# Patient Record
Sex: MALE | Race: WHITE | NOT HISPANIC OR LATINO | Employment: FULL TIME | URBAN - METROPOLITAN AREA
[De-identification: names, ages, dates, MRNs, and addresses within clinical notes are randomized per-mention and may not be internally consistent; named-entity substitution may affect disease eponyms.]

---

## 2017-05-03 NOTE — PROGRESS NOTES
SUBJECTIVE:                                                    Dong Joseph is a 62 year old male who presents to clinic today for the following health issues: on Niacin, fish oil daniel once in a while IBS, headache on and off      Hyperlipidemia Follow-Up      Rate your low fat/cholesterol diet?: good    Taking statin?  Yes, no muscle aches from statin    Other lipid medications/supplements?:  none       Amount of exercise or physical activity: 6-7 days/week for an average of 45-60 minutes    Problems taking medications regularly: No    Medication side effects: none    Diet: low salt and low fat/cholesterol      PROBLEMS TO ADD ON...    Problem list and histories reviewed & adjusted, as indicated.  Additional history: as documented    Patient Active Problem List   Diagnosis     Pure hypercholesterolemia     Impotence of organic origin     HYPERLIPIDEMIA LDL GOAL <130     Past Surgical History:   Procedure Laterality Date     COLONOSCOPY  12/22/2010    COLONOSCOPY performed by DONNA WHITEHEAD at  GI     HERNIA REPAIR, INGUINAL RT/LT  1979-80     HERNIA REPAIR, INGUINAL RT/LT  03/30/2006    Recurrent left inguinal hernia.       Social History   Substance Use Topics     Smoking status: Former Smoker     Packs/day: 0.50     Types: Cigarettes     Smokeless tobacco: Never Used      Comment: Quit in 1978     Alcohol use 1.0 oz/week     Family History   Problem Relation Age of Onset     C.A.D. Mother      age 70s     CEREBROVASCULAR DISEASE Father      at 68 yo     Lipids Brother      Lipids Brother          Current Outpatient Prescriptions   Medication Sig Dispense Refill     atorvastatin (LIPITOR) 20 MG tablet Take 1 tablet (20 mg) by mouth daily 90 tablet 3     aspirin 81 MG tablet Take 1 tablet by mouth daily. 90 tablet 3     [DISCONTINUED] atorvastatin (LIPITOR) 20 MG tablet Take 1 tablet (20 mg) by mouth daily 90 tablet 3     VIAGRA 100 MG OR TABS 1/2 to 1 TABLET AS NEEDED (Patient not taking: No sig reported)  "12 4     No Known Allergies    Reviewed and updated as needed this visit by clinical staff       Reviewed and updated as needed this visit by Provider         ROS:  Constitutional, HEENT, cardiovascular, pulmonary, gi and gu systems are negative, except as otherwise noted.    OBJECTIVE:                                                    /70  Pulse 72  Temp 97.4  F (36.3  C) (Oral)  Resp 12  Ht 5' 8.5\" (1.74 m)  Wt 157 lb (71.2 kg)  BMI 23.52 kg/m2  Body mass index is 23.52 kg/(m^2).  GENERAL: healthy, alert and no distress  EYES: Eyes grossly normal to inspection, PERRL and conjunctivae and sclerae normal  NECK: no adenopathy, no asymmetry, masses, or scars and thyroid normal to palpation  RESP: lungs clear to auscultation - no rales, rhonchi or wheezes  CV: regular rate and rhythm, normal S1 S2, no S3 or S4, no murmur, click or rub, no peripheral edema and peripheral pulses strong  ABDOMEN: soft, nontender, no hepatosplenomegaly, no masses and bowel sounds normal  MS: no gross musculoskeletal defects noted, no edema  NEURO: Normal strength and tone, mentation intact and speech normal  BACK: no CVA tenderness, no paralumbar tenderness  PSYCH: mentation appears normal, affect normal/bright    Diagnostic Test Results:  Results for orders placed or performed in visit on 05/08/17   Lipid panel reflex to direct LDL   Result Value Ref Range    Cholesterol 149 <200 mg/dL    Triglycerides 105 <150 mg/dL    HDL Cholesterol 44 >39 mg/dL    LDL Cholesterol Calculated 84 <100 mg/dL    Non HDL Cholesterol 105 <130 mg/dL   Glucose   Result Value Ref Range    Glucose 105 (H) 70 - 99 mg/dL        ASSESSMENT/PLAN:                                                    Hyperlipidemia; controlled   Plan:  No changes in the patient's current treatment plan            ICD-10-CM    1. History of elevated glucose Z86.39 Glucose   2. Pure hypercholesterolemia E78.00 Lipid panel reflex to direct LDL     atorvastatin (LIPITOR) 20 MG " "tablet   3. Hyperlipidemia LDL goal <130 E78.5 Lipid panel reflex to direct LDL     atorvastatin (LIPITOR) 20 MG tablet     Glucose       MEDICATIONS:  Continue current medications without change  FUTURE APPOINTMENTS:       - Follow-up visit in   SELF MONITORING:  Work on weight loss  Regular exercise  See Patient Instructions    The patient understood the rational for the diagnosis and treatment plan. All questions were answered to best of my ability and the patient's satisfaction.  I have reviewed all pertinent investigations including labs and imaging including outside records if relevant. See patient instructions.     Patient Instructions     Controlling Your Cholesterol  Cholesterol is a waxy substance. It travels in your blood through the blood vessels. When you have high cholesterol, it builds up in the walls of the blood vessels. This makes the vessels narrower. Blood flow decreases. You are then at greater risk for having a heart attack or a stroke.  Good and bad cholesterol  Lipids are fats. Blood is mostly water. Fat and water don't mix. So our bodies need lipoproteins (lipids inside a protein shell) to carry the lipids. The protein shell carries its lipids through the bloodstream. There are two main kinds of lipoproteins:    LDL (low-density lipoprotein) is known as \"bad cholesterol.\" It mainly carries cholesterol. It delivers this cholesterol to body cells. Excess LDL cholesterol will build up in artery walls. This increases your risk for heart disease and stroke.    HDL (high-density lipoprotein) is known as \"good cholesterol.\" This protein shell collects excess cholesterol that LDLs have left behind on blood vessel walls. That's why high levels of HDL cholesterol can decrease your risk of heart disease and stroke.  Controlling cholesterol levels  Total cholesterol includes LDL and HDL cholesterol, as well as other fats in the bloodstream. If your total cholesterol is high, follow the steps below to " help lower your total cholesterol level:    Eat less unhealthy fat:    Cut back on saturated fats and trans (also called hydrogenated) fats by selecting lean cuts of meat, low-fat dairy, and using oils instead of solid fats. Limit baked goods, processed meats, and fried foods. A diet that s high in these fats increases your bad cholesterol. It's not enough to just cut back on foods containing cholesterol.    Eat about 2 servings of fish per week. Most fish contain omega-3 fatty acids. These help lower blood cholesterol.    Eat more whole grains and soluble fiber (such as oat bran). These lower overall cholesterol.    Be active:    Choose an activity you enjoy. Walking, swimming, and riding a bike are some good ways to be active.    Start at a level where you feel comfortable. Increase your time and pace a little each week.    Work up to 40 minutes of moderate to high intensity physical activity at least 3 to 4 days per week.    Remember, some activity is better than none.    If you haven't been exercising regularly, start slowly. Check with your doctor to make sure the exercise plan is right for you.    Quit smoking. Quitting smoking can improve your lipid levels. It also lowers your risk for heart disease and stroke.    Weight management. If you are overweight or obese, your health care provider will work with you to lose weight and lower your BMI (body mass index) to a normal or near-normal level. Making diet changes and increasing physical activity can help.    Take medication as directed. Many people need medication to get their LDL levels to a safe level. Medication to lower cholesterol levels is effective and safe. (But taking medication is not a substitute for exercise or watching your diet!) Your doctor can tell you whether you might benefit from a cholesterol-lowering medication.    8270-6843 [employer]. [address] [place] [address], [place] [address]. All rights reserved. This information is not  intended as a substitute for professional medical care. Always follow your healthcare professional's instructions.        Understanding Food and Cholesterol  Having a high cholesterol level puts you at risk for heart disease and other health problems. What you eat has a big effect on your body s cholesterol level. Eating certain foods can raise your cholesterol. Other foods can help you lower it. Watching what you eat can help you get your cholesterol level under control.    Know high cholesterol foods  Foods high in fat, cholesterol, or both:    Fatty beef, cold cuts, ortega, sausage    Creamy sauces and fatty gravies    Cookies, donuts, muffins, and pastries    Fried foods    Egg yolks    Shortening, butter, coconut oil, palm oil, hydrogenated oils (read labels)    High-fat dairy products, such as whole milk, cheese, and ice cream  Better choices:    Lean beef, skinless white-meat poultry, fish    Tomato sauce, vegetable puree    Dried fruit, bagels, bread with jam    Baked, broiled, steamed, or roasted foods    Egg whites or egg substitute    Tub margarine, canola oil, and olive oil in moderation    Low-fat or nonfat dairy products, such as 1% or fat-free milk, reduced-fat cheese, and nonfat frozen yogurt  Use fiber to help control cholesterol  Foods high in fiber can help you keep your cholesterol down. Good sources of fiber are:    Oats, barley    Whole grains    Beans    Vegetables    Cornmeal, popcorn    Berries, apples, other fruits    2015-2681 [employer]. [address] [place] [address], [place] [address]. All rights reserved. This information is not intended as a substitute for professional medical care. Always follow your healthcare professional's instructions.        Lifestyle Changes to Control Cholesterol  Diet, exercise, weight management, quitting smoking, stress management, and taking your medications right can help you control your cholesterol.    Diet  Your health care provider will give you  information on changes to your diet you may need to make, based on your situation. Your provider may recommend that you see a registered dietitian for help with diet changes. Changes may include:    Reducing the amount of fat and cholesterol in your meals    Reducing the amount of sodium (salt) in your food, especially if you have high blood pressure    Eating more fresh vegetables and fruits    Eating lean proteins, such as fish, poultry, and legumes (beans and peas), and eating less red meat and processed meats    Using low-fat dairy products    Using vegetable and nut oils in limited amounts    Limiting how many sweets and processed foods like chips, cookies, and baked goods that you eat   Exercise  Regular exercise is a good way to help your body control cholesterol. Regular exercise has many benefits. It can:    Raise your good cholesterol.    Help lower your bad cholesterol.    Let blood flow better through your body.    Give more oxygen to your muscles and tissues.    Help you manage your weight.  Your health care provider may recommend that you get more physical activity if you haven't been active. Depending on your situation, your provider may recommend that you get moderate to vigorous physical activity for at least 40 minutes each day and for at least 3 to 4 days each week. A few examples of moderate to vigorous activity include:    Walking at a brisk pace, about 3 to 4 miles per hour    Jogging or running    Swimming or water aerobics    Hiking    Dancing    Martial arts    Tennis    Riding a bicycle or stationary bike    Dancing  Weight management  If you are overweight or obese, your health care provider will work with you to help you lose weight and lower your BMI (body mass index) to a normal or near-normal level. Making diet changes and getting more physical activity can help.    Quitting smoking  Smoking and other tobacco use can raise cholesterol and make it harder to control. Quitting is tough.  But millions of people have given up tobacco for good. You can quit, too! Think about some of the reasons below to quit smoking. Do any of them make you think twice about your smoking habit?  Stop smoking because it:    Keeps your cholesterol high, even if you make all the other changes you re supposed to.    Damages your body, especially your heart, lungs, and blood vessels.    Makes you more likely to have a heart attack (also known as acute myocardial infarction, or AMI), stroke, or cancer.    Stains your teeth and makes your skin, clothes, and breath smell bad.    Costs a lot of money.  Stress   Learn stress-management techniques to help you deal with stress in your home and work life.   Making the most of medications  Healthy eating and exercise are a good start to keeping your cholesterol down. But you may need some extra help from medication. If your doctor prescribes medication, be sure to take it exactly as directed. Remember:    Tell your doctor about all other medications you take, including vitamins and herbs.    Tell your doctor if you have any side effects after starting to take a medication. Examples of side effects to watch for include: muscle aches, weakness, blurred vision, rust-colored urine, yellowing of eyes or skin (jaundice), or headache.    Don t skip a dose or stop taking your medication because you feel better or because your cholesterol numbers go down. Never stop taking your medication unless your doctor has told you it s OK.    7784-4956 [employer]. [address] [place] [address]. All rights reserved. This information is not intended as a substitute for professional medical care. Always follow your healthcare professional's instructions.            Tanner Khan MD  [facility]

## 2017-05-08 ENCOUNTER — OFFICE VISIT (OUTPATIENT)
Dept: FAMILY MEDICINE | Facility: OTHER | Age: 62
End: 2017-05-08
Payer: COMMERCIAL

## 2017-05-08 VITALS
RESPIRATION RATE: 12 BRPM | SYSTOLIC BLOOD PRESSURE: 126 MMHG | WEIGHT: 157 LBS | HEIGHT: 69 IN | HEART RATE: 72 BPM | TEMPERATURE: 97.4 F | DIASTOLIC BLOOD PRESSURE: 70 MMHG | BODY MASS INDEX: 23.25 KG/M2

## 2017-05-08 DIAGNOSIS — E78.00 PURE HYPERCHOLESTEROLEMIA: Primary | ICD-10-CM

## 2017-05-08 DIAGNOSIS — Z86.39 HISTORY OF ELEVATED GLUCOSE: ICD-10-CM

## 2017-05-08 DIAGNOSIS — E78.5 HYPERLIPIDEMIA LDL GOAL <130: ICD-10-CM

## 2017-05-08 LAB
CHOLEST SERPL-MCNC: 149 MG/DL
GLUCOSE SERPL-MCNC: 105 MG/DL (ref 70–99)
HDLC SERPL-MCNC: 44 MG/DL
LDLC SERPL CALC-MCNC: 84 MG/DL
NONHDLC SERPL-MCNC: 105 MG/DL
TRIGL SERPL-MCNC: 105 MG/DL

## 2017-05-08 PROCEDURE — 99213 OFFICE O/P EST LOW 20 MIN: CPT | Performed by: FAMILY MEDICINE

## 2017-05-08 PROCEDURE — 80061 LIPID PANEL: CPT | Performed by: FAMILY MEDICINE

## 2017-05-08 PROCEDURE — 36415 COLL VENOUS BLD VENIPUNCTURE: CPT | Performed by: FAMILY MEDICINE

## 2017-05-08 PROCEDURE — 82947 ASSAY GLUCOSE BLOOD QUANT: CPT | Performed by: FAMILY MEDICINE

## 2017-05-08 RX ORDER — ATORVASTATIN CALCIUM 20 MG/1
20 TABLET, FILM COATED ORAL DAILY
Qty: 90 TABLET | Refills: 3 | Status: SHIPPED | OUTPATIENT
Start: 2017-05-08 | End: 2018-05-14

## 2017-05-08 ASSESSMENT — PAIN SCALES - GENERAL: PAINLEVEL: NO PAIN (0)

## 2017-05-08 NOTE — PATIENT INSTRUCTIONS
"  Controlling Your Cholesterol  Cholesterol is a waxy substance. It travels in your blood through the blood vessels. When you have high cholesterol, it builds up in the walls of the blood vessels. This makes the vessels narrower. Blood flow decreases. You are then at greater risk for having a heart attack or a stroke.  Good and bad cholesterol  Lipids are fats. Blood is mostly water. Fat and water don't mix. So our bodies need lipoproteins (lipids inside a protein shell) to carry the lipids. The protein shell carries its lipids through the bloodstream. There are two main kinds of lipoproteins:    LDL (low-density lipoprotein) is known as \"bad cholesterol.\" It mainly carries cholesterol. It delivers this cholesterol to body cells. Excess LDL cholesterol will build up in artery walls. This increases your risk for heart disease and stroke.    HDL (high-density lipoprotein) is known as \"good cholesterol.\" This protein shell collects excess cholesterol that LDLs have left behind on blood vessel walls. That's why high levels of HDL cholesterol can decrease your risk of heart disease and stroke.  Controlling cholesterol levels  Total cholesterol includes LDL and HDL cholesterol, as well as other fats in the bloodstream. If your total cholesterol is high, follow the steps below to help lower your total cholesterol level:    Eat less unhealthy fat:    Cut back on saturated fats and trans (also called hydrogenated) fats by selecting lean cuts of meat, low-fat dairy, and using oils instead of solid fats. Limit baked goods, processed meats, and fried foods. A diet that s high in these fats increases your bad cholesterol. It's not enough to just cut back on foods containing cholesterol.    Eat about 2 servings of fish per week. Most fish contain omega-3 fatty acids. These help lower blood cholesterol.    Eat more whole grains and soluble fiber (such as oat bran). These lower overall cholesterol.    Be active:    Choose an " activity you enjoy. Walking, swimming, and riding a bike are some good ways to be active.    Start at a level where you feel comfortable. Increase your time and pace a little each week.    Work up to 40 minutes of moderate to high intensity physical activity at least 3 to 4 days per week.    Remember, some activity is better than none.    If you haven't been exercising regularly, start slowly. Check with your doctor to make sure the exercise plan is right for you.    Quit smoking. Quitting smoking can improve your lipid levels. It also lowers your risk for heart disease and stroke.    Weight management. If you are overweight or obese, your health care provider will work with you to lose weight and lower your BMI (body mass index) to a normal or near-normal level. Making diet changes and increasing physical activity can help.    Take medication as directed. Many people need medication to get their LDL levels to a safe level. Medication to lower cholesterol levels is effective and safe. (But taking medication is not a substitute for exercise or watching your diet!) Your doctor can tell you whether you might benefit from a cholesterol-lowering medication.    9844-0164 [employer]. [address] [place] [id number]. All rights reserved. This information is not intended as a substitute for professional medical care. Always follow your healthcare professional's instructions.        Understanding Food and Cholesterol  Having a high cholesterol level puts you at risk for heart disease and other health problems. What you eat has a big effect on your body s cholesterol level. Eating certain foods can raise your cholesterol. Other foods can help you lower it. Watching what you eat can help you get your cholesterol level under control.    Know high cholesterol foods  Foods high in fat, cholesterol, or both:    Fatty beef, cold cuts, ortega, sausage    Creamy sauces and fatty gravies    Cookies, donuts,  muffins, and pastries    Fried foods    Egg yolks    Shortening, butter, coconut oil, palm oil, hydrogenated oils (read labels)    High-fat dairy products, such as whole milk, cheese, and ice cream  Better choices:    Lean beef, skinless white-meat poultry, fish    Tomato sauce, vegetable puree    Dried fruit, bagels, bread with jam    Baked, broiled, steamed, or roasted foods    Egg whites or egg substitute    Tub margarine, canola oil, and olive oil in moderation    Low-fat or nonfat dairy products, such as 1% or fat-free milk, reduced-fat cheese, and nonfat frozen yogurt  Use fiber to help control cholesterol  Foods high in fiber can help you keep your cholesterol down. Good sources of fiber are:    Oats, barley    Whole grains    Beans    Vegetables    Cornmeal, popcorn    Berries, apples, other fruits    [id number] [employer]. [address] [place] [address]. All rights reserved. This information is not intended as a substitute for professional medical care. Always follow your healthcare professional's instructions.        Lifestyle Changes to Control Cholesterol  Diet, exercise, weight management, quitting smoking, stress management, and taking your medications right can help you control your cholesterol.    Diet  Your health care provider will give you information on changes to your diet you may need to make, based on your situation. Your provider may recommend that you see a registered dietitian for help with diet changes. Changes may include:    Reducing the amount of fat and cholesterol in your meals    Reducing the amount of sodium (salt) in your food, especially if you have high blood pressure    Eating more fresh vegetables and fruits    Eating lean proteins, such as fish, poultry, and legumes (beans and peas), and eating less red meat and processed meats    Using low-fat dairy products    Using vegetable and nut oils in limited amounts    Limiting how many sweets and processed  foods like chips, cookies, and baked goods that you eat   Exercise  Regular exercise is a good way to help your body control cholesterol. Regular exercise has many benefits. It can:    Raise your good cholesterol.    Help lower your bad cholesterol.    Let blood flow better through your body.    Give more oxygen to your muscles and tissues.    Help you manage your weight.  Your health care provider may recommend that you get more physical activity if you haven't been active. Depending on your situation, your provider may recommend that you get moderate to vigorous physical activity for at least 40 minutes each day and for at least 3 to 4 days each week. A few examples of moderate to vigorous activity include:    Walking at a brisk pace, about 3 to 4 miles per hour    Jogging or running    Swimming or water aerobics    Hiking    Dancing    Martial arts    Tennis    Riding a bicycle or stationary bike    Dancing  Weight management  If you are overweight or obese, your health care provider will work with you to help you lose weight and lower your BMI (body mass index) to a normal or near-normal level. Making diet changes and getting more physical activity can help.    Quitting smoking  Smoking and other tobacco use can raise cholesterol and make it harder to control. Quitting is tough. But millions of people have given up tobacco for good. You can quit, too! Think about some of the reasons below to quit smoking. Do any of them make you think twice about your smoking habit?  Stop smoking because it:    Keeps your cholesterol high, even if you make all the other changes you re supposed to.    Damages your body, especially your heart, lungs, and blood vessels.    Makes you more likely to have a heart attack (also known as acute myocardial infarction, or AMI), stroke, or cancer.    Stains your teeth and makes your skin, clothes, and breath smell bad.    Costs a lot of money.  Stress   Learn stress-management techniques to  "help you deal with stress in your home and work life.   Making the most of medications  Healthy eating and exercise are a good start to keeping your cholesterol down. But you may need some extra help from medication. If your doctor prescribes medication, be sure to take it exactly as directed. Remember:    Tell your doctor about all other medications you take, including vitamins and herbs.    Tell your doctor if you have any side effects after starting to take a medication. Examples of side effects to watch for include: muscle aches, weakness, blurred vision, rust-colored urine, yellowing of eyes or skin (jaundice), or headache.    Don t skip a dose or stop taking your medication because you feel better or because your cholesterol numbers go down. Never stop taking your medication unless your doctor has told you it s OK.    [id number] [employer]. [address] [place], [place] [id number]. All rights reserved. This information is not intended as a substitute for professional medical care. Always follow your healthcare professional's instructions.        Risk Factors for Heart Disease  A risk factor is something that increases your chance of having heart disease. Heart disease (also called coronary artery disease) involves damage to arteries, blood vessels that carry oxygen-rich blood through your body. Things like smoking or unhealthy cholesterol levels can damage arteries. You can t control some risk factors, such as age and a family history of heart disease. But most, including those listed below, are things you can control.    Unhealthy cholesterol levels  Cholesterol is a fatty substance in your blood. It can build up inside your arteries and block the blood flow to your heart or brain. Your risk of heart disease goes up if you have high levels of LDL (\"bad\") cholesterol or triglycerides (another substance that can build up) You re also at risk if you don't have enough HDL cholesterol (\"good\") " cholesterol that clears the bad cholesterol away.  Smoking  This is the most important risk factor you can change. Smoking damages your arteries. It reduces blood flow to your heart and brain. It greatly increases your risk of heart disease, stroke, lung disease, and cancer. If you smoke, you are two to four times more likely to develop coronary artery disease.  High blood pressure  High blood pressure occurs when blood pushes too hard against artery walls as it passes through the arteries. This damages the artery lining. High blood pressure raises your risk of heart attack, also known as acute myocardial infarction, or AMI, and especially stroke.  Negative emotions  Stress, pent-up anger, and other negative emotions have been linked to heart disease. Over time, these emotions could raise your heart disease risk.  Metabolic syndrome  This is caused by a combination of certain risk factors. It puts you at extra high risk of heart disease, stroke, and diabetes. You have metabolic syndrome if you have three or more of the following: low HDL cholesterol; high triglycerides; high blood pressure; high blood sugar; extra weight around the waist.  Diabetes  Diabetes occurs when you have high levels of sugar (glucose) in your blood. This can damage arteries if not kept under control. Having diabetes also makes you more likely to have a silent heart attack--one without any symptoms.  Excess weight  Excess weight makes other risk factors, such as diabetes, more likely. Excess weight around the waist or stomach increases your heart disease risk the most.  Lack of physical activity  When you re not active, you re more likely to develop diabetes, high blood pressure, abnormal cholesterol levels, and excess weight.     Most people with heart disease have more than one risk factor.     [id number] [employer]. [address] [place] [address], [place] [address]. All rights reserved. This information is not intended as a  substitute for professional medical care. Always follow your healthcare professional's instructions.        Heart Disease Education    The heart beats 60 to 100 times per minute, 24 hours a day. This equals almost 1000,000 times a day. It pumps blood with oxygen and nutrients to the tissues and organs of the body. But the heart is a muscle and needs its own supply of blood. Blood flow to the heart is supplied by the coronary arteries. Coronary artery disease (atherosclerosis) is a result of cholesterol, saturated fat, and calcium deposits (plaques) that build up inside the walls. This causes inflammation within the coronary arteries. These plaques narrow the artery and reduce blood flow to the heart muscle. The reduction in blood flow to the heart muscle decreases oxygen supply to the heart. If the narrowing is significant enough, the oxygen supply to one or more regions of the heart can be temporarily or permanently shut down. This can cause chest pain, and possibly death of heart tissue (heart attack).  Types of chest pain  Angina is the name for pain in the heart muscle. Angina is a warning sign of serious heart disease. When untreated it can lead to a heart attack, also known as acute myocardial infarction, or AMI. Angina occurs when there is not enough blood and oxygen flowing to the heart for the amount of work it is doing. This most often happens during physical exertion, when the heart is working hardest. It is usually relieved by rest or nitroglycerin. Angina may also occur after a large meal when extra blood is sent to the digestive organs and less goes to the heart. In the case of advanced or unstable heart disease, angina can occur at rest or awaken you from sleep. Angina usually lasts from a few minutes up to 20 minutes or more. When treated early, the effects of angina can be reversed without permanent damage to the heart. Angina is a serious condition and needs to be evaluated by a medical  professional immediately.  There are two types of angina -- stable and unstable:    Stable angina usually occurs with a predictable level of activity. Being stable, its character, severity, and occurrence do not change much over time. It usually starts with activity, and resolves with rest or taking your medicine as instructed by your doctor. The symptoms usually do not last long.    Unstable angina changes or gets worse over time. It is different from whatever you are used to. It may feel different or worse, begin without cause, occur with exercise or exertion, wake you up from sleep, and last longer. It may not respond in the same way as it does when you take your usual medicines for an attack. This type of angina can be a warning sign of an impending heart attack.     A heart attack is usually the result of a blood clot that suddenly forms in a coronary artery that has been narrowed with plaque. When this occurs, blood flow may be cut off to a part of the heart muscle, causing the cells to die. This weakens the pumping action of the heart, which affects the delivery of blood to all the other organs in the body including the brain. This damage is not reversible. However, early treatment can limit the amount of damage.  The pain you feel with angina and a heart attack may have a similar quality. However, it is usually different in intensity and duration. Here are some typical descriptions of a heart attack:    It is most often experienced as a squeezing, crushing, pressure-like sensation in the center of the chest.    It is sometimes described as  something heavy sitting on my chest.     It may feel more like a bad case of indigestion.    The pain may spread from the chest to the arm, shoulder, throat or jaw.    Sometimes the pain is not felt in the chest at all, but only in the arm, shoulder, throat or jaw.    There may also be nausea, vomiting, dizziness or light-headedness, sweating and trouble  "breathing.    Palpitations, or your heart beating rapidly    A new, irregular heart beat    Unexplained weakness  You may not be able to tell the difference between \"bad\" angina and a heart attack at home. Seek help if your symptoms are different than usual. Do not be in denial or just try to \"tough it out.\"  Call 911  This is the fastest and safest way to get to the emergency department. The paramedics can also start treatment on the way to the hospital, saving valuable time for your heart.    If the angina gets worse, if it continues, or if it stops and returns, call 911 immediately. Do not delay. You may be having a heart attack.    After you call 911, take a second tablet or spray unless instructed otherwise. When repeating doses, sit down if possible, because it can make you feel lightheaded or dizzy. Wait another 5 minutes. If the angina still does not go away, take a third tablet or spray. Do not take more than 3 tablets or sprays within 15 minutes. Stay on the phone with 911 for further instruction.    Your healthcare provider may give you slightly different instructions than those above. If so, follow them carefully.  Do not wait until symptoms become severe to call 911.  Other reasons to call 911 include:    Trouble breathing    Feeling lightheaded, faint, or dizzy    Rapid heart beat    Slower than usual heart rate compared to your normal    Angina with weakness, dizziness, fainting, heavy sweating, nausea, or vomiting    Extreme drowsiness, confusion    Weakness of an arm or leg or one side of the face    Difficulty with speech or vision  When to seek medical care  Remember, the signs and symptoms of a heart attack are not always like they are on TV. Sometimes they are not so obvious. You may only feel weak, or just not right. If it is not clear or if you have any doubt, call for advice.    Seek help if there is a change in the type of pain, if it feels different, or if your symptoms are mild.    Do not " "drive yourself. Have someone else drive you. If no one can drive, call 911.    Do not delay. Fast diagnosis and treatment can prevent or limit the amount of heart damage during a heart attack.    Do not go to your doctor's office or a clinic as they may not be able to provide all the testing and treatment required for this condition.    If your doctor has given you medicine to take when symptoms occur, take them but don't delay getting help trying to locate medicines.  What happens in the emergency department  The emergency department is connected to your local emergency medical system (EMS) through 911. That's why during a cardiac emergency, calling 911 is the fastest way to get help. The goal of the emergency department is to rapidly screen, evaluate, and treat people.  Once you are there, an electrocardiogram (ECG or heart tracing) will be done. Blood samples may be taken to look for the presence of heart enzymes that leak from damaged heart cells and show if a heart attack is occurring. You will often be evaluated by a heart specialist (cardiologist) who decides the best course of action. In the case of severe angina or early heart attack, and depending on the circumstances, powerful \"clot busting\" medicines can be used to dissolve blood clots in the coronary artery. In other cases, you may be taken to a cardiac catheterization lab. Here, a tiny balloon-tipped catheter is advanced through blood vessels to the heart. There the balloon is inflated pushing open the blood vessel restoring blood flow.  Risk factors for heart disease  Risk factors for heart disease are a combination of genetic and lifestyle. Many risk factors work by either directly or indirectly damaging the blood vessels of the heart, or by increasing the risk of forming blood or cholesterol clots, which then clog up and block the arteries.     Examples of physical lifestyle risk factors:    Cigarette smoking    High blood pressure    High blood " cholesterol    Use of stimulant drugs such as cocaine,  crack,  and amphetamines    Eating a high-fat, high-cholesterol meal    Diabetes     Obesity which increases risk for diabetes and high blood pressure    Lack of regular physical activity     Examples of emotional lifestyle factors:    Chronic high stress levels release stress hormones. These raise blood pressure and cholesterol level and makes blood clot more easily.    Held-in anger, hostile or cynical attitude    Social and emotional isolation, lack of intimacy    Loss of relationship    Depression  Other factors that increase the risk of heart attack that you cannot control :    Age. The older you get beyond 40, the greater is your risk of significant coronary artery disease.    Gender. More men than women get heart disease; but once past menopause, women who are not taking estrogen replacement have the same risk as men for a heart attack.    Family history. If your mother, father, brother or sister has coronary artery disease, your risk of having it is higher than a person your age without this family history.  What can you do to decrease your risk  To reduce your risk of heart disease:    Get regular checkups with your doctor.    Take your medicines for blood pressure, cholesterol or diabetes as directed.    Watch your diet. Eat a heart healthy diet choosing fresh foods, less salt, cholesterol, and fat    Stop smoking. Get help if needed.    Get regular exercise.    Manage stress.    Carry a list of medicines and doses in your wallet.    [id number] [employer]. [address] [place] [address], [place] [address]. All rights reserved. This information is not intended as a substitute for professional medical care. Always follow your healthcare professional's instructions.

## 2017-05-08 NOTE — NURSING NOTE
"Chief Complaint   Patient presents with     Recheck Medication     Panel Management     Lipids       Initial /70  Pulse 72  Temp 97.4  F (36.3  C) (Oral)  Resp 12  Ht 5' 8.5\" (1.74 m)  Wt 157 lb (71.2 kg)  BMI 23.52 kg/m2 Estimated body mass index is 23.52 kg/(m^2) as calculated from the following:    Height as of this encounter: 5' 8.5\" (1.74 m).    Weight as of this encounter: 157 lb (71.2 kg).  Medication Reconciliation: complete     Rashmi Chang CMA (AAMA)      "

## 2017-05-08 NOTE — MR AVS SNAPSHOT
"              After Visit Summary   5/8/2017    Dong Joseph    MRN: [id number]           Patient Information     Date Of Birth          1955        Visit Information        Provider Department      5/8/2017 11:10 AM Tanner Khan MD [facility]        Today's Diagnoses     History of elevated glucose    -  1    Pure hypercholesterolemia        Hyperlipidemia LDL goal <130          Care Instructions      Controlling Your Cholesterol  Cholesterol is a waxy substance. It travels in your blood through the blood vessels. When you have high cholesterol, it builds up in the walls of the blood vessels. This makes the vessels narrower. Blood flow decreases. You are then at greater risk for having a heart attack or a stroke.  Good and bad cholesterol  Lipids are fats. Blood is mostly water. Fat and water don't mix. So our bodies need lipoproteins (lipids inside a protein shell) to carry the lipids. The protein shell carries its lipids through the bloodstream. There are two main kinds of lipoproteins:    LDL (low-density lipoprotein) is known as \"bad cholesterol.\" It mainly carries cholesterol. It delivers this cholesterol to body cells. Excess LDL cholesterol will build up in artery walls. This increases your risk for heart disease and stroke.    HDL (high-density lipoprotein) is known as \"good cholesterol.\" This protein shell collects excess cholesterol that LDLs have left behind on blood vessel walls. That's why high levels of HDL cholesterol can decrease your risk of heart disease and stroke.  Controlling cholesterol levels  Total cholesterol includes LDL and HDL cholesterol, as well as other fats in the bloodstream. If your total cholesterol is high, follow the steps below to help lower your total cholesterol level:    Eat less unhealthy fat:    Cut back on saturated fats and trans (also called hydrogenated) fats by selecting lean cuts of meat, low-fat dairy, and using oils instead of solid " fats. Limit baked goods, processed meats, and fried foods. A diet that s high in these fats increases your bad cholesterol. It's not enough to just cut back on foods containing cholesterol.    Eat about 2 servings of fish per week. Most fish contain omega-3 fatty acids. These help lower blood cholesterol.    Eat more whole grains and soluble fiber (such as oat bran). These lower overall cholesterol.    Be active:    Choose an activity you enjoy. Walking, swimming, and riding a bike are some good ways to be active.    Start at a level where you feel comfortable. Increase your time and pace a little each week.    Work up to 40 minutes of moderate to high intensity physical activity at least 3 to 4 days per week.    Remember, some activity is better than none.    If you haven't been exercising regularly, start slowly. Check with your doctor to make sure the exercise plan is right for you.    Quit smoking. Quitting smoking can improve your lipid levels. It also lowers your risk for heart disease and stroke.    Weight management. If you are overweight or obese, your health care provider will work with you to lose weight and lower your BMI (body mass index) to a normal or near-normal level. Making diet changes and increasing physical activity can help.    Take medication as directed. Many people need medication to get their LDL levels to a safe level. Medication to lower cholesterol levels is effective and safe. (But taking medication is not a substitute for exercise or watching your diet!) Your doctor can tell you whether you might benefit from a cholesterol-lowering medication.    4564-5272 [employer]. [address] [place] [address], [place] [address]. All rights reserved. This information is not intended as a substitute for professional medical care. Always follow your healthcare professional's instructions.        Understanding Food and Cholesterol  Having a high cholesterol level puts you at risk for heart  disease and other health problems. What you eat has a big effect on your body s cholesterol level. Eating certain foods can raise your cholesterol. Other foods can help you lower it. Watching what you eat can help you get your cholesterol level under control.    Know high cholesterol foods  Foods high in fat, cholesterol, or both:    Fatty beef, cold cuts, ortega, sausage    Creamy sauces and fatty gravies    Cookies, donuts, muffins, and pastries    Fried foods    Egg yolks    Shortening, butter, coconut oil, palm oil, hydrogenated oils (read labels)    High-fat dairy products, such as whole milk, cheese, and ice cream  Better choices:    Lean beef, skinless white-meat poultry, fish    Tomato sauce, vegetable puree    Dried fruit, bagels, bread with jam    Baked, broiled, steamed, or roasted foods    Egg whites or egg substitute    Tub margarine, canola oil, and olive oil in moderation    Low-fat or nonfat dairy products, such as 1% or fat-free milk, reduced-fat cheese, and nonfat frozen yogurt  Use fiber to help control cholesterol  Foods high in fiber can help you keep your cholesterol down. Good sources of fiber are:    Oats, barley    Whole grains    Beans    Vegetables    Cornmeal, popcorn    Berries, apples, other fruits    5493-9199 [employer]. [address] [place] [address]. All rights reserved. This information is not intended as a substitute for professional medical care. Always follow your healthcare professional's instructions.        Lifestyle Changes to Control Cholesterol  Diet, exercise, weight management, quitting smoking, stress management, and taking your medications right can help you control your cholesterol.    Diet  Your health care provider will give you information on changes to your diet you may need to make, based on your situation. Your provider may recommend that you see a registered dietitian for help with diet changes. Changes may include:    Reducing the  amount of fat and cholesterol in your meals    Reducing the amount of sodium (salt) in your food, especially if you have high blood pressure    Eating more fresh vegetables and fruits    Eating lean proteins, such as fish, poultry, and legumes (beans and peas), and eating less red meat and processed meats    Using low-fat dairy products    Using vegetable and nut oils in limited amounts    Limiting how many sweets and processed foods like chips, cookies, and baked goods that you eat   Exercise  Regular exercise is a good way to help your body control cholesterol. Regular exercise has many benefits. It can:    Raise your good cholesterol.    Help lower your bad cholesterol.    Let blood flow better through your body.    Give more oxygen to your muscles and tissues.    Help you manage your weight.  Your health care provider may recommend that you get more physical activity if you haven't been active. Depending on your situation, your provider may recommend that you get moderate to vigorous physical activity for at least 40 minutes each day and for at least 3 to 4 days each week. A few examples of moderate to vigorous activity include:    Walking at a brisk pace, about 3 to 4 miles per hour    Jogging or running    Swimming or water aerobics    Hiking    Dancing    Martial arts    Tennis    Riding a bicycle or stationary bike    Dancing  Weight management  If you are overweight or obese, your health care provider will work with you to help you lose weight and lower your BMI (body mass index) to a normal or near-normal level. Making diet changes and getting more physical activity can help.    Quitting smoking  Smoking and other tobacco use can raise cholesterol and make it harder to control. Quitting is tough. But millions of people have given up tobacco for good. You can quit, too! Think about some of the reasons below to quit smoking. Do any of them make you think twice about your smoking habit?  Stop smoking because  it:    Keeps your cholesterol high, even if you make all the other changes you re supposed to.    Damages your body, especially your heart, lungs, and blood vessels.    Makes you more likely to have a heart attack (also known as acute myocardial infarction, or AMI), stroke, or cancer.    Stains your teeth and makes your skin, clothes, and breath smell bad.    Costs a lot of money.  Stress   Learn stress-management techniques to help you deal with stress in your home and work life.   Making the most of medications  Healthy eating and exercise are a good start to keeping your cholesterol down. But you may need some extra help from medication. If your doctor prescribes medication, be sure to take it exactly as directed. Remember:    Tell your doctor about all other medications you take, including vitamins and herbs.    Tell your doctor if you have any side effects after starting to take a medication. Examples of side effects to watch for include: muscle aches, weakness, blurred vision, rust-colored urine, yellowing of eyes or skin (jaundice), or headache.    Don t skip a dose or stop taking your medication because you feel better or because your cholesterol numbers go down. Never stop taking your medication unless your doctor has told you it s OK.    4231-6978 [employer]. [address] [place] [address], [place] [address]. All rights reserved. This information is not intended as a substitute for professional medical care. Always follow your healthcare professional's instructions.              Follow-ups after your visit        Who to contact     If you have questions or need follow up information about today's clinic visit or your schedule please contact [facility] directly at [phone].  Normal or non-critical lab and imaging results will be communicated to you by MyChart, letter or phone within 4 business days after the clinic has received the results. If you do not hear from us within 7  "days, please contact the clinic through [employer] or phone. If you have a critical or abnormal lab result, we will notify you by phone as soon as possible.  Submit refill requests through Mobile[employer] or call your pharmacy and they will forward the refill request to us. Please allow 3 business days for your refill to be completed.          Additional Information About Your Visit        [facility] Information     [employer] gives you secure access to your electronic health record. If you see a primary care provider, you can also send messages to your care team and make appointments. If you have questions, please call your primary care clinic.  If you do not have a primary care provider, please call [phone] and they will assist you.        Care EveryWhere ID     This is your Care EveryWhere ID. This could be used by other organizations to access your [place] medical records  [id number]        Your Vitals Were     Pulse Temperature Respirations Height BMI (Body Mass Index)       72 97.4  F (36.3  C) (Oral) 12 5' 8.5\" (1.74 m) 23.52 kg/m2        Blood Pressure from Last 3 Encounters:   05/08/17 126/70   05/23/16 120/66   05/22/15 104/70    Weight from Last 3 Encounters:   05/08/17 157 lb (71.2 kg)   05/23/16 157 lb (71.2 kg)   05/22/15 164 lb 8 oz (74.6 kg)              We Performed the Following     Glucose     Lipid panel reflex to direct LDL          Where to get your medicines      These medications were sent to [facility] - [place], [place] - [address] [facility] [address]  [address] [facility] [address], [place] [address]     Phone:  [phone]     atorvastatin 20 MG tablet          Primary Care Provider Office Phone # Fax #    Naomi Kuo PA-C [phone]       [facility] [address] [place] [address]        Thank you!     Thank you for choosing [facility]  for your care. Our goal is always to provide you with excellent care. Hearing back from our " patients is one way we can continue to improve our services. Please take a few minutes to complete the written survey that you may receive in the mail after your visit with us. Thank you!             Your Updated Medication List - Protect others around you: Learn how to safely use, store and throw away your medicines at www.disposemymeds.org.          This list is accurate as of: 5/8/17 11:44 AM.  Always use your most recent med list.                   Brand Name Dispense Instructions for use    aspirin 81 MG tablet     90 tablet    Take 1 tablet by mouth daily.       atorvastatin 20 MG tablet    LIPITOR    90 tablet    Take 1 tablet (20 mg) by mouth daily       VIAGRA 100 MG cap/tab   Generic drug:  sildenafil     12    1/2 to 1 TABLET AS NEEDED

## 2017-05-09 ENCOUNTER — TELEPHONE (OUTPATIENT)
Dept: FAMILY MEDICINE | Facility: OTHER | Age: 62
End: 2017-05-09

## 2017-05-09 NOTE — TELEPHONE ENCOUNTER
Notes Recorded by Tanner Khan MD on 5/9/2017 at 10:20 AM  Cholesterol well controlled, glucose mild elevated.  continue healthy life style, balance diet and regular exercise.  If any questions, I can call.    Electronically signed:  Tanner Khan M.D

## 2018-01-10 ENCOUNTER — TELEPHONE (OUTPATIENT)
Dept: FAMILY MEDICINE | Facility: OTHER | Age: 63
End: 2018-01-10

## 2018-01-10 NOTE — TELEPHONE ENCOUNTER
Next 5 appointments (look out 90 days)     Jan 11, 2018  1:00 PM CST   Office Visit with Neel Vizcarra PA-C   [facility]    [address] [place] [id number]   [phone]                Rachel Ervin, RN, BSN

## 2018-01-10 NOTE — TELEPHONE ENCOUNTER
Reason for call:  Patient reporting a symptom    Symptom or request: pain from butt and down leg    Duration (how long have symptoms been present): worse the last couple of weeks    Have you been treated for this before? No    Additional comments: Patient would like to talk to RN to see if he should be seen and who he should see.  Please call    Phone Number patient can be reached at:  Home number on file [phone] (home)    Best Time:  any    Can we leave a detailed message on this number:  YES    Call taken on 1/10/2018 at 1:12 PM by Beba Pickett

## 2018-01-10 NOTE — TELEPHONE ENCOUNTER
"Work in request for Thursday afternoon--routing to [facility] and ER providers per patient request.    RN triage phone assessment note: 1/10/2018  Dong Joseph is a 62 year old male with a history of right hip pain. I spoke with him today.    NURSING ASSESSMENT:  Description:  Right hip pain for about one year. He believes it's sciatic pain, has been seeing a chiropractor, and that seemed to help at first, but the past couple of weeks his pain has become worse, \"it's feeling like it's pinched, a sharp pain when I try to get up from sitting, and like there's no stretch when I try to walk up stairs, my leg feels like a log.\" Pain can reach up to 8/10 at times. For the past month or so he's felt a fairly constant numbness/tingling sensation in his toes on the right foot, can feel it now while driving. He denies any noticeable deformity in the hip/leg/foot. Denies pale/blue skin in the foot.  Pain scale: \"it gets bad, like 8/10 at times.\"  Associated sx: numbness/tingling in toes  Onset/duration:  Pain for about a year, worsening and with new numbness/tingling in toes for about 1 month  Precip. factors:  No known injury, gradual onset  Allergies: No Known Allergies    NURSING PLAN: Nursing advice to patient seek care immediately per triage protocol.    RECOMMENDED DISPOSITION:  advised visit today, though PCP and [facility] providers are unable to work in any more patients this afternoon. Offered to look at surrounding clinics for open spots, as well as the option for UC/ER discussed with patient. Patient then declines a visit today, and requests a work in for tomorrow, as late in the afternoon as possible, at either [place] or [place]. At this time, only \"Dr. Only\" and \"same day\" spots are available. Will route as work in request to tomorrow's providers at both clinics. Patient will await a call back.    If further questions/concerns or if symptoms do not improve, worsen or new symptoms develop, call your PCP or [place] [facility] as soon as possible.      Guideline used:  Telephone Triage Protocols for Nurses, Fifth Edition, Esmer Hi  Hip Pain, p. 332  NOTE:  Disposition was determined by the first positive assessment question in the listed triage protocol, therefore all previous assessment questions were negative.       Stan Em, RN, BSN

## 2018-01-11 ENCOUNTER — OFFICE VISIT (OUTPATIENT)
Dept: FAMILY MEDICINE | Facility: OTHER | Age: 63
End: 2018-01-11
Payer: COMMERCIAL

## 2018-01-11 VITALS
RESPIRATION RATE: 20 BRPM | SYSTOLIC BLOOD PRESSURE: 136 MMHG | DIASTOLIC BLOOD PRESSURE: 80 MMHG | BODY MASS INDEX: 23.82 KG/M2 | HEART RATE: 103 BPM | OXYGEN SATURATION: 99 % | WEIGHT: 159 LBS | TEMPERATURE: 97.5 F

## 2018-01-11 DIAGNOSIS — M54.31 SCIATICA, RIGHT SIDE: Primary | ICD-10-CM

## 2018-01-11 DIAGNOSIS — M54.16 LUMBAR RADICULOPATHY: ICD-10-CM

## 2018-01-11 PROCEDURE — 99214 OFFICE O/P EST MOD 30 MIN: CPT | Performed by: PHYSICIAN ASSISTANT

## 2018-01-11 RX ORDER — METHYLPREDNISOLONE 4 MG
TABLET, DOSE PACK ORAL
Qty: 21 TABLET | Refills: 0 | Status: SHIPPED | OUTPATIENT
Start: 2018-01-11 | End: 2018-01-24

## 2018-01-11 ASSESSMENT — PAIN SCALES - GENERAL: PAINLEVEL: WORST PAIN (10)

## 2018-01-11 NOTE — PROGRESS NOTES
"  SUBJECTIVE:                                                    Dong Joseph is a 62 year old male who presents to clinic today for the following health issues:      HPI    Joint Pain    Onset: 1 year, but severe pain within the last few weeks    Description:   Location: right hip  Character: Sharp and Stabbing in \"buttock and hip area. It's a shooting pain.\"    Intensity: 10/10    Progression of Symptoms: worse    Accompanying Signs & Symptoms:  Other symptoms: numbness, tingling and \"leg kind of feels like a log\"   \"hard to bend it\"  \"hard to walk on it\"  \"sitting is okay but when I stand it's like shards of glass going through leg.\"  \"getting up from sitting is bad, getting up from sleeping is very bad.\"  Has to \"physically lift leg up to get into vehicle\"    History:   Previous similar pain: no       Precipitating factors:   Trauma or overuse: no     Alleviating factors:  Improved by: \"sitting is okay\"    Therapies Tried and outcome: heat, ice, stretches, Advil/Aleve with no relief.    Patient states he has coworkers who told him it could be his sciatic nerve. He has been seeing a chiropractor which was initially helpful but recently it has not helped. Over the past few weeks, the pain has been a lot worse in the right buttock and hip area with numbness over the anterior leg down to the top of the toes. He denies any low back pain. When he bends over and tries to stand up, the pain is excruciating and take his breath away. He states he can barely lift his right leg when sitting down. He has been taking Advil and Aleve with minimal benefit. He denies any saddle anesthesia or loss of bowel/bladder control. He denies any recent fall. He has not taken any time off of work.     Problem list and histories reviewed & adjusted, as indicated.  Additional history: none    ROS:  GENERAL: Denies fever, fatigue, weakness, weight gain, or weight loss.  MUSCULOSKELETAL: +Right buttock/hip pain.   NEUROLOGIC:  +Right leg " numbness/tinging. Denies headache, fainting, dizziness, memory loss, or seizures.    OBJECTIVE:     /80 (BP Location: Right arm, Patient Position: Chair, Cuff Size: Adult Regular)  Pulse 103  Temp 97.5  F (36.4  C) (Temporal)  Resp 20  Wt 159 lb (72.1 kg)  SpO2 99%  BMI 23.82 kg/m2  Body mass index is 23.82 kg/(m^2).  GENERAL: healthy, alert and no distress  RESP: lungs clear to auscultation - no rales, rhonchi or wheezes  CV: regular rate and rhythm, normal S1 S2, no S3 or S4, no murmur, click or rub  MS: no gross musculoskeletal defects noted. No spinal or paraspinal tenderness. No hip tenderness. Straight leg raise positive on the right.   NEURO: Normal strength and tone with the exception of 4-/5 right hip flexion, mentation intact and speech normal. Cranial nerves II-XII are grossly intact. DTRs are 2+/4 throughout and symmetric. Gait is antalgic and appears painful.      ASSESSMENT/PLAN:       ICD-10-CM    1. Sciatica, right side M54.31 MR Lumbar Spine w/o Contrast     methylPREDNISolone (MEDROL DOSEPAK) 4 MG tablet     NEUROSURGERY REFERRAL   2. Lumbar radiculopathy M54.16 MR Lumbar Spine w/o Contrast     methylPREDNISolone (MEDROL DOSEPAK) 4 MG tablet     NEUROSURGERY REFERRAL         Symptoms are consistent with lumbar radiculopathy/sciatica so will order an MRI for further evaluation.   Will also set him up with neurosurgery to discuss treatment options as he very likely has a disc herniation with new right leg weakness and parasthesias.   I will prescribe a medrol dose pack to take as directed with food to help with pain and inflammation. Instructed to take with food. He would like to avoid any narcotic medications.   I recommend he with ibuprofen/Aleve and Tylenol as needed for pain along with ice and heat.  Continue with stretching as well.  I offered to write him a work note but he declines at this time stating his job is not very physical but he will let me know if this changes.        Neel Vizcarra PA-C  [facility]

## 2018-01-11 NOTE — PATIENT INSTRUCTIONS
"Will order an MRI for further evaluation of your continued pain as you very likely have a pinched nerve in your back.  Will get you set up with a spine surgeon for further evaluation.  I will prescribe a steroid pack to take as directed with food to help with pain and inflammation.  Continue with ibuprofen/Aleve and Tylenol as needed for pain along with ice and heat.  Continue with stretching as well.  If you need a work note, let me know.              * Sciatica    Sciatica (\"Lumbar Radiculopathy\") causes a pain that spreads from the lower back down into the buttock, hip and leg. Sometimes leg pain can occur without any back pain. Sciatica is due to irritation or pressure on a spinal nerve as it comes out of the spinal canal. This is most often due to a bulge or rupture of a nearby spinal disk (the cartilage cushion between each spinal bone), which presses on a nearby nerve. Other causes include spinal stenosis (narrowing of the spinal canal) and spasm of the piriformis muscle (a muscle in the buttocks that the sciatic nerve passes through).  Sciatica may begin after a sudden twisting/bending force (such as in a car accident), or sometimes after a simple awkward movement. In either case, muscle spasm is commonly present and contributes to the pain.  The diagnosis of sciatica is made from the symptoms and physical exam. Unless you had a physical injury (such as a car accident or fall), X-rays are usually not ordered for the initial evaluation of sciatica because the nerves and disks cannot be seen on an X-ray. Most sciatica (80-90%) gets better with time.  What can I do about my low back pain?  There are three main things you can do to ease low back pain and help it go away.    Use heat or cold packs.    Take medicine as directed.    Use positions, movements and exercises. Stay active! Too much rest can make your symptoms worse.  Using heat or cold packs  Try cold packs or gentle heat to ease your pain. Use " whichever gives the most relief. Apply the cold pack or heat for 15 minutes at a time, as often as needed.  Taking medicine  If taking over-the-counter medicine:    Take ibuprofen (Advil, Motrin) 600 mg. three times a day as needed for pain.  OR    Take Aleve (naproxen sodium) 220 to 440 mg. two times a day as needed for pain  If your doctor prescribed a muscle relaxant (cyclobenzapine 10 mg.):    Take one half ( ) to 1 tablet at bedtime    Do not drive when taking this medicine. This drug may make you sleepy.  Using positions, movements and exercises  Research tells us that moving your joints and muscles can help you recover from back pain. Such activity should be simple and gentle.  Use the positions below as well as walking to help relieve your discomfort. Try taking a short walk every 3 to 4 hours during the day. Walk for a few minutes inside your home or take longer walks outside, on a treadmill or at a mall. Slowly increase the amount of time you walk. Expect discomfort when you begin, but it should lessen as your back starts to recover.  Finding a position that is comfortable  When your back pain is new, you may find that certain positions will ease your pain. Gently try each of the following positions until you find one that eases your pain. Once you find a position of comfort, use it as often as you like while you recover. Return to your daily routine as soon as possible.     Lie on your back with your legs bent. You can do this by placing a pillow under your knees or lie on the floor and rest your lower legs on the seat of a chair.    Lie on your side with your knees bent and place a pillow between your knees.    Lie on your stomach over pillows.  When should I call my doctor?  Your back pain should improve over the first couple of weeks. As it improves, you should be able to return to your normal activities. But call your doctor if:    You have a sudden change in your ability to control? your bladder or  bowels.    You begin to feel tingling in your groin or legs.    The pain spreads down your leg and into your foot.    Your toes, feet or leg muscles begin to feel weak.    You feel generally unwell or sick.    Your pain gets worse.    4417-7535 [employer]. [address] [place] [address]. All rights reserved. This information is not intended as a substitute for professional medical care. Always follow your healthcare professional's instructions.  This information has been modified by your health care provider with permission from the publisher.

## 2018-01-11 NOTE — NURSING NOTE
"Chief Complaint   Patient presents with     Musculoskeletal Problem       Initial /80 (BP Location: Right arm, Patient Position: Chair, Cuff Size: Adult Regular)  Pulse 103  Temp 97.5  F (36.4  C) (Temporal)  Resp 20  Wt 159 lb (72.1 kg)  SpO2 99%  BMI 23.82 kg/m2 Estimated body mass index is 23.82 kg/(m^2) as calculated from the following:    Height as of 5/8/17: 5' 8.5\" (1.74 m).    Weight as of this encounter: 159 lb (72.1 kg).  Medication Reconciliation: complete     Magdalena Solis CMA      "

## 2018-01-11 NOTE — MR AVS SNAPSHOT
"              After Visit Summary   1/11/2018    Dong Joseph    MRN: [id number]           Patient Information     Date Of Birth          1955        Visit Information        Provider Department      1/11/2018 1:00 PM Neel Vizcarra PA-C [facility]        Today's Diagnoses     Sciatica, right side    -  1    Lumbar radiculopathy          Care Instructions    Will order an MRI for further evaluation of your continued pain as you very likely have a pinched nerve in your back.  Will get you set up with a spine surgeon for further evaluation.  I will prescribe a steroid pack to take as directed with food to help with pain and inflammation.  Continue with ibuprofen/Aleve and Tylenol as needed for pain along with ice and heat.  Continue with stretching as well.  If you need a work note, let me know.              * Sciatica    Sciatica (\"Lumbar Radiculopathy\") causes a pain that spreads from the lower back down into the buttock, hip and leg. Sometimes leg pain can occur without any back pain. Sciatica is due to irritation or pressure on a spinal nerve as it comes out of the spinal canal. This is most often due to a bulge or rupture of a nearby spinal disk (the cartilage cushion between each spinal bone), which presses on a nearby nerve. Other causes include spinal stenosis (narrowing of the spinal canal) and spasm of the piriformis muscle (a muscle in the buttocks that the sciatic nerve passes through).  Sciatica may begin after a sudden twisting/bending force (such as in a car accident), or sometimes after a simple awkward movement. In either case, muscle spasm is commonly present and contributes to the pain.  The diagnosis of sciatica is made from the symptoms and physical exam. Unless you had a physical injury (such as a car accident or fall), X-rays are usually not ordered for the initial evaluation of sciatica because the nerves and disks cannot be seen on an X-ray. Most sciatica (80-90%) " gets better with time.  What can I do about my low back pain?  There are three main things you can do to ease low back pain and help it go away.    Use heat or cold packs.    Take medicine as directed.    Use positions, movements and exercises. Stay active! Too much rest can make your symptoms worse.  Using heat or cold packs  Try cold packs or gentle heat to ease your pain. Use whichever gives the most relief. Apply the cold pack or heat for 15 minutes at a time, as often as needed.  Taking medicine  If taking over-the-counter medicine:    Take ibuprofen (Advil, Motrin) 600 mg. three times a day as needed for pain.  OR    Take Aleve (naproxen sodium) 220 to 440 mg. two times a day as needed for pain  If your doctor prescribed a muscle relaxant (cyclobenzapine 10 mg.):    Take one half ( ) to 1 tablet at bedtime    Do not drive when taking this medicine. This drug may make you sleepy.  Using positions, movements and exercises  Research tells us that moving your joints and muscles can help you recover from back pain. Such activity should be simple and gentle.  Use the positions below as well as walking to help relieve your discomfort. Try taking a short walk every 3 to 4 hours during the day. Walk for a few minutes inside your home or take longer walks outside, on a treadmill or at a mall. Slowly increase the amount of time you walk. Expect discomfort when you begin, but it should lessen as your back starts to recover.  Finding a position that is comfortable  When your back pain is new, you may find that certain positions will ease your pain. Gently try each of the following positions until you find one that eases your pain. Once you find a position of comfort, use it as often as you like while you recover. Return to your daily routine as soon as possible.     Lie on your back with your legs bent. You can do this by placing a pillow under your knees or lie on the floor and rest your lower legs on the seat of a  chair.    Lie on your side with your knees bent and place a pillow between your knees.    Lie on your stomach over pillows.  When should I call my doctor?  Your back pain should improve over the first couple of weeks. As it improves, you should be able to return to your normal activities. But call your doctor if:    You have a sudden change in your ability to control? your bladder or bowels.    You begin to feel tingling in your groin or legs.    The pain spreads down your leg and into your foot.    Your toes, feet or leg muscles begin to feel weak.    You feel generally unwell or sick.    Your pain gets worse.    3619-4430 [employer]. [address] [place] [address], [place] [address]. All rights reserved. This information is not intended as a substitute for professional medical care. Always follow your healthcare professional's instructions.  This information has been modified by your health care provider with permission from the publisher.                Follow-ups after your visit        Additional Services     NEUROSURGERY REFERRAL       Your provider has referred you to: [facility]: [facility] [phone]   [email or web address]    Worsening right sided sciatic with leg weakness, MRI ordered    Please be aware that coverage of these services is subject to the terms and limitations of your health insurance plan.  Call member services at your health plan with any benefit or coverage questions.      Please bring the following with you to your appointment:    (1) Any X-Rays, CTs or MRIs which have been performed.  Contact the facility where they were done to arrange for  prior to your scheduled appointment.   (2) List of current medications  (3) This referral request   (4) Any documents/labs given to you for this referral                  Follow-up notes from your care team     Return if symptoms worsen or fail to improve.      Your next 10  appointments already scheduled     Jan 12, 2018 12:30 PM CST   (Arrive by 12:15 PM)   MR LUMBAR SPINE W/O CONTRAST with PHMR1   [facility] ([facility] [place])    [address] [address] [phone]           Take your medicines as usual, unless your doctor tells you not to. Bring a list of your current medicines to your exam (including vitamins, minerals and over-the-counter drugs). Also bring the results of similar scans you may have had.  Please remove any body piercings and hair extensions before you arrive.  Follow your doctor s orders. If you do not, we may have to postpone your exam.  You will not have contrast for this exam. You do not need to do anything special to prepare.  The MRI machine uses a strong magnet. Please wear clothes without metal (snaps, zippers). A sweatsuit works well, or we may give you a hospital gown.   **IMPORTANT** THE INSTRUCTIONS BELOW ARE ONLY FOR THOSE PATIENTS WHO HAVE BEEN TOLD THEY WILL RECEIVE SEDATION OR GENERAL ANESTHESIA DURING THEIR MRI PROCEDURE:  IF YOU WILL RECEIVE SEDATION (take medicine to help you relax during your exam):   You must get the medicine from your doctor before you arrive. Bring the medicine to the exam. Do not take it at home.   Arrive one hour early. Bring someone who can take you home after the test. Your medicine will make you sleepy. After the exam, you may not drive, take a bus or take a taxi by yourself.   No eating 8 hours before your exam. You may have clear liquids up until 4 hours before your exam. (Clear liquids include water, clear tea, black coffee and fruit juice without pulp.)  IF YOU WILL RECEIVE ANESTHESIA (be asleep for your exam):   Arrive 1 1/2 hours early. Bring someone who can take you home after the test. You may not drive, take a bus or take a taxi by yourself.   No eating 8 hours before your exam. You may have clear liquids up until 4 hours before your exam. (Clear liquids include  water, clear tea, black coffee and fruit juice without pulp.)   You will spend four to five hours in the recovery room.  Please call the Imaging Department at your exam site with any questions.            Jan 18, 2018  9:20 AM CST   [facility] Visit [facility]asquez [facility]   [facility]    [address], [address]  [facility] [address]   [phone]              Future tests that were ordered for you today     Open Future Orders        Priority Expected Expires Ordered    MR Lumbar Spine w/o Contrast Routine  1/11/2019 1/11/2018            Who to contact     If you have questions or need follow up information about today's clinic visit or your schedule please contact [facility] directly at [phone].  Normal or non-critical lab and imaging results will be communicated to you by MyChart, letter or phone within 4 business days after the clinic has received the results. If you do not hear from us within 7 days, please contact the clinic through [employer] or phone. If you have a critical or abnormal lab result, we will notify you by phone as soon as possible.  Submit refill requests through [employer] or call your pharmacy and they will forward the refill request to us. Please allow 3 business days for your refill to be completed.          Additional Information About Your Visit        [employer] Information     [employer] gives you secure access to your electronic health record. If you see a primary care provider, you can also send messages to your care team and make appointments. If you have questions, please call your primary care clinic.  If you do not have a primary care provider, please call [phone] and they will assist you.        Care EveryWhere ID     This is your Care EveryWhere ID. This could be used by other organizations to access your [facility] medical records  [id number]        Your Vitals Were     Pulse Temperature Respirations Pulse Oximetry  BMI (Body Mass Index)       103 97.5  F (36.4  C) (Temporal) 20 99% 23.82 kg/m2        Blood Pressure from Last 3 Encounters:   01/11/18 136/80   05/08/17 126/70   05/23/16 120/66    Weight from Last 3 Encounters:   01/11/18 159 lb (72.1 kg)   05/08/17 157 lb (71.2 kg)   05/23/16 157 lb (71.2 kg)              We Performed the Following     NEUROSURGERY REFERRAL          Today's Medication Changes          These changes are accurate as of: 1/11/18  1:23 PM.  If you have any questions, ask your nurse or doctor.               Start taking these medicines.        Dose/Directions    methylPREDNISolone 4 MG tablet   Commonly known as:  MEDROL DOSEPAK   Used for:  Sciatica, right side, Lumbar radiculopathy   Started by:  Neel Vizcarra PA-C        Follow package instructions   Quantity:  21 tablet   Refills:  0            Where to get your medicines      These medications were sent to [facility] [address] [place] [address] [address] [facility] [address]  [address], [facility] [address]     Phone:  [phone]     methylPREDNISolone 4 MG tablet                Primary Care Provider Office Phone # Fax #    Naomi Kuo PA-C [phone] [address] [place] [address]        Equal Access to Services     STEPHAN ASHFORD AH: Hadii emily ku hadasho Soomaali, waaxda luqadaha, qaybta kaalmada adedovyada, tejinder marr. So [facility] [phone].    ATENCIÓN: Si habla español, tiene a gonzalez disposición servicios gratuitos de asistencia lingüística. Naima al [phone].    We comply with applicable federal civil rights laws and [place] laws. We do not discriminate on the basis of race, color, national origin, age, disability, sex, sexual orientation, or gender identity.            Thank you!     Thank you for choosing [facility]  for your care. Our goal is always to provide you with excellent care. Hearing back from our patients is one  way we can continue to improve our services. Please take a few minutes to complete the written survey that you may receive in the mail after your visit with us. Thank you!             Your Updated Medication List - Protect others around you: Learn how to safely use, store and throw away your medicines at www.disposemymeds.org.          This list is accurate as of: 1/11/18  1:23 PM.  Always use your most recent med list.                   Brand Name Dispense Instructions for use Diagnosis    aspirin 81 MG tablet     90 tablet    Take 1 tablet by mouth daily.    Pure hypercholesterolemia       atorvastatin 20 MG tablet    LIPITOR    90 tablet    Take 1 tablet (20 mg) by mouth daily    Pure hypercholesterolemia, Hyperlipidemia LDL goal <130       methylPREDNISolone 4 MG tablet    MEDROL DOSEPAK    21 tablet    Follow package instructions    Sciatica, right side, Lumbar radiculopathy       VIAGRA 100 MG tablet   Generic drug:  sildenafil     12    1/2 to 1 TABLET AS NEEDED    Impotence of organic origin

## 2018-01-12 ENCOUNTER — HOSPITAL ENCOUNTER (OUTPATIENT)
Dept: MRI IMAGING | Facility: CLINIC | Age: 63
Discharge: HOME | End: 2018-01-12
Attending: PHYSICIAN ASSISTANT | Admitting: PHYSICIAN ASSISTANT
Payer: COMMERCIAL

## 2018-01-12 DIAGNOSIS — M54.16 LUMBAR RADICULOPATHY: ICD-10-CM

## 2018-01-12 DIAGNOSIS — M54.31 SCIATICA, RIGHT SIDE: ICD-10-CM

## 2018-01-12 PROCEDURE — 72148 MRI LUMBAR SPINE W/O DYE: CPT

## 2018-01-16 ENCOUNTER — RADIANT APPOINTMENT (OUTPATIENT)
Dept: GENERAL RADIOLOGY | Facility: OTHER | Age: 63
End: 2018-01-16
Attending: PHYSICIAN ASSISTANT
Payer: COMMERCIAL

## 2018-01-16 ENCOUNTER — OFFICE VISIT (OUTPATIENT)
Dept: NEUROSURGERY | Facility: CLINIC | Age: 63
End: 2018-01-16
Attending: PHYSICIAN ASSISTANT
Payer: COMMERCIAL

## 2018-01-16 VITALS
WEIGHT: 160 LBS | HEIGHT: 70 IN | OXYGEN SATURATION: 99 % | HEART RATE: 65 BPM | DIASTOLIC BLOOD PRESSURE: 85 MMHG | SYSTOLIC BLOOD PRESSURE: 133 MMHG | BODY MASS INDEX: 22.9 KG/M2

## 2018-01-16 DIAGNOSIS — M54.10 RADICULAR PAIN OF RIGHT LOWER EXTREMITY: ICD-10-CM

## 2018-01-16 DIAGNOSIS — M25.551 RIGHT HIP PAIN: ICD-10-CM

## 2018-01-16 DIAGNOSIS — M25.551 RIGHT HIP PAIN: Primary | ICD-10-CM

## 2018-01-16 PROCEDURE — 99204 OFFICE O/P NEW MOD 45 MIN: CPT | Performed by: PHYSICIAN ASSISTANT

## 2018-01-16 PROCEDURE — 73502 X-RAY EXAM HIP UNI 2-3 VIEWS: CPT

## 2018-01-16 PROCEDURE — G0463 HOSPITAL OUTPT CLINIC VISIT: HCPCS | Performed by: PHYSICIAN ASSISTANT

## 2018-01-16 RX ORDER — TRAMADOL HYDROCHLORIDE 50 MG/1
50-100 TABLET ORAL EVERY 6 HOURS PRN
Qty: 20 TABLET | Refills: 0 | Status: SHIPPED | OUTPATIENT
Start: 2018-01-16 | End: 2018-02-07

## 2018-01-16 ASSESSMENT — PAIN SCALES - GENERAL: PAINLEVEL: WORST PAIN (10)

## 2018-01-16 NOTE — MR AVS SNAPSHOT
After Visit Summary   1/16/2018    Dong Joseph    MRN: [id number]           Patient Information     Date Of Birth          1955        Visit Information        Provider Department      1/16/2018 11:00 AM Aurelio Lam PA-C [facility]        Today's Diagnoses     Right hip pain    -  1    Radicular pain of right lower extremity          Care Instructions    >          Follow-ups after your visit        Additional Services     PAIN MANAGEMENT REFERRAL       ISpine, right L4-5 and L5-S1 transforaminal epidural injections please.                  Your next 10 appointments already scheduled     Apr 16, 2018   Procedure with Johnnie Ellsworth MD   [facility], [place], Same Day Surgery (--)    [address] [place] [id number] [phone]            Apr 24, 2018 12:00 PM CDT   Return Visit with Garrett Dejesus MD   [facility]    [address] [place] [id number] [phone]            May 02, 2018 12:00 PM CDT   (Arrive by 11:45 AM)   Return Visit with Johnnie Ellsworth MD   [facility] ([facility])    [address] [id number] [phone]            Jun 07, 2018 11:00 AM CDT   LAB with NL LAB [facility] ([facility])    [id number] [place] [id number] [phone]           Please do not eat 10-12 hours before your appointment if you are coming in fasting for labs on lipids, cholesterol, or glucose (sugar). This does not apply to pregnant women. Water, hot tea and black coffee (with nothing added) are okay. Do not drink other fluids, diet soda or chew gum.            Jun 14, 2018 11:00 AM CDT   Return Visit with Froilan Peres MD   [facility]    [address] [facility] [id number] [phone]             "  Who to contact     If you have questions or need follow up information about today's clinic visit or your schedule please contact [facility] directly at [phone].  Normal or non-critical lab and imaging results will be communicated to you by [employer], letter or phone within 4 business days after the clinic has received the results. If you do not hear from us within 7 days, please contact the clinic through InvisibleCRMhart or phone. If you have a critical or abnormal lab result, we will notify you by phone as soon as possible.  Submit refill requests through [employer] or call your pharmacy and they will forward the refill request to us. Please allow 3 business days for your refill to be completed.          Additional Information About Your Visit        [employer] Information     [employer] gives you secure access to your electronic health record. If you see a primary care provider, you can also send messages to your care team and make appointments. If you have questions, please call your primary care clinic.  If you do not have a primary care provider, please call [phone] and they will assist you.        Care EveryWhere ID     This is your Care EveryWhere ID. This could be used by other organizations to access your [place] medical records  [id number]        Your Vitals Were     Pulse Height Pulse Oximetry BMI (Body Mass Index)          65 5' 10\" (1.778 m) 99% 22.96 kg/m2         Blood Pressure from Last 3 Encounters:   04/11/18 134/70   02/14/18 136/79   02/07/18 132/75    Weight from Last 3 Encounters:   04/11/18 159 lb (72.1 kg)   03/30/18 154 lb (69.9 kg)   03/19/18 154 lb (69.9 kg)              We Performed the Following     PAIN MANAGEMENT REFERRAL          Today's Medication Changes          These changes are accurate as of 1/16/18 11:59 PM.  If you have any questions, ask your nurse or doctor.               Start taking these medicines.        Dose/Directions    traMADol 50 MG tablet "   Commonly known as:  ULTRAM   Used for:  Right hip pain   Started by:  Aurelio Lam PA-C        Dose:   mg   Take 1-2 tablets ( mg) by mouth every 6 hours as needed for pain   Quantity:  20 tablet   Refills:  0            Where to get your medicines      Some of these will need a paper prescription and others can be bought over the counter.  Ask your nurse if you have questions.     Bring a paper prescription for each of these medications     traMADol 50 MG tablet                Primary Care Provider Office Phone # Fax #    Naomi Kuo PA-C [phone] [address] [place] [address]        Equal Access to Services     [facility]: Hadii emily payne hadsonyo Somurray, waaxda luqadaha, qaybta kaalmada koffi, tejinder marr. [facility] [phone].    ATENCIÓN: Si habla español, tiene a gonzalez disposición servicios gratuitos de asistencia lingüística. [facility] [phone].    We comply with applicable federal civil rights laws and [place] laws. We do not discriminate on the basis of race, color, national origin, age, disability, sex, sexual orientation, or gender identity.            Thank you!     Thank you for choosing [facility]  for your care. Our goal is always to provide you with excellent care. Hearing back from our patients is one way we can continue to improve our services. Please take a few minutes to complete the written survey that you may receive in the mail after your visit with us. Thank you!             Your Updated Medication List - Protect others around you: Learn how to safely use, store and throw away your medicines at www.disposemymeds.org.          This list is accurate as of 1/16/18 11:59 PM.  Always use your most recent med list.                   Brand Name Dispense Instructions for use Diagnosis    aspirin 81 MG tablet     90 tablet    Take 1 tablet by mouth daily.    Pure hypercholesterolemia        atorvastatin 20 MG tablet    LIPITOR    90 tablet    Take 1 tablet (20 mg) by mouth daily    Pure hypercholesterolemia, Hyperlipidemia LDL goal <130       traMADol 50 MG tablet    ULTRAM    20 tablet    Take 1-2 tablets ( mg) by mouth every 6 hours as needed for pain    Right hip pain

## 2018-01-16 NOTE — LETTER
1/16/2018         RE: Dong Joseph  [id number] [place] [address]        Dear Colleague,    Thank you for referring your patient, Dong Joseph, to the [facility]. Please see a copy of my visit note below.    Dr. Jairon Velasquez  [facility]  Neurosurgery Clinic Visit      CC: Right hip pain    Primary care Provider: Naomi Kuo    Referring Provider: Neel Vizcarra PA-C      Reason For Visit:   I was asked to consult on the patient for right hip pain.          HPI: Dong Joseph is a 62 year old male who presents for evaluation of his chief complaint of right hip pain.  He has had severe pain through the right buttock and hip over the last couple of weeks.  He has been developing worsening symptoms over the last couple of months, but states that these past 2 weeks have been quite severe.  He has difficulty bearing weight on the right lower extremity.  He notes that he did have similar symptoms about 1 year ago, which resolved spontaneously.  He also describes some pain in the right anterior thigh.  He also has some numbness and tingling that affects his toes of the right foot.  He has been working with a chiropractor, with minimal symptomatic improvement.  He states that he does feel some weakness in the right lower extremity, but is unsure whether this is related to his pain level or not.  He denies bowel or bladder changes, or any problems with balance or coordination.  He has been using a cane lately.    Past Medical History:   Diagnosis Date     Impotence of organic origin 2003     Pure hypercholesterolemia 2002    statin started circa 2002       Past Medical History reviewed with patient during visit.    Past Surgical History:   Procedure Laterality Date     COLONOSCOPY  12/22/2010    COLONOSCOPY performed by DONNA WHITEHEAD at  GI     HERNIA REPAIR, INGUINAL RT/LT  1979-80     HERNIA REPAIR, INGUINAL RT/LT  03/30/2006    Recurrent left  "inguinal hernia.     Past Surgical History reviewed with patient during visit.    Current Outpatient Prescriptions   Medication     traMADol (ULTRAM) 50 MG tablet     methylPREDNISolone (MEDROL DOSEPAK) 4 MG tablet     atorvastatin (LIPITOR) 20 MG tablet     aspirin 81 MG tablet     VIAGRA 100 MG OR TABS     No current facility-administered medications for this visit.        No Known Allergies    Social History     Social History     Marital status:      Spouse name: N/A     Number of children: N/A     Years of education: N/A     Social History Main Topics     Smoking status: Former Smoker     Packs/day: 0.50     Types: Cigarettes     Smokeless tobacco: Never Used      Comment: Quit in 1978     Alcohol use 1.0 oz/week     Drug use: No     Sexual activity: Yes     Partners: Female     Other Topics Concern     Parent/Sibling W/ Cabg, Mi Or Angioplasty Before 65f 55m? No     Social History Narrative       Family History   Problem Relation Age of Onset     C.A.D. Mother      age 70s     CEREBROVASCULAR DISEASE Father      at 68 yo     Lipids Brother      Lipids Brother           ROS: 10 point ROS neg other than the symptoms noted above in the HPI.    Vital Signs: /85 (BP Location: Right arm, Patient Position: Sitting, Cuff Size: Adult Regular)  Pulse 65  Ht 5' 10\" (1.778 m)  Wt 160 lb (72.6 kg)  SpO2 99%  BMI 22.96 kg/m2    Examination:  Constitutional:  Alert, well nourished, NAD.  HEENT: Normocephalic, atraumatic.   Pulmonary:  Without shortness of breath, normal effort.   Lymph: no lymphadenopathy to low back or LE.   Integumentary: Skin is free of rashes or lesions.   Cardiovascular:  No pitting edema of BLE.    Psych: Normal affect, no apparent distress    Neurological:  Awake  Alert  Oriented x 3  Speech clear  Cranial nerves II - XII grossly intact  Motor exam   Hip Flexor:                Right: 5/5  Left:  5/5  Hip Adductor:             Right:  5/5  Left:  5/5  Hip Abductor:             Right:  " 5/5  Left:  5/5  Gastroc Soleus:        Right:  5/5  Left:  5/5  Tib/Ant:                      Right:  5/5  Left:  5/5  EHL:                          Right:  5/5  Left:  5/5       Right quads are difficult to assess given his pain level.  Sensation normal to bilateral upper and lower extremities.    Reflexes are 2+ in the patellar and Achilles. There is no clonus. Downgoing Babinski.    Musculoskeletal:  Gait: Able to stand from a seated position, using a cane and has an antalgic gait.  However, his pain is reproduced with internal and external rotation of the right hip.  He is mildly tender to palpation over the greater trochanter on the right.    Imaging:   MRI of the lumbar spine was reviewed in the office today.  It does show multiple levels of lumbar disc degeneration, with mild to moderate foraminal narrowing on the right at L4-5 and L5-S1.  There are no apparent disc herniations or clear nerve impingement.    Assessment/Plan:     Lumbar disc degeneration  Right hip pain    Dong Joseph is a 62 year old male.  I did have a discussed with the patient regarding his symptoms.  He understands that his lumbar spine MRI does show multiple levels of disc degeneration, but there is no clear disc herniation or nerve impingement which correlates directly with his symptoms.  He does have some foraminal narrowing on the right at L4-5 and L5-S1, so his spinal pathology certainly could be contributory.  However, I do feel that he has probably some underlying hip pathology as well, as he does have his pain re-created with internal and external rotation of the right hip, and with his inability to bear weight on that side.  I will still get him in for an epidural injection, and I also ordered some x-rays on the right hip for further evaluation.  He was eager to proceed with these options.  I gave him a one time, small quantity of tramadol as well.          Aurelio Lam PA-C  [facility]  [facility]  [address] [address]    Tel [phone]  Pager [phone]      Again, thank you for allowing me to participate in the care of your patient.        Sincerely,        Aurelio Lam PA-C

## 2018-01-16 NOTE — PROGRESS NOTES
Dr. Jairon Velasquez  [facility]  Neurosurgery Clinic Visit      CC: Right hip pain    Primary care Provider: Naomi Kuo    Referring Provider: Neel Vizcarra PA-C      Reason For Visit:   I was asked to consult on the patient for right hip pain.          HPI: Dong Joseph is a 62 year old male who presents for evaluation of his chief complaint of right hip pain.  He has had severe pain through the right buttock and hip over the last couple of weeks.  He has been developing worsening symptoms over the last couple of months, but states that these past 2 weeks have been quite severe.  He has difficulty bearing weight on the right lower extremity.  He notes that he did have similar symptoms about 1 year ago, which resolved spontaneously.  He also describes some pain in the right anterior thigh.  He also has some numbness and tingling that affects his toes of the right foot.  He has been working with a chiropractor, with minimal symptomatic improvement.  He states that he does feel some weakness in the right lower extremity, but is unsure whether this is related to his pain level or not.  He denies bowel or bladder changes, or any problems with balance or coordination.  He has been using a cane lately.    Past Medical History:   Diagnosis Date     Impotence of organic origin 2003     Pure hypercholesterolemia 2002    statin started circa 2002       Past Medical History reviewed with patient during visit.    Past Surgical History:   Procedure Laterality Date     COLONOSCOPY  12/22/2010    COLONOSCOPY performed by DONNA WHITEHEAD at  GI     HERNIA REPAIR, INGUINAL RT/LT  1979-80     HERNIA REPAIR, INGUINAL RT/LT  03/30/2006    Recurrent left inguinal hernia.     Past Surgical History reviewed with patient during visit.    Current Outpatient Prescriptions   Medication     traMADol (ULTRAM) 50 MG tablet     methylPREDNISolone (MEDROL DOSEPAK) 4 MG tablet     atorvastatin (LIPITOR) 20 MG tablet      "aspirin 81 MG tablet     VIAGRA 100 MG OR TABS     No current facility-administered medications for this visit.        No Known Allergies    Social History     Social History     Marital status:      Spouse name: N/A     Number of children: N/A     Years of education: N/A     Social History Main Topics     Smoking status: Former Smoker     Packs/day: 0.50     Types: Cigarettes     Smokeless tobacco: Never Used      Comment: Quit in 1978     Alcohol use 1.0 oz/week     Drug use: No     Sexual activity: Yes     Partners: Female     Other Topics Concern     Parent/Sibling W/ Cabg, Mi Or Angioplasty Before 65f 55m? No     Social History Narrative       Family History   Problem Relation Age of Onset     C.A.D. Mother      age 70s     CEREBROVASCULAR DISEASE Father      at 68 yo     Lipids Brother      Lipids Brother           ROS: 10 point ROS neg other than the symptoms noted above in the HPI.    Vital Signs: /85 (BP Location: Right arm, Patient Position: Sitting, Cuff Size: Adult Regular)  Pulse 65  Ht 5' 10\" (1.778 m)  Wt 160 lb (72.6 kg)  SpO2 99%  BMI 22.96 kg/m2    Examination:  Constitutional:  Alert, well nourished, NAD.  HEENT: Normocephalic, atraumatic.   Pulmonary:  Without shortness of breath, normal effort.   Lymph: no lymphadenopathy to low back or LE.   Integumentary: Skin is free of rashes or lesions.   Cardiovascular:  No pitting edema of BLE.    Psych: Normal affect, no apparent distress    Neurological:  Awake  Alert  Oriented x 3  Speech clear  Cranial nerves II - XII grossly intact  Motor exam   Hip Flexor:                Right: 5/5  Left:  5/5  Hip Adductor:             Right:  5/5  Left:  5/5  Hip Abductor:             Right:  5/5  Left:  5/5  Gastroc Soleus:        Right:  5/5  Left:  5/5  Tib/Ant:                      Right:  5/5  Left:  5/5  EHL:                          Right:  5/5  Left:  5/5       Right quads are difficult to assess given his pain level.  Sensation normal to " bilateral upper and lower extremities.    Reflexes are 2+ in the patellar and Achilles. There is no clonus. Downgoing Babinski.    Musculoskeletal:  Gait: Able to stand from a seated position, using a cane and has an antalgic gait.  However, his pain is reproduced with internal and external rotation of the right hip.  He is mildly tender to palpation over the greater trochanter on the right.    Imaging:   MRI of the lumbar spine was reviewed in the office today.  It does show multiple levels of lumbar disc degeneration, with mild to moderate foraminal narrowing on the right at L4-5 and L5-S1.  There are no apparent disc herniations or clear nerve impingement.    Assessment/Plan:     Lumbar disc degeneration  Right hip pain    Dong Joseph is a 62 year old male.  I did have a discussed with the patient regarding his symptoms.  He understands that his lumbar spine MRI does show multiple levels of disc degeneration, but there is no clear disc herniation or nerve impingement which correlates directly with his symptoms.  He does have some foraminal narrowing on the right at L4-5 and L5-S1, so his spinal pathology certainly could be contributory.  However, I do feel that he has probably some underlying hip pathology as well, as he does have his pain re-created with internal and external rotation of the right hip, and with his inability to bear weight on that side.  I will still get him in for an epidural injection, and I also ordered some x-rays on the right hip for further evaluation.  He was eager to proceed with these options.  I gave him a one time, small quantity of tramadol as well.          Aurelio Lam PA-C  [facility]  [address] [address]    Tel [phone]  Pager [phone]

## 2018-01-16 NOTE — NURSING NOTE
"Dong Joseph is a 62 year old male who presents for:  Chief Complaint   Patient presents with     Neurologic Problem     Right sided low back pain, stabbing pain down the right buttocks down the legs with N/W/T for a couple weeks and getting worse         Initial Vitals:  /85 (BP Location: Right arm, Patient Position: Sitting, Cuff Size: Adult Regular)  Pulse 65  Ht 5' 10\" (1.778 m)  Wt 160 lb (72.6 kg)  SpO2 99%  BMI 22.96 kg/m2 Estimated body mass index is 22.96 kg/(m^2) as calculated from the following:    Height as of this encounter: 5' 10\" (1.778 m).    Weight as of this encounter: 160 lb (72.6 kg).. Body surface area is 1.89 meters squared. BP completed using cuff size: regular  Worst Pain (10)    Do you feel safe in your environment?  Yes  Do you need any refills today? No    Nursing Comments: Right sided low back pain, stabbing pain down the right buttocks down the legs with N/W/T for a couple weeks and getting worse.        5 min. nursing intake time  Aisha Freitas MA       Discharge plan: see providers dictation  2 min. nursing discharge time  Aisha Freitas MA        "

## 2018-01-17 ENCOUNTER — TELEPHONE (OUTPATIENT)
Dept: FAMILY MEDICINE | Facility: OTHER | Age: 63
End: 2018-01-17

## 2018-01-17 DIAGNOSIS — M54.10 RADICULAR PAIN OF RIGHT LOWER EXTREMITY: ICD-10-CM

## 2018-01-17 DIAGNOSIS — M25.551 HIP PAIN, RIGHT: Primary | ICD-10-CM

## 2018-01-17 NOTE — TELEPHONE ENCOUNTER
His hip x-ray revealed a bony cyst that is likely benign based on the character of the cyst but we could order an MRI for further evaluation to be sure. I doubt this would be contributing to his pain and there were no other abnormal hip findgs. I read his office note from neurosurgery and it sounds like they were going to set up an epidural steroid injection. Has he set this up yet because I think this is the next step to treat his pain.    Neel Vizcarra PA-C

## 2018-01-17 NOTE — TELEPHONE ENCOUNTER
Spoke with patient to coordinate next steps in plan of care. Right hip xray shows bony cyst. Per Dr. Velasquez, ordered right hip MRI to further evaluate, as well as right LESI.     Hip MRI scheduled for tomorrow at [place]. 7:45am check in.    Patient states he wants soonest available for LESI, and is open to any [place] location. [facility] has soonest opening. Scheduled for Friday at 8am. Informed patient to be NPO 3 hours prior, check in at 6:30am, and to have a  with him. Advised patient to call me back with any further questions. We will contact him with MRI results. Patient voiced understanding.

## 2018-01-17 NOTE — TELEPHONE ENCOUNTER
"Patient is wondering if an MRI JM would order would be different, because \"he just had one on 01/12.\"     Also - he has not set up an appointment for the steroid injection. He states \"Dr. Velasquez said he would call me but I haven't heard from him about any of it.\"    Will route message to JM to review. Also to Dr. Velasquez/team to address. Patient states \"he can hardly walk.\"  Magdalena Solis, CMA    "

## 2018-01-17 NOTE — TELEPHONE ENCOUNTER
Reason for call:  Patient would like to know what is going on with him.  He states that he has had test taken and does not know what is causing his pain on his right side and right leg.  Ok to leave a detailed message on phone.

## 2018-01-17 NOTE — TELEPHONE ENCOUNTER
This would need to be a separate MRI as the lumbar MRI did not look at the area with the cyst.    Neel Vizcarra PA-C

## 2018-01-18 ENCOUNTER — TELEPHONE (OUTPATIENT)
Dept: FAMILY MEDICINE | Facility: OTHER | Age: 63
End: 2018-01-18

## 2018-01-18 ENCOUNTER — HOSPITAL ENCOUNTER (OUTPATIENT)
Dept: MRI IMAGING | Facility: CLINIC | Age: 63
Discharge: HOME | End: 2018-01-18
Attending: NEUROLOGICAL SURGERY | Admitting: NEUROLOGICAL SURGERY
Payer: COMMERCIAL

## 2018-01-18 DIAGNOSIS — M54.10 RADICULAR PAIN OF RIGHT LOWER EXTREMITY: ICD-10-CM

## 2018-01-18 DIAGNOSIS — C79.51 BONY METASTASIS: Primary | ICD-10-CM

## 2018-01-18 DIAGNOSIS — M25.551 HIP PAIN, RIGHT: ICD-10-CM

## 2018-01-18 PROCEDURE — 73721 MRI JNT OF LWR EXTRE W/O DYE: CPT | Mod: RT

## 2018-01-18 NOTE — PROGRESS NOTES
Dr. Velasquez received hip MRI results and communicated with patient. It shows a probable large osseous metastasis, primary unknown. Cancelled LESI.     Coordinated Ortho Oncology referral at [facility].   Appt scheduled for Wed 1/24/18 at 12:45 with Dr. Ellsworth.   LVM with patient to inform him of their address and phone: [address] [facility], 4th [address]. #[phone]

## 2018-01-18 NOTE — TELEPHONE ENCOUNTER
Reason for Call:  Form, our goal is to have forms completed with 72 hours, however, some forms may require a visit or additional information.    Type of letter, form or note:  medical    Who is the form from?: [employer]     Where did the form come from: Patient or family brought in       What clinic location was the form placed at?: [facility] - [phone]    Where the form was placed: Dr's Box    What number is listed as a contact on the form?: [phone]       Additional comments: pt dropped this off for Vizcarra. Please complete form,sign,date and fax back to [phone]    Call taken on 1/18/2018 at 2:45 PM by Leila Barr

## 2018-01-18 NOTE — TELEPHONE ENCOUNTER
Form placed in JM's box for completion and signature.  Mandi Carmona CMA ([facility])

## 2018-01-18 NOTE — TELEPHONE ENCOUNTER
APPT INFO    Date /Time: 1/24/18 12:45PM   Reason for Appt: R Hip Bony Metastasis    Ref Provider/Clinic: Dr. Jairon Velasquez   Are there internal records? Yes/No?  IF YES, list clinic names: Yes -     [facility]  [facility] -- Neel Vizcarra PA-C    Imaging in Pacs:  XR R Hip 1/16/18  MRI R Hip 1/18/18   Are there outside records? Yes/No? no   Patient Contact (Y/N) & Call Details: No - Patient is referred. All records are in Epic/PACS.      Action: Chart reviewed

## 2018-01-22 ASSESSMENT — ENCOUNTER SYMPTOMS
DIZZINESS: 0
SEIZURES: 0
WEAKNESS: 1
MUSCLE WEAKNESS: 1
STIFFNESS: 1
MEMORY LOSS: 0
TINGLING: 0
DISTURBANCES IN COORDINATION: 1
ORTHOPNEA: 0
EXERCISE INTOLERANCE: 1
SYNCOPE: 0
ARTHRALGIAS: 1
NUMBNESS: 1
LEG PAIN: 1
LOSS OF CONSCIOUSNESS: 0
HYPOTENSION: 0
SPEECH CHANGE: 0
PARALYSIS: 0
MYALGIAS: 1
LIGHT-HEADEDNESS: 0
SLEEP DISTURBANCES DUE TO BREATHING: 0
HEADACHES: 0
HYPERTENSION: 1
TREMORS: 0
BACK PAIN: 1
PALPITATIONS: 0
MUSCLE CRAMPS: 0

## 2018-01-24 ENCOUNTER — TELEPHONE (OUTPATIENT)
Dept: FAMILY MEDICINE | Facility: OTHER | Age: 63
End: 2018-01-24

## 2018-01-24 ENCOUNTER — APPOINTMENT (OUTPATIENT)
Dept: SURGERY | Facility: CLINIC | Age: 63
End: 2018-01-24
Payer: COMMERCIAL

## 2018-01-24 ENCOUNTER — ANESTHESIA EVENT (OUTPATIENT)
Dept: SURGERY | Facility: AMBULATORY SURGERY CENTER | Age: 63
End: 2018-01-24

## 2018-01-24 ENCOUNTER — OFFICE VISIT (OUTPATIENT)
Dept: ORTHOPEDICS | Facility: CLINIC | Age: 63
End: 2018-01-24
Payer: COMMERCIAL

## 2018-01-24 ENCOUNTER — ALLIED HEALTH/NURSE VISIT (OUTPATIENT)
Dept: SURGERY | Facility: CLINIC | Age: 63
End: 2018-01-24
Payer: COMMERCIAL

## 2018-01-24 ENCOUNTER — OFFICE VISIT (OUTPATIENT)
Dept: SURGERY | Facility: CLINIC | Age: 63
End: 2018-01-24
Payer: COMMERCIAL

## 2018-01-24 ENCOUNTER — PRE VISIT (OUTPATIENT)
Dept: ORTHOPEDICS | Facility: CLINIC | Age: 63
End: 2018-01-24

## 2018-01-24 VITALS
BODY MASS INDEX: 21.95 KG/M2 | TEMPERATURE: 98 F | WEIGHT: 153.3 LBS | SYSTOLIC BLOOD PRESSURE: 132 MMHG | HEIGHT: 70 IN | DIASTOLIC BLOOD PRESSURE: 85 MMHG | HEART RATE: 72 BPM | OXYGEN SATURATION: 98 % | RESPIRATION RATE: 18 BRPM

## 2018-01-24 VITALS — WEIGHT: 160 LBS | HEIGHT: 70 IN | BODY MASS INDEX: 22.9 KG/M2

## 2018-01-24 DIAGNOSIS — M89.9 BONE LESION: Primary | ICD-10-CM

## 2018-01-24 DIAGNOSIS — Z01.818 PRE-OP EVALUATION: ICD-10-CM

## 2018-01-24 DIAGNOSIS — M89.8X9 BONE MASS: Primary | ICD-10-CM

## 2018-01-24 DIAGNOSIS — M89.8X9 BONE MASS: ICD-10-CM

## 2018-01-24 LAB
ANION GAP SERPL CALCULATED.3IONS-SCNC: 6 MMOL/L (ref 3–14)
BUN SERPL-MCNC: 16 MG/DL (ref 7–30)
CALCIUM SERPL-MCNC: 9.6 MG/DL (ref 8.5–10.1)
CHLORIDE SERPL-SCNC: 106 MMOL/L (ref 94–109)
CO2 SERPL-SCNC: 26 MMOL/L (ref 20–32)
CREAT SERPL-MCNC: 0.86 MG/DL (ref 0.66–1.25)
ERYTHROCYTE [DISTWIDTH] IN BLOOD BY AUTOMATED COUNT: 12.2 % (ref 10–15)
GFR SERPL CREATININE-BSD FRML MDRD: >90 ML/MIN/1.7M2
GLUCOSE SERPL-MCNC: 92 MG/DL (ref 70–99)
HCT VFR BLD AUTO: 46.1 % (ref 40–53)
HGB BLD-MCNC: 15.4 G/DL (ref 13.3–17.7)
MCH RBC QN AUTO: 30.7 PG (ref 26.5–33)
MCHC RBC AUTO-ENTMCNC: 33.4 G/DL (ref 31.5–36.5)
MCV RBC AUTO: 92 FL (ref 78–100)
PLATELET # BLD AUTO: 202 10E9/L (ref 150–450)
POTASSIUM SERPL-SCNC: 3.7 MMOL/L (ref 3.4–5.3)
RBC # BLD AUTO: 5.01 10E12/L (ref 4.4–5.9)
SODIUM SERPL-SCNC: 138 MMOL/L (ref 133–144)
WBC # BLD AUTO: 6.6 10E9/L (ref 4–11)

## 2018-01-24 ASSESSMENT — LIFESTYLE VARIABLES: TOBACCO_USE: 1

## 2018-01-24 ASSESSMENT — PAIN SCALES - GENERAL: PAINLEVEL: MODERATE PAIN (5)

## 2018-01-24 NOTE — NURSING NOTE
"Reason For Visit:   Chief Complaint   Patient presents with     Consult     Rt hip bony metastais appt per [facility] Ref by Dr. De La O        Pain Assessment  Patient Currently in Pain: Yes  0-10 Pain Scale: 9  Primary Pain Location: Hip  Pain Orientation: Right  Pain Descriptors: Sharp, Stabbing (numbness and tingling in toes)  Alleviating Factors: Pain medication, Rest, Ice, Heat  Aggravating Factors: Movement, Walking, Standing, Stairs, Bending, Stretching (getting up and down)               HEIGHT: 5' 10\", WEIGHT: 160 lbs 0 oz, BMI: Body mass index is 22.96 kg/(m^2).      Current Outpatient Prescriptions   Medication Sig Dispense Refill     traMADol (ULTRAM) 50 MG tablet Take 1-2 tablets ( mg) by mouth every 6 hours as needed for pain 20 tablet 0     methylPREDNISolone (MEDROL DOSEPAK) 4 MG tablet Follow package instructions 21 tablet 0     atorvastatin (LIPITOR) 20 MG tablet Take 1 tablet (20 mg) by mouth daily 90 tablet 3     aspirin 81 MG tablet Take 1 tablet by mouth daily. 90 tablet 3     VIAGRA 100 MG OR TABS 1/2 to 1 TABLET AS NEEDED (Patient not taking: No sig reported) 12 4        No Known Allergies          VIVIANE, ESTUS, CMA    "

## 2018-01-24 NOTE — PATIENT INSTRUCTIONS
Preparing for Your Surgery      Name:  Dong Joseph   MRN:  [id number]   :  1955   Today's Date:  2018     Arriving for surgery:  Surgery date:  18  Arrival time:  2:00 p.m.  Please come to:     [facility]  [address] [place] [id number]     Parking is available in front of the [facility] building from 5:30AM to 8:00PM.  -  Proceed to the 5th floor to check into the Ambulatory Surgery Center.              >> There will be patient concierges on the 1st and 5th floor, for assistance or an escort, if you would like.              >> Please call [phone] with any questions.    What can I eat or drink?  -  You may have solid food or milk products until 8 hours prior to your surgery 06:00 a.m.  -  You may have water, apple juice or 7up/Sprite, Gatorade or Powerade, Clear broth, or clear jello until 2 hours prior to your surgery  1:30 p.m.    Which medicines can I take?  -  Do NOT take these medications in the morning, the day of surgery:     Aspirin    -  Please take these medications the day of surgery:     Tramadol as needed    How do I prepare myself?  -  Take two showers: one the night before surgery; and one the morning of surgery.         Use Scrubcare or Hibiclens to wash from neck down.  You may use your own shampoo and conditioner. No other hair products.   -  Do NOT use lotion, powder, deodorant, or antiperspirant the day of your surgery.  -  Do NOT wear any makeup, fingernail polish or jewelry.  -Do not bring your own medications to the hospital, except for inhalers and eye drops.  -  Bring your ID and insurance card.    Questions or Concerns:  If you have questions or concerns, please call the  Preoperative Assessment Center, Monday-Friday 7AM-7PM:  [phone]          AFTER YOUR SURGERY  Breathing exercises   Breathing exercises help you recover faster. Take deep breaths and let the air out slowly. This will:     Help you wake  up after surgery.    Help prevent complications like pneumonia.  Preventing complications will help you go home sooner.   We may give you a breathing device (incentive spirometer) to encourage you to breathe deeply.   Nausea and vomiting   You may feel sick to your stomach after surgery; if so, let your nurse know.    Pain control:  After surgery, you may have pain. Our goal is to help you manage your pain. Pain medicine will help you feel comfortable enough to do activities that will help you heal.  These activities may include breathing exercises, walking and physical therapy.   To help your health care team treat your pain we will ask: 1) If you have pain  2) where it is located 3) describe your pain in your words  Methods of pain control include medications given by mouth, vein or by nerve block for some surgeries.  We may give you a pain control pump that will:  1) Deliver the medicine through a tube placed in your vein  2) Control the amount of medicine you receive  3) Allow you to push a button to deliver a dose of pain medicine  Sequential Compression Device (SCD) or Pneumo Boots:  You may need to wear SCD S on your legs or feet. These are wraps connected to a machine that pumps in air and releases it. The repeated pumping helps prevent blood clots from forming.

## 2018-01-24 NOTE — ANESTHESIA PREPROCEDURE EVALUATION
Anesthesia Evaluation     . Pt has had prior anesthetic. Type: General    No history of anesthetic complications          ROS/MED HX    ENT/Pulmonary:  - neg pulmonary ROS   (+)tobacco use, Past use light remote smoker packs/day  , . .    Neurologic:  - neg neurologic ROS     Cardiovascular:     (+) Dyslipidemia, ----. Taking blood thinners Pt has received instructions: Instructions Given to patient: Surgery scheduled 1 day in advance so still on ASA. . . :. . Previous cardiac testing date:results:date: results:ECG reviewed date:1/24/18 results:SB at 55. QTc 382. date: results:         (-) CAD   METS/Exercise Tolerance: Comment: Can walk miles, bike until right hip pain.  >4 METS   Hematologic:  - neg hematologic  ROS       Musculoskeletal:   (+) , , other musculoskeletal- right hip pain      GI/Hepatic: Comment: Seldom GERD    (+) GERD Symptomatic,       Renal/Genitourinary:     (+) Other Renal/ Genitourinary, ED      Endo:  - neg endo ROS       Psychiatric:  - neg psychiatric ROS       Infectious Disease:  - neg infectious disease ROS       Malignancy:   (+) Malignancy History of Other  Other CA Active status post 1. Probable large osseous metastasis in the right supra-acetabular  ilium extending into the adjacent soft tissues corresponding with the  lucent lesion seen on the prior CT. Plasmacytoma is also in the differential.         Other:    (+) H/O Chronic Pain,                   Physical Exam      Airway   Mallampati: II  TM distance: >3 FB  Neck ROM: full    Dental   (+) caps    Cardiovascular   Rhythm and rate: regular and normal      Pulmonary    breath sounds clear to auscultation    Other findings:   1/24/2018 15:48  Sodium: 138  Potassium: 3.7  Chloride: 106  Carbon Dioxide: 26  Urea Nitrogen: 16  Creatinine: 0.86  GFR Estimate: >90  GFR Estimate If Black: >90  Calcium: 9.6  Anion Gap: 6  Glucose: 92    WBC: 6.6  Hemoglobin: 15.4  Hematocrit: 46.1  Platelet Count: 202  RBC Count: 5.01  MCV: 92  MCH:  30.7  MCHC: 33.4  RDW: 12.2           PAC Discussion and Assessment    ASA Classification: 2  Case is suitable for: [place]  Anesthetic techniques and relevant risks discussed: GA  Invasive monitoring and risk discussed: No  Types:   Possibility and Risk of blood transfusion discussed: No  NPO instructions given:   Additional anesthetic preparation and risks discussed:   Needs early admission to pre-op area:   Other:     PAC Resident/NP Anesthesia Assessment:  Scheduled for Right Needle Versus Open Biopsy Right Pelvis on 1/25/18 by Dr. Sanchez in treatment of bone tumor.  PAC referral for risk assessment and optimization for anesthesia with comorbid conditions of:    1. Probable large osseous metastasis in the right supra-acetabular  ilium extending into the adjacent soft tissues corresponding with the  lucent lesion seen on the prior CT. Plasmacytoma is also in the  differential.     Pre-operative considerations:  1.  Cardiac:  Functional status very good.  Could walk miles prior to right hip pain that started few months ago.  Low risk surgery.  0.4%  risk of major adverse cardiac event.  Risk of MACE < 1%. METS>4.  No further cardiac evaluation needed per 2014 ACC/AHA guidelines for non-cardiac surgery.  EKG **  2.  Pulm:  Airway feasible.  SUZETTE risk: low.  Prior light smoker.   3.  GI:  Risk of PONV score = 2.  If 3 or > anti-emetic intervention recommended (with 2 or more meds).   4.  Right hip pain due to large osseous mass      Patient is optimized and is acceptable candidate for the proposed procedure.  No further diagnostic evaluation is needed.     Patient also evaluated by Dr. Ramirez. See recommendations below.           Reviewed and Signed by PAC Mid-Level Provider/Resident  Mid-Level Provider/Resident: Nat Barreto PA-C  Date: 1/24/18  Time: 1440    Attending Anesthesiologist Anesthesia Assessment:        Anesthesiologist:   Date:   Time:   Pass/Fail:   Disposition:     PAC Pharmacist  Assessment:        Pharmacist:   Date:   Time:      Anesthesia Plan      History & Physical Review  History and physical reviewed and following examination; no interval change.    ASA Status:  2 .    NPO Status:  > 8 hours (NPO clear liquids >2 hours)    Plan for LMA and General with Intravenous induction. Maintenance will be Balanced.    PONV prophylaxis:  Ondansetron (or other 5HT-3) and Dexamethasone or Solumedrol  - ASA 2  - GA with LMA, standard ASA monitors, IV induction, balanced anesthetic  - PIV  - Antibiotics per surgery  - PONV prophylaxis  - Pain management with multimodal analgesia, IV fentanyl PRN  - Relevant risks, benefits, alternatives and the anesthetic plan were discussed with patient/family or family representative. All questions were answered and there was agreement to proceed.    Lis Osorio MD PGY4        Postoperative Care  Postoperative pain management:  Multi-modal analgesia.      Consents  Anesthetic plan, risks, benefits and alternatives discussed with:  Patient.  Use of blood products discussed: No .   .                          .

## 2018-01-24 NOTE — H&P
Pre-Operative H & P     CC:  Preoperative exam to assess for increased cardiopulmonary risk while undergoing surgery and anesthesia.    Date of Encounter: 1/24/2018  Primary Care Physician:  Naomi Kuo  Dong Joseph is a 62 year old male who presents for pre-operative H & P in preparation for Right Needle Versus Open Biopsy Right Pelvis on 1/25/18 by Dr. Sanchez in treatment of bone tumor.  Surgery at [facility].  He developed right hip pain 6 months ago.  He tried going to chiropractor but no relief.  Initially had MRI lumbar spine that was unrevealing. He then underwent right hip xray that was abnormal and proceeded to MRI right hip and found to have large osseous mass.   He saw Dr. Sanchez today and surgery scheduled for tomorrow.  He tried steroid pack but seemed to make it worse.  He is currently just taking Ultram prn.  Pain level 10/10.     History is obtained from the patient and electronic health record.     Past Medical History  Past Medical History:   Diagnosis Date     Bone mass      Impotence of organic origin 2003     Pure hypercholesterolemia 2002    statin started circa 2002       Past Surgical History  Past Surgical History:   Procedure Laterality Date     C STOMACH SURGERY PROCEDURE UNLISTED  2006    Hernia     COLONOSCOPY  12/22/2010    COLONOSCOPY performed by DONNA WHITEHEAD at  GI     HERNIA REPAIR, INGUINAL RT/LT  1979-80     HERNIA REPAIR, INGUINAL RT/LT  03/30/2006    Recurrent left inguinal hernia.       Hx of Blood transfusions/reactions: no     Hx of abnormal bleeding or anti-platelet use: on ASA    Steroid use in the last year: steroid dose pack    Personal or FH with difficulty with Anesthesia:  no    Prior to Admission Medications  Current Outpatient Prescriptions   Medication Sig Dispense Refill     traMADol (ULTRAM) 50 MG tablet Take 1-2 tablets ( mg) by mouth every 6 hours as needed for pain 20 tablet 0     atorvastatin (LIPITOR) 20  MG tablet Take 1 tablet (20 mg) by mouth daily (Patient taking differently: Take 20 mg by mouth At Bedtime ) 90 tablet 3     aspirin 81 MG tablet Take 1 tablet by mouth daily. 90 tablet 3       Allergies  No Known Allergies    Social History  Social History     Social History     Marital status:      Spouse name: N/A     Number of children: N/A     Years of education: N/A     Occupational History     Not on file.     Social History Main Topics     Smoking status: Former Smoker     Packs/day: 0.50     Types: Cigarettes     Start date: 6/1/1973     Quit date: 6/1/1978     Smokeless tobacco: Never Used      Comment: Quit in 1978     Alcohol use 1.0 oz/week      Comment: Very minimal.     Drug use: No     Sexual activity: Not Currently     Partners: Female     Other Topics Concern     Parent/Sibling W/ Cabg, Mi Or Angioplasty Before 65f 55m? No     Social History Narrative    Single.  Son lives with him.  .            Family History  Family History   Problem Relation Age of Onset     C.A.D. Mother      age 70s     Coronary Artery Disease Mother      CEREBROVASCULAR DISEASE Father      at 66 yo     Hypertension Father      CANCER Brother      Coronary Artery Disease Brother      Cardiac Sudden Death Brother      Hyperlipidemia Brother      MENTAL ILLNESS Son      Asthma Son      Depression Son        ROS/MED HX  The complete review of systems is negative other than noted in the HPI or here.     ENT/Pulmonary:  - neg pulmonary ROS   (+)tobacco use, Past use light remote smoker packs/day  , . .    Neurologic:  - neg neurologic ROS     Cardiovascular:     (+) Dyslipidemia,    (-) CAD, chest pain or shortness of breath.   METS/Exercise Tolerance: Comment: Can walk miles, bike until right hip pain.  >4 METS   Hematologic:  - neg hematologic  ROS       Musculoskeletal:   (+) , , other musculoskeletal- right hip pain      GI/Hepatic: Comment: Seldom GERD    (+) GERD Symptomatic,       Renal/Genitourinary:    "  (+) Other Renal/ Genitourinary, ED      Endo:  - neg endo ROS       Psychiatric:  - neg psychiatric ROS       Infectious Disease:  - neg infectious disease ROS       Malignancy:   See HPI                   Temp: 98  F (36.7  C) Temp src: Oral BP: 132/85 Pulse: 72   Resp: 18 SpO2: 98 %         153 lbs 4.8 oz  5' 10\"   Body mass index is 22 kg/(m^2).       Physical Exam  Constitutional: Awake, alert, cooperative, no apparent distress, and appears stated age.  Eyes: Pupils equal, round and reactive to light, , sclera clear, conjunctiva normal.  HENT: Normocephalic, oral pharynx with moist mucus membranes, good dentition. No goiter appreciated.   Respiratory: Clear to auscultation bilaterally, no crackles or wheezing.  Cardiovascular: Regular rate and rhythm, normal S1 and S2, and no murmur noted.  Carotids +2, no bruits. No edema. Palpable pulses to  DP and PT arteries.   GI: Normal bowel sounds, soft, non-distended, non-tender, no masses palpated, no hepatosplenomegaly.   Lymph/Hematologic: No cervical lymphadenopathy and no supraclavicular lymphadenopathy.  Skin: Warm and dry.  No rashes at anticipated surgical site.   Musculoskeletal: Full ROM of neck. There is no redness, warmth, or swelling of the joints. Gross motor strength is normal.    Neurologic: Awake, alert, oriented to name, place and time. Cranial nerves II-XII are grossly intact. Antalgic gait.  Uses cane.   Neuropsychiatric: Calm, cooperative. Normal affect.     Labs: (personally reviewed)    1/24/2018 15:48  Sodium: 138  Potassium: 3.7  Chloride: 106  Carbon Dioxide: 26  Urea Nitrogen: 16  Creatinine: 0.86  GFR Estimate: >90  GFR Estimate If Black: >90  Calcium: 9.6  Anion Gap: 6  Glucose: 92    WBC: 6.6  Hemoglobin: 15.4  Hematocrit: 46.1  Platelet Count: 202  RBC Count: 5.01  MCV: 92  MCH: 30.7  MCHC: 33.4  RDW: 12.2  MRI right hip  Probable large osseous metastasis in the right supra-acetabular  ilium extending into the adjacent soft tissues " corresponding with the  lucent lesion seen on the prior CT. Plasmacytoma is also in the  differential.     EKG: Personally reviewed but formal cardiology read pending: SB at 55.  QTc 382.       Outside records reviewed from: NA    ASSESSMENT and PLAN  Dong Joseph is a 62 year old male scheduled to undergo Right Needle Versus Open Biopsy Right Pelvis on 1/25/18 by Dr. Sanchez in treatment of bone tumor.  PAC referral for risk assessment and optimization for anesthesia with comorbid conditions of:    Pre-operative considerations:  1.  Cardiac:  Functional status very good.  Could walk miles prior to right hip pain that started few months ago.  Low risk surgery.  0.4%  risk of major adverse cardiac event.  Risk of MACE < 1%. METS>4.  No further cardiac evaluation needed per 2014 ACC/AHA guidelines for non-cardiac surgery.  EKG SB at 55 bpm.    2.  Pulm:  Airway feasible.  SUZETTE risk: low.  Prior light smoker.   3.  GI:  Risk of PONV score = 2.  If 3 or > anti-emetic intervention recommended (with 2 or more meds).   4.  Right hip pain due to large osseous mass    For complete medication and diet instructions for surgery, please refer to the AVS completed by nursing.   Patient is optimized and is acceptable candidate for the proposed procedure.  No further diagnostic evaluation is needed.  Patient was discussed with Dr Ramirez.    Nat Barreto PA-C  Preoperative Assessment Center  [facility]  Phone: [phone]  Fax: [phone]

## 2018-01-24 NOTE — TELEPHONE ENCOUNTER
Reason for Call:  Same Day Appointment, Requested Provider:  STU Salazar    PCP: Naomi Kuo    Reason for visit: preop yet today for surgery tomorrow for biopsy to be done at the [facility]     Duration of symptoms:NA    Have you been treated for this in the past? No    Additional comments: none    Can we leave a detailed message on this number? YES    Phone number patient can be reached at: Home number on file [phone] (home)    Best Time: any    Call taken on 1/24/2018 at 1:31 PM by Cielo Lakhani

## 2018-01-24 NOTE — TELEPHONE ENCOUNTER
Pt called back and he is being worked in else where but he was thankful for the help.  Sheela Lea, CMA

## 2018-01-24 NOTE — MR AVS SNAPSHOT
After Visit Summary   1/24/2018    Dong Joseph    MRN: [id number]           Patient Information     Date Of Birth          1955        Visit Information        Provider Department      1/24/2018 12:45 PM Johnnie Ellsworth MD [facility]        Today's Diagnoses     Bone lesion    -  1       Follow-ups after your visit        Your next 10 appointments already scheduled     Jan 24, 2018  3:30 PM CST   (Arrive by 3:15 PM)   PAC RN ASSESSMENT with  Pac Rn   [facility] ([facility])    [address] [place] [address]  4th [facility] [address]   [phone]            Jan 24, 2018  3:50 PM CST   (Arrive by 3:35 PM)   PAC Anesthesia Consult with  Pac Anesthesiologist   [facility] [place] ([facility])    [address] [place] [address]  4th [facility] [address]   [phone]            Jan 24, 2018  4:00 PM CST   LAB with  LAB   [facility] Lab ([facility])    [address] [place] [id number] [phone]           Please do not eat 10-12 hours before your appointment if you are coming in fasting for labs on lipids, cholesterol, or glucose (sugar). This does not apply to pregnant women. Water, hot tea and black coffee (with nothing added) are okay. Do not drink other fluids, diet soda or chew gum.            Jan 25, 2018   Procedure with Johnnie Ellsworth MD   [facility] ([facility])    [address] [place] [address]  5th [facility] [address] [phone]           Located in the Clinics and Surgery Center at [address] [place] [address].   parking is very convenient and highly recommended.  is a $6 flat rate fee.  Both  and self parkers should enter the main arrival plaza from [facility]; parking attendants will direct you based on  "your parking preference.              Future tests that were ordered for you today     Open Future Orders        Priority Expected Expires Ordered    Basic metabolic panel Routine 1/24/2018 2/23/2018 1/24/2018    CBC with platelets Routine 1/24/2018 2/23/2018 1/24/2018            Who to contact     Please call your clinic at [phone] to:    Ask questions about your health    Make or cancel appointments    Discuss your medicines    Learn about your test results    Speak to your doctor   If you have compliments or concerns about an experience at your clinic, or if you wish to file a complaint, please contact [facility] Patient Relations at [phone] or email us at [email or web address]         Additional Information About Your Visit        [employer] Information     [employer] gives you secure access to your electronic health record. If you see a primary care provider, you can also send messages to your care team and make appointments. If you have questions, please call your primary care clinic.  If you do not have a primary care provider, please call [phone] and they will assist you.      [employer] is an electronic gateway that provides easy, online access to your medical records. With [employer], you can request a clinic appointment, read your test results, renew a prescription or communicate with your care team.     To access your existing account, please contact your [facility] or call [phone] for assistance.        Care EveryWhere ID     This is your Care EveryWhere ID. This could be used by other organizations to access your [place] medical records  [id number]        Your Vitals Were     Height BMI (Body Mass Index)                1.778 m (5' 10\") 22.96 kg/m2           Blood Pressure from Last 3 Encounters:   01/24/18 132/85   01/16/18 133/85   01/11/18 136/80    Weight from Last 3 Encounters:   01/24/18 69.5 kg (153 lb 4.8 oz) "   01/24/18 72.6 kg (160 lb)   01/16/18 72.6 kg (160 lb)              We Performed the Following     Rosie-Operative Worksheet          Today's Medication Changes          These changes are accurate as of 1/24/18  3:04 PM.  If you have any questions, ask your nurse or doctor.               These medicines have changed or have updated prescriptions.        Dose/Directions    atorvastatin 20 MG tablet   Commonly known as:  LIPITOR   This may have changed:  when to take this   Used for:  Pure hypercholesterolemia, Hyperlipidemia LDL goal <130        Dose:  20 mg   Take 1 tablet (20 mg) by mouth daily   Quantity:  90 tablet   Refills:  3                Primary Care Provider Office Phone # Fax #    Naomi Kuo PA-C [phone] [address] [place] [address]        Equal Access to Services     ROSA ASHFORD : Latanya Cedeno, waaminata acunaqkashmir, qaybta kaalmaarnel rain, tejinder camacho . So [facility] [phone].    ATENCIÓN: Si habla español, tiene a gonzalez disposición servicios gratuitos de asistencia lingüística. Llame al [phone].    We comply with applicable federal civil rights laws and [place] laws. We do not discriminate on the basis of race, color, national origin, age, disability, sex, sexual orientation, or gender identity.            Thank you!     Thank you for choosing [facility]  for your care. Our goal is always to provide you with excellent care. Hearing back from our patients is one way we can continue to improve our services. Please take a few minutes to complete the written survey that you may receive in the mail after your visit with us. Thank you!             Your Updated Medication List - Protect others around you: Learn how to safely use, store and throw away your medicines at www.disposemymeds.org.          This list is accurate as of 1/24/18  3:04 PM.  Always use your most recent med list.                   Brand Name  Dispense Instructions for use Diagnosis    aspirin 81 MG tablet     90 tablet    Take 1 tablet by mouth daily.    Pure hypercholesterolemia       atorvastatin 20 MG tablet    LIPITOR    90 tablet    Take 1 tablet (20 mg) by mouth daily    Pure hypercholesterolemia, Hyperlipidemia LDL goal <130       traMADol 50 MG tablet    ULTRAM    20 tablet    Take 1-2 tablets ( mg) by mouth every 6 hours as needed for pain    Right hip pain

## 2018-01-24 NOTE — TELEPHONE ENCOUNTER
Left message for pt to return call, when call is returned let pt know Naomi is unable to work pt in today but Lis Kellogg stated she could fit him in in any of her chart review spots. Please see if pt is willing to do see her and schedule.    Rashmi Chang CMA ([facility])

## 2018-01-24 NOTE — PROGRESS NOTES
Orthopedic Surgery Consultation    REFERRING PHYSICIAN: Jairon Velasquez   PRIMARY CARE PHYSICIAN: Naomi Kuo           Chief Complaint:   Consult (Rt hip bony metastais appt per Rashmi from  [facility] Ref by Dr. De La O )      History of Present Illness:  Symptom Profile Including: location of symptoms, onset, severity, exacerbating/alleviating factors, previous treatments:        Dong Joseph is a 62 year old male who resents for evaluation of a several month history of right hip pain.  The patient says that he initially thought it was his sciatic nerve and was seeing a chiropractor chiropractor who eventually referred him to a neurosurgeon.  X-rays and MRI were done which demonstrated a lesion in his hip.  He was subsequently referred to us for evaluation.  He is otherwise been of his normal health.  He denies numbness tingling or weakness.  He denies nausea vomiting chest pain shortness of breath.         Past Medical History:     Past Medical History:   Diagnosis Date     Hyperlipidemia 2002     Impotence of organic origin 2003     Pure hypercholesterolemia 2002    statin started circa 2002            Past Surgical History:     Past Surgical History:   Procedure Laterality Date     C STOMACH SURGERY PROCEDURE UNLISTED  2006    Hernia     COLONOSCOPY  12/22/2010    COLONOSCOPY performed by DONNA WHITEHEAD at  GI     HERNIA REPAIR, INGUINAL RT/LT  1979-80     HERNIA REPAIR, INGUINAL RT/LT  03/30/2006    Recurrent left inguinal hernia.            Social History:     Social History   Substance Use Topics     Smoking status: Former Smoker     Packs/day: 0.50     Types: Cigarettes     Start date: 6/1/1973     Quit date: 6/1/1978     Smokeless tobacco: Never Used      Comment: Quit in 1978     Alcohol use 1.0 oz/week      Comment: Very minimal.            Family History:     Family History   Problem Relation Age of Onset     C.A.D. Mother      age 70s     Coronary Artery Disease Mother       "CEREBROVASCULAR DISEASE Father      at 66 yo     Hypertension Father      Lipids Brother      Lipids Brother      Coronary Artery Disease Brother      Cardiac Sudden Death Brother      Hyperlipidemia Brother      CANCER Brother      MENTAL ILLNESS Son      Asthma Son      Depression Son             Allergies:   No Known Allergies         Medications:     Current Outpatient Prescriptions   Medication     traMADol (ULTRAM) 50 MG tablet     methylPREDNISolone (MEDROL DOSEPAK) 4 MG tablet     atorvastatin (LIPITOR) 20 MG tablet     aspirin 81 MG tablet     VIAGRA 100 MG OR TABS     No current facility-administered medications for this visit.              Review of Systems:     A 10 point ROS was performed and reviewed. Specific responses to these questions are noted at the end of the document.         Physical Exam:   Vitals: Ht 1.778 m (5' 10\")  Wt 72.6 kg (160 lb)  BMI 22.96 kg/m2  Constitutional: awake, alert, cooperative, no apparent distress, appears stated age.    Eyes: The sclera are white.  Ears, Nose, Throat: The trachea is midline.  Psychiatric: The patient has a normal affect.  Respiratory: breathing non-labored  Cardiovascular: The extremities are warm and perfused.  Skin: no obvious rashes or lesions.  Musculoskeletal: Evaluation of the right hip demonstrates no palpable masses or skin changes.  He has pain with any motion of the right hip.  He has 5 out of 5 strength in ankle plantar and dorsiflexion.  He is 5 out of 5 strength with great toe plantar dorsiflexion.  He does endorse some tingling sensation on the dorsum of his foot but is otherwise sensory intact throughout the entire foot and calf.  He has palpable DP and PT pulses.         Imaging:   We ordered and independently reviewed new radiographs at this clinic visit. The results were discussed with the patient.  Findings include:    MRI of the right hip demonstrates a destructive lesion associated with the bone of the right acetabulum and ilium " with his significant soft tissue mass associated.             Assessment and Plan:   Assessment:  62 year old male with destructive mass of the right acetabulum with soft tissue component.  Differential includes malignancy or metastatic disease lymphoma chondrosarcoma etc.     Plan:  1. Plan would be to do a biopsy of this mass.  We will plan to do a guided biopsy via x-ray in the OR with the potential of doing an open biopsy if frozen came back nonspecific.  Risks and benefits of this are discussed with the patient.  He would like to proceed as soon as possible.      Kalen Cabello MD  PGY-4, Orthopaedic Surgery  [phone]            Answers for HPI/ROS submitted by the patient on 1/22/2018   General Symptoms: No  Skin Symptoms: No  HENT Symptoms: No  EYE SYMPTOMS: No  HEART SYMPTOMS: Yes  LUNG SYMPTOMS: No  INTESTINAL SYMPTOMS: No  URINARY SYMPTOMS: No  REPRODUCTIVE SYMPTOMS: No  SKELETAL SYMPTOMS: Yes  BLOOD SYMPTOMS: No  NERVOUS SYSTEM SYMPTOMS: Yes  MENTAL HEALTH SYMPTOMS: No  Chest pain or pressure: No  Fast or irregular heartbeat: No  Pain in legs with walking: Yes  Trouble breathing while lying down: No  Fingers or toes appear blue: No  High blood pressure: Yes  Low blood pressure: No  Fainting: No  Murmurs: No  Pacemaker: No  Varicose veins: No  Edema or swelling: No  Wake up at night with shortness of breath: No  Light-headedness: No  Exercise intolerance: Yes  Back pain: Yes  Muscle aches: Yes  Joint pain: Yes  Bone pain: Yes  Muscle cramps: No  Muscle weakness: Yes  Joint stiffness: Yes  Bone fracture: No  Trouble with coordination: Yes  Dizziness or trouble with balance: No  Fainting or black-out spells: No  Memory loss: No  Headache: No  Seizures: No  Speech problems: No  Tingling: No  Tremor: No  Weakness: Yes  Difficulty walking: Yes  Paralysis: No  Numbness: Yes

## 2018-01-24 NOTE — MR AVS SNAPSHOT
After Visit Summary   2018    Dong Joseph    MRN: [id number]           Patient Information     Date Of Birth          1955        Visit Information        Provider Department      2018 3:30 PM Rn, [facility]        Care Instructions    Preparing for Your Surgery      Name:  Dong Joseph   MRN:  [id number]   :  1955   Today's Date:  2018     Arriving for surgery:  Surgery date:  18  Arrival time:  2:00 p.m.  Please come to:     [facility]  [address] [place] [id number]     Parking is available in front of the [facility] building from 5:30AM to 8:00PM.  -  Proceed to the 5th floor to check into the Ambulatory Surgery Center.              >> There will be patient concierges on the 1st and 5th floor, for assistance or an escort, if you would like.              >> Please call [phone] with any questions.    What can I eat or drink?  -  You may have solid food or milk products until 8 hours prior to your surgery 06:00 a.m.  -  You may have water, apple juice or 7up/Sprite, Gatorade or Powerade, Clear broth, or clear jello until 2 hours prior to your surgery  1:30 p.m.    Which medicines can I take?  -  Do NOT take these medications in the morning, the day of surgery:     Aspirin    -  Please take these medications the day of surgery:     Tramadol as needed    How do I prepare myself?  -  Take two showers: one the night before surgery; and one the morning of surgery.         Use Scrubcare or Hibiclens to wash from neck down.  You may use your own shampoo and conditioner. No other hair products.   -  Do NOT use lotion, powder, deodorant, or antiperspirant the day of your surgery.  -  Do NOT wear any makeup, fingernail polish or jewelry.  -Do not bring your own medications to the hospital, except for inhalers and eye drops.  -  Bring your ID and insurance card.    Questions or  Concerns:  If you have questions or concerns, please call the  Preoperative Assessment Center, Monday-Friday 7AM-7PM:  [phone]          AFTER YOUR SURGERY  Breathing exercises   Breathing exercises help you recover faster. Take deep breaths and let the air out slowly. This will:     Help you wake up after surgery.    Help prevent complications like pneumonia.  Preventing complications will help you go home sooner.   We may give you a breathing device (incentive spirometer) to encourage you to breathe deeply.   Nausea and vomiting   You may feel sick to your stomach after surgery; if so, let your nurse know.    Pain control:  After surgery, you may have pain. Our goal is to help you manage your pain. Pain medicine will help you feel comfortable enough to do activities that will help you heal.  These activities may include breathing exercises, walking and physical therapy.   To help your health care team treat your pain we will ask: 1) If you have pain  2) where it is located 3) describe your pain in your words  Methods of pain control include medications given by mouth, vein or by nerve block for some surgeries.  We may give you a pain control pump that will:  1) Deliver the medicine through a tube placed in your vein  2) Control the amount of medicine you receive  3) Allow you to push a button to deliver a dose of pain medicine  Sequential Compression Device (SCD) or Pneumo Boots:  You may need to wear SCD S on your legs or feet. These are wraps connected to a machine that pumps in air and releases it. The repeated pumping helps prevent blood clots from forming.           Follow-ups after your visit        Your next 10 appointments already scheduled     Jan 24, 2018  3:30 PM CST   (Arrive by 3:15 PM)   PAC RN ASSESSMENT with Davidson Pac Rn   [facility] ([facility])    [address] [place] [id number] [phone]            Jan 24,  2018  3:50 PM CST   (Arrive by 3:35 PM)   PAC Anesthesia Consult with  Pac Anesthesiologist   [facility] Preoperative [facility] ([facility])    [address] [place] [address]  4th [facility] [address] [phone]            Jan 24, 2018  4:00 PM CST   LAB with  LAB   [facility] Lab ([facility])    [address] [place] [address]  1st [facility] [id number] [phone]           Please do not eat 10-12 hours before your appointment if you are coming in fasting for labs on lipids, cholesterol, or glucose (sugar). This does not apply to pregnant women. Water, hot tea and black coffee (with nothing added) are okay. Do not drink other fluids, diet soda or chew gum.            Jan 25, 2018   Procedure with Johnnie Ellsworth MD   [facility] ([facility])    [address] [place] [address]  5th [facility] [id number] [phone]           Located in the Clinics and Surgery Center at [address] [place] [address].   parking is very convenient and highly recommended.  is a $6 flat rate fee.  Both  and self parkers should enter the main arrival plaza from [facility]; parking attendants will direct you based on your parking preference.              Future tests that were ordered for you today     Open Future Orders        Priority Expected Expires Ordered    Basic metabolic panel Routine 1/24/2018 2/23/2018 1/24/2018    CBC with platelets Routine 1/24/2018 2/23/2018 1/24/2018            Who to contact     Please call your clinic at [phone] to:    Ask questions about your health    Make or cancel appointments    Discuss your medicines    Learn about your test results    Speak to your doctor   If you have compliments or concerns about an experience at your clinic, or if you wish to file a complaint, please contact [facility] Patient Relations at  [phone] or email us at [email or web address]         Additional Information About Your Visit        [employer] Information     [employer] gives you secure access to your electronic health record. If you see a primary care provider, you can also send messages to your care team and make appointments. If you have questions, please call your primary care clinic.  If you do not have a primary care provider, please call [phone] and they will assist you.      [employer] is an electronic gateway that provides easy, online access to your medical records. With [employer], you can request a clinic appointment, read your test results, renew a prescription or communicate with your care team.     To access your existing account, please contact your [facility] or call [phone] for assistance.        Care EveryWhere ID     This is your Care EveryWhere ID. This could be used by other organizations to access your [place] medical records  [id number]         Blood Pressure from Last 3 Encounters:   01/24/18 132/85   01/16/18 133/85   01/11/18 136/80    Weight from Last 3 Encounters:   01/24/18 69.5 kg (153 lb 4.8 oz)   01/24/18 72.6 kg (160 lb)   01/16/18 72.6 kg (160 lb)              Today, you had the following     No orders found for display         Today's Medication Changes          These changes are accurate as of 1/24/18  3:06 PM.  If you have any questions, ask your nurse or doctor.               These medicines have changed or have updated prescriptions.        Dose/Directions    atorvastatin 20 MG tablet   Commonly known as:  LIPITOR   This may have changed:  when to take this   Used for:  Pure hypercholesterolemia, Hyperlipidemia LDL goal <130        Dose:  20 mg   Take 1 tablet (20 mg) by mouth daily   Quantity:  90 tablet   Refills:  3                Primary Care Provider Office Phone # Fax #    Naomi Kuo PA-C [phone] [address] [place]  [place] [address]        Equal Access to Services     ROSA ASHFORD : Hadii emily payne stephaniekevin Wilian, wajarvisda luqadaha, qaybta kabhavaniarnel rain, tejinder marr. So [facility] [phone].    ATENCIÓN: Si habla español, tiene a gonzalez disposición servicios gratuitos de asistencia lingüística. Llame al [phone].    We comply with applicable federal civil rights laws and [place] laws. We do not discriminate on the basis of race, color, national origin, age, disability, sex, sexual orientation, or gender identity.            Thank you!     Thank you for choosing [facility]OPER[facility]  for your care. Our goal is always to provide you with excellent care. Hearing back from our patients is one way we can continue to improve our services. Please take a few minutes to complete the written survey that you may receive in the mail after your visit with us. Thank you!             Your Updated Medication List - Protect others around you: Learn how to safely use, store and throw away your medicines at www.disposemymeds.org.          This list is accurate as of 1/24/18  3:06 PM.  Always use your most recent med list.                   Brand Name Dispense Instructions for use Diagnosis    aspirin 81 MG tablet     90 tablet    Take 1 tablet by mouth daily.    Pure hypercholesterolemia       atorvastatin 20 MG tablet    LIPITOR    90 tablet    Take 1 tablet (20 mg) by mouth daily    Pure hypercholesterolemia, Hyperlipidemia LDL goal <130       traMADol 50 MG tablet    ULTRAM    20 tablet    Take 1-2 tablets ( mg) by mouth every 6 hours as needed for pain    Right hip pain

## 2018-01-24 NOTE — NURSING NOTE
Teaching Flowsheet   Relevant Diagnosis: Needle versus open biopsy right pelvis  Teaching Topic: preop     Person(s) involved in teaching:   Patient     Motivation Level:  Asks Questions: Yes  Eager to Learn: Yes  Cooperative: Yes  Receptive (willing/able to accept information): Yes  Any cultural factors/Holiness beliefs that may influence understanding or compliance? No       Patient demonstrates understanding of the following:  Reason for the appointment, diagnosis and treatment plan: Yes  Knowledge of proper use of medications and conditions for which they are ordered (with special attention to potential side effects or drug interactions): Yes  Which situations necessitate calling provider and whom to contact: Yes     Teaching Concerns Addressed:        Proper use and care of soap (medical equip, care aids, etc.): Yes  Nutritional needs and diet plan: Yes  Pain management techniques: Yes  Wound Care: Yes  How and/when to access community resources: NA     Instructional Materials Used/Given: surgery packet reviewed with patient.  He will obtain H&P today here today.  He has no further questions today.

## 2018-01-24 NOTE — LETTER
1/24/2018       RE: Dong Joseph  [id number]  [place] [address]     Dear Colleague,    Thank you for referring your patient, Dong Joseph, to the [facility] at [facility]. Please see a copy of my visit note below.    I was present with the resident during the history and exam.  I discussed the case with the resident and agree with the findings as documented in the assessment and plan.    Orthopedic Surgery Consultation    REFERRING PHYSICIAN: Jairon Velasquez   PRIMARY CARE PHYSICIAN: Naomi Kuo           Chief Complaint:   Consult (Rt hip bony metastais appt per Rashmi from  [facility]. Ref by Dr. De La O )      History of Present Illness:  Symptom Profile Including: location of symptoms, onset, severity, exacerbating/alleviating factors, previous treatments:        Dong Joseph is a 62 year old male who resents for evaluation of a several month history of right hip pain.  The patient says that he initially thought it was his sciatic nerve and was seeing a chiropractor chiropractor who eventually referred him to a neurosurgeon.  X-rays and MRI were done which demonstrated a lesion in his hip.  He was subsequently referred to us for evaluation.  He is otherwise been of his normal health.  He denies numbness tingling or weakness.  He denies nausea vomiting chest pain shortness of breath.         Past Medical History:     Past Medical History:   Diagnosis Date     Hyperlipidemia 2002     Impotence of organic origin 2003     Pure hypercholesterolemia 2002    statin started circa 2002            Past Surgical History:     Past Surgical History:   Procedure Laterality Date     C STOMACH SURGERY PROCEDURE UNLISTED  2006    Hernia     COLONOSCOPY  12/22/2010    COLONOSCOPY performed by DONNA WHITEHEAD at  GI     HERNIA REPAIR, INGUINAL RT/LT  1979-80     HERNIA REPAIR, INGUINAL RT/LT  03/30/2006    Recurrent left inguinal hernia.             "Social History:     Social History   Substance Use Topics     Smoking status: Former Smoker     Packs/day: 0.50     Types: Cigarettes     Start date: 6/1/1973     Quit date: 6/1/1978     Smokeless tobacco: Never Used      Comment: Quit in 1978     Alcohol use 1.0 oz/week      Comment: Very minimal.            Family History:     Family History   Problem Relation Age of Onset     C.A.D. Mother      age 70s     Coronary Artery Disease Mother      CEREBROVASCULAR DISEASE Father      at 66 yo     Hypertension Father      Lipids Brother      Lipids Brother      Coronary Artery Disease Brother      Cardiac Sudden Death Brother      Hyperlipidemia Brother      CANCER Brother      MENTAL ILLNESS Son      Asthma Son      Depression Son             Allergies:   No Known Allergies         Medications:     Current Outpatient Prescriptions   Medication     traMADol (ULTRAM) 50 MG tablet     methylPREDNISolone (MEDROL DOSEPAK) 4 MG tablet     atorvastatin (LIPITOR) 20 MG tablet     aspirin 81 MG tablet     VIAGRA 100 MG OR TABS     No current facility-administered medications for this visit.              Review of Systems:     A 10 point ROS was performed and reviewed. Specific responses to these questions are noted at the end of the document.         Physical Exam:   Vitals: Ht 1.778 m (5' 10\")  Wt 72.6 kg (160 lb)  BMI 22.96 kg/m2  Constitutional: awake, alert, cooperative, no apparent distress, appears stated age.    Eyes: The sclera are white.  Ears, Nose, Throat: The trachea is midline.  Psychiatric: The patient has a normal affect.  Respiratory: breathing non-labored  Cardiovascular: The extremities are warm and perfused.  Skin: no obvious rashes or lesions.  Musculoskeletal: Evaluation of the right hip demonstrates no palpable masses or skin changes.  He has pain with any motion of the right hip.  He has 5 out of 5 strength in ankle plantar and dorsiflexion.  He is 5 out of 5 strength with great toe plantar dorsiflexion. "  He does endorse some tingling sensation on the dorsum of his foot but is otherwise sensory intact throughout the entire foot and calf.  He has palpable DP and PT pulses.         Imaging:   We ordered and independently reviewed new radiographs at this clinic visit. The results were discussed with the patient.  Findings include:    MRI of the right hip demonstrates a destructive lesion associated with the bone of the right acetabulum and ilium with his significant soft tissue mass associated.             Assessment and Plan:   Assessment:  62 year old male with destructive mass of the right acetabulum with soft tissue component.  Differential includes malignancy or metastatic disease lymphoma chondrosarcoma etc.     Plan:  1. Plan would be to do a biopsy of this mass.  We will plan to do a guided biopsy via x-ray in the OR with the potential of doing an open biopsy if frozen came back nonspecific.  Risks and benefits of this are discussed with the patient.  He would like to proceed as soon as possible.      Kalen Cabello MD  PGY-4, Orthopaedic Surgery  [phone]    Again, thank you for allowing me to participate in the care of your patient.      Sincerely,    Johnnie Ellsworth MD

## 2018-01-25 ENCOUNTER — SURGERY (OUTPATIENT)
Age: 63
End: 2018-01-25

## 2018-01-25 ENCOUNTER — ANESTHESIA (OUTPATIENT)
Dept: SURGERY | Facility: AMBULATORY SURGERY CENTER | Age: 63
End: 2018-01-25

## 2018-01-25 ENCOUNTER — TELEPHONE (OUTPATIENT)
Dept: ORTHOPEDICS | Facility: CLINIC | Age: 63
End: 2018-01-25

## 2018-01-25 ENCOUNTER — RADIANT APPOINTMENT (OUTPATIENT)
Dept: RADIOLOGY | Facility: AMBULATORY SURGERY CENTER | Age: 63
End: 2018-01-25
Attending: ORTHOPAEDIC SURGERY
Payer: COMMERCIAL

## 2018-01-25 ENCOUNTER — HOSPITAL ENCOUNTER (OUTPATIENT)
Facility: AMBULATORY SURGERY CENTER | Age: 63
End: 2018-01-25
Attending: ORTHOPAEDIC SURGERY
Payer: COMMERCIAL

## 2018-01-25 VITALS
RESPIRATION RATE: 16 BRPM | DIASTOLIC BLOOD PRESSURE: 71 MMHG | OXYGEN SATURATION: 98 % | SYSTOLIC BLOOD PRESSURE: 136 MMHG | TEMPERATURE: 97.6 F

## 2018-01-25 DIAGNOSIS — M89.8X9 BONE MASS: ICD-10-CM

## 2018-01-25 DIAGNOSIS — M89.9 BONE LESION: Primary | ICD-10-CM

## 2018-01-25 LAB — INTERPRETATION ECG - MUSE: NORMAL

## 2018-01-25 RX ORDER — SODIUM CHLORIDE, SODIUM LACTATE, POTASSIUM CHLORIDE, CALCIUM CHLORIDE 600; 310; 30; 20 MG/100ML; MG/100ML; MG/100ML; MG/100ML
INJECTION, SOLUTION INTRAVENOUS CONTINUOUS
Status: DISCONTINUED | OUTPATIENT
Start: 2018-01-25 | End: 2018-01-26 | Stop reason: HOSPADM

## 2018-01-25 RX ORDER — LIDOCAINE HYDROCHLORIDE 20 MG/ML
INJECTION, SOLUTION INFILTRATION; PERINEURAL PRN
Status: DISCONTINUED | OUTPATIENT
Start: 2018-01-25 | End: 2018-01-25

## 2018-01-25 RX ORDER — ONDANSETRON 4 MG/1
4 TABLET, ORALLY DISINTEGRATING ORAL EVERY 30 MIN PRN
Status: DISCONTINUED | OUTPATIENT
Start: 2018-01-25 | End: 2018-01-26 | Stop reason: HOSPADM

## 2018-01-25 RX ORDER — NALOXONE HYDROCHLORIDE 0.4 MG/ML
.1-.4 INJECTION, SOLUTION INTRAMUSCULAR; INTRAVENOUS; SUBCUTANEOUS
Status: DISCONTINUED | OUTPATIENT
Start: 2018-01-25 | End: 2018-01-26 | Stop reason: HOSPADM

## 2018-01-25 RX ORDER — ACETAMINOPHEN 325 MG/1
975 TABLET ORAL ONCE
Status: COMPLETED | OUTPATIENT
Start: 2018-01-25 | End: 2018-01-25

## 2018-01-25 RX ORDER — DEXAMETHASONE SODIUM PHOSPHATE 4 MG/ML
INJECTION, SOLUTION INTRA-ARTICULAR; INTRALESIONAL; INTRAMUSCULAR; INTRAVENOUS; SOFT TISSUE PRN
Status: DISCONTINUED | OUTPATIENT
Start: 2018-01-25 | End: 2018-01-25

## 2018-01-25 RX ORDER — SODIUM CHLORIDE, SODIUM LACTATE, POTASSIUM CHLORIDE, CALCIUM CHLORIDE 600; 310; 30; 20 MG/100ML; MG/100ML; MG/100ML; MG/100ML
INJECTION, SOLUTION INTRAVENOUS CONTINUOUS
Status: DISCONTINUED | OUTPATIENT
Start: 2018-01-25 | End: 2018-01-25 | Stop reason: HOSPADM

## 2018-01-25 RX ORDER — MEPERIDINE HYDROCHLORIDE 25 MG/ML
12.5 INJECTION INTRAMUSCULAR; INTRAVENOUS; SUBCUTANEOUS
Status: DISCONTINUED | OUTPATIENT
Start: 2018-01-25 | End: 2018-01-26 | Stop reason: HOSPADM

## 2018-01-25 RX ORDER — PROPOFOL 10 MG/ML
INJECTION, EMULSION INTRAVENOUS PRN
Status: DISCONTINUED | OUTPATIENT
Start: 2018-01-25 | End: 2018-01-25

## 2018-01-25 RX ORDER — FENTANYL CITRATE 50 UG/ML
25-50 INJECTION, SOLUTION INTRAMUSCULAR; INTRAVENOUS
Status: DISCONTINUED | OUTPATIENT
Start: 2018-01-25 | End: 2018-01-25 | Stop reason: HOSPADM

## 2018-01-25 RX ORDER — ONDANSETRON 2 MG/ML
INJECTION INTRAMUSCULAR; INTRAVENOUS PRN
Status: DISCONTINUED | OUTPATIENT
Start: 2018-01-25 | End: 2018-01-25

## 2018-01-25 RX ORDER — ACETAMINOPHEN 325 MG/1
650 TABLET ORAL
Status: DISCONTINUED | OUTPATIENT
Start: 2018-01-25 | End: 2018-01-26 | Stop reason: HOSPADM

## 2018-01-25 RX ORDER — ONDANSETRON 2 MG/ML
4 INJECTION INTRAMUSCULAR; INTRAVENOUS EVERY 30 MIN PRN
Status: DISCONTINUED | OUTPATIENT
Start: 2018-01-25 | End: 2018-01-26 | Stop reason: HOSPADM

## 2018-01-25 RX ORDER — LIDOCAINE 40 MG/G
CREAM TOPICAL
Status: DISCONTINUED | OUTPATIENT
Start: 2018-01-25 | End: 2018-01-25 | Stop reason: HOSPADM

## 2018-01-25 RX ORDER — GABAPENTIN 100 MG/1
100 CAPSULE ORAL ONCE
Status: COMPLETED | OUTPATIENT
Start: 2018-01-25 | End: 2018-01-25

## 2018-01-25 RX ADMIN — LIDOCAINE HYDROCHLORIDE 100 MG: 20 INJECTION, SOLUTION INFILTRATION; PERINEURAL at 15:46

## 2018-01-25 RX ADMIN — ACETAMINOPHEN 975 MG: 325 TABLET ORAL at 14:47

## 2018-01-25 RX ADMIN — SODIUM CHLORIDE, SODIUM LACTATE, POTASSIUM CHLORIDE, CALCIUM CHLORIDE: 600; 310; 30; 20 INJECTION, SOLUTION INTRAVENOUS at 14:47

## 2018-01-25 RX ADMIN — DEXAMETHASONE SODIUM PHOSPHATE 4 MG: 4 INJECTION, SOLUTION INTRA-ARTICULAR; INTRALESIONAL; INTRAMUSCULAR; INTRAVENOUS; SOFT TISSUE at 15:46

## 2018-01-25 RX ADMIN — GABAPENTIN 100 MG: 100 CAPSULE ORAL at 14:47

## 2018-01-25 RX ADMIN — ONDANSETRON 4 MG: 2 INJECTION INTRAMUSCULAR; INTRAVENOUS at 15:46

## 2018-01-25 RX ADMIN — PROPOFOL 200 MG: 10 INJECTION, EMULSION INTRAVENOUS at 15:46

## 2018-01-25 NOTE — IP AVS SNAPSHOT
MRN:[id number]                      After Visit Summary   1/25/2018    Dong Joseph    MRN: [id number]           Thank you!     Thank you for choosing [facility] for your care. Our goal is always to provide you with excellent care. Hearing back from our patients is one way we can continue to improve our services. Please take a few minutes to complete the written survey that you may receive in the mail after you visit with us. Thank you!        Patient Information     Date Of Birth          1955        About your hospital stay     You were admitted on:  January 25, 2018 [facility]    You were discharged on:  January 25, 2018       Who to Call     For medical emergencies, please call 911.  For non-urgent questions about your medical care, please call your primary care provider or clinic, [phone]  For questions related to your surgery, please call your surgery clinic        Attending Provider     Provider Specialty    Johnnie Ellsworth MD Orthopaedic Surgery       Primary Care Provider Office Phone # Fax #    Naomi Kuo PA-C [phone]      After Care Instructions      Diet as Tolerated       Return to diet before surgery, unless instructed otherwise.            Discharge Instructions       Review outpatient procedure discharge instructions with patient as directed by Provider. We will follow up w/ patient regarding biopsy results over the phone. No need to follow-up.            Ice to affected area       Ice pack to surgical site every 15 minutes per hour for 24 hours            No weight bearing       On affected side            Notify Provider       For signs and symptoms of infection: Fever greater than 101, redness, swelling, heat at site, drainage, pus.            Remove dressing - at 72 hours            Wound care       Do not immerse wound in water until sutures removed                  Further  instructions from your care team       [facility]  Home Care Following Anesthesia  For 24 hours after surgery:  1. Get plenty of rest.  A responsible adult must stay with you for at least 24 hours after you leave the surgery center.  2. Do not drive or use heavy equipment.  If you have weakness or tingling, don't drive or use heavy equipment until this feeling goes away.   3. Do not drink alcohol.   4. Avoid strenuous or risky activities.  Ask for help when climbing stairs.  5. You may feel lightheaded.  IF so, sit for a few minutes before standing.  Have someone help you get up.   6. If you have nausea (feel sick to your stomach): Drink only clear liquids such as apple juice, ginger ale, broth or 7-Up.  Rest may also help.  Be sure to drink enough fluids.  Move to a regular diet as you feel able.   7. You may have a slight fever.  Call the doctor if your fever is over 100 F (37.7 C) (taken under the tongue) or lasts longer than 24 hours.  8. You may have a dry mouth, a sore throat, muscle aches or trouble sleeping. These should go away after 24 hours.  9. Do not make important or legal decisions.          Tips for taking pain medications  To get the best pain relief possible, remember these points:    Take pain medications as directed, before pain becomes severe.    Pain medication can upset your stomach: taking it with food may help.    Constipation is a common side effect of pain medication. Drink plenty of  fluids.    Eat foods high in fiber. Take a stool softener if recommended by your doctor or pharmacist.    Do not drink alcohol, drive or operate machinery while taking pain medications.    Ask about other ways to control pain, such as with heat, ice or relaxation.    Tylenol/Acetaminophen Consumption  To help encourage the safe use of acetaminophen, the makers of TYLENOL  have lowered the maximum daily dose for single-ingredient Extra Strength TYLENOL  (acetaminophen)  products sold in the [place] from 8 pills per day (4,000 mg) to 6 pills per day (3,000 mg). The dosing interval has also changed from 2 pills every 4-6 hours to 2 pills every 6 hours.    If you feel your pain relief is insufficient, you may take Tylenol/Acetaminophen in addition to your narcotic pain medication.     Be careful not to exceed 3,000 mg of Tylenol/Acetaminophen in a 24 hour period from all sources.    If you are taking extra strength Tylenol/acetaminophen (500 mg), the maximum dose is 6 tablets in 24 hours.    If you are taking regular strength acetaminophen (325 mg), the maximum dose is 9 tablets in 24 hours.    Call a doctor for any of the followin. Signs of infection (fever, growing tenderness at the surgery site, a large amount of drainage or bleeding, severe pain, foul-smelling drainage, redness, swelling).  2. It has been over 8 to 10 hours since surgery and you are still not able to urinate (pass water).  3. Headache for over 24 hours.  4. Numbness, tingling or weakness the day after surgery (if you had spinal anesthesia).  Your doctor is:       Dr. Johnnie Ellsworth, Orthopaedics: [phone]               Or dial [phone] and ask for the resident on call for:  General Surgery  For emergency care, call the:  [facility]:  [phone] (TTY for hearing impaired: [phone])                Pending Results     Date and Time Order Name Status Description    2018 1610 LEUKEMIA LYMPHOMA EVALUATION (FLOW CYTOMETRY) In process     2018 1607 Surgical pathology exam Preliminary     2018 1502 EKG 12-LEAD COMPLETE W/READ - CLINICS Preliminary             Admission Information     Date & Time Provider Department Dept. Phone    2018 Johnnie Ellsworth MD [facility] [phone]      Your Vitals Were     Blood Pressure Temperature Respirations Pulse Oximetry          156/85 97.6  F (36.4  C) (Temporal) 16 100%        MyChart  Information     [facility] gives you secure access to your electronic health record. If you see a primary care provider, you can also send messages to your care team and make appointments. If you have questions, please call your primary care clinic.  If you do not have a primary care provider, please call [phone] and they will assist you.      [facility] is an electronic gateway that provides easy, online access to your medical records. With [facility], you can request a clinic appointment, read your test results, renew a prescription or communicate with your care team.     To access your existing account, please contact your [facility] or call [phone] for assistance.        Care EveryWhere ID     This is your Care EveryWhere ID. This could be used by other organizations to access your [facility] medical records  [id number]        Equal Access to Services     ROSA ASHFORD : Latanya Cedeno, heather farooq, soumya herronalkelle rain, tejinder marr. So [facility] [phone].    ATENCIÓN: Si habla español, tiene a gonzalez disposición servicios gratuitos de asistencia lingüística. Llame al [phone].    We comply with applicable federal civil rights laws and [place] laws. We do not discriminate on the basis of race, color, national origin, age, disability, sex, sexual orientation, or gender identity.               Review of your medicines      CONTINUE these medicines which may have CHANGED, or have new prescriptions. If we are uncertain of the size of tablets/capsules you have at home, strength may be listed as something that might have changed.        Dose / Directions    atorvastatin 20 MG tablet   Commonly known as:  LIPITOR   This may have changed:  when to take this   Used for:  Pure hypercholesterolemia, Hyperlipidemia LDL goal <130        Dose:  20 mg   Take 1 tablet (20 mg) by mouth daily   Quantity:  90 tablet   Refills:  3         CONTINUE  these medicines which have NOT CHANGED        Dose / Directions    aspirin 81 MG tablet   Used for:  Pure hypercholesterolemia        Dose:  81 mg   Take 1 tablet by mouth daily.   Quantity:  90 tablet   Refills:  3       traMADol 50 MG tablet   Commonly known as:  ULTRAM   Used for:  Right hip pain        Dose:   mg   Take 1-2 tablets ( mg) by mouth every 6 hours as needed for pain   Quantity:  20 tablet   Refills:  0       TYLENOL PO        Dose:  1000 mg   Take 1,000 mg by mouth   Refills:  0                Protect others around you: Learn how to safely use, store and throw away your medicines at www.disposemymeds.org.             Medication List: This is a list of all your medications and when to take them. Check marks below indicate your daily home schedule. Keep this list as a reference.      Medications           Morning Afternoon Evening Bedtime As Needed    aspirin 81 MG tablet   Take 1 tablet by mouth daily.                                atorvastatin 20 MG tablet   Commonly known as:  LIPITOR   Take 1 tablet (20 mg) by mouth daily                                traMADol 50 MG tablet   Commonly known as:  ULTRAM   Take 1-2 tablets ( mg) by mouth every 6 hours as needed for pain                                TYLENOL PO   Take 1,000 mg by mouth   Last time this was given:  975 mg on 1/25/2018  2:47 PM

## 2018-01-25 NOTE — ANESTHESIA POSTPROCEDURE EVALUATION
Patient: Dong Joseph    Procedure(s):  Needle Biopsy Right Pelvis - Wound Class: I-Clean    Diagnosis:Bone Tumor  Diagnosis Additional Information: No value filed.    Anesthesia Type:  LMA, General    Note:  Anesthesia Post Evaluation    Patient location during evaluation: PACU  Patient participation: Able to fully participate in evaluation  Level of consciousness: awake  Pain management: adequate  Airway patency: patent  Cardiovascular status: acceptable  Respiratory status: acceptable  Hydration status: acceptable  PONV: none             Last vitals:  Vitals:    01/25/18 1645 01/25/18 1700 01/25/18 1730   BP: 138/80 156/85 132/76   Resp: 25 16 16   Temp: 36.7  C (98  F) 36.4  C (97.6  F) 36.4  C (97.6  F)   SpO2: 98% 100% 99%         Electronically Signed By: Luis Eduardo Sanchez MD  January 25, 2018  5:49 PM

## 2018-01-25 NOTE — IP AVS SNAPSHOT
[facility]    [address] [place] [address]    Phone:  [phone]    Fax:  [phone]                                       After Visit Summary   1/25/2018    Dong Joseph    MRN: [id number]           After Visit Summary Signature Page     I have received my discharge instructions, and my questions have been answered. I have discussed any challenges I see with this plan with the nurse or doctor.    ..........................................................................................................................................  Patient/Patient Representative Signature      ..........................................................................................................................................  Patient Representative Print Name and Relationship to Patient    ..................................................               ................................................  Date                                            Time    ..........................................................................................................................................  Reviewed by Signature/Title    ...................................................              ..............................................  Date                                                            Time

## 2018-01-25 NOTE — OP NOTE
DATE OF SURGERY: 1/25/2018    PREOPERATIVE DIAGNOSIS: Right pelvis tumor    POSTOPERATIVE DIAGNOSIS: Right pelvis tumor    PROCEDURE: Needle biopsy of right pelvis bone tumor    SURGEON: Johnnie Ellsworth MD     ASSISTANT: Maia Pike PA-C as an assistant was required to help close the wound, and resident help was not available.    PATIENT HISTORY: Patient has noticed that right hip pain.  An MRI shows a destructive lesion of the right acetabulum.  He understands the risks of biopsy including bleeding infection pain.    DESCRIPTION OF PROCEDURE: The patient was placed supine after undergoing successful induction of general anesthesia.  The right anterior pelvis area was washed and sterilely prepped and draped.  I used the C-arm to locate the area of the tumor.  I made a small incision with a 15 blade and passed a Armen-Cut needle under C-arm guidance into the soft bone just lateral to the femoral vessels.  We got several good core samples of good tissue.  Frozen section showed what appeared to be malignancy, possibly a lymphoma.  Steri-Strips and a sterile dry dressing were applied to the small incision.  The patient was extubated and taken to the recovery room in stable condition.  The estimated blood loss is 1 mL.  I was present for all critical portions of the procedure.  There were no complications.    Johnnie Ellsworth MD

## 2018-01-25 NOTE — ANESTHESIA CARE TRANSFER NOTE
Patient: Dong Joseph    Procedure(s):  Needle Biopsy Right Pelvis - Wound Class: I-Clean    Diagnosis: Bone Tumor  Diagnosis Additional Information: No value filed.    Anesthesia Type:   LMA, General     Note:  Airway :Room Air  Patient transferred to:PACU  Comments: Patient awake and breathing spont. VSS. No complaints of pain or nausea. Report to RNHandoff Report: Identifed the Patient, Identified the Reponsible Provider, Reviewed the pertinent medical history, Discussed the surgical course, Reviewed Intra-OP anesthesia mangement and issues during anesthesia, Set expectations for post-procedure period and Allowed opportunity for questions and acknowledgement of understanding      Vitals: (Last set prior to Anesthesia Care Transfer)    CRNA VITALS  1/25/2018 1610 - 1/25/2018 1646      1/25/2018             Pulse: 75    SpO2: 98 %    Resp Rate (observed): (!)  7                Electronically Signed By: KASIA Cheatham CRNA  January 25, 2018  4:46 PM

## 2018-01-25 NOTE — DISCHARGE INSTRUCTIONS
[facility]  Home Care Following Anesthesia  For 24 hours after surgery:  1. Get plenty of rest.  A responsible adult must stay with you for at least 24 hours after you leave the surgery center.  2. Do not drive or use heavy equipment.  If you have weakness or tingling, don't drive or use heavy equipment until this feeling goes away.   3. Do not drink alcohol.   4. Avoid strenuous or risky activities.  Ask for help when climbing stairs.  5. You may feel lightheaded.  IF so, sit for a few minutes before standing.  Have someone help you get up.   6. If you have nausea (feel sick to your stomach): Drink only clear liquids such as apple juice, ginger ale, broth or 7-Up.  Rest may also help.  Be sure to drink enough fluids.  Move to a regular diet as you feel able.   7. You may have a slight fever.  Call the doctor if your fever is over 100 F (37.7 C) (taken under the tongue) or lasts longer than 24 hours.  8. You may have a dry mouth, a sore throat, muscle aches or trouble sleeping. These should go away after 24 hours.  9. Do not make important or legal decisions.          Tips for taking pain medications  To get the best pain relief possible, remember these points:    Take pain medications as directed, before pain becomes severe.    Pain medication can upset your stomach: taking it with food may help.    Constipation is a common side effect of pain medication. Drink plenty of  fluids.    Eat foods high in fiber. Take a stool softener if recommended by your doctor or pharmacist.    Do not drink alcohol, drive or operate machinery while taking pain medications.    Ask about other ways to control pain, such as with heat, ice or relaxation.    Tylenol/Acetaminophen Consumption  To help encourage the safe use of acetaminophen, the makers of TYLENOL  have lowered the maximum daily dose for single-ingredient Extra Strength TYLENOL  (acetaminophen) products sold in the [place] from 8 pills per  day (4,000 mg) to 6 pills per day (3,000 mg). The dosing interval has also changed from 2 pills every 4-6 hours to 2 pills every 6 hours.    If you feel your pain relief is insufficient, you may take Tylenol/Acetaminophen in addition to your narcotic pain medication.     Be careful not to exceed 3,000 mg of Tylenol/Acetaminophen in a 24 hour period from all sources.    If you are taking extra strength Tylenol/acetaminophen (500 mg), the maximum dose is 6 tablets in 24 hours.    If you are taking regular strength acetaminophen (325 mg), the maximum dose is 9 tablets in 24 hours.    Call a doctor for any of the followin. Signs of infection (fever, growing tenderness at the surgery site, a large amount of drainage or bleeding, severe pain, foul-smelling drainage, redness, swelling).  2. It has been over 8 to 10 hours since surgery and you are still not able to urinate (pass water).  3. Headache for over 24 hours.  4. Numbness, tingling or weakness the day after surgery (if you had spinal anesthesia).  Your doctor is:       Dr. Johnnie Ellsworth, Orthopaedics: [phone]               Or dial [phone] and ask for the resident on call for:  General Surgery  For emergency care, call the:  [place]:  [phone] (TTY for hearing impaired: [phone])

## 2018-01-26 LAB — COPATH REPORT: NORMAL

## 2018-01-29 ENCOUNTER — TELEPHONE (OUTPATIENT)
Dept: ORTHOPEDICS | Facility: CLINIC | Age: 63
End: 2018-01-29

## 2018-01-29 NOTE — TELEPHONE ENCOUNTER
RN called and spoke with Dong.  Pathology is not final yet. We will call when final or feel free to call us if you have not heard from us by the end of the week.  Biopsy results    Call patient Received: Today       Dimple Almeida [place], [place]       Phone Number: [phone]                     Pt calling to see if he needs to come in to get results from recent biopsy or he is going to get a call on the results.  Please call the pt back at this number [phone]   Dimple Guerrero   Call ctr   Please DO NOT send message and or reply back to sender. Call center Representatives DO NOT respond to Messages

## 2018-01-30 LAB — COPATH REPORT: NORMAL

## 2018-01-31 ENCOUNTER — TELEPHONE (OUTPATIENT)
Dept: ORTHOPEDICS | Facility: CLINIC | Age: 63
End: 2018-01-31

## 2018-01-31 NOTE — TELEPHONE ENCOUNTER
Dr. Cottrell has called and spoke with Nagi.  Review of pathology showed:  Patient Name: NAGI FOURNIER   MR#: [id number]   Specimen #: [id number]   Collected: 1/25/2018   Received: 1/25/2018   Reported: 1/30/2018 15:10   Ordering Phy(s): CANDIDA COTTRELL     For improved result formatting, select 'View Enhanced Report Format' under    Linked Documents section.     ORIGINAL REPORT:     SPECIMEN(S):   Right pelvis     FINAL DIAGNOSIS:   Pelvis, right, biopsy:        Plasma cell neoplasm (see comment    His questions were answered and he should see Radiation and Medical Oncology.

## 2018-02-01 ENCOUNTER — TELEPHONE (OUTPATIENT)
Dept: ORTHOPEDICS | Facility: CLINIC | Age: 63
End: 2018-02-01

## 2018-02-01 ENCOUNTER — TELEPHONE (OUTPATIENT)
Dept: RADIATION ONCOLOGY | Facility: CLINIC | Age: 63
End: 2018-02-01

## 2018-02-01 DIAGNOSIS — C90.30 PLASMACYTOMA OF BONE (H): Primary | ICD-10-CM

## 2018-02-01 NOTE — TELEPHONE ENCOUNTER
Spoke with patient about consult date, time, and location with Dr Dejesus. Patient given direction to out facility. Patient verbalized understanding and had no questions    Aj LOCKHART

## 2018-02-01 NOTE — TELEPHONE ENCOUNTER
RN called and spoke with Dong    Radiation appointment   Dr. Oleg Car 02-02-18 at 1000    Medical Oncology -  Dr. Ja Harden  02-07-18 at 1000.  He will attend these appointments

## 2018-02-02 ENCOUNTER — APPOINTMENT (OUTPATIENT)
Dept: RADIATION ONCOLOGY | Facility: CLINIC | Age: 63
End: 2018-02-02
Payer: COMMERCIAL

## 2018-02-02 ENCOUNTER — TELEPHONE (OUTPATIENT)
Dept: RADIATION ONCOLOGY | Facility: CLINIC | Age: 63
End: 2018-02-02

## 2018-02-02 ENCOUNTER — OFFICE VISIT (OUTPATIENT)
Dept: RADIATION ONCOLOGY | Facility: CLINIC | Age: 63
End: 2018-02-02
Payer: COMMERCIAL

## 2018-02-02 VITALS
DIASTOLIC BLOOD PRESSURE: 76 MMHG | RESPIRATION RATE: 18 BRPM | TEMPERATURE: 97.8 F | SYSTOLIC BLOOD PRESSURE: 114 MMHG | BODY MASS INDEX: 22.26 KG/M2 | WEIGHT: 155.5 LBS | OXYGEN SATURATION: 99 % | HEART RATE: 71 BPM | HEIGHT: 70 IN

## 2018-02-02 DIAGNOSIS — C90.30 PLASMACYTOMA OF BONE (H): ICD-10-CM

## 2018-02-02 LAB
ALBUMIN SERPL-MCNC: 4.1 G/DL (ref 3.4–5)
ALP SERPL-CCNC: 80 U/L (ref 40–150)
ALT SERPL W P-5'-P-CCNC: 34 U/L (ref 0–70)
ANION GAP SERPL CALCULATED.3IONS-SCNC: 5 MMOL/L (ref 3–14)
AST SERPL W P-5'-P-CCNC: 10 U/L (ref 0–45)
BILIRUB SERPL-MCNC: 0.5 MG/DL (ref 0.2–1.3)
BUN SERPL-MCNC: 21 MG/DL (ref 7–30)
CALCIUM SERPL-MCNC: 9.9 MG/DL (ref 8.5–10.1)
CHLORIDE SERPL-SCNC: 103 MMOL/L (ref 94–109)
CO2 SERPL-SCNC: 31 MMOL/L (ref 20–32)
CREAT SERPL-MCNC: 0.83 MG/DL (ref 0.66–1.25)
GFR SERPL CREATININE-BSD FRML MDRD: >90 ML/MIN/1.7M2
GLUCOSE SERPL-MCNC: 105 MG/DL (ref 70–99)
LDH SERPL L TO P-CCNC: 135 U/L (ref 85–227)
POTASSIUM SERPL-SCNC: 4.3 MMOL/L (ref 3.4–5.3)
PROT SERPL-MCNC: 7.9 G/DL (ref 6.8–8.8)
SODIUM SERPL-SCNC: 139 MMOL/L (ref 133–144)
URATE SERPL-MCNC: 5.7 MG/DL (ref 3.5–7.2)

## 2018-02-02 PROCEDURE — 82784 ASSAY IGA/IGD/IGG/IGM EACH: CPT | Performed by: RADIOLOGY

## 2018-02-02 PROCEDURE — 84165 PROTEIN E-PHORESIS SERUM: CPT | Performed by: RADIOLOGY

## 2018-02-02 PROCEDURE — 77290 THER RAD SIMULAJ FIELD CPLX: CPT | Performed by: RADIOLOGY

## 2018-02-02 PROCEDURE — 36415 COLL VENOUS BLD VENIPUNCTURE: CPT | Performed by: RADIOLOGY

## 2018-02-02 PROCEDURE — 77263 THER RADIOLOGY TX PLNG CPLX: CPT | Performed by: RADIOLOGY

## 2018-02-02 PROCEDURE — 99205 OFFICE O/P NEW HI 60 MIN: CPT | Mod: 25 | Performed by: RADIOLOGY

## 2018-02-02 PROCEDURE — 84550 ASSAY OF BLOOD/URIC ACID: CPT | Performed by: RADIOLOGY

## 2018-02-02 PROCEDURE — 80053 COMPREHEN METABOLIC PANEL: CPT | Performed by: RADIOLOGY

## 2018-02-02 PROCEDURE — 86334 IMMUNOFIX E-PHORESIS SERUM: CPT | Performed by: RADIOLOGY

## 2018-02-02 PROCEDURE — 38221 DX BONE MARROW BIOPSIES: CPT | Performed by: RADIOLOGY

## 2018-02-02 PROCEDURE — 00000402 ZZHCL STATISTIC TOTAL PROTEIN: Performed by: RADIOLOGY

## 2018-02-02 PROCEDURE — 77334 RADIATION TREATMENT AID(S): CPT | Performed by: RADIOLOGY

## 2018-02-02 PROCEDURE — 82232 ASSAY OF BETA-2 PROTEIN: CPT | Performed by: RADIOLOGY

## 2018-02-02 PROCEDURE — 86335 IMMUNFIX E-PHORSIS/URINE/CSF: CPT | Performed by: RADIOLOGY

## 2018-02-02 PROCEDURE — 83615 LACTATE (LD) (LDH) ENZYME: CPT | Performed by: RADIOLOGY

## 2018-02-02 PROCEDURE — 83883 ASSAY NEPHELOMETRY NOT SPEC: CPT | Performed by: RADIOLOGY

## 2018-02-02 ASSESSMENT — PAIN SCALES - GENERAL: PAINLEVEL: MILD PAIN (3)

## 2018-02-02 NOTE — NURSING NOTE
Patient scheduled for radiation therapy starting 2/13/18 at 0830. Patient informed that radiation schedule could change based on future workup. This writer informed patient that he will be contacted with updates. Patient verbalized understanding     Aj LOCKHART

## 2018-02-02 NOTE — PATIENT INSTRUCTIONS
What to expect at your Simulation visit:    You will meet with a Radiation Therapist and other team members who will be doing a planning session called a  simulation  with you. This process will determine your daily treatment.    ~ You will lie on a flat table and have a treatment planning CT scan.  It is important during the scan to hold very still and breathe normally.    ~ Your therapist may construct a body mold to help you hold still for your treatments.    ~ If you are having treatment to the head or neck area you will be fitted with a plastic mesh mask that fits very snugly over your face and neck.     ~ Your therapist will be taking some digital photos that will go in your treatment chart.      ~Your therapist will make marks on your skin and take measurements. Your therapist may ask you about making small tattoos (a permanent small dot) over these marks.  These marks are used to position you daily for your radiation therapy treatments. Please do not wash off any marks until all of your radiation therapy treatments are complete unless you are instructed to do so by your therapist.    ~ Once the simulation is completed it can take from 3 to 10 business days before you start radiation therapy treatments.    ~ You may meet with a nurse who will go over management of treatment side effects and self care during your treatments. The nurse will help to plan care with other departments and physicians if needed.      Bone Marrow Aspiration and Biopsy    Bone marrow is the soft, spongy part inside bones. It makes most of the body s blood cells. Aspiration and biopsy are procedures done to take a sample of bone marrow out of the body for examination. To perform either procedure, a needle is inserted into one of your bones, usually the back of the hip bone. Then a sample of bone marrow is removed.   If a sample of fluid and cells is taken, it is called bone marrow aspiration. If a solid sample of bone marrow  tissue is removed, it is called bone marrow biopsy. In either case, the samples are sent to a lab and studied. The procedures can be done alone, but are most often done together. This sheet tells you more about what to expect.  Why the procedures are done  The procedures may be done for a number of reasons. They can help diagnose certain blood or bone marrow disorders or infections. They may help find certain cancers, such as leukemia. They can show if cancer in other areas of the body has spread to the bone marrow. They can be used during cancer treatment, such as chemotherapy, to monitor treatment progress. And they may be done before certain treatments, such as a stem cell transplant, which require a bone marrow sample. Your healthcare provider will explain why you need the procedure and answer any questions you have.  Preparing for the procedure  Prepare for the procedure as told. In addition:    Tell your healthcare provider:    What medicines you take. This includes blood thinners, such as aspirin. This also includes over-the-counter medicines, herbs, and other supplements. You may need to stop taking some or all of them before the procedure.    If you are allergic to any medicines. Also mention if you have ever had a reaction to medicines used during other tests or procedures in the past.    If you have a history of bleeding problems.    If you are pregnant or may be pregnant.    Follow any directions you re given for not eating or drinking before the procedure.  The day of the procedure  The procedures can be done at a hospital, clinic, or healthcare provider s office. They are performed by a healthcare provider or trained healthcare provider. Whether you re having one or both procedures, plan to be at the facility for 1 to 2 hours. You ll likely go home the same day.  Before the procedure begins  What to expect before the procedure:    You ll change into a patient gown.    An IV line may be put into a vein  in your arm or hand. This line supplies fluids and medicines.    You ll be given a sedative to help you relax, if needed. This medicine is given by pill, injection, or through the IV line.  During the procedure  What to expect during the procedure:    You ll lie on your side or your stomach.    The site to be used for the bone marrow samples is marked and cleaned. The most common site is the back of the hip bone. Less common sites include the front of the hip bone or breastbone.    Numbing medicine (local anesthesia) is injected at the site.    A small cut is made through the numbed skin.    One or both procedures are then done. Be sure to lie still for each procedure. It is normal to feel some pressure or pain during each procedure.    For the bone marrow aspiration, a thin needle is put through the cut and into the bone. A syringe is then attached to the needle and used to remove a sample of bone marrow fluid and cells.    For the bone marrow biopsy, a different needle is put through the same cut and into the bone. A small amount of bone marrow tissue is then removed.    The samples are sent to a lab to be evaluated.    When the procedure is complete, pressure is applied to the site for 10 to 15 minutes to help stop bleeding. The site is then bandaged.  After the procedure  Most people can go home after a short period of observation. If you need it, you ll be given medicine to manage pain. If you were given a sedative, you may be taken to a recovery room to rest until the medicine wears off. An adult family member or friend must drive you home afterward.  Recovering at home  Once at home, follow any instructions you re given. Be sure to:    Take all medicines as directed.    Care for the procedure site as instructed.    Check for signs of infection at the procedure site (see below).    Avoid getting the procedure site wet. Do not bathe or shower until your healthcare providers say it is OK to do so. If you wish,  you may wash with a sponge or washcloth.    Avoid heavy lifting and other strenuous activities as directed.      Call the healthcare provider  Call your healthcare provider if you have any of the following:    Fever of 100.4  F (38  C) or higher, or as directed by your healthcare provider    Signs of infection at the procedure site, such as increased redness or swelling, warmth, worsening pain, bleeding, or foul-smelling drainage   Follow-up  Your healthcare provider will discuss the results with you when they are ready. This is usually within a week after the procedure.      Risks and possible complications of these procedures  These include:    Severe bleeding or bruising at the procedure site    Infection at the procedure site    Bone fracture    Bone infection   Date Last Reviewed: 10/8/2015    0614-6559 [employer]. [address] [place] [address]. All rights reserved. This information is not intended as a substitute for professional medical care. Always follow your healthcare professional's instructions.               Please contact [facility] RN with questions or concerns following today's appointment: [phone].    Thank you!

## 2018-02-02 NOTE — NURSING NOTE
Met with patient today following consultation visit with Dr. Dejesus.  Reviewed need for labs provided through blood draw and urine today, 2/2/2018.  Patient also informed of need for 24 hour urine, instructions and supplies provided by .  Patient scheduled for bone marrow biopsy at [facility] with Jamila Montoya NP on Monday, 2/5/2018 at 0845.  Patient provided educational handout regarding Bone Marrow Biopsy, education provided on procedure and after care of procedure.  Patient will stop taking baby aspirin today and will not resume for 24 hours after procedure.  Patient verbalized understanding of need to have  for procedure on Monday, 2/5/2018.  Labs drawn today and urine sample provided as ordered.  Patient has oncology consultation scheduled with Dr. Peres on 2/7/2018.  Patient verbalized understanding of all information and had no questions at this time.  Patient was provided direct contact information for this RN and was encouraged to call with questions or concerns.    Dr. Dejesus updated.    Mary Grace Ryan, RN BSN OCN

## 2018-02-02 NOTE — NURSING NOTE
"INITIAL PATIENT ASSESSMENT    Diagnosis: Plasma cell neoplasm    Prior radiation therapy: None    Prior chemotherapy: None    Prior hormonal therapy:No    Pain Eval:  Current history of pain associated with this visit:   Intensity: Current pain \"3/10\", patient reports increase of pain to \"7-8\" and pain at best \"0-1/10\"  Current: patient reports pain of right low back/buttocks and right groin as a deep dull and aching, reports intermittent sharp shooting pains.  Patient reports increased pain with moving from sitting to standing position, pain relief with use of cane to limit weight bearing on right lower extremity.  Location: Right low back/buttocks and right groin, intermittent pain of right anterior thigh and intermittent numbness/tingling of right foot  Treatment: Patient reports taking Tylenol 500 mg po as needed, \"I don't take it everyday\" and patient reports taking two Tramadol tablets since the time he first received prescription.    Psychosocial  Living arrangements: Lives at home in [place] with son, patient reports he is not , works as a  making medical devices.  Fall Risk: ambulates with assistive device, patient reports using cane for the past three weeks.   referral needs: Not needed    Advanced Directive: No  Implantable Cardiac Device? No    Reproductive note: Not recorded for male patient    Review of Systems     Constitutional: Negative.    HENT: Negative.    Eyes: Negative.    Respiratory: Negative.    Cardiovascular: Negative.    Gastrointestinal: Negative.    Genitourinary: Negative.    Musculoskeletal: Positive for back pain, falls and joint pain. Negative for myalgias and neck pain.        See pain note.  Patient reports recent fall at the end of November/early December as he was leaving work and slipped on ice.  He reports experiencing increased pain of right hip/buttocks and notes that pain improved over time.   Skin: Negative.    Neurological: Positive for " tingling. Negative for dizziness, tremors, sensory change, speech change, focal weakness, seizures, loss of consciousness and headaches.        Patient reports intermittent numbness/tingling of right foot - see pain note.   Endo/Heme/Allergies: Negative.    Psychiatric/Behavioral: Negative.      Nurse face-to-face time: Level 5:  over 15 min face to face time

## 2018-02-02 NOTE — PROGRESS NOTES
RADIATION ONCOLOGY CONSULT NOTE    Date of Visit: 2018  Patient Name: Dong Joseph  MRN: [id number]  : 1955    Dong Joseph is being seen today for initial consultation at the request of Dr. Johnnie Ellsworth for consideration of radiation therapy.    HISTORY OF PRESENT ILLNESS:  Mr. Joseph is a 62 year old male with a painful R hip plasmacytoma.    He initially presented with right hip pain and radicular pain in the right lower extremity, which has been present for around 6 mos but progressing recently.  Lumbar MRI showed diffuse degenerative changes throughout the lumbar spine, no disc herniation, and moderate foraminal stenosis at the right at L4-L5 and L5-S1 and left L2-L3.  Plain film x-rays showed a lucent area in the right supra acetabular ilium measuring 5.2 x 3.9 cm without definite fracture.    2018: MRI showed a destructive lesion in the right acetabulum which was large and heterogeneously T2 hyperintense and hypointense in the right supra-acetabular ilium extending past the margin of the bone into adjacent soft tissues posterior to the right ilium and slightly anterior to the right ilium, appearing most consistent with a metastasis or plasmacytoma.      He was referred to Orthopedic surgery.    2018: He underwent biopsy of the pelvic lesion.  Pathology showed plasma cell neoplasm, but flow cytometry showed no definitive B-cell population identified due to limited cell number.    He states that he has no pain at rest, but does have pain with movement or weight bearing that rates 7/10, but is relieved with tylenol. He sometimes takes tramadol. He has some tingling in his R leg and weakness/decreased ROM which he attributes to pain limitations. He states that Dr. Ellsworth did not recommend any surgery for his R hip. He denies any other sources of pain throughout his body and otherwise feels good.    CHEMOTHERAPY HISTORY: none    RADIATION THERAPY HISTORY:   none    PAST MEDICAL/SURGICAL HISTORY:  Past Medical History:   Diagnosis Date     Impotence of organic origin 2003     Plasma cell neoplasm 01/25/2018    S/P Needle biopsy of right pelvis bone tumor     Pure hypercholesterolemia 2002    statin started circa 2002     Past Surgical History:   Procedure Laterality Date     BIOPSY BONE PELVIS Right 1/25/2018    Procedure: BIOPSY BONE PELVIS;  Needle Biopsy Right Pelvis;  Surgeon: Johnnie Ellsworth MD;  Location: [facility] OR     COLONOSCOPY  12/22/2010    COLONOSCOPY performed by DONNA WHITEHEAD at  GI     HERNIA REPAIR, INGUINAL RT/LT Bilateral 1979-80    Inguinal     HERNIA REPAIR, INGUINAL RT/LT  03/30/2006    Recurrent left inguinal hernia.       ALLERGIES:  Allergies as of 02/02/2018     (No Known Allergies)       MEDICATIONS:  Current Outpatient Prescriptions   Medication Sig Dispense Refill     Acetaminophen (TYLENOL PO) Take 1,000 mg by mouth       traMADol (ULTRAM) 50 MG tablet Take 1-2 tablets ( mg) by mouth every 6 hours as needed for pain 20 tablet 0     atorvastatin (LIPITOR) 20 MG tablet Take 1 tablet (20 mg) by mouth daily (Patient taking differently: Take 20 mg by mouth At Bedtime ) 90 tablet 3     aspirin 81 MG tablet Take 1 tablet by mouth daily. 90 tablet 3        FAMILY HISTORY:  Family History   Problem Relation Age of Onset     C.A.D. Mother      age 70s     Coronary Artery Disease Mother      CEREBROVASCULAR DISEASE Father      at 68 yo     Hypertension Father      Lung Cancer Brother      [place]     Coronary Artery Disease Brother      Cardiac Sudden Death Brother      Hyperlipidemia Brother      MENTAL ILLNESS Son      Asthma Son      Depression Son        SOCIAL HISTORY:  Social History     Social History     Marital status:      Spouse name: N/A     Number of children: N/A     Years of education: N/A     Occupational History     Not on file.     Social History Main Topics     Smoking status: Former Smoker      "Packs/day: 0.75     Years: 5.00     Types: Cigarettes     Start date: 6/1/1973     Quit date: 6/1/1978     Smokeless tobacco: Never Used     Alcohol use 1.0 oz/week      Comment: Patient reports one drink bi-weekly     Drug use: No     Sexual activity: Not Currently     Partners: Female     Other Topics Concern     Parent/Sibling W/ Cabg, Mi Or Angioplasty Before 65f 55m? No     Social History Narrative    Single.  Son lives with him.  .            REVIEW OF SYSTEMS: A 10-point review of systems was obtained. Pertinent findings are noted in the HPI and are otherwise unremarkable.     PHYSICAL EXAM:  VITALS: /76 (BP Location: Left arm, Patient Position: Chair, Cuff Size: Adult Regular)  Pulse 71  Temp 97.8  F (36.6  C) (Oral)  Resp 18  Ht 5' 10\"  Wt 155 lb 8 oz  SpO2 99%  BMI 22.31 kg/m2  GEN: appears well, in no acute distress  HEENT: normocephalic and atraumatic, EOMI, anicteric sclerae  CV: no LE edema, no JVD  RESP: normal respiration on room air, no stridor  ABDOMEN: soft, NT, ND  SKIN: normal color and turgor  MSK: pain with motion of R hip flex/ext, strength exam limited mainly by pain, 3/5 at R hip flex/ext, 4/5 knee flex/ext, 5/5 dorsi/plantarfexion. 5/5 throughout left LE. Normal sensation to light touch bilat. Ambulating with cane.   NEURO: CN II-XII grossly intact, no focal neurologic deficit  PSYCH: appropriate mood, affect, and judgment    ECOG PERFORMANCE STATUS: 0    All pertinent laboratory, imaging, and pathology findings have been reviewed.     Last Basic Metabolic Panel:  Lab Results   Component Value Date     01/24/2018      Lab Results   Component Value Date    POTASSIUM 3.7 01/24/2018     Lab Results   Component Value Date    CHLORIDE 106 01/24/2018     Lab Results   Component Value Date    CHANCE 9.6 01/24/2018     Lab Results   Component Value Date    CO2 26 01/24/2018     Lab Results   Component Value Date    BUN 16 01/24/2018     Lab Results   Component Value Date "    CR 0.86 01/24/2018     Lab Results   Component Value Date    GLC 92 01/24/2018     Lab Results   Component Value Date    WBC 6.6 01/24/2018     Lab Results   Component Value Date    RBC 5.01 01/24/2018     Lab Results   Component Value Date    HGB 15.4 01/24/2018     Lab Results   Component Value Date    HCT 46.1 01/24/2018     No components found for: MCT  Lab Results   Component Value Date    MCV 92 01/24/2018     Lab Results   Component Value Date    MCH 30.7 01/24/2018     Lab Results   Component Value Date    MCHC 33.4 01/24/2018     Lab Results   Component Value Date    RDW 12.2 01/24/2018     Lab Results   Component Value Date     01/24/2018         IMPRESSION AND RECOMMENDATIONS:  In summary, Mr. Joseph is a 62 year old male with a plasma cell neoplasm of the R superior acetabulum. His workup remains incomplete at this time, and it is not clear if this represents a solitary plasmacytoma of bone or if he has multiple myeloma. I will order a bone marrow biopsy and skeletal survey. He will see medical oncology next week; he will also need SPEP, UPEP, immunofixation studies and other bloodwork. His CBC and BMP are WNL.    I explained to him that the management of solitary plasmacytoma and multiple myeloma are different and discussed their natural histories and management; solitary plasmacytoma may be treated with radiation alone to a high dose, and multiple myeloma is treated with systemic therapy and low dose radiation can be added for palliation. Final plan will depend on the above workup and medical oncology consultation.    The risks, benefits, alternatives, and logistics to radiation therapy were discussed in detail. For SPB, radiation would likely take place daily for 5 weeks, whereas palliation for MM would more likely be a 2 week course. Side effects of RT may include pain, bone fracture, damage to the bowel or bladder, femoral head damage including necrosis. He is aware that the side effects  of radiation therapy may be severe and permanent, although we expect that such risks would be low. He was given the opportunity to ask questions, which were answered. Informed consent was obtained. CT simulation performed today.    Garrett Dejesus M.D.  Attending Physician  Radiation Oncology  Clinic phone #: [phone]  Pager #: [phone]

## 2018-02-02 NOTE — MR AVS SNAPSHOT
After Visit Summary   2/2/2018    Dong Joseph    MRN: [id number]           Patient Information     Date Of Birth          1955        Visit Information        Provider Department      2/2/2018 10:00 AM Garrett Dejesus MD [facility]        Today's Diagnoses     Plasmacytoma of bone (H)          Care Instructions          What to expect at your Simulation visit:    You will meet with a Radiation Therapist and other team members who will be doing a planning session called a  simulation  with you. This process will determine your daily treatment.    ~ You will lie on a flat table and have a treatment planning CT scan.  It is important during the scan to hold very still and breathe normally.    ~ Your therapist may construct a body mold to help you hold still for your treatments.    ~ If you are having treatment to the head or neck area you will be fitted with a plastic mesh mask that fits very snugly over your face and neck.     ~ Your therapist will be taking some digital photos that will go in your treatment chart.      ~Your therapist will make marks on your skin and take measurements. Your therapist may ask you about making small tattoos (a permanent small dot) over these marks.  These marks are used to position you daily for your radiation therapy treatments. Please do not wash off any marks until all of your radiation therapy treatments are complete unless you are instructed to do so by your therapist.    ~ Once the simulation is completed it can take from 3 to 10 business days before you start radiation therapy treatments.    ~ You may meet with a nurse who will go over management of treatment side effects and self care during your treatments. The nurse will help to plan care with other departments and physicians if needed.      Bone Marrow Aspiration and Biopsy    Bone marrow is the soft, spongy part inside bones. It makes most of the body s blood cells. Aspiration and  biopsy are procedures done to take a sample of bone marrow out of the body for examination. To perform either procedure, a needle is inserted into one of your bones, usually the back of the hip bone. Then a sample of bone marrow is removed.   If a sample of fluid and cells is taken, it is called bone marrow aspiration. If a solid sample of bone marrow tissue is removed, it is called bone marrow biopsy. In either case, the samples are sent to a lab and studied. The procedures can be done alone, but are most often done together. This sheet tells you more about what to expect.  Why the procedures are done  The procedures may be done for a number of reasons. They can help diagnose certain blood or bone marrow disorders or infections. They may help find certain cancers, such as leukemia. They can show if cancer in other areas of the body has spread to the bone marrow. They can be used during cancer treatment, such as chemotherapy, to monitor treatment progress. And they may be done before certain treatments, such as a stem cell transplant, which require a bone marrow sample. Your healthcare provider will explain why you need the procedure and answer any questions you have.  Preparing for the procedure  Prepare for the procedure as told. In addition:    Tell your healthcare provider:    What medicines you take. This includes blood thinners, such as aspirin. This also includes over-the-counter medicines, herbs, and other supplements. You may need to stop taking some or all of them before the procedure.    If you are allergic to any medicines. Also mention if you have ever had a reaction to medicines used during other tests or procedures in the past.    If you have a history of bleeding problems.    If you are pregnant or may be pregnant.    Follow any directions you re given for not eating or drinking before the procedure.  The day of the procedure  The procedures can be done at a hospital, clinic, or healthcare provider s  office. They are performed by a healthcare provider or trained healthcare provider. Whether you re having one or both procedures, plan to be at the facility for 1 to 2 hours. You ll likely go home the same day.  Before the procedure begins  What to expect before the procedure:    You ll change into a patient gown.    An IV line may be put into a vein in your arm or hand. This line supplies fluids and medicines.    You ll be given a sedative to help you relax, if needed. This medicine is given by pill, injection, or through the IV line.  During the procedure  What to expect during the procedure:    You ll lie on your side or your stomach.    The site to be used for the bone marrow samples is marked and cleaned. The most common site is the back of the hip bone. Less common sites include the front of the hip bone or breastbone.    Numbing medicine (local anesthesia) is injected at the site.    A small cut is made through the numbed skin.    One or both procedures are then done. Be sure to lie still for each procedure. It is normal to feel some pressure or pain during each procedure.    For the bone marrow aspiration, a thin needle is put through the cut and into the bone. A syringe is then attached to the needle and used to remove a sample of bone marrow fluid and cells.    For the bone marrow biopsy, a different needle is put through the same cut and into the bone. A small amount of bone marrow tissue is then removed.    The samples are sent to a lab to be evaluated.    When the procedure is complete, pressure is applied to the site for 10 to 15 minutes to help stop bleeding. The site is then bandaged.  After the procedure  Most people can go home after a short period of observation. If you need it, you ll be given medicine to manage pain. If you were given a sedative, you may be taken to a recovery room to rest until the medicine wears off. An adult family member or friend must drive you home afterward.  Recovering  at home  Once at home, follow any instructions you re given. Be sure to:    Take all medicines as directed.    Care for the procedure site as instructed.    Check for signs of infection at the procedure site (see below).    Avoid getting the procedure site wet. Do not bathe or shower until your healthcare providers say it is OK to do so. If you wish, you may wash with a sponge or washcloth.    Avoid heavy lifting and other strenuous activities as directed.      Call the healthcare provider  Call your healthcare provider if you have any of the following:    Fever of 100.4  F (38  C) or higher, or as directed by your healthcare provider    Signs of infection at the procedure site, such as increased redness or swelling, warmth, worsening pain, bleeding, or foul-smelling drainage   Follow-up  Your healthcare provider will discuss the results with you when they are ready. This is usually within a week after the procedure.      Risks and possible complications of these procedures  These include:    Severe bleeding or bruising at the procedure site    Infection at the procedure site    Bone fracture    Bone infection   Date Last Reviewed: 10/8/2015    5629-1515 [employer]. [address] [place] [address]. All rights reserved. This information is not intended as a substitute for professional medical care. Always follow your healthcare professional's instructions.               Please contact [facility] RN with questions or concerns following today's appointment: [phone].    Thank you!            Follow-ups after your visit        Your next 10 appointments already scheduled     Feb 05, 2018  8:45 AM CST   [facility]    [id number] [place] [id number] [phone]            Feb 05, 2018  9:45 AM CST   Return Visit with KASIA Olsen CNP   [facility]  [facility]    [address] [place] [id number] [phone]            Feb 07, 2018 10:00 AM CST   New Visit with [facility]ilan Peres MD   [facility]    [address] [facility] [id number] [phone]              Future tests that were ordered for you today     Open Future Orders        Priority Expected Expires Ordered    Protein electrophoresis timed urine Routine  2/2/2019 2/2/2018    Leukemia Lymphoma Evaluation (Flow Cytometry) Routine  8/1/2018 2/2/2018    CHROMOSOME BONE MARROW With Professional Interpretation Routine  8/1/2018 2/2/2018    FISH With Professional Interpretation Routine  8/1/2018 2/2/2018    CGH SNP ARRAY for Oncology Routine  8/1/2018 2/2/2018    XR Bone Survey Limited Routine 2/2/2018 2/2/2019 2/2/2018            Who to contact     If you have questions or need follow up information about today's clinic visit or your schedule please contact [facility] directly at [phone].  Normal or non-critical lab and imaging results will be communicated to you by [employer], letter or phone within 4 business days after the clinic has received the results. If you do not hear from us within 7 days, please contact the clinic through [employer] or phone. If you have a critical or abnormal lab result, we will notify you by phone as soon as possible.  Submit refill requests through [employer] or call your pharmacy and they will forward the refill request to us. Please allow 3 business days for your refill to be completed.          Additional Information About Your Visit        [employer] Information     [employer] gives you secure access to your electronic health record. If you see a primary care provider, you can also send messages to your care team and make appointments. If you have questions, please call your primary care clinic.  If you do not have a primary care provider, please call [phone]  "and they will assist you.      [employer] is an electronic gateway that provides easy, online access to your medical records. With [employer], you can request a clinic appointment, read your test results, renew a prescription or communicate with your care team.     To access your existing account, please contact your [facility] or call [phone] for assistance.        Care EveryWhere ID     This is your Care EveryWhere ID. This could be used by other organizations to access your [place] medical records  [id number]        Your Vitals Were     Pulse Temperature Respirations Height Pulse Oximetry BMI (Body Mass Index)    71 97.8  F (36.6  C) (Oral) 18 5' 10\" 99% 22.31 kg/m2       Blood Pressure from Last 3 Encounters:   02/02/18 114/76   01/25/18 136/71   01/24/18 132/85    Weight from Last 3 Encounters:   02/02/18 155 lb 8 oz   01/24/18 153 lb 4.8 oz   01/24/18 160 lb              We Performed the Following     Beta 2 microglobulin     BONE MARROW BIOPSY     Comprehensive metabolic panel     Kappa and lambda light chain     Lactate Dehydrogenase     Protein electrophoresis     Protein Immunofixation Serum     Protein immunofixation urine     Uric acid          Today's Medication Changes          These changes are accurate as of 2/2/18 12:08 PM.  If you have any questions, ask your nurse or doctor.               These medicines have changed or have updated prescriptions.        Dose/Directions    atorvastatin 20 MG tablet   Commonly known as:  LIPITOR   This may have changed:  when to take this   Used for:  Pure hypercholesterolemia, Hyperlipidemia LDL goal <130        Dose:  20 mg   Take 1 tablet (20 mg) by mouth daily   Quantity:  90 tablet   Refills:  3                Primary Care Provider Office Phone # Fax #    Naomi Kuo PA-C [phone] [address] [place] [address]        Equal Access to Services     ROSA ASHFORD AH: Latanya Cedeno, " wajarvisarnel farooq, qaybta kasherry rain, tejinder newell socorropedro stapletonaaaiden ah. So [facility] [phone].    ATENCIÓN: Si shalala tulio, tiene a gonzalez disposición servicios gratuitos de asistencia lingüística. Naima al [phone].    We comply with applicable federal civil rights laws and [place] laws. We do not discriminate on the basis of race, color, national origin, age, disability, sex, sexual orientation, or gender identity.            Thank you!     Thank you for choosing [facility]  for your care. Our goal is always to provide you with excellent care. Hearing back from our patients is one way we can continue to improve our services. Please take a few minutes to complete the written survey that you may receive in the mail after your visit with us. Thank you!             Your Updated Medication List - Protect others around you: Learn how to safely use, store and throw away your medicines at www.disposemymeds.org.          This list is accurate as of 2/2/18 12:08 PM.  Always use your most recent med list.                   Brand Name Dispense Instructions for use Diagnosis    aspirin 81 MG tablet     90 tablet    Take 1 tablet by mouth daily.    Pure hypercholesterolemia       atorvastatin 20 MG tablet    LIPITOR    90 tablet    Take 1 tablet (20 mg) by mouth daily    Pure hypercholesterolemia, Hyperlipidemia LDL goal <130       traMADol 50 MG tablet    ULTRAM    20 tablet    Take 1-2 tablets ( mg) by mouth every 6 hours as needed for pain    Right hip pain       TYLENOL PO      Take 1,000 mg by mouth

## 2018-02-02 NOTE — TELEPHONE ENCOUNTER
Please check for prior Auth  Diagnosis Code: C90.3  17897    25298    54290   85462 (5)  23732 [id number]  (19)  23076   79937 (5)  90453(5)   17144 (5)  Note Routed to Financial Counselors at [facility]

## 2018-02-05 ENCOUNTER — APPOINTMENT (OUTPATIENT)
Dept: RADIATION ONCOLOGY | Facility: CLINIC | Age: 63
End: 2018-02-05
Payer: COMMERCIAL

## 2018-02-05 ENCOUNTER — ONCOLOGY VISIT (OUTPATIENT)
Dept: ONCOLOGY | Facility: CLINIC | Age: 63
End: 2018-02-05
Payer: COMMERCIAL

## 2018-02-05 VITALS
HEART RATE: 75 BPM | DIASTOLIC BLOOD PRESSURE: 64 MMHG | SYSTOLIC BLOOD PRESSURE: 113 MMHG | RESPIRATION RATE: 16 BRPM | OXYGEN SATURATION: 94 % | TEMPERATURE: 99.3 F

## 2018-02-05 DIAGNOSIS — C90.30 PLASMACYTOMA OF BONE (H): ICD-10-CM

## 2018-02-05 DIAGNOSIS — D72.822 PLASMACYTOSIS: Primary | ICD-10-CM

## 2018-02-05 DIAGNOSIS — C90.30 PLASMACYTOMA (H): Primary | ICD-10-CM

## 2018-02-05 DIAGNOSIS — C90.30 PLASMACYTOMA OF BONE (H): Primary | ICD-10-CM

## 2018-02-05 LAB
ALBUMIN SERPL ELPH-MCNC: 4.3 G/DL (ref 3.7–5.1)
ALPHA1 GLOB SERPL ELPH-MCNC: 0.5 G/DL (ref 0.2–0.4)
ALPHA2 GLOB SERPL ELPH-MCNC: 0.8 G/DL (ref 0.5–0.9)
B-GLOBULIN SERPL ELPH-MCNC: 0.9 G/DL (ref 0.6–1)
B2 MICROGLOB SERPL-MCNC: 2.4 MG/L
BASOPHILS # BLD AUTO: 0.1 10E9/L (ref 0–0.2)
BASOPHILS NFR BLD AUTO: 0.8 %
DIFFERENTIAL METHOD BLD: NORMAL
EOSINOPHIL # BLD AUTO: 0.3 10E9/L (ref 0–0.7)
EOSINOPHIL NFR BLD AUTO: 3.5 %
ERYTHROCYTE [DISTWIDTH] IN BLOOD BY AUTOMATED COUNT: 12.2 % (ref 10–15)
GAMMA GLOB SERPL ELPH-MCNC: 1.1 G/DL (ref 0.7–1.6)
HCT VFR BLD AUTO: 44.7 % (ref 40–53)
HGB BLD-MCNC: 14.6 G/DL (ref 13.3–17.7)
IGA SERPL-MCNC: 157 MG/DL (ref 70–380)
IGG SERPL-MCNC: 1140 MG/DL (ref 695–1620)
IGM SERPL-MCNC: 67 MG/DL (ref 60–265)
IMM GRANULOCYTES # BLD: 0 10E9/L (ref 0–0.4)
IMM GRANULOCYTES NFR BLD: 0.3 %
KAPPA LC UR-MCNC: 8.72 MG/DL (ref 0.33–1.94)
KAPPA LC/LAMBDA SER: 8.23 {RATIO} (ref 0.26–1.65)
LAMBDA LC SERPL-MCNC: 1.06 MG/DL (ref 0.57–2.63)
LYMPHOCYTES # BLD AUTO: 1.8 10E9/L (ref 0.8–5.3)
LYMPHOCYTES NFR BLD AUTO: 25 %
M PROTEIN SERPL ELPH-MCNC: 0 G/DL
MCH RBC QN AUTO: 30.2 PG (ref 26.5–33)
MCHC RBC AUTO-ENTMCNC: 32.7 G/DL (ref 31.5–36.5)
MCV RBC AUTO: 93 FL (ref 78–100)
MONOCYTES # BLD AUTO: 0.7 10E9/L (ref 0–1.3)
MONOCYTES NFR BLD AUTO: 10.2 %
NEUTROPHILS # BLD AUTO: 4.3 10E9/L (ref 1.6–8.3)
NEUTROPHILS NFR BLD AUTO: 60.2 %
PLATELET # BLD AUTO: 248 10E9/L (ref 150–450)
PROT ELPH PNL UR ELPH: NORMAL
PROT PATTERN SERPL ELPH-IMP: ABNORMAL
PROT PATTERN SERPL IFE-IMP: NORMAL
RBC # BLD AUTO: 4.83 10E12/L (ref 4.4–5.9)
WBC # BLD AUTO: 7.1 10E9/L (ref 4–11)

## 2018-02-05 PROCEDURE — 38222 DX BONE MARROW BX & ASPIR: CPT | Performed by: NURSE PRACTITIONER

## 2018-02-05 PROCEDURE — 88271 CYTOGENETICS DNA PROBE: CPT | Performed by: INTERNAL MEDICINE

## 2018-02-05 PROCEDURE — 88264 CHROMOSOME ANALYSIS 20-25: CPT | Performed by: INTERNAL MEDICINE

## 2018-02-05 PROCEDURE — 00000161 ZZHCL STATISTIC H-SPHEME PROCESS B/S: Performed by: RADIOLOGY

## 2018-02-05 PROCEDURE — 77295 3-D RADIOTHERAPY PLAN: CPT | Performed by: RADIOLOGY

## 2018-02-05 PROCEDURE — 85025 COMPLETE CBC W/AUTO DIFF WBC: CPT | Performed by: NURSE PRACTITIONER

## 2018-02-05 PROCEDURE — 85060 BLOOD SMEAR INTERPRETATION: CPT | Performed by: RADIOLOGY

## 2018-02-05 PROCEDURE — 40000951 ZZHCL STATISTIC BONE MARROW INTERP TC 85097: Performed by: RADIOLOGY

## 2018-02-05 PROCEDURE — 88161 CYTOPATH SMEAR OTHER SOURCE: CPT | Mod: 59 | Performed by: RADIOLOGY

## 2018-02-05 PROCEDURE — 00000058 ZZHCL STATISTIC BONE MARROW ASP PERF TC 38220: Performed by: RADIOLOGY

## 2018-02-05 PROCEDURE — 77300 RADIATION THERAPY DOSE PLAN: CPT | Performed by: RADIOLOGY

## 2018-02-05 PROCEDURE — 88313 SPECIAL STAINS GROUP 2: CPT | Mod: 59 | Performed by: RADIOLOGY

## 2018-02-05 PROCEDURE — 88305 TISSUE EXAM BY PATHOLOGIST: CPT | Performed by: RADIOLOGY

## 2018-02-05 PROCEDURE — 88275 CYTOGENETICS 100-300: CPT | Performed by: INTERNAL MEDICINE

## 2018-02-05 PROCEDURE — 96374 THER/PROPH/DIAG INJ IV PUSH: CPT | Mod: 59

## 2018-02-05 PROCEDURE — 88311 DECALCIFY TISSUE: CPT | Performed by: RADIOLOGY

## 2018-02-05 PROCEDURE — 88184 FLOWCYTOMETRY/ TC 1 MARKER: CPT | Performed by: INTERNAL MEDICINE

## 2018-02-05 PROCEDURE — 40000424 ZZHCL STATISTIC BONE MARROW CORE PERF TC 38221: Performed by: RADIOLOGY

## 2018-02-05 PROCEDURE — 88237 TISSUE CULTURE BONE MARROW: CPT | Performed by: INTERNAL MEDICINE

## 2018-02-05 PROCEDURE — 84166 PROTEIN E-PHORESIS/URINE/CSF: CPT | Performed by: INTERNAL MEDICINE

## 2018-02-05 PROCEDURE — 88185 FLOWCYTOMETRY/TC ADD-ON: CPT | Performed by: INTERNAL MEDICINE

## 2018-02-05 PROCEDURE — 88342 IMHCHEM/IMCYTCHM 1ST ANTB: CPT | Performed by: RADIOLOGY

## 2018-02-05 PROCEDURE — 77334 RADIATION TREATMENT AID(S): CPT | Performed by: RADIOLOGY

## 2018-02-05 PROCEDURE — 88280 CHROMOSOME KARYOTYPE STUDY: CPT | Performed by: INTERNAL MEDICINE

## 2018-02-05 ASSESSMENT — PAIN SCALES - GENERAL
PAINLEVEL: NO PAIN (0)
PAINLEVEL: NO PAIN (0)

## 2018-02-05 NOTE — LETTER
2/5/2018         RE: Dong Joseph  [address]  [place] [address]        Dear Colleague,    Thank you for referring your patient, Dong Joseph, to the [facility]. Please see a copy of my visit note below.    ONC Adult Bone Marrow Biopsy Procedure Note  February 5, 2018  /79  Pulse 70  Temp 98.5  F (36.9  C) (Oral)  Resp 16  SpO2 97%   Lab Results   Component Value Date    WBC 7.1 02/05/2018     Lab Results   Component Value Date    RBC 4.83 02/05/2018     Lab Results   Component Value Date    HGB 14.6 02/05/2018     Lab Results   Component Value Date    HCT 44.7 02/05/2018     No components found for: MCT  Lab Results   Component Value Date    MCV 93 02/05/2018     Lab Results   Component Value Date    MCH 30.2 02/05/2018     Lab Results   Component Value Date    MCHC 32.7 02/05/2018     Lab Results   Component Value Date    RDW 12.2 02/05/2018     Lab Results   Component Value Date     02/05/2018     Learning needs assessment complete within 12 months? YES    DIAGNOSIS: Plasmacytoma    PROCEDURE: Unilateral Bone Marrow Biopsy and Unilateral Aspirate    LOCATION: [facility]    Patient s identification was positively verified by verbal identification and invasive procedure safety checklist was completed. Informed consent was obtained. Following the administration of Midazolam 1+1 mg as pre-medication, patient was placed in the prone position and prepped and draped in a sterile manner. Approximately 14 cc of 1% Lidocaine was used over the left posterior iliac spine. Following this a 3 mm incision was made. Trephine bone marrow core(s) was (were) obtained from the IC. Bone marrow aspirates were obtained from the [facility]. Aspirates were sent for morphology, immunophenotyping, cytogenetics and molecular diagnostics. A total of approximately 25 ml of marrow was aspirated( extra test completed per orders. Following this procedure a sterile  dressing was applied to the bone marrow biopsy site(s). The patient was placed in the supine position to maintain pressure on the biopsy site. Post-procedure wound care instructions were given.     Complications: NO- but pt has low pan tolerance and second mg of versed was used and additional lidocaine over site    Pre-procedural pain: 0 out of 10 on the numeric pain rating scale to the biopsy site     Post-procedural pain assessment: 0 out of 10 on the numeric pain rating scale to the biopsy site but pain is 10/10 to right hip when walking-states he will be going home to take a tramadol which he is using intermittently for the pain.    Length of procedure:20 minutes or less      Procedure performed by: Jamila Montoya CNp      Again, thank you for allowing me to participate in the care of your patient.        Sincerely,        Jamila Montoya, NADEEM, APRN CNP

## 2018-02-05 NOTE — PROGRESS NOTES
ONC Adult Bone Marrow Biopsy Procedure Note  February 5, 2018  /79  Pulse 70  Temp 98.5  F (36.9  C) (Oral)  Resp 16  SpO2 97%   Lab Results   Component Value Date    WBC 7.1 02/05/2018     Lab Results   Component Value Date    RBC 4.83 02/05/2018     Lab Results   Component Value Date    HGB 14.6 02/05/2018     Lab Results   Component Value Date    HCT 44.7 02/05/2018     No components found for: MCT  Lab Results   Component Value Date    MCV 93 02/05/2018     Lab Results   Component Value Date    MCH 30.2 02/05/2018     Lab Results   Component Value Date    MCHC 32.7 02/05/2018     Lab Results   Component Value Date    RDW 12.2 02/05/2018     Lab Results   Component Value Date     02/05/2018     Learning needs assessment complete within 12 months? YES    DIAGNOSIS: Plasmacytoma    PROCEDURE: Unilateral Bone Marrow Biopsy and Unilateral Aspirate    LOCATION: [facility]    Patient s identification was positively verified by verbal identification and invasive procedure safety checklist was completed. Informed consent was obtained. Following the administration of Midazolam 1+1 mg as pre-medication, patient was placed in the prone position and prepped and draped in a sterile manner. Approximately 14 cc of 1% Lidocaine was used over the left posterior iliac spine. Following this a 3 mm incision was made. Trephine bone marrow core(s) was (were) obtained from the [facility]. Bone marrow aspirates were obtained from the IC. Aspirates were sent for morphology, immunophenotyping, cytogenetics and molecular diagnostics. A total of approximately 25 ml of marrow was aspirated( extra test completed per orders. Following this procedure a sterile dressing was applied to the bone marrow biopsy site(s). The patient was placed in the supine position to maintain pressure on the biopsy site. Post-procedure wound care instructions were given.     Complications: NO- but pt has low pan tolerance and  second mg of versed was used and additional lidocaine over site    Pre-procedural pain: 0 out of 10 on the numeric pain rating scale to the biopsy site     Post-procedural pain assessment: 0 out of 10 on the numeric pain rating scale to the biopsy site but pain is 10/10 to right hip when walking-states he will be going home to take a tramadol which he is using intermittently for the pain.    Length of procedure:20 minutes or less      Procedure performed by: Jamila Montoya CNp

## 2018-02-05 NOTE — MR AVS SNAPSHOT
After Visit Summary   2/5/2018    Dong Joseph    MRN: [id number]           Patient Information     Date Of Birth          1955        Visit Information        Provider Department      2/5/2018 8:45 AM NURSE ONLY CANCER CENTER [facility]        Today's Diagnoses     Plasmacytosis    -  1    Plasmacytoma of bone (H)           Follow-ups after your visit        Your next 10 appointments already scheduled     Feb 07, 2018 10:00 AM CST   New Visit with Froilan Peres MD   [facility]    [address] [place] [address]   [phone]            Feb 13, 2018  8:30 AM CST   New Treatment with Garrett Dejesus MD, RADIATION THERAPIST   [facility] ([facility])    [address] [facility] [address]   [phone]            Feb 14, 2018  8:45 AM CST   TREATMENT with RADIATION THERAPIST   [facility] ([facility])    [address] [facility] [address]   [phone]            Feb 15, 2018 11:15 AM CST   TREATMENT with RADIATION THERAPIST   [facility])    [address] [facility] [address]   [phone]            Feb 16, 2018 11:15 AM CST   TREATMENT with RADIATION THERAPIST   [facility] ([facility])    [address] [facility] [address]   [phone]            Feb 19, 2018 11:15 AM CST   TREATMENT with RADIATION THERAPIST   [facility])    [address] [facility] [address]   [phone]            Feb 19, 2018 11:30 AM CST   on treatment visit with Garrett Dejesus MD   [facility])    [address] [facility] [address]   [phone]            Feb 20, 2018 11:15 AM CST   TREATMENT with RADIATION THERAPIST    [facility] ([facility])    [address] [facility] [address] [phone]            Feb 21, 2018 11:15 AM CST   TREATMENT with RADIATION THERAPIST   [facility] ([facility])    [address] [facility] [address] [phone]              Who to contact     If you have questions or need follow up information about today's clinic visit or your schedule please contact [facility] directly at [phone].  Normal or non-critical lab and imaging results will be communicated to you by MyChart, letter or phone within 4 business days after the clinic has received the results. If you do not hear from us within 7 days, please contact the clinic through EasyPropertyhart or phone. If you have a critical or abnormal lab result, we will notify you by phone as soon as possible.  Submit refill requests through Captify or call your pharmacy and they will forward the refill request to us. Please allow 3 business days for your refill to be completed.          Additional Information About Your Visit        MyChart Information     Captify gives you secure access to your electronic health record. If you see a primary care provider, you can also send messages to your care team and make appointments. If you have questions, please call your primary care clinic.  If you do not have a primary care provider, please call [phone] and they will assist you.      Captify is an electronic gateway that provides easy, online access to your medical records. With Captify, you can request a clinic appointment, read your test results, renew a prescription or communicate with your care team.     To access your existing account, please contact your [facility] or call [phone] for assistance.        Care EveryWhere ID     This is your Care EveryWhere ID. This could be used by other organizations to access  your [facility] medical records  [id number]         Blood Pressure from Last 3 Encounters:   02/05/18 124/79   02/02/18 114/76   01/25/18 136/71    Weight from Last 3 Encounters:   02/02/18 70.5 kg (155 lb 8 oz)   01/24/18 69.5 kg (153 lb 4.8 oz)   01/24/18 72.6 kg (160 lb)              We Performed the Following     *CBC with platelets differential     Protein electrophoresis timed urine          Today's Medication Changes          These changes are accurate as of 2/5/18 11:09 AM.  If you have any questions, ask your nurse or doctor.               These medicines have changed or have updated prescriptions.        Dose/Directions    atorvastatin 20 MG tablet   Commonly known as:  LIPITOR   This may have changed:  when to take this   Used for:  Pure hypercholesterolemia, Hyperlipidemia LDL goal <130        Dose:  20 mg   Take 1 tablet (20 mg) by mouth daily   Quantity:  90 tablet   Refills:  3                Primary Care Provider Office Phone # Fax #    Naomi Kuo PA-C [phone] [address] [place] [address]        Equal Access to Services     [facility]: Hadii emily payne hadasho Somurray, waaxda luqadaha, qaybta kaalmaarnel rain, tejinder camacho . So [facility] [phone].    ATENCIÓN: Si habla español, tiene a gonzalez disposición servicios gratuitos de asistencia lingüística. [facility] [phone].    We comply with applicable federal civil rights laws and [place] laws. We do not discriminate on the basis of race, color, national origin, age, disability, sex, sexual orientation, or gender identity.            Thank you!     Thank you for choosing [facility]  for your care. Our goal is always to provide you with excellent care. Hearing back from our patients is one way we can continue to improve our services. Please take a few minutes to complete the written survey that you may receive in the mail after your visit with us. Thank you!              Your Updated Medication List - Protect others around you: Learn how to safely use, store and throw away your medicines at www.disposemymeds.org.          This list is accurate as of 2/5/18 11:09 AM.  Always use your most recent med list.                   Brand Name Dispense Instructions for use Diagnosis    aspirin 81 MG tablet     90 tablet    Take 1 tablet by mouth daily.    Pure hypercholesterolemia       atorvastatin 20 MG tablet    LIPITOR    90 tablet    Take 1 tablet (20 mg) by mouth daily    Pure hypercholesterolemia, Hyperlipidemia LDL goal <130       traMADol 50 MG tablet    ULTRAM    20 tablet    Take 1-2 tablets ( mg) by mouth every 6 hours as needed for pain    Right hip pain       TYLENOL PO      Take 1,000 mg by mouth

## 2018-02-05 NOTE — TELEPHONE ENCOUNTER
"Rad Onc. Treatments with [employer] ins. All codes are valid and billable, no auth or pre-d is needed for this plan. Patient has a $600 deductible and then covered at 80/20 till he meets his out of pocket maximum of $3,250.00 so far patient has met $366.80 for 2018 year. Call ref. # [id number] \"Georgette\" 02/05/18 11:46am  "

## 2018-02-05 NOTE — MR AVS SNAPSHOT
After Visit Summary   2/5/2018    Dong Joseph    MRN: [id number]           Patient Information     Date Of Birth          1955        Visit Information        Provider Department      2/5/2018 9:45 AM Jamila Montoya APRN CNP [facility]        Today's Diagnoses     Plasmacytoma of bone (H)    -  1       Follow-ups after your visit        Your next 10 appointments already scheduled     Feb 07, 2018 10:00 AM CST   New Visit with [facility]an [facility]   [facility]    [address] [place] [address]   [phone]            Feb 13, 2018  8:30 AM CST   New Treatment with Garrett Dejesus MD, RADIATION THERAPIST   [facility] ([facility])    [address] [facility] [address]   [phone]            Feb 14, 2018  8:45 AM CST   TREATMENT with RADIATION THERAPIST   [facility] ([facility])    [address] [facility] [address]   [phone]            Feb 15, 2018 11:15 AM CST   TREATMENT with RADIATION THERAPIST   [facility] ([facility])    [address] [facility] [address]   [phone]            Feb 16, 2018 11:15 AM CST   TREATMENT with RADIATION THERAPIST   [facility] ([facility])    [address] [facility] [address]   [phone]            Feb 19, 2018 11:15 AM CST   TREATMENT with RADIATION THERAPIST   [facility] ([facility])    [address] [facility] [address]   [phone]            Feb 19, 2018 11:30 AM CST   on treatment visit with Garrett Dejesus MD   [facility] ([facility])    [address] [facility] [address]   [phone]            Feb 20, 2018 11:15 AM CST   TREATMENT with RADIATION THERAPIST   [facility]    [address] [place] [address] [phone]            Feb 21, 2018 11:15 AM CST   TREATMENT with RADIATION THERAPIST   [facility]    [address] [facility] [address] [phone]              Who to contact     If you have questions or need follow up information about today's clinic visit or your schedule please contact [facility] directly at [phone].  Normal or non-critical lab and imaging results will be communicated to you by Eat Localhart, letter or phone within 4 business days after the clinic has received the results. If you do not hear from us within 7 days, please contact the clinic through [employer] Localhart or phone. If you have a critical or abnormal lab result, we will notify you by phone as soon as possible.  Submit refill requests through Styky or call your pharmacy and they will forward the refill request to us. Please allow 3 business days for your refill to be completed.          Additional Information About Your Visit        Eat Localhart Information     Styky gives you secure access to your electronic health record. If you see a primary care provider, you can also send messages to your care team and make appointments. If you have questions, please call your primary care clinic.  If you do not have a primary care provider, please call [phone] and they will assist you.      Styky is an electronic gateway that provides easy, online access to your medical records. With Styky, you can request a clinic appointment, read your test results, renew a prescription or communicate with your care team.     To access your existing account, please contact your [facility] or call [phone] for assistance.        Care EveryWhere ID     This is your Care EveryWhere ID. This could be used by other organizations to access your [facility]  medical records  [id number]        Your Vitals Were     Pulse Temperature Respirations Pulse Oximetry          75 99.3  F (37.4  C) (Oral) 16 94%         Blood Pressure from Last 3 Encounters:   02/05/18 113/64   02/02/18 114/76   01/25/18 136/71    Weight from Last 3 Encounters:   02/02/18 70.5 kg (155 lb 8 oz)   01/24/18 69.5 kg (153 lb 4.8 oz)   01/24/18 72.6 kg (160 lb)              We Performed the Following     Bone Marrow Biopsy (Charge)     Bone Marrow; Aspiration Only (Charge)     CGH SNP ARRAY for Oncology     CHROMOSOME BONE MARROW With Professional Interpretation     FISH With Professional Interpretation     Leukemia Lymphoma Evaluation (Flow Cytometry)          Today's Medication Changes          These changes are accurate as of 2/5/18 11:33 AM.  If you have any questions, ask your nurse or doctor.               These medicines have changed or have updated prescriptions.        Dose/Directions    atorvastatin 20 MG tablet   Commonly known as:  LIPITOR   This may have changed:  when to take this   Used for:  Pure hypercholesterolemia, Hyperlipidemia LDL goal <130        Dose:  20 mg   Take 1 tablet (20 mg) by mouth daily   Quantity:  90 tablet   Refills:  3                Primary Care Provider Office Phone # Fax #    Naomi Kuo PA-C [phone] [address] [place] [address]        Equal Access to Services     [facility]: Latanya lindo Somurray, waaxda luqadaha, qaybta kaalmada aderigoberto, tejinder marr. So [facility] [phone].    ATENCIÓN: Si habla español, tiene a gonzalez disposición servicios gratuitos de asistencia lingüística. Llame al [phone].    We comply with applicable federal civil rights laws and [place] laws. We do not discriminate on the basis of race, color, national origin, age, disability, sex, sexual orientation, or gender identity.            Thank you!     Thank you for choosing [facility]  for your  care. Our goal is always to provide you with excellent care. Hearing back from our patients is one way we can continue to improve our services. Please take a few minutes to complete the written survey that you may receive in the mail after your visit with us. Thank you!             Your Updated Medication List - Protect others around you: Learn how to safely use, store and throw away your medicines at www.disposemymeds.org.          This list is accurate as of 2/5/18 11:33 AM.  Always use your most recent med list.                   Brand Name Dispense Instructions for use Diagnosis    aspirin 81 MG tablet     90 tablet    Take 1 tablet by mouth daily.    Pure hypercholesterolemia       atorvastatin 20 MG tablet    LIPITOR    90 tablet    Take 1 tablet (20 mg) by mouth daily    Pure hypercholesterolemia, Hyperlipidemia LDL goal <130       traMADol 50 MG tablet    ULTRAM    20 tablet    Take 1-2 tablets ( mg) by mouth every 6 hours as needed for pain    Right hip pain       TYLENOL PO      Take 1,000 mg by mouth

## 2018-02-05 NOTE — PROGRESS NOTES
"Patient presents for bone marrow biopsy.  Discussed procedure with patient.  Vitals obtained and stable.  Lab staff present during PIV start and lab drawn.  Jamila Montoya CNP met with patient and consent was signed.  Pre-medications administered, patient positioned in prone position.  Patient tolerated procedure well.  Post observation x 30\", Apple cider and snack provided. PIV D/C'd, cathlon intact.  Dressing assessed; no bleeding. Discharge instructions reviewed with patient and son, Mayito, both understanding.  Son accompanied patient to the car and will drive patient home.  Patient discharged at 1040. Esmer Simental RN  BSN OCN      "

## 2018-02-06 LAB
ALBUMIN MFR UR ELPH: 12.9 %
ALPHA1 GLOB MFR UR ELPH: 12.8 %
ALPHA2 GLOB MFR UR ELPH: 15.5 %
B-GLOBULIN MFR UR ELPH: 12.8 %
COPATH REPORT: NORMAL
COPATH REPORT: NORMAL
GAMMA GLOB MFR UR ELPH: 46 %
M PROTEIN MFR UR ELPH: 23.1 %
PROT PATTERN UR ELPH-IMP: ABNORMAL

## 2018-02-07 ENCOUNTER — RADIANT APPOINTMENT (OUTPATIENT)
Dept: GENERAL RADIOLOGY | Facility: CLINIC | Age: 63
End: 2018-02-07
Attending: INTERNAL MEDICINE
Payer: COMMERCIAL

## 2018-02-07 ENCOUNTER — ONCOLOGY VISIT (OUTPATIENT)
Dept: ONCOLOGY | Facility: CLINIC | Age: 63
End: 2018-02-07
Payer: COMMERCIAL

## 2018-02-07 VITALS
HEART RATE: 75 BPM | OXYGEN SATURATION: 99 % | TEMPERATURE: 97.6 F | HEIGHT: 70 IN | RESPIRATION RATE: 16 BRPM | DIASTOLIC BLOOD PRESSURE: 75 MMHG | WEIGHT: 157.5 LBS | SYSTOLIC BLOOD PRESSURE: 132 MMHG | BODY MASS INDEX: 22.55 KG/M2

## 2018-02-07 DIAGNOSIS — C90.30 PLASMACYTOMA OF BONE (H): Primary | ICD-10-CM

## 2018-02-07 DIAGNOSIS — M25.551 RIGHT HIP PAIN: ICD-10-CM

## 2018-02-07 DIAGNOSIS — C90.30 PLASMACYTOMA OF BONE (H): ICD-10-CM

## 2018-02-07 PROCEDURE — 99205 OFFICE O/P NEW HI 60 MIN: CPT | Performed by: INTERNAL MEDICINE

## 2018-02-07 PROCEDURE — 77074 RADEX OSSEOUS SURVEY LMTD: CPT | Mod: TC

## 2018-02-07 RX ORDER — TRAMADOL HYDROCHLORIDE 50 MG/1
50-100 TABLET ORAL EVERY 6 HOURS PRN
Qty: 20 TABLET | Refills: 0 | Status: SHIPPED | OUTPATIENT
Start: 2018-02-07 | End: 2018-02-15

## 2018-02-07 ASSESSMENT — PAIN SCALES - GENERAL: PAINLEVEL: WORST PAIN (10)

## 2018-02-07 NOTE — PROGRESS NOTES
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 Records in system - [facility] ref.       HISTORY OF PRESENT ILLNESS:   62-year-old male who developed right hip pain with radiation into posterior thigh starting 6 months ago and was progressively getting worse. MRI of the lumbar spine showed degenerative changes throughout, no disc hernniation and moderate foraminal stenosis at multiple levels. X ray hip showed a lucent area in the right supra-acetabular region measuring 5.2 cm in maximum diameter without any definite fracture. MRI hip on 01/18/18 showed a destructive lesion in the right acetabulum extending past the margin of the bone into adjacent soft tissues. Met with orthopedic surgery and underwent biopsy of the pelvic lesion which came back positive for plasma cell neoplasm. He was referred to medical and radiation oncology evaluation. Met with radiation on 02/2/18 and underwent labs( SPEP, UPEP, immunofixation, free light chain ratio) as well as bone marrow aspiration and biopsy as workup of plasma cell neoplasm.    Presents to clinic today to establish care. Complains of right hip pain gets worse on standing. Range of motion is restricted at the right hip joint. Also has intermittent tingling and numbness in the right foot. Denies any other site of bone pain, nausea/vomiting, fatigue, weight loss, abdominal pain, constipation, dyspnea or any other complaints.    No personal family history of blood disorders or myeloma.    REVIEW OF SYSTEMS:      ROS: 14 point ROS neg other than the symptoms noted above in the HPI.    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       ALLERGIES:   Allergies as of 02/07/2018     (No Known Allergies)       MEDICATIONS:   Current Outpatient Prescriptions   Medication Sig Dispense Refill     traMADol (ULTRAM) 50 MG tablet Take 1-2 tablets ( mg) by mouth every 6 hours as needed for pain 20 tablet 0     Acetaminophen (TYLENOL PO) Take 1,000 mg by mouth       atorvastatin (LIPITOR) 20 MG tablet Take 1 tablet (20 mg) by mouth daily (Patient taking differently: Take 20 mg by mouth At Bedtime ) 90 tablet 3     aspirin 81 MG tablet Take 1 tablet by mouth daily. 90 tablet 3        FAMILY HISTORY:   Family History   Problem Relation Age of Onset     C.A.D. Mother      age 70s     Coronary Artery Disease Mother      CEREBROVASCULAR DISEASE Father      at 68 yo     Hypertension Father      Lung Cancer Brother      [place]      Coronary Artery Disease Brother      Cardiac Sudden Death Brother      Hyperlipidemia Brother      MENTAL ILLNESS Son      Asthma Son      Depression Son        SOCIAL HISTORY:   Social History     Social History     Marital status:      Spouse name: N/A     Number of children: N/A     Years of education: N/A     Social History Main Topics     Smoking status: Former Smoker     Packs/day: 0.75     Years: 5.00     Types: Cigarettes     Start date: 6/1/1973     Quit date: 6/1/1978     Smokeless tobacco: Never Used     Alcohol use 1.0 oz/week      Comment: Patient reports one drink bi-weekly     Drug use: No     Sexual activity: Not Currently     Partners: Female     Other Topics Concern  "    Parent/Sibling W/ Cabg, Mi Or Angioplasty Before 65f 55m? No     Social History Narrative    Single.  Son lives with him.  .            PHYSICAL EXAMINATION:   /75 (BP Location: Right arm, Patient Position: Chair, Cuff Size: Adult Regular)  Pulse 75  Temp 97.6  F (36.4  C) (Temporal)  Resp 16  Ht 1.778 m (5' 10\")  Wt 71.4 kg (157 lb 8 oz)  SpO2 99%  BMI 22.6 kg/m2  Wt Readings from Last 10 Encounters:   02/07/18 71.4 kg (157 lb 8 oz)   02/02/18 70.5 kg (155 lb 8 oz)   01/24/18 69.5 kg (153 lb 4.8 oz)   01/24/18 72.6 kg (160 lb)   01/16/18 72.6 kg (160 lb)   01/11/18 72.1 kg (159 lb)   05/08/17 71.2 kg (157 lb)   05/23/16 71.2 kg (157 lb)   05/22/15 74.6 kg (164 lb 8 oz)   05/27/14 76.5 kg (168 lb 11.2 oz)      ECOG performance status: 0  Exam:  Constitutional: healthy, alert and no distress  Head: Normocephalic. No masses, lesions, tenderness or abnormalities  Neck: Neck supple. No adenopathy.  ENT: ENT exam normal, no neck nodes or sinus tenderness  Cardiovascular: negative,  Respiratory: negative,  Lungs clear  Gastrointestinal: Abdomen soft, non-tender. BS normal.   : Deferred  Musculoskeletal: Range of motion restricted at right lower extremity hip joint  Skin: no suspicious lesions or rashes  Neurologic: Gait normal. Reflexes normal and symmetric. Sensation grossly WNL.  Psychiatric: mentation appears normal and affect normal/bright  Hematologic/Lymphatic/Immunologic: Normal cervical lymph nodes        LABORATORY RESULTS:    Recent Labs   Lab Test  02/02/18   1144  01/24/18   1548   NA  139  138   POTASSIUM  4.3  3.7   CHLORIDE  103  106   BUN  21  16   CR  0.83  0.86   GLC  105*  92   CHANCE  9.9  9.6     Recent Labs   Lab Test  02/05/18   0905  01/24/18   1548   WBC  7.1  6.6   HGB  14.6  15.4   PLT  248  202   MCV  93  92   NEUTROPHIL  60.2   --      Recent Labs   Lab Test  02/02/18   1144  05/23/12   1322  04/21/10   1334   BILITOTAL  0.5   --    --    ALKPHOS  80   --    --    ALT  " 34  27  31   AST  10   --    --    ALBUMIN  4.1   --    --    LDH  135   --    --      Component      Latest Ref Rng & Units 2/2/2018   Albumin Fraction      3.7 - 5.1 g/dL 4.3   Alpha 1 Fraction      0.2 - 0.4 g/dL 0.5 (H)   Alpha 2 Fraction      0.5 - 0.9 g/dL 0.8   Beta Fraction      0.6 - 1.0 g/dL 0.9   Gamma Fraction      0.7 - 1.6 g/dL 1.1   Monoclonal Peak      0.0 g/dL 0.0   ELP Interpretation:       Essentially normal electrophoretic pattern.  No monoclonal protein seen.  Pathologic . . .   Immunofixation ELP       No monoclonal protein seen on immunofixation.  Pathological significance requires clinical . . .   IGG      695 - 1620 mg/dL 1140   IGA      70 - 380 mg/dL 157   IGM      60 - 265 mg/dL 67   [place] Free Lt Chain      0.33 - 1.94 mg/dL 8.72 (H)   Lambda Free Lt Chain      0.57 - 2.63 mg/dL 1.06   Kappa Lambda Ratio      0.26 - 1.65 8.23 (H)     Component      Latest Ref Rng & Units 2/5/2018   Albumin Fraction Urine      0 % 12.9 (H)   Alpha 1 Fraction Urine      0 % 12.8 (H)   Alpha 2 Fraction Urine      0 % 15.5 (H)   Beta Fraction Urine      0 % 12.8 (H)   Gamma Fraction Urine      0 % 46.0 (H)   Monoclonal Peak Urine      0% % 23.1 (H)   ELP Interpretation Urine       Albumin and globulins seen. A monoclonal protein (about 23%) is seen in the gamma . . .       IMAGING RESULTS:  Recent Results (from the past 744 hour(s))   MR Lumbar Spine w/o Contrast    Narrative    MRI LUMBAR SPINE WITHOUT CONTRAST   1/12/2018 12:58 PM     HISTORY: Right leg pain, tingling, numbness, and weakness.    COMPARISON: None.    TECHNIQUE: Multiplanar MR imaging was performed without contrast.    FINDINGS: Numbering of the levels is based on what appear to be five  lumbar type vertebral bodies. There is a mild S-shaped curvature of  the spine, convex to the left inferiorly and to the right in the mid  lumbar spine. There is loss of the normal lumbar lordosis. Vertebral  body alignment is otherwise normal. No fracture  is seen. No pars  interarticularis defect is demonstrated. No osseous lesion is seen.  Schmorl's nodes are seen in several vertebral body endplates. There  are mild-to-moderate Modic type I signal changes in the endplates  adjacent to the L2-L3 disc with moderate Modic type II signal change  in the endplates adjacent to the L3-L4 disc. No other abnormal marrow  signal intensity is seen. The conus medullaris terminates at the level  of the L1 vertebral body. No intrathecal abnormality is seen. The  adjacent soft tissues are unremarkable.    Findings by specific level:    T12-L1: The disc and facet joints are normal. No stenosis is seen.    L1-L2: The disc height is well-preserved. There is a minimal disc  bulge with no herniation or stenosis seen. The facet joints are  unremarkable.    L2-L3: There is mild disc height loss. There is a moderate disc bulge  with no herniation seen. There are mild degenerative changes in the  right facet joint. The left facet joint is unremarkable. There is  moderate left foraminal stenosis with borderline mild right foraminal  narrowing. No central canal stenosis is seen.    L3-L4: There is moderate to marked disc height loss. There is a  moderate disc bulge and marginal osteophyte formation with no focal  disc herniation seen. The facet joints are unremarkable. There is mild  prominence of the ligamentum flava on the left. There is borderline  mild central canal stenosis with mild-to-moderate bilateral neural  foraminal stenosis.    L4-L5: There is moderate disc height loss. There is a moderate disc  bulge with no focal herniation seen. The facet joints are  unremarkable. No central canal stenosis is present. There is moderate  right and mild left neural foraminal stenosis.    L5-S1: There is disc dehydration with moderate disc height loss. There  is a mild disc bulge with no focal herniation seen. There are mild  degenerative changes in the facet joints. No central canal stenosis  is  present. There is moderate right and mild left neural foraminal  stenosis.      Impression    IMPRESSION: Diffuse degenerative changes throughout the lumbar spine.  No disc herniation is seen. There is moderate foraminal stenosis on  the right at both L4-L5 and L5-S1 and on the left at L2-L3. Less  extensive stenosis is seen elsewhere as described above.    INRGIS JEFFERS MD   XR Hip Right 2-3 Views    Narrative    HIP RIGHT TWO TO THREE VIEWS   1/16/2018 1:04 PM     HISTORY: Right hip pain.    COMPARISON: MRI lumbar spine dated 1/12/2018.    FINDINGS: Lucency in the right supra-acetabular ilium likely  represents cyst formation. This measures up to 5.2 x 3.9 cm  cross-sectionally. No definite fracture is seen. Hip joint space is  well-maintained. Hernia fixation devices are projected over the  pelvis.      Impression    IMPRESSION:  1. Supra-acetabular right ilial lucent lesion with well-circumscribed  border could represent a cyst. Given its sharp zone of transition, it  is considered likely benign. Further evaluation with pelvic MRI may be  helpful as clinically indicated and this is likely due to chronic  degenerative changes of the hip.    JULIANA AWAN MD   MR Hip Right w/o Contrast    Narrative    MR HIP RIGHT WITHOUT CONTRAST 1/18/2018 8:42 AM    HISTORY:  Hip pain, right. Radicular pain of right lower extremity.  Lucent area right supra-acetabular ilium on plain films.    COMPARISONS: Right hip plain films dated 1/16/2018.    TECHNIQUE: Coronal T1 and STIR.   Axial T1 and T2 fat suppression.    FINDINGS:   Osseous and Cartilaginous Structures:  There is a large  heterogeneously T2 hyperintense and T2 hypointense lesion in the right  supra-acetabular ilium corresponding with the lucent lesion seen on  the prior CT. This extends past the margin of bone into the adjacent  soft tissues posterior to the right ilium and slightly anterior to the  right ilium. This appears solid and is most consistent with  a  metastasis or plasmacytoma. Bone marrow signal intensity is otherwise  within normal limits. Hip joint spaces are grossly well-maintained. No  fracture or malalignment.    Acetabular Labrum: No juxtaacetabular cyst.  No obvious labral tear is  appreciated, allowing for the large FOV technique.  If indicated  clinically, MR arthrography would be considered the study of choice in  this regard.    Trochanteric and Iliopsoas Bursae: No fluid collection.    Common Hamstring Tendon: Intact.    Additional Findings: No significant joint effusion.  The gluteus  medius and minimus tendons appear unremarkable. Muscle signal  intensity throughout the visualized portions of the pelvis and upper  legs is within normal limits as is the subcutaneous adipose tissue.      Impression    IMPRESSION:   1. Probable large osseous metastasis in the right supra-acetabular  ilium extending into the adjacent soft tissues corresponding with the  lucent lesion seen on the prior CT. Plasmacytoma is also in the  differential. Further evaluation with nuclear medicine bone scan is  recommended to evaluate for possible other lesions. Tissue sampling  will likely be necessary.  2. No other evidence for metastasis is seen. No other significant  abnormalities are identified and there is no evidence for fracture.    I called the findings of the large right supra-acetabular ilial solid  lesion (metastasis versus plasmacytoma) to Dr. Velasquez on 1/18/2018 at  approximately 8:50 AM.    JULIANA AWAN MD   XR Surgery SANJAY L/T 5 Min Fluoro w Stills    Narrative    This exam was marked as non-reportable because it will not be read by a   radiologist or a [place] non-radiologist provider.               PATHOLOGY  01/25/18  SPECIMEN(S):   Right pelvis     FINAL DIAGNOSIS:   Pelvis, right, biopsy:        Plasma cell neoplasm (see comment).     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  [place] red  stain shows amyloid deposition in some blood vessels.  Correlation with bone marrow studies, serum protein   electrophoresis and immunofixation, urine free light chains, a bone survey   and clinical presentation is   Recommended.    02/05/18    TEST(S):   Unilateral Bone Marrow Biopsy/Aspiration     FINAL DIAGNOSIS:   Bone marrow, posterior iliac crest, left decalcified trephine biopsy and   touch imprint;  left, direct aspirate   smear, and concentrated aspirate smear; and peripheral blood smear:     - Normocellular marrow (cellularity estimated at  30-40%) with   trilineage hematopoietic maturation, 1%   blasts, 1% polytypic plasma cells     - No morphologic or immunophenotypic evidence for plasma cell neoplasm.     - Peripheral blood showing normal hemogram and differential     ASSESSMENT AND PLAN:    62 year-old male who developed right hip pain going on for a few months and progressively getting worse who on imaging workup was noted to have a large heterogenous right acetabular mass biopsy of which came back positive as plasma cell neoplasm. Further workup negative for anemia, hypercalcemia with electrophoresis/immunofixation showing monoclonal free kappa light chain immunoglobulin in urine with increased kappa free light chain in serum. Bone marrow biopsy shows no morphologic or immunophenotypic evidence for plasma cell neoplasm.     - Plasma cell neoplasm  Bone Survey pending to rule out lytic lesions.   If it comes back negative, it  would be consistent with solitary plasmacytoma of the bone. Treatment recommendation would be to proceed with definitive radiation therapy. Patient has already met with radiation oncology and is tentatively scheduled to start radiation next week. Patient was informed about the presence of monoclonal protein which can be present in the SPB and it may or may not disappear with treatment. He was made aware of the potential risk of progression to multiple myeloma and the need to  monitor with labs including CBC, BMP, SPEP, UPEP, free light chains every 4 months for the first year and annually thereafter.    On the other hand, bone survey showing multiple lytic lesions be consistent with multiple myeloma and he would require systemic therapy along with palliative radiation    - Pain control  Tramadol prescription given    RT clinic in 1 week follow-up on results of bone survey and give final treatment recommendations.    The patient is ready to learn, no apparent learning barriers were identified, Diagnosis and treatment plans were explained to the patient. The patient expressed understanding of the content. The patient questions were answered to his satisfaction.    Chart documentation with Dragon Voice recognition Software. Although reviewed after completion, some words and grammatical errors may remain.    Froilan Peres MD  Attending Physician   Hematology/Medical Oncology

## 2018-02-07 NOTE — Clinical Note
2/7/2018         RE: Dong Joseph  [id number]  [place] [address]        Dear Colleague,    Thank you for referring your patient, Dong Joseph, to the [facility]. Please see a copy of my visit note below.    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 Records in system - [facility] ref.       HISTORY OF PRESENT ILLNESS:   62-year-old male who developed right hip pain with radiation into posterior thigh starting 6 months ago and was progressively getting worse. MRI of the lumbar spine showed degenerative changes throughout, no disc hernniation and moderate foraminal stenosis at multiple levels. X ray hip showed a lucent area in the right supra-acetabular region measuring 5.2 cm in maximum diameter without any definite fracture. MRI hip on 01/18/18 showed a destructive lesion in the right acetabulum extending past the margin of the bone into adjacent soft tissues. Met with orthopedic surgery and underwent biopsy of the pelvic lesion which came back positive for plasma cell neoplasm. He was referred to medical and radiation oncology evaluation. Met with radiation on 02/2/18 and underwent labs( SPEP, UPEP, immunofixation, free light chain ratio) as well as bone marrow aspiration and biopsy as workup of plasma cell neoplasm.    Presents to clinic today to establish care. Complains of right hip pain gets worse on standing. Range of motion is restricted at the right hip joint. Also has intermittent tingling and numbness in the right foot. Denies any other site of bone pain, nausea/vomiting, fatigue, weight loss, abdominal pain, constipation, dyspnea or any other complaints.    No personal family history of blood disorders or myeloma.    REVIEW OF SYSTEMS:      ROS: 14 point ROS neg other than the symptoms noted above in the HPI.    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       ALLERGIES:   Allergies as of 02/07/2018     (No Known Allergies)       MEDICATIONS:   Current Outpatient Prescriptions   Medication Sig Dispense Refill     traMADol (ULTRAM) 50 MG tablet Take 1-2 tablets ( mg) by mouth every 6 hours as needed for pain 20 tablet 0     Acetaminophen (TYLENOL PO) Take 1,000 mg by mouth       atorvastatin (LIPITOR) 20 MG tablet Take 1 tablet (20 mg) by mouth daily (Patient taking differently: Take 20 mg by mouth At Bedtime ) 90 tablet 3     aspirin 81 MG tablet Take 1 tablet by mouth daily. 90 tablet 3        FAMILY HISTORY:   Family History   Problem Relation Age of Onset     C.A.D. Mother      age 70s     Coronary Artery Disease Mother      CEREBROVASCULAR DISEASE Father      at 66 yo     Hypertension Father      Lung Cancer Brother      [place]      Coronary Artery Disease Brother      Cardiac Sudden Death Brother      Hyperlipidemia Brother      MENTAL ILLNESS Son      Asthma Son      Depression Son        SOCIAL HISTORY:   Social History     Social History     Marital status:      Spouse name: N/A     Number of children: N/A     Years of education: N/A     Social History Main Topics     Smoking status: Former Smoker     Packs/day: 0.75     Years: 5.00     Types: Cigarettes     Start date:  "6/1/1973     Quit date: 6/1/1978     Smokeless tobacco: Never Used     Alcohol use 1.0 oz/week      Comment: Patient reports one drink bi-weekly     Drug use: No     Sexual activity: Not Currently     Partners: Female     Other Topics Concern     Parent/Sibling W/ Cabg, Mi Or Angioplasty Before 65f 55m? No     Social History Narrative    Single.  Son lives with him.  .            PHYSICAL EXAMINATION:   /75 (BP Location: Right arm, Patient Position: Chair, Cuff Size: Adult Regular)  Pulse 75  Temp 97.6  F (36.4  C) (Temporal)  Resp 16  Ht 1.778 m (5' 10\")  Wt 71.4 kg (157 lb 8 oz)  SpO2 99%  BMI 22.6 kg/m2  Wt Readings from Last 10 Encounters:   02/07/18 71.4 kg (157 lb 8 oz)   02/02/18 70.5 kg (155 lb 8 oz)   01/24/18 69.5 kg (153 lb 4.8 oz)   01/24/18 72.6 kg (160 lb)   01/16/18 72.6 kg (160 lb)   01/11/18 72.1 kg (159 lb)   05/08/17 71.2 kg (157 lb)   05/23/16 71.2 kg (157 lb)   05/22/15 74.6 kg (164 lb 8 oz)   05/27/14 76.5 kg (168 lb 11.2 oz)      ECOG performance status: 0  Exam:  Constitutional: healthy, alert and no distress  Head: Normocephalic. No masses, lesions, tenderness or abnormalities  Neck: Neck supple. No adenopathy.  ENT: ENT exam normal, no neck nodes or sinus tenderness  Cardiovascular: negative,  Respiratory: negative,  Lungs clear  Gastrointestinal: Abdomen soft, non-tender. BS normal.   : Deferred  Musculoskeletal: Range of motion restricted at right lower extremity hip joint  Skin: no suspicious lesions or rashes  Neurologic: Gait normal. Reflexes normal and symmetric. Sensation grossly WNL.  Psychiatric: mentation appears normal and affect normal/bright  Hematologic/Lymphatic/Immunologic: Normal cervical lymph nodes        LABORATORY RESULTS:    Recent Labs   Lab Test  02/02/18   1144  01/24/18   1548   NA  139  138   POTASSIUM  4.3  3.7   CHLORIDE  103  106   BUN  21  16   CR  0.83  0.86   GLC  105*  92   CHANCE  9.9  9.6     Recent Labs   Lab Test  02/05/18   0905  " 01/24/18   1548   WBC  7.1  6.6   HGB  14.6  15.4   PLT  248  202   MCV  93  92   NEUTROPHIL  60.2   --      Recent Labs   Lab Test  02/02/18   1144  05/23/12   1322  04/21/10   1334   BILITOTAL  0.5   --    --    ALKPHOS  80   --    --    ALT  34  27  31   AST  10   --    --    ALBUMIN  4.1   --    --    LDH  135   --    --      Component      Latest Ref Rng & Units 2/2/2018   Albumin Fraction      3.7 - 5.1 g/dL 4.3   Alpha 1 Fraction      0.2 - 0.4 g/dL 0.5 (H)   Alpha 2 Fraction      0.5 - 0.9 g/dL 0.8   Beta Fraction      0.6 - 1.0 g/dL 0.9   Gamma Fraction      0.7 - 1.6 g/dL 1.1   Monoclonal Peak      0.0 g/dL 0.0   ELP Interpretation:       Essentially normal electrophoretic pattern.  No monoclonal protein seen.  Pathologic . . .   Immunofixation ELP       No monoclonal protein seen on immunofixation.  Pathological significance requires clinical . . .   IGG      695 - 1620 mg/dL 1140   IGA      70 - 380 mg/dL 157   IGM      60 - 265 mg/dL 67   [place] Free Lt Chain      0.33 - 1.94 mg/dL 8.72 (H)   Lambda Free Lt Chain      0.57 - 2.63 mg/dL 1.06   Kappa Lambda Ratio      0.26 - 1.65 8.23 (H)     Component      Latest Ref Rng & Units 2/5/2018   Albumin Fraction Urine      0 % 12.9 (H)   Alpha 1 Fraction Urine      0 % 12.8 (H)   Alpha 2 Fraction Urine      0 % 15.5 (H)   Beta Fraction Urine      0 % 12.8 (H)   Gamma Fraction Urine      0 % 46.0 (H)   Monoclonal Peak Urine      0% % 23.1 (H)   ELP Interpretation Urine       Albumin and globulins seen. A monoclonal protein (about 23%) is seen in the gamma . . .       IMAGING RESULTS:  Recent Results (from the past 744 hour(s))   MR Lumbar Spine w/o Contrast    Narrative    MRI LUMBAR SPINE WITHOUT CONTRAST   1/12/2018 12:58 PM     HISTORY: Right leg pain, tingling, numbness, and weakness.    COMPARISON: None.    TECHNIQUE: Multiplanar MR imaging was performed without contrast.    FINDINGS: Numbering of the levels is based on what appear to be five  lumbar type  vertebral bodies. There is a mild S-shaped curvature of  the spine, convex to the left inferiorly and to the right in the mid  lumbar spine. There is loss of the normal lumbar lordosis. Vertebral  body alignment is otherwise normal. No fracture is seen. No pars  interarticularis defect is demonstrated. No osseous lesion is seen.  Schmorl's nodes are seen in several vertebral body endplates. There  are mild-to-moderate Modic type I signal changes in the endplates  adjacent to the L2-L3 disc with moderate Modic type II signal change  in the endplates adjacent to the L3-L4 disc. No other abnormal marrow  signal intensity is seen. The conus medullaris terminates at the level  of the L1 vertebral body. No intrathecal abnormality is seen. The  adjacent soft tissues are unremarkable.    Findings by specific level:    T12-L1: The disc and facet joints are normal. No stenosis is seen.    L1-L2: The disc height is well-preserved. There is a minimal disc  bulge with no herniation or stenosis seen. The facet joints are  unremarkable.    L2-L3: There is mild disc height loss. There is a moderate disc bulge  with no herniation seen. There are mild degenerative changes in the  right facet joint. The left facet joint is unremarkable. There is  moderate left foraminal stenosis with borderline mild right foraminal  narrowing. No central canal stenosis is seen.    L3-L4: There is moderate to marked disc height loss. There is a  moderate disc bulge and marginal osteophyte formation with no focal  disc herniation seen. The facet joints are unremarkable. There is mild  prominence of the ligamentum flava on the left. There is borderline  mild central canal stenosis with mild-to-moderate bilateral neural  foraminal stenosis.    L4-L5: There is moderate disc height loss. There is a moderate disc  bulge with no focal herniation seen. The facet joints are  unremarkable. No central canal stenosis is present. There is moderate  right and mild  left neural foraminal stenosis.    L5-S1: There is disc dehydration with moderate disc height loss. There  is a mild disc bulge with no focal herniation seen. There are mild  degenerative changes in the facet joints. No central canal stenosis is  present. There is moderate right and mild left neural foraminal  stenosis.      Impression    IMPRESSION: Diffuse degenerative changes throughout the lumbar spine.  No disc herniation is seen. There is moderate foraminal stenosis on  the right at both L4-L5 and L5-S1 and on the left at L2-L3. Less  extensive stenosis is seen elsewhere as described above.    INGRIS JEFFERS MD   XR Hip Right 2-3 Views    Narrative    HIP RIGHT TWO TO THREE VIEWS   1/16/2018 1:04 PM     HISTORY: Right hip pain.    COMPARISON: MRI lumbar spine dated 1/12/2018.    FINDINGS: Lucency in the right supra-acetabular ilium likely  represents cyst formation. This measures up to 5.2 x 3.9 cm  cross-sectionally. No definite fracture is seen. Hip joint space is  well-maintained. Hernia fixation devices are projected over the  pelvis.      Impression    IMPRESSION:  1. Supra-acetabular right ilial lucent lesion with well-circumscribed  border could represent a cyst. Given its sharp zone of transition, it  is considered likely benign. Further evaluation with pelvic MRI may be  helpful as clinically indicated and this is likely due to chronic  degenerative changes of the hip.    JULIANA AWAN MD   MR Hip Right w/o Contrast    Narrative    MR HIP RIGHT WITHOUT CONTRAST 1/18/2018 8:42 AM    HISTORY:  Hip pain, right. Radicular pain of right lower extremity.  Lucent area right supra-acetabular ilium on plain films.    COMPARISONS: Right hip plain films dated 1/16/2018.    TECHNIQUE: Coronal T1 and STIR.   Axial T1 and T2 fat suppression.    FINDINGS:   Osseous and Cartilaginous Structures:  There is a large  heterogeneously T2 hyperintense and T2 hypointense lesion in the right  supra-acetabular ilium  corresponding with the lucent lesion seen on  the prior CT. This extends past the margin of bone into the adjacent  soft tissues posterior to the right ilium and slightly anterior to the  right ilium. This appears solid and is most consistent with a  metastasis or plasmacytoma. Bone marrow signal intensity is otherwise  within normal limits. Hip joint spaces are grossly well-maintained. No  fracture or malalignment.    Acetabular Labrum: No juxtaacetabular cyst.  No obvious labral tear is  appreciated, allowing for the large FOV technique.  If indicated  clinically, MR arthrography would be considered the study of choice in  this regard.    Trochanteric and Iliopsoas Bursae: No fluid collection.    Common Hamstring Tendon: Intact.    Additional Findings: No significant joint effusion.  The gluteus  medius and minimus tendons appear unremarkable. Muscle signal  intensity throughout the visualized portions of the pelvis and upper  legs is within normal limits as is the subcutaneous adipose tissue.      Impression    IMPRESSION:   1. Probable large osseous metastasis in the right supra-acetabular  ilium extending into the adjacent soft tissues corresponding with the  lucent lesion seen on the prior CT. Plasmacytoma is also in the  differential. Further evaluation with nuclear medicine bone scan is  recommended to evaluate for possible other lesions. Tissue sampling  will likely be necessary.  2. No other evidence for metastasis is seen. No other significant  abnormalities are identified and there is no evidence for fracture.    I called the findings of the large right supra-acetabular ilial solid  lesion (metastasis versus plasmacytoma) to Dr. Velasquez on 1/18/2018 at  approximately 8:50 AM.    JULIANA AWAN MD   XR Surgery SANJAY L/T 5 Min Fluoro w Stills    Narrative    This exam was marked as non-reportable because it will not be read by a   radiologist or a [facility] non-radiologist provider.                PATHOLOGY  01/25/18  SPECIMEN(S):   Right pelvis     FINAL DIAGNOSIS:   Pelvis, right, biopsy:        Plasma cell neoplasm (see comment).     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  [place] red stain shows amyloid deposition in some blood vessels.  Correlation with bone marrow studies, serum protein   electrophoresis and immunofixation, urine free light chains, a bone survey   and clinical presentation is   Recommended.    02/05/18    TEST(S):   Unilateral Bone Marrow Biopsy/Aspiration     FINAL DIAGNOSIS:   Bone marrow, posterior iliac crest, left decalcified trephine biopsy and   touch imprint;  left, direct aspirate   smear, and concentrated aspirate smear; and peripheral blood smear:     - Normocellular marrow (cellularity estimated at  30-40%) with   trilineage hematopoietic maturation, 1%   blasts, 1% polytypic plasma cells     - No morphologic or immunophenotypic evidence for plasma cell neoplasm.     - Peripheral blood showing normal hemogram and differential     ASSESSMENT AND PLAN:    62 year-old male who developed right hip pain going on for a few months and progressively getting worse who on imaging workup was noted to have a large heterogenous right acetabular mass biopsy of which came back positive as plasma cell neoplasm. Further workup negative for anemia, hypercalcemia with electrophoresis/immunofixation showing monoclonal free kappa light chain immunoglobulin in urine with increased kappa free light chain in serum. Bone marrow biopsy shows no morphologic or immunophenotypic evidence for plasma cell neoplasm.     - Plasma cell neoplasm  Bone Survey pending to rule out lytic lesions.   If it comes back negative, it  would be consistent with solitary plasmacytoma of the bone. Treatment recommendation would be to proceed with definitive radiation therapy. Patient has already met with radiation oncology and is tentatively  scheduled to start radiation next week. Patient was informed about the presence of monoclonal protein which can be present in the SPB and it may or may not disappear with treatment. He was made aware of the potential risk of progression to multiple myeloma and the need to monitor with labs including CBC, BMP, SPEP, UPEP, free light chains every 4 months for the first year and annually thereafter.    On the other hand, bone survey showing multiple lytic lesions be consistent with multiple myeloma and he would require systemic therapy along with palliative radiation    - Pain control  Tramadol prescription given    RT clinic in 1 week follow-up on results of bone survey and give final treatment recommendations.    The patient is ready to learn, no apparent learning barriers were identified, Diagnosis and treatment plans were explained to the patient. The patient expressed understanding of the content. The patient questions were answered to his satisfaction.    Chart documentation with Dragon Voice recognition Software. Although reviewed after completion, some words and grammatical errors may remain.    Froilan Peres MD  Attending Physician   Hematology/Medical Oncology        Again, thank you for allowing me to participate in the care of your patient.        Sincerely,        Froilan Peres MD

## 2018-02-07 NOTE — MR AVS SNAPSHOT
After Visit Summary   2/7/2018    Dong Joseph    MRN: [id number]           Patient Information     Date Of Birth          1955        Visit Information        Provider Department      2/7/2018 10:00 AM Froilan Peres MD [facility]        Today's Diagnoses     Right hip pain          Care Instructions      Please follow up with Dr. Peres in 1 week with plan of care.      Bone Survey Date/Time:  Today at 11:15 - Check in at 11:00    Follow Up Date/Time: 2/14/18 at 2:30    If you have any questions or concerns please feel free to call.    If you need to reschedule please call:  Clinic or Lab Appointment - [phone]  Infusion - [phone]  Imaging - [phone]    Ravin Gandhi RN, BSN, OCN   Oncology Care Coordinator RN  [facility]  [phone]              Follow-ups after your visit        Your next 10 appointments already scheduled     Feb 07, 2018 11:15 AM CST   XR BONE SURVEY LIMITED with PHXRSP1   [address] [address]              Please bring a list of your current medicines to your exam. (Include vitamins, minerals and over-thecounter medicines.) Leave your valuables at home.  Tell your doctor if there is a chance you may be pregnant.  You do not need to do anything special for this exam.            Feb 13, 2018  8:30 AM CST   New Treatment with Garrett Dejesus MD, RADIATION THERAPIST   [facility])    [address] [place] [address]   [phone]            Feb 14, 2018  8:45 AM CST   TREATMENT with RADIATION THERAPIST   [facility] ([facility])    [address] [place] [address]   [phone]            Feb 14, 2018  2:30 PM CST   Return Visit with Froilan Peres MD   [address]  [address]   [phone]            Feb 15, 2018 11:15 AM CST   TREATMENT with RADIATION THERAPIST   [facility] ([facility])    [address] [facility] [address]   [phone]            Feb 16, 2018 11:15 AM CST   TREATMENT with RADIATION THERAPIST   [facility] ([facility])    [address] [facility] [address]   [phone]            Feb 19, 2018 11:15 AM CST   TREATMENT with RADIATION THERAPIST   [facility] ([facility])    [address] [facility] [address]   [phone]            Feb 19, 2018 11:30 AM CST   on treatment visit with Garrett Dejesus MD   [facility] ([facility])    [address] [place] [address]   [phone]            Feb 20, 2018 11:15 AM CST   TREATMENT with RADIATION THERAPIST   [facility] ([facility])    [address] [place] [address]   [phone]              Who to contact     If you have questions or need follow up information about today's clinic visit or your schedule please contact [facility] directly at [phone].  Normal or non-critical lab and imaging results will be communicated to you by MyChart, letter or phone within 4 business days after the clinic has received the results. If you do not hear from us within 7 days, please contact the clinic through One[employer] or phone. If you have a critical or abnormal lab result, we will notify you by phone as soon as possible.  Submit refill requests through [email or web address] or call your pharmacy and they will forward the refill request to us. Please allow 3 business days for your refill to be completed.          Additional Information About Your Visit        OneMobharHazelMail Information     [email or web address] gives you secure access to your electronic health record. If you see a primary care  "provider, you can also send messages to your care team and make appointments. If you have questions, please call your primary care clinic.  If you do not have a primary care provider, please call [phone] and they will assist you.        Care EveryWhere ID     This is your Care EveryWhere ID. This could be used by other organizations to access your [place] medical records  [id number]        Your Vitals Were     Pulse Temperature Respirations Height Pulse Oximetry BMI (Body Mass Index)    75 97.6  F (36.4  C) (Temporal) 16 1.778 m (5' 10\") 99% 22.6 kg/m2       Blood Pressure from Last 3 Encounters:   02/07/18 132/75   02/05/18 113/64   02/02/18 114/76    Weight from Last 3 Encounters:   02/07/18 71.4 kg (157 lb 8 oz)   02/02/18 70.5 kg (155 lb 8 oz)   01/24/18 69.5 kg (153 lb 4.8 oz)              Today, you had the following     No orders found for display         Today's Medication Changes          These changes are accurate as of 2/7/18 10:37 AM.  If you have any questions, ask your nurse or doctor.               These medicines have changed or have updated prescriptions.        Dose/Directions    atorvastatin 20 MG tablet   Commonly known as:  LIPITOR   This may have changed:  when to take this   Used for:  Pure hypercholesterolemia, Hyperlipidemia LDL goal <130        Dose:  20 mg   Take 1 tablet (20 mg) by mouth daily   Quantity:  90 tablet   Refills:  3            Where to get your medicines      Some of these will need a paper prescription and others can be bought over the counter.  Ask your nurse if you have questions.     Bring a paper prescription for each of these medications     traMADol 50 MG tablet                Primary Care Provider Office Phone # Fax #    Naomi Kuo PA-C [phone] [address] [place] [address]        Equal Access to Services     ROSA ASHFORD AH: Latanya Cedeno, wajarvisda oseas, qaybta kaalkelle rain, tejinder newell " eleazar camacho ah. So [facility] [phone].    ATENCIÓN: Si habla tulio, tiene a gonzalez disposición servicios gratuitos de asistencia lingüística. Naima al [phone].    We comply with applicable federal civil rights laws and [place] laws. We do not discriminate on the basis of race, color, national origin, age, disability, sex, sexual orientation, or gender identity.            Thank you!     Thank you for choosing [facility]  for your care. Our goal is always to provide you with excellent care. Hearing back from our patients is one way we can continue to improve our services. Please take a few minutes to complete the written survey that you may receive in the mail after your visit with us. Thank you!             Your Updated Medication List - Protect others around you: Learn how to safely use, store and throw away your medicines at www.disposemymeds.org.          This list is accurate as of 2/7/18 10:37 AM.  Always use your most recent med list.                   Brand Name Dispense Instructions for use Diagnosis    aspirin 81 MG tablet     90 tablet    Take 1 tablet by mouth daily.    Pure hypercholesterolemia       atorvastatin 20 MG tablet    LIPITOR    90 tablet    Take 1 tablet (20 mg) by mouth daily    Pure hypercholesterolemia, Hyperlipidemia LDL goal <130       traMADol 50 MG tablet    ULTRAM    20 tablet    Take 1-2 tablets ( mg) by mouth every 6 hours as needed for pain    Right hip pain       TYLENOL PO      Take 1,000 mg by mouth

## 2018-02-07 NOTE — PATIENT INSTRUCTIONS
Please follow up with Dr. Peres in 1 week with plan of care.      Bone Survey Date/Time:  Today at 11:15 - Check in at 11:00    Follow Up Date/Time: 2/14/18 at 2:30    If you have any questions or concerns please feel free to call.    If you need to reschedule please call:  Clinic or Lab Appointment - [phone]  Infusion - [phone]  Imaging - [phone]    Ravin Gandhi, RN, BSN, OCN   Oncology Care Coordinator RN  [facility]  [phone]

## 2018-02-07 NOTE — NURSING NOTE
"Oncology Rooming Note    February 7, 2018 10:01 AM   Dong Joseph is a 62 year old male who presents for:    Chief Complaint   Patient presents with     Consult     Multiple Myeloma. Records in system - [facility] ref.     Initial Vitals: /75 (BP Location: Right arm, Patient Position: Chair, Cuff Size: Adult Regular)  Pulse 75  Temp 97.6  F (36.4  C) (Temporal)  Resp 16  Ht 1.778 m (5' 10\")  Wt 71.4 kg (157 lb 8 oz)  SpO2 99%  BMI 22.6 kg/m2 Estimated body mass index is 22.6 kg/(m^2) as calculated from the following:    Height as of this encounter: 1.778 m (5' 10\").    Weight as of this encounter: 71.4 kg (157 lb 8 oz). Body surface area is 1.88 meters squared.  Worst Pain (10) Comment: Right, Groin/Hip to knee   No LMP for male patient.  Allergies reviewed: Yes  Medications reviewed: Yes    Medications: MEDICATION REFILLS NEEDED TODAY. Provider was notified.  Pharmacy name entered into EPIC:    [facility] - [place], [place] - [address] [facility] [address]  [facility] [place], [place] - [address] [facility] - [place] - [address] [place] [address] DRUG STORE [id number] - [place], [place] - [address] [facility] [address] AT [facility] OF  & MAIN    Clinical concerns: None. Dr. Peres was notified.    Ellen Tyler MA              "

## 2018-02-08 LAB — COPATH REPORT: NORMAL

## 2018-02-08 NOTE — NURSING NOTE
DISCHARGE PLAN:  Next appointments: See patient instruction section  Departure Mode: Ambulatory  Accompanied by: self  10 minutes for nursing discharge (face to face time)     Dong Joseph is here today for Oncology consult.  Writing nurse seen patient after Medical Oncology appointment to address questions/concerns/coordinate care. Patient to have further workup.  Will be starting Radiation.  Follow up next week with results and plan of care. Appointments scheduled. See patient instructions and Oncologist's Progress note for further details. Questions and concerns addressed to patient's satisfaction. Patient verbalized and demonstrated understanding of plan.  Contact information provided and patient is encouraged to call with any that arise in the interim of care.    Ravin Gandhi, RN, BSN, OCN   Oncology Care Coordinator RN  [facility]  [phone]  2/8/2018, 9:09 AM

## 2018-02-09 LAB — COPATH REPORT: NORMAL

## 2018-02-13 ENCOUNTER — APPOINTMENT (OUTPATIENT)
Dept: RADIATION ONCOLOGY | Facility: CLINIC | Age: 63
End: 2018-02-13
Payer: COMMERCIAL

## 2018-02-13 PROCEDURE — 77280 THER RAD SIMULAJ FIELD SMPL: CPT | Performed by: RADIOLOGY

## 2018-02-14 ENCOUNTER — ONCOLOGY VISIT (OUTPATIENT)
Dept: ONCOLOGY | Facility: CLINIC | Age: 63
End: 2018-02-14
Payer: COMMERCIAL

## 2018-02-14 ENCOUNTER — APPOINTMENT (OUTPATIENT)
Dept: RADIATION ONCOLOGY | Facility: CLINIC | Age: 63
End: 2018-02-14
Payer: COMMERCIAL

## 2018-02-14 VITALS
DIASTOLIC BLOOD PRESSURE: 79 MMHG | TEMPERATURE: 97.8 F | RESPIRATION RATE: 16 BRPM | HEIGHT: 70 IN | OXYGEN SATURATION: 99 % | SYSTOLIC BLOOD PRESSURE: 136 MMHG | WEIGHT: 158.3 LBS | HEART RATE: 105 BPM | BODY MASS INDEX: 22.66 KG/M2

## 2018-02-14 DIAGNOSIS — C90.30 PLASMACYTOMA OF BONE (H): Primary | ICD-10-CM

## 2018-02-14 PROCEDURE — G6002 STEREOSCOPIC X-RAY GUIDANCE: HCPCS | Performed by: RADIOLOGY

## 2018-02-14 PROCEDURE — 99214 OFFICE O/P EST MOD 30 MIN: CPT | Performed by: INTERNAL MEDICINE

## 2018-02-14 PROCEDURE — 77412 RADIATION TX DELIVERY LVL 3: CPT | Performed by: RADIOLOGY

## 2018-02-14 ASSESSMENT — PAIN SCALES - GENERAL: PAINLEVEL: WORST PAIN (10)

## 2018-02-14 NOTE — Clinical Note
2/14/2018         RE: Dong Joseph  [address]  [place] [address]        Dear Colleague,    Thank you for referring your patient, Dong Joseph, to the [facility]. Please see a copy of my visit note below.      FOLLOW-UP VISIT NOTE    PATIENT NAME: Dong Joseph MRN # [id number]  DATE OF VISIT: Feb 14, 2018 YOB: 1955    REFERRING PROVIDER: No referring provider defined for this encounter.    CANCER TYPE:Solitary plasmacytoma of bone    ONCOLOGY HISTORY:  62-year-old male who developed right hip pain with radiation into posterior thigh starting 6 months ago and was progressively getting worse. MRI of the lumbar spine showed degenerative changes throughout, no disc hernniation and moderate foraminal stenosis at multiple levels. X ray hip showed a lucent area in the right supra-acetabular region measuring 5.2 cm in maximum diameter without any definite fracture. MRI hip on 01/18/18 showed a destructive lesion in the right acetabulum extending past the margin of the bone into adjacent soft tissues. Met with orthopedic surgery and underwent biopsy of the pelvic lesion which came back positive for plasma cell neoplasm.     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 Bone marrow biopsy shows no morphologic or immunophenotypic evidence for plasma cell neoplasm. Bone survey showed right Iliac lesion with lucent areas in frontal region- likely benign.      SUBJECTIVE     Patient presents for 1 week follow-up. She has met with radiation oncology and started on a course of definitive radiation for plasmacytoma. Continues to have right hip pain for which ceased taking tramadol as needed. Also has noticed loose stools the last few days. Denies any other complaints      PAST MEDICAL HISTORY     Past Medical History:   Diagnosis Date      Impotence of organic origin 2003     Plasma cell neoplasm 01/25/2018    S/P Needle biopsy of right pelvis bone tumor     Pure hypercholesterolemia 2002    statin started circa 2002         CURRENT OUTPATIENT MEDICATIONS     Current Outpatient Prescriptions   Medication Sig Dispense Refill     traMADol (ULTRAM) 50 MG tablet Take 1-2 tablets ( mg) by mouth every 6 hours as needed for pain 20 tablet 0     Acetaminophen (TYLENOL PO) Take 1,000 mg by mouth       atorvastatin (LIPITOR) 20 MG tablet Take 1 tablet (20 mg) by mouth daily (Patient taking differently: Take 20 mg by mouth At Bedtime ) 90 tablet 3     aspirin 81 MG tablet Take 1 tablet by mouth daily. 90 tablet 3        ALLERGIES   No Known Allergies     REVIEW OF SYSTEMS   As above in the HPI, o/w complete 12-point ROS was negative.     PHYSICAL EXAM   B/P: 136/79, T: 97.8, P: 105, R: 16  SpO2 Readings from Last 4 Encounters:   02/14/18 99%   02/07/18 99%   02/05/18 94%   02/02/18 99%     Wt Readings from Last 3 Encounters:   02/14/18 71.8 kg (158 lb 4.8 oz)   02/07/18 71.4 kg (157 lb 8 oz)   02/02/18 70.5 kg (155 lb 8 oz)     GEN: NAD  EYES:PERRLA  Mouth/ENT: Oropharynx is clear.  NECK: no cervical or supraclavicular lymphadenopathy  LUNGS: clear bilaterally  CV: regular, no murmurs, rubs, or gallops  ABDOMEN: soft, non-tender, non-distended, normal bowel sounds, no hepatosplenomegaly by percussion or palpation  EXT: warm, well perfused, no edema  NEURO: alert  SKIN: no rashes     LABORATORY AND IMAGING STUDIES     Recent Labs   Lab Test  02/02/18   1144  01/24/18   1548   NA  139  138   POTASSIUM  4.3  3.7   CHLORIDE  103  106   CO2  31  26   ANIONGAP  5  6   BUN  21  16   CR  0.83  0.86   GLC  105*  92   CHANCE  9.9  9.6     Recent Labs   Lab Test  02/05/18   0905  01/24/18   1548   WBC  7.1  6.6   HGB  14.6  15.4   PLT  248  202   MCV  93  92   NEUTROPHIL  60.2   --      Recent Labs   Lab Test  02/02/18   1144  05/23/12   1322  04/21/10   1334    BILITOTAL  0.5   --    --    ALKPHOS  80   --    --    ALT  34  27  31   AST  10   --    --    ALBUMIN  4.1   --    --    LDH  135   --    --      Component      Latest Ref Rng & Units 2/2/2018   Albumin Fraction      3.7 - 5.1 g/dL 4.3   Alpha 1 Fraction      0.2 - 0.4 g/dL 0.5 (H)   Alpha 2 Fraction      0.5 - 0.9 g/dL 0.8   Beta Fraction      0.6 - 1.0 g/dL 0.9   Gamma Fraction      0.7 - 1.6 g/dL 1.1   Monoclonal Peak      0.0 g/dL 0.0   ELP Interpretation:       Essentially normal electrophoretic pattern.  No monoclonal protein seen.  Pathologic . . .   Immunofixation ELP       No monoclonal protein seen on immunofixation.  Pathological significance requires clinical . . .   IGG      695 - 1620 mg/dL 1140   IGA      70 - 380 mg/dL 157   IGM      60 - 265 mg/dL 67   [place] Free Lt Chain      0.33 - 1.94 mg/dL 8.72 (H)   Lambda Free Lt Chain      0.57 - 2.63 mg/dL 1.06   Kappa Lambda Ratio      0.26 - 1.65 8.23 (H)      Component      Latest Ref Rng & Units 2/5/2018   Albumin Fraction Urine      0 % 12.9 (H)   Alpha 1 Fraction Urine      0 % 12.8 (H)   Alpha 2 Fraction Urine      0 % 15.5 (H)   Beta Fraction Urine      0 % 12.8 (H)   Gamma Fraction Urine      0 % 46.0 (H)   Monoclonal Peak Urine      0% % 23.1 (H)   ELP Interpretation Urine       Albumin and globulins seen. A monoclonal protein (about 23%) is seen in the gamma . . .          Results for orders placed or performed in visit on 02/07/18   XR Bone Survey Limited    Narrative    BONE SURVEY LIMITED   2/7/2018 11:46 AM     HISTORY:  Plasmacytoma of bone (H).    COMPARISON: Right hip x-rays dated 1/16/2018.    FINDINGS:    AP and lateral skull: There are approximately three lucent lesions in  the left frontal bone projected over the AP skull which could  represent focal metastasis or multiple myeloma. No other evidence for  multiple myeloma is seen in the skull.    AP and lateral C-spine: Degenerative changes of the cervical spine are  noted. No lucent  lesions are seen. No evidence for malalignment.    AP and lateral T-spine: No fracture. No lucent lesions are identified  in the thoracic spine. Some degenerative changes are noted.    AP and lateral L-spine: Dextroconvex curvature of the mid lumbar spine  is noted. No lucencies are seen in the lumbar spine. There is a lucent  lesion in the supra-acetabular right ilium again noted. Degenerative  changes of the lumbar spine including anterior spondylotic spurring,  endplate eburnation and disc height loss at multiple levels. No  fracture is identified.    PA chest: Lungs are clear. Heart size, mediastinum, and pulmonary  vascularity are within normal limits. No pneumothorax or significant  pleural fluid collection. No evidence for pulmonary or osseous  metastasis. No lucent osseous lesions are seen.    Bilateral oblique ribs: There is no fracture or malalignment. No  evidence for osseous metastasis.    Bilateral AP shoulders: There is no fracture or malalignment. No  evidence for osseous metastasis. Joint spaces are maintained.    Bilateral AP humeri: There is no fracture or malalignment. No evidence  for osseous metastasis. Joint spaces are maintained.    Bilateral AP forearm: There is no fracture or malalignment. No  evidence for osseous metastasis. Joint spaces are maintained.    AP pelvis: Large lucent lesion in the supra-acetabular right ilium is  again noted. No other lucent lesions are seen in the pelvis or sacrum.  Hernia fixation devices are projected over the pelvis. No definite  fracture is seen.    Bilateral AP femur:  Lucent lesion in the supra-acetabular right ilium  is again noted. Subtle lucency in the supra-acetabular right ilium  could represent a very subtle fracture. No other lucent lesions are  seen in the femurs.    Bilateral AP tib/fib: There is no fracture or malalignment. No  evidence for osseous metastasis. Joint spaces are maintained.      Impression    IMPRESSION:  1. Large lucent lesion  supra-acetabular right ilium is again noted.  There is a linear lucency through the right supra-acetabular ilium  which could represent a very subtle fracture.  2. Subtle lucent areas in the left frontal region on the frontal view  of the skull could represent additional myelomatous lesions. These  could also represent venous lakes. No other evidence for osseous  metastasis is seen.  3. Degenerative changes are noted in the spine.    JULIANA AWAN MD      PATHOLOGY  01/25/18  SPECIMEN(S):   Right pelvis     FINAL DIAGNOSIS:   Pelvis, right, biopsy:        Plasma cell neoplasm (see comment).     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  [place]ongo red stain shows amyloid deposition in some blood vessels.  Correlation with bone marrow studies, serum protein   electrophoresis and immunofixation, urine free light chains, a bone survey   and clinical presentation is   Recommended.     02/05/18     TEST(S):   Unilateral Bone Marrow Biopsy/Aspiration     FINAL DIAGNOSIS:   Bone marrow, posterior iliac crest, left decalcified trephine biopsy and   touch imprint;  left, direct aspirate   smear, and concentrated aspirate smear; and peripheral blood smear:     - Normocellular marrow (cellularity estimated at  30-40%) with   trilineage hematopoietic maturation, 1%   blasts, 1% polytypic plasma cells     - No morphologic or immunophenotypic evidence for plasma cell neoplasm.     - Peripheral blood showing normal hemogram and differential      ASSESSMENT AND PLAN     62 year-old male with a large heterogenous right acetabular mass biopsy of which came back positive as plasma cell neoplasm. Further workup negative for anemia, hypercalcemia with electrophoresis/immunofixation showing monoclonal free kappa light chain immunoglobulin in urine with increased kappa free light chain in serum. Bone marrow biopsy shows no morphologic or immunophenotypic evidence for plasma  cell neoplasm. Bone survey negative for any additional lytic lesions.      - Solitary plasmacytoma of the bone.   Treatment recommendation would be to proceed with definitive radiation therapy. Patient has already started radiation thsi  Week with plan for a 6 week treatment. He was again made aware of the potential risk of progression to multiple myeloma and the need to monitor with labs including CBC, BMP, SPEP, UPEP, free light chains every 4 months for the first year and annually thereafter.     - Pain control  Tramadol prescription given     RT clinic in 4 months for follow up with labs 1 week prior      The patient is ready to learn, no apparent learning barriers were identified, Diagnosis and treatment plans were explained to the patient. The patient expressed understanding of the content. The patient questions were answered to his satisfaction.     Chart documentation with Dragon Voice recognition Software. Although reviewed after completion, some words and grammatical errors may remain.  Froilan Peres MD  Attending Physician   Hematology/Medical Oncology    Again, thank you for allowing me to participate in the care of your patient.        Sincerely,        Froilan Peres MD

## 2018-02-14 NOTE — NURSING NOTE
"Oncology Rooming Note    February 14, 2018 2:23 PM   Dong Joseph is a 62 year old male who presents for:    Chief Complaint   Patient presents with     Oncology Clinic Visit     1 week follow up for Plasmacytoma of bone     Results     Bone survery 2/7/2018.     Care Plan     Initial Vitals: /79 (BP Location: Right arm, Patient Position: Chair, Cuff Size: Adult Regular)  Pulse 105  Temp 97.8  F (36.6  C) (Temporal)  Resp 16  Ht 1.778 m (5' 10\")  Wt 71.8 kg (158 lb 4.8 oz)  SpO2 99%  BMI 22.71 kg/m2 Estimated body mass index is 22.71 kg/(m^2) as calculated from the following:    Height as of this encounter: 1.778 m (5' 10\").    Weight as of this encounter: 71.8 kg (158 lb 4.8 oz). Body surface area is 1.88 meters squared.  Worst Pain (10) Comment: left   No LMP for male patient.  Allergies reviewed: Yes  Medications reviewed: Yes    Medications: Medication refills not needed today.  Pharmacy name entered into EPIC:    [facility] - [place], [place] - [address] [address]  [facility] [place] - [address] PHARMACY - [place], [place] - [address] [place] [address] DRUG STORE [address] - [place], [place] - [address] [facility] [address] AT [facility]  & MAIN    Clinical concerns:   Nausea or Vomiting:  No  Mouth sores:  No  SOB:  No  Fever or chills:  No  Hard or loose stools:  Yes - loose and green  If yes,   Skin issues:  No   If yes,   Fatigue:  No  Light headed or Dizzy:  No  Weakness:  Yes  Difficulty sleeping:  No  Memory loss:  No  6-8 glasses of water a day:  No  Appetite:  Good   Dr. Peres was notified.    Ellen Tyler MA              "

## 2018-02-14 NOTE — MR AVS SNAPSHOT
After Visit Summary   2018    Dong Joseph    MRN: [id number]           Patient Information     Date Of Birth          1955        Visit Information        Provider Department      2018 2:30 PM Froilan Peres MD [facility]        Today's Diagnoses     Plasmacytoma of bone (H)    -  1      Care Instructions    Please follow up with Dr. Peres in 4 months.  You will need labs 1 week prior to your appointment.    Lab appointment date/time: 2018 @ 11:00am in [place]                                         Follow up appointment date/time: 2018 @ 11:00am     Keep in mind to have any labs or imaging done prior to your Oncology follow-up.      If you have any questions or concerns please feel free to call.  Clinic: [phone]  [phone]   Infusion: [phone]    Ellen Tyler [facility]  Oncology Clinic  [facility]  [phone]                  Follow-ups after your visit        Follow-up notes from your care team     Return in about 4 months (around 2018).      Your next 10 appointments already scheduled     Feb 15, 2018 11:15 AM CST   TREATMENT with RADIATION THERAPIST   [facility])    [address] [facility] [address]   [phone]            2018 11:15 AM CST   TREATMENT with RADIATION THERAPIST   [facility])    [address] [facility] [address]   [phone]            2018 11:15 AM CST   TREATMENT with RADIATION THERAPIST   [facility]    [address] [facility] [address]   [phone]            2018 11:30 AM CST   on treatment visit with Garrett Dejesus MD   [facility])    [address] [facility] [address]   [phone]            2018 11:15 AM CST    TREATMENT with RADIATION THERAPIST   [facility]    [address] [facility] [address]   [phone]            Feb 21, 2018 11:15 AM CST   TREATMENT with RADIATION THERAPIST   [facility]    [address] [facility] [address]   [phone]            Feb 22, 2018 11:15 AM CST   TREATMENT with RADIATION THERAPIST   [facility]    [address] [facility] [address]   [phone]            Feb 23, 2018 11:15 AM CST   TREATMENT with RADIATION THERAPIST   [facility]    [address] [facility] [address]   [phone]            Feb 26, 2018 11:15 AM CST   TREATMENT with RADIATION THERAPIST   [facility])    [address] [facility] [address]   [phone]            Feb 26, 2018 11:30 AM CST   on treatment visit with Garrett Dejesus MD   [facility]    [address] [facility] [address]   [phone]              Future tests that were ordered for you today     Open Future Orders        Priority Expected Expires Ordered    CBC with platelets differential Routine 6/6/2018 2/14/2019 2/14/2018    Comprehensive metabolic panel Routine 6/6/2018 2/14/2019 2/14/2018    Kappa and lambda light chain Routine 6/6/2018 2/14/2019 2/14/2018    Protein electrophoresis Routine 6/6/2018 2/14/2019 2/14/2018    Protein Immunofixation Serum Routine 6/6/2018 2/14/2019 2/14/2018    Protein electrophoresis timed urine Routine 6/6/2018 2/14/2019 2/14/2018    Protein immunofixation urine Routine 6/6/2018 2/14/2019 2/14/2018            Who to contact     If you have questions or need follow up information about today's clinic visit or your schedule please contact [facility]  "[facility] directly at [phone].  Normal or non-critical lab and imaging results will be communicated to you by EXO5hart, letter or phone within 4 business days after the clinic has received the results. If you do not hear from us within 7 days, please contact the clinic through EXO5hart or phone. If you have a critical or abnormal lab result, we will notify you by phone as soon as possible.  Submit refill requests through [facility] or call your pharmacy and they will forward the refill request to us. Please allow 3 business days for your refill to be completed.          Additional Information About Your Visit        [employer] Information     [facility] gives you secure access to your electronic health record. If you see a primary care provider, you can also send messages to your care team and make appointments. If you have questions, please call your primary care clinic.  If you do not have a primary care provider, please call [phone] and they will assist you.        Care EveryWhere ID     This is your Care EveryWhere ID. This could be used by other organizations to access your [place] medical records  [id number]        Your Vitals Were     Pulse Temperature Respirations Height Pulse Oximetry BMI (Body Mass Index)    105 97.8  F (36.6  C) (Temporal) 16 1.778 m (5' 10\") 99% 22.71 kg/m2       Blood Pressure from Last 3 Encounters:   02/14/18 136/79   02/07/18 132/75   02/05/18 113/64    Weight from Last 3 Encounters:   02/14/18 71.8 kg (158 lb 4.8 oz)   02/07/18 71.4 kg (157 lb 8 oz)   02/02/18 70.5 kg (155 lb 8 oz)                 Today's Medication Changes          These changes are accurate as of 2/14/18  2:38 PM.  If you have any questions, ask your nurse or doctor.               These medicines have changed or have updated prescriptions.        Dose/Directions    atorvastatin 20 MG tablet   Commonly known as:  LIPITOR   This may have changed:  when to take this   Used for:  Pure hypercholesterolemia, " Hyperlipidemia LDL goal <130        Dose:  20 mg   Take 1 tablet (20 mg) by mouth daily   Quantity:  90 tablet   Refills:  3                Primary Care Provider Office Phone # Fax #    Naomi Kuo PA-C [phone] [address] [place] [address]        Equal Access to Services     [facility]: Hadii aad ku hadasho Soomaali, waaxda luqadaha, qaybta kaalmada adeegyada, waxay idiin hayperlan adedov bush lamiltonn . So [facility] [phone].    ATENCIÓN: Si habla español, tiene a gonzalez disposición servicios gratuitos de asistencia lingüística. [facility] [phone].    We comply with applicable federal civil rights laws and [place] laws. We do not discriminate on the basis of race, color, national origin, age, disability, sex, sexual orientation, or gender identity.            Thank you!     Thank you for choosing [facility]  for your care. Our goal is always to provide you with excellent care. Hearing back from our patients is one way we can continue to improve our services. Please take a few minutes to complete the written survey that you may receive in the mail after your visit with us. Thank you!             Your Updated Medication List - Protect others around you: Learn how to safely use, store and throw away your medicines at www.disposemymeds.org.          This list is accurate as of 2/14/18  2:38 PM.  Always use your most recent med list.                   Brand Name Dispense Instructions for use Diagnosis    aspirin 81 MG tablet     90 tablet    Take 1 tablet by mouth daily.    Pure hypercholesterolemia       atorvastatin 20 MG tablet    LIPITOR    90 tablet    Take 1 tablet (20 mg) by mouth daily    Pure hypercholesterolemia, Hyperlipidemia LDL goal <130       traMADol 50 MG tablet    ULTRAM    20 tablet    Take 1-2 tablets ( mg) by mouth every 6 hours as needed for pain    Right hip pain       TYLENOL PO      Take 1,000 mg by mouth

## 2018-02-14 NOTE — PROGRESS NOTES
FOLLOW-UP VISIT NOTE    PATIENT NAME: Dong Joseph MRN # [id number]  DATE OF VISIT: Feb 14, 2018 YOB: 1955    REFERRING PROVIDER: No referring provider defined for this encounter.    CANCER TYPE:Solitary plasmacytoma of bone    ONCOLOGY HISTORY:  62-year-old male who developed right hip pain with radiation into posterior thigh starting 6 months ago and was progressively getting worse. MRI of the lumbar spine showed degenerative changes throughout, no disc hernniation and moderate foraminal stenosis at multiple levels. X ray hip showed a lucent area in the right supra-acetabular region measuring 5.2 cm in maximum diameter without any definite fracture. MRI hip on 01/18/18 showed a destructive lesion in the right acetabulum extending past the margin of the bone into adjacent soft tissues. Met with orthopedic surgery and underwent biopsy of the pelvic lesion which came back positive for plasma cell neoplasm.     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 Bone marrow biopsy shows no morphologic or immunophenotypic evidence for plasma cell neoplasm. Bone survey showed right Iliac lesion with lucent areas in frontal region- likely benign.      SUBJECTIVE     Patient presents for 1 week follow-up. She has met with radiation oncology and started on a course of definitive radiation for plasmacytoma. Continues to have right hip pain for which ceased taking tramadol as needed. Also has noticed loose stools the last few days. Denies any other complaints      PAST MEDICAL HISTORY     Past Medical History:   Diagnosis Date     Impotence of organic origin 2003     Plasma cell neoplasm 01/25/2018    S/P Needle biopsy of right pelvis bone tumor     Pure hypercholesterolemia 2002    statin started circa 2002         CURRENT OUTPATIENT MEDICATIONS     Current Outpatient  Prescriptions   Medication Sig Dispense Refill     traMADol (ULTRAM) 50 MG tablet Take 1-2 tablets ( mg) by mouth every 6 hours as needed for pain 20 tablet 0     Acetaminophen (TYLENOL PO) Take 1,000 mg by mouth       atorvastatin (LIPITOR) 20 MG tablet Take 1 tablet (20 mg) by mouth daily (Patient taking differently: Take 20 mg by mouth At Bedtime ) 90 tablet 3     aspirin 81 MG tablet Take 1 tablet by mouth daily. 90 tablet 3        ALLERGIES   No Known Allergies     REVIEW OF SYSTEMS   As above in the HPI, o/w complete 12-point ROS was negative.     PHYSICAL EXAM   B/P: 136/79, T: 97.8, P: 105, R: 16  SpO2 Readings from Last 4 Encounters:   02/14/18 99%   02/07/18 99%   02/05/18 94%   02/02/18 99%     Wt Readings from Last 3 Encounters:   02/14/18 71.8 kg (158 lb 4.8 oz)   02/07/18 71.4 kg (157 lb 8 oz)   02/02/18 70.5 kg (155 lb 8 oz)     GEN: NAD  EYES:PERRLA  Mouth/ENT: Oropharynx is clear.  NECK: no cervical or supraclavicular lymphadenopathy  LUNGS: clear bilaterally  CV: regular, no murmurs, rubs, or gallops  ABDOMEN: soft, non-tender, non-distended, normal bowel sounds, no hepatosplenomegaly by percussion or palpation  EXT: warm, well perfused, no edema  NEURO: alert  SKIN: no rashes     LABORATORY AND IMAGING STUDIES     Recent Labs   Lab Test  02/02/18   1144  01/24/18   1548   NA  139  138   POTASSIUM  4.3  3.7   CHLORIDE  103  106   CO2  31  26   ANIONGAP  5  6   BUN  21  16   CR  0.83  0.86   GLC  105*  92   CHANCE  9.9  9.6     Recent Labs   Lab Test  02/05/18   0905  01/24/18   1548   WBC  7.1  6.6   HGB  14.6  15.4   PLT  248  202   MCV  93  92   NEUTROPHIL  60.2   --      Recent Labs   Lab Test  02/02/18   1144  05/23/12   1322  04/21/10   1334   BILITOTAL  0.5   --    --    ALKPHOS  80   --    --    ALT  34  27  31   AST  10   --    --    ALBUMIN  4.1   --    --    LDH  135   --    --      Component      Latest Ref Rng & Units 2/2/2018   Albumin Fraction      3.7 - 5.1 g/dL 4.3   Alpha 1  Fraction      0.2 - 0.4 g/dL 0.5 (H)   Alpha 2 Fraction      0.5 - 0.9 g/dL 0.8   Beta Fraction      0.6 - 1.0 g/dL 0.9   Gamma Fraction      0.7 - 1.6 g/dL 1.1   Monoclonal Peak      0.0 g/dL 0.0   ELP Interpretation:       Essentially normal electrophoretic pattern.  No monoclonal protein seen.  Pathologic . . .   Immunofixation ELP       No monoclonal protein seen on immunofixation.  Pathological significance requires clinical . . .   IGG      695 - 1620 mg/dL 1140   IGA      70 - 380 mg/dL 157   IGM      60 - 265 mg/dL 67   [place] Free Lt Chain      0.33 - 1.94 mg/dL 8.72 (H)   Lambda Free Lt Chain      0.57 - 2.63 mg/dL 1.06   Kappa Lambda Ratio      0.26 - 1.65 8.23 (H)      Component      Latest Ref Rng & Units 2/5/2018   Albumin Fraction Urine      0 % 12.9 (H)   Alpha 1 Fraction Urine      0 % 12.8 (H)   Alpha 2 Fraction Urine      0 % 15.5 (H)   Beta Fraction Urine      0 % 12.8 (H)   Gamma Fraction Urine      0 % 46.0 (H)   Monoclonal Peak Urine      0% % 23.1 (H)   ELP Interpretation Urine       Albumin and globulins seen. A monoclonal protein (about 23%) is seen in the gamma . . .          Results for orders placed or performed in visit on 02/07/18   XR Bone Survey Limited    Narrative    BONE SURVEY LIMITED   2/7/2018 11:46 AM     HISTORY:  Plasmacytoma of bone (H).    COMPARISON: Right hip x-rays dated 1/16/2018.    FINDINGS:    AP and lateral skull: There are approximately three lucent lesions in  the left frontal bone projected over the AP skull which could  represent focal metastasis or multiple myeloma. No other evidence for  multiple myeloma is seen in the skull.    AP and lateral C-spine: Degenerative changes of the cervical spine are  noted. No lucent lesions are seen. No evidence for malalignment.    AP and lateral T-spine: No fracture. No lucent lesions are identified  in the thoracic spine. Some degenerative changes are noted.    AP and lateral L-spine: Dextroconvex curvature of the mid  lumbar spine  is noted. No lucencies are seen in the lumbar spine. There is a lucent  lesion in the supra-acetabular right ilium again noted. Degenerative  changes of the lumbar spine including anterior spondylotic spurring,  endplate eburnation and disc height loss at multiple levels. No  fracture is identified.    PA chest: Lungs are clear. Heart size, mediastinum, and pulmonary  vascularity are within normal limits. No pneumothorax or significant  pleural fluid collection. No evidence for pulmonary or osseous  metastasis. No lucent osseous lesions are seen.    Bilateral oblique ribs: There is no fracture or malalignment. No  evidence for osseous metastasis.    Bilateral AP shoulders: There is no fracture or malalignment. No  evidence for osseous metastasis. Joint spaces are maintained.    Bilateral AP humeri: There is no fracture or malalignment. No evidence  for osseous metastasis. Joint spaces are maintained.    Bilateral AP forearm: There is no fracture or malalignment. No  evidence for osseous metastasis. Joint spaces are maintained.    AP pelvis: Large lucent lesion in the supra-acetabular right ilium is  again noted. No other lucent lesions are seen in the pelvis or sacrum.  Hernia fixation devices are projected over the pelvis. No definite  fracture is seen.    Bilateral AP femur:  Lucent lesion in the supra-acetabular right ilium  is again noted. Subtle lucency in the supra-acetabular right ilium  could represent a very subtle fracture. No other lucent lesions are  seen in the femurs.    Bilateral AP tib/fib: There is no fracture or malalignment. No  evidence for osseous metastasis. Joint spaces are maintained.      Impression    IMPRESSION:  1. Large lucent lesion supra-acetabular right ilium is again noted.  There is a linear lucency through the right supra-acetabular ilium  which could represent a very subtle fracture.  2. Subtle lucent areas in the left frontal region on the frontal view  of the skull  could represent additional myelomatous lesions. These  could also represent venous lakes. No other evidence for osseous  metastasis is seen.  3. Degenerative changes are noted in the spine.    JULIANA AWAN MD      PATHOLOGY  01/25/18  SPECIMEN(S):   Right pelvis     FINAL DIAGNOSIS:   Pelvis, right, biopsy:        Plasma cell neoplasm (see comment).     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  [place] red stain shows amyloid deposition in some blood vessels.  Correlation with bone marrow studies, serum protein   electrophoresis and immunofixation, urine free light chains, a bone survey   and clinical presentation is   Recommended.     02/05/18     TEST(S):   Unilateral Bone Marrow Biopsy/Aspiration     FINAL DIAGNOSIS:   Bone marrow, posterior iliac crest, left decalcified trephine biopsy and   touch imprint;  left, direct aspirate   smear, and concentrated aspirate smear; and peripheral blood smear:     - Normocellular marrow (cellularity estimated at  30-40%) with   trilineage hematopoietic maturation, 1%   blasts, 1% polytypic plasma cells     - No morphologic or immunophenotypic evidence for plasma cell neoplasm.     - Peripheral blood showing normal hemogram and differential      ASSESSMENT AND PLAN     62 year-old male with a large heterogenous right acetabular mass biopsy of which came back positive as plasma cell neoplasm. Further workup negative for anemia, hypercalcemia with electrophoresis/immunofixation showing monoclonal free kappa light chain immunoglobulin in urine with increased kappa free light chain in serum. Bone marrow biopsy shows no morphologic or immunophenotypic evidence for plasma cell neoplasm. Bone survey negative for any additional lytic lesions.      - Solitary plasmacytoma of the bone.   Treatment recommendation would be to proceed with definitive radiation therapy. Patient has already started radiation thsi  Week  with plan for a 6 week treatment. He was again made aware of the potential risk of progression to multiple myeloma and the need to monitor with labs including CBC, BMP, SPEP, UPEP, free light chains every 4 months for the first year and annually thereafter.     - Pain control  Tramadol prescription given     RT clinic in 4 months for follow up with labs 1 week prior      The patient is ready to learn, no apparent learning barriers were identified, Diagnosis and treatment plans were explained to the patient. The patient expressed understanding of the content. The patient questions were answered to his satisfaction.     Chart documentation with Dragon Voice recognition Software. Although reviewed after completion, some words and grammatical errors may remain.  Froilan Peres MD  Attending Physician   Hematology/Medical Oncology

## 2018-02-14 NOTE — NURSING NOTE
DISCHARGE PLAN:  Next appointments: See patient instruction section  Departure Mode: W/C  Accompanied by: self  5 minutes for nursing discharge (face to face time)   Ellen Tyler MA    Writing nurse seen patient after Medical Oncology appointment to address questions/concerns/coordinate care.  Pt to RTC in 4 months with labs one week prior. Pt would like labs to be drawn in [place] for his convenience. Appointments scheduled. Questions and concerns addressed to patient's satisfaction. Contact information provided and patient is encouraged to call with any that arise in the interim of care.  See patient instructions and Oncologist's Progress note for further details.    Winifred Tyler CMA   of  Cancer Care  [facility]  [phone]  2/14/2018, 2:40 PM

## 2018-02-14 NOTE — PATIENT INSTRUCTIONS
Please follow up with Dr. Peres in 4 months.  You will need labs 1 week prior to your appointment.    Lab appointment date/time: 2018 @ 11:00am in [place]                                         Follow up appointment date/time: 2018 @ 11:00am     Keep in mind to have any labs or imaging done prior to your Oncology follow-up.      If you have any questions or concerns please feel free to call.  Clinic: [phone]  [phone]   Infusion: [phone]    Ellen Tyler [facility]  Oncology Clinic  [facility]  [phone]

## 2018-02-15 ENCOUNTER — APPOINTMENT (OUTPATIENT)
Dept: RADIATION ONCOLOGY | Facility: CLINIC | Age: 63
End: 2018-02-15
Payer: COMMERCIAL

## 2018-02-15 PROCEDURE — 77412 RADIATION TX DELIVERY LVL 3: CPT | Performed by: RADIOLOGY

## 2018-02-15 PROCEDURE — G6002 STEREOSCOPIC X-RAY GUIDANCE: HCPCS | Performed by: RADIOLOGY

## 2018-02-15 RX ORDER — TRAMADOL HYDROCHLORIDE 50 MG/1
50-100 TABLET ORAL EVERY 6 HOURS PRN
Qty: 20 TABLET | Refills: 0 | Status: SHIPPED | OUTPATIENT
Start: 2018-02-15 | End: 2018-02-26

## 2018-02-16 ENCOUNTER — APPOINTMENT (OUTPATIENT)
Dept: RADIATION ONCOLOGY | Facility: CLINIC | Age: 63
End: 2018-02-16
Payer: COMMERCIAL

## 2018-02-16 PROCEDURE — 77412 RADIATION TX DELIVERY LVL 3: CPT | Performed by: RADIOLOGY

## 2018-02-16 PROCEDURE — G6002 STEREOSCOPIC X-RAY GUIDANCE: HCPCS | Performed by: RADIOLOGY

## 2018-02-19 ENCOUNTER — APPOINTMENT (OUTPATIENT)
Dept: RADIATION ONCOLOGY | Facility: CLINIC | Age: 63
End: 2018-02-19
Payer: COMMERCIAL

## 2018-02-19 ENCOUNTER — OFFICE VISIT (OUTPATIENT)
Dept: RADIATION ONCOLOGY | Facility: CLINIC | Age: 63
End: 2018-02-19
Payer: COMMERCIAL

## 2018-02-19 VITALS — BODY MASS INDEX: 21.81 KG/M2 | WEIGHT: 152 LBS

## 2018-02-19 DIAGNOSIS — M25.551 RIGHT HIP PAIN: ICD-10-CM

## 2018-02-19 DIAGNOSIS — C90.30 PLASMACYTOMA (H): Primary | ICD-10-CM

## 2018-02-19 LAB — COPATH REPORT: NORMAL

## 2018-02-19 PROCEDURE — 99207 ZZC DROP WITH A PROCEDURE: CPT | Performed by: RADIOLOGY

## 2018-02-19 PROCEDURE — 77412 RADIATION TX DELIVERY LVL 3: CPT | Performed by: RADIOLOGY

## 2018-02-19 PROCEDURE — G6002 STEREOSCOPIC X-RAY GUIDANCE: HCPCS | Performed by: RADIOLOGY

## 2018-02-19 ASSESSMENT — PAIN SCALES - GENERAL: PAINLEVEL: MODERATE PAIN (5)

## 2018-02-19 NOTE — PROGRESS NOTES
[facility] PHYSICIANS  SPECIALIZING IN BREAKTHROUGHS  Radiation Oncology    On Treatment Visit Note      Dong Joseph      Date: 2018   MRN: [id number]   : 1955  Diagnosis: plasmacytoma of bone      Reason for Visit:  On Radiation Treatment Visit     Treatment Summary to Date  Treatment Site: right pelvis Current Dose: 720/4500 cGy Fractions:       Chemotherapy  Chemo concurrent with radx?: No (Dr. Perse)    Subjective:   Tolerating RT well, pain is improving, some more frequency in BM but no diarrhea.    Nursing ROS:   Nutrition Alteration  Diet Type: Patient's Preference  Skin  Skin Reaction: 0 - No changes  Skin Intervention: skin changes and skin cares reviewed, hair loss reviewed, Aquaphor samples provided           Gastrointestinal  Nausea: 0 - None  Diarrhea: 1 - Abdominal cramping, two or less soft or liquid bowel movements  GI Note: patient reports loose stools, denies diarrhea, reviewed diarrhea management     Psychosocial  Mood - Anxiety: 0 - Normal  Mood - Depression: 0 - Normal  Pyschosocial Note: slight fatigue  Pain Assessment  0-10 Pain Scale: 5  Pain Descriptors: Aching, Dull (right low back, buttock, groin)  Pain Treatment: Tramadol two tablets po daily  Pain Note: patient reports improvement in pain today, worse with bending and weight bearing on right side      Objective:   Wt 152 lb  BMI 21.81 kg/m2  NAD  ambulating with cane  Labs:  CBC RESULTS:   Recent Labs   Lab Test  18   0905   WBC  7.1   RBC  4.83   HGB  14.6   HCT  44.7   MCV  93   MCH  30.2   MCHC  32.7   RDW  12.2   PLT  248     ELECTROLYTES:  Recent Labs   Lab Test  18   1144   NA  139   POTASSIUM  4.3   CHLORIDE  103   CHANCE  9.9   CO2  31   BUN  21   CR  0.83   GLC  105*       Assessment:    Tolerating radiation therapy well.  All questions and concerns addressed.    Plan:   1. Continue current therapy.        Sqeeqeeiq chart and setup information reviewed  MVCT/IGRT images  checked    Medication Review  Med list reviewed with patient?: Yes  Med list printed and given: Offered and declined    Educational Topic Discussed  Education Instructions: radiation therapy side effects: fatigue, skin changes and skin cares, nausea/vomiting, diarrhea      Garrett Dejesus MD

## 2018-02-19 NOTE — NURSING NOTE
Teaching Flowsheet   Relevant Diagnosis: Plasmacytoma of bone  Teaching Topic: radiation therapy     Person(s) involved in teaching:   Patient     Motivation Level:  Asks Questions: Yes  Eager to Learn: Yes  Cooperative: Yes  Receptive (willing/able to accept information): Yes  Any cultural factors/Mandaeism beliefs that may influence understanding or compliance? No       Patient demonstrates understanding of the following:  Reason for the appointment, diagnosis and treatment plan: Yes  Knowledge of proper use of medications and conditions for which they are ordered (with special attention to potential side effects or drug interactions): Yes  Which situations necessitate calling provider and whom to contact: Yes       Teaching Concerns Addressed:   Comments: Radiation therapy side effects: fatigue, skin changes and skin cares, nausea/vomiting, diarrhea     Proper use and care of  (medical equip, care aids, etc.): NA  Nutritional needs and diet plan: Yes  Pain management techniques: Yes  Wound Care: Yes  How and/when to access community resources: Yes     Instructional Materials Used/Given: Radiation Therapy and You booklet, educational handouts on fatigue, skin changes and skin cares, nausea/vomiting, diarrhea     Time spent with patient: 15 minutes.

## 2018-02-19 NOTE — PATIENT INSTRUCTIONS
Please contact [place] [facility]iation On[facility] RN with questions or concerns following today's appointment: [phone].    Thank you!

## 2018-02-19 NOTE — MR AVS SNAPSHOT
After Visit Summary   2/19/2018    Dong Joseph    MRN: [id number]           Patient Information     Date Of Birth          1955        Visit Information        Provider Department      2/19/2018 11:30 AM Garrett Dejesus MD [facility]        Today's Diagnoses     Plasmacytoma (H)    -  1    Right hip pain          Care Instructions    [facility] contact [facility] RN with questions or concerns following today's appointment: [phone].    Thank you!            Follow-ups after your visit        Your next 10 appointments already scheduled     Feb 20, 2018 11:15 AM CST   TREATMENT with RADIATION THERAPIST   [facility] ([facility])    [address] [facility] [address]   [phone]            Feb 21, 2018 11:15 AM CST   TREATMENT with RADIATION THERAPIST   [facility] ([facility])    [address] [facility] [address]   [phone]            Feb 22, 2018 11:15 AM CST   TREATMENT with RADIATION THERAPIST   [facility] ([facility])    [address] [facility] [address]   [phone]            Feb 23, 2018 11:15 AM CST   TREATMENT with RADIATION THERAPIST   [facility] ([facility])    [address] [facility] [address]   [phone]            Feb 26, 2018 11:15 AM CST   TREATMENT with RADIATION THERAPIST   [facility] ([facility])    [address] [facility] [address]   [phone]            Feb 26, 2018 11:30 AM CST   on treatment visit with Garrett Dejesus MD   [facility] ([facility])    [address] [facility] [address]   [phone]            Feb 27, 2018 11:15 AM CST   TREATMENT with RADIATION THERAPIST   [facility] ([facility])     [address] [facility] [address]   [phone]            Feb 28, 2018 11:15 AM CST   TREATMENT with RADIATION THERAPIST   [facility] ([facility])    [address] [facility] [address]   [phone]            Mar 01, 2018 11:15 AM CST   TREATMENT with RADIATION THERAPIST   [facility] ([facility])    [address] [facility] [address]   [phone]            Mar 02, 2018 11:15 AM CST   TREATMENT with RADIATION THERAPIST   [facility] ([facility])    [address] [facility] [address]   [phone]              Who to contact     If you have questions or need follow up information about today's clinic visit or your schedule please contact [facility] directly at [phone].  Normal or non-critical lab and imaging results will be communicated to you by Devkinetic Designshart, letter or phone within 4 business days after the clinic has received the results. If you do not hear from us within 7 days, please contact the clinic through Food Reportert or phone. If you have a critical or abnormal lab result, we will notify you by phone as soon as possible.  Submit refill requests through QM Power or call your pharmacy and they will forward the refill request to us. Please allow 3 business days for your refill to be completed.          Additional Information About Your Visit        [employer]etic [employer]sharDwolla Information     QM Power gives you secure access to your electronic health record. If you see a primary care provider, you can also send messages to your care team and make appointments. If you have questions, please call your primary care clinic.  If you do not have a primary care provider, please call [phone] and they will assist you.      QM Power is an electronic gateway that provides easy, online access to your medical records. With QM Power, you can request a clinic  appointment, read your test results, renew a prescription or communicate with your care team.     To access your existing account, please contact your [facility] or call [phone] for assistance.        Care EveryWhere ID     This is your Care EveryWhere ID. This could be used by other organizations to access your [place] medical records  [id number]        Your Vitals Were     BMI (Body Mass Index)                   21.81 kg/m2            Blood Pressure from Last 3 Encounters:   02/14/18 136/79   02/07/18 132/75   02/05/18 113/64    Weight from Last 3 Encounters:   02/19/18 152 lb   02/14/18 158 lb 4.8 oz   02/07/18 157 lb 8 oz              Today, you had the following     No orders found for display         Today's Medication Changes          These changes are accurate as of 2/19/18 12:06 PM.  If you have any questions, ask your nurse or doctor.               These medicines have changed or have updated prescriptions.        Dose/Directions    atorvastatin 20 MG tablet   Commonly known as:  LIPITOR   This may have changed:  when to take this   Used for:  Pure hypercholesterolemia, Hyperlipidemia LDL goal <130        Dose:  20 mg   Take 1 tablet (20 mg) by mouth daily   Quantity:  90 tablet   Refills:  3            Where to get your medicines      Some of these will need a paper prescription and others can be bought over the counter.  Ask your nurse if you have questions.     Bring a paper prescription for each of these medications     traMADol 50 MG tablet                Primary Care Provider Office Phone # Fax #    Naomi Kuo PA-C [phone] [address] [place] [address]        Equal Access to Services     STEPHAN ASHFORD : Latanya Cedeno, wajarvisda lurajeshadaha, qaybta kaalmatejinder barksdale. So [facility] [phone].    ATENCIÓN: Si habla español, tiene a gonzalez disposición servicios gratuitos de asistencia  lingüística. Naima al [phone].    We comply with applicable federal civil rights laws and [place] laws. We do not discriminate on the basis of race, color, national origin, age, disability, sex, sexual orientation, or gender identity.            Thank you!     Thank you for choosing [facility]  for your care. Our goal is always to provide you with excellent care. Hearing back from our patients is one way we can continue to improve our services. Please take a few minutes to complete the written survey that you may receive in the mail after your visit with us. Thank you!             Your Updated Medication List - Protect others around you: Learn how to safely use, store and throw away your medicines at www.disposemymeds.org.          This list is accurate as of 2/19/18 12:06 PM.  Always use your most recent med list.                   Brand Name Dispense Instructions for use Diagnosis    aspirin 81 MG tablet     90 tablet    Take 1 tablet by mouth daily.    Pure hypercholesterolemia       atorvastatin 20 MG tablet    LIPITOR    90 tablet    Take 1 tablet (20 mg) by mouth daily    Pure hypercholesterolemia, Hyperlipidemia LDL goal <130       traMADol 50 MG tablet    ULTRAM    20 tablet    Take 1-2 tablets ( mg) by mouth every 6 hours as needed for pain    Right hip pain       TYLENOL PO      Take 1,000 mg by mouth

## 2018-02-20 ENCOUNTER — APPOINTMENT (OUTPATIENT)
Dept: RADIATION ONCOLOGY | Facility: CLINIC | Age: 63
End: 2018-02-20
Payer: COMMERCIAL

## 2018-02-20 LAB — COPATH REPORT: NORMAL

## 2018-02-20 PROCEDURE — 77336 RADIATION PHYSICS CONSULT: CPT | Performed by: RADIOLOGY

## 2018-02-20 PROCEDURE — G6002 STEREOSCOPIC X-RAY GUIDANCE: HCPCS | Performed by: RADIOLOGY

## 2018-02-20 PROCEDURE — 77427 RADIATION TX MANAGEMENT X5: CPT | Performed by: RADIOLOGY

## 2018-02-20 PROCEDURE — 77412 RADIATION TX DELIVERY LVL 3: CPT | Performed by: RADIOLOGY

## 2018-02-21 ENCOUNTER — APPOINTMENT (OUTPATIENT)
Dept: RADIATION ONCOLOGY | Facility: CLINIC | Age: 63
End: 2018-02-21
Payer: COMMERCIAL

## 2018-02-21 PROCEDURE — 77412 RADIATION TX DELIVERY LVL 3: CPT | Performed by: RADIOLOGY

## 2018-02-21 PROCEDURE — G6002 STEREOSCOPIC X-RAY GUIDANCE: HCPCS | Performed by: RADIOLOGY

## 2018-02-22 ENCOUNTER — APPOINTMENT (OUTPATIENT)
Dept: RADIATION ONCOLOGY | Facility: CLINIC | Age: 63
End: 2018-02-22
Payer: COMMERCIAL

## 2018-02-22 PROCEDURE — 77412 RADIATION TX DELIVERY LVL 3: CPT | Performed by: RADIOLOGY

## 2018-02-22 PROCEDURE — G6002 STEREOSCOPIC X-RAY GUIDANCE: HCPCS | Performed by: RADIOLOGY

## 2018-02-23 ENCOUNTER — APPOINTMENT (OUTPATIENT)
Dept: RADIATION ONCOLOGY | Facility: CLINIC | Age: 63
End: 2018-02-23
Payer: COMMERCIAL

## 2018-02-23 PROCEDURE — G6002 STEREOSCOPIC X-RAY GUIDANCE: HCPCS | Performed by: RADIOLOGY

## 2018-02-23 PROCEDURE — 77412 RADIATION TX DELIVERY LVL 3: CPT | Performed by: RADIOLOGY

## 2018-02-26 ENCOUNTER — OFFICE VISIT (OUTPATIENT)
Dept: RADIATION ONCOLOGY | Facility: CLINIC | Age: 63
End: 2018-02-26
Payer: COMMERCIAL

## 2018-02-26 ENCOUNTER — APPOINTMENT (OUTPATIENT)
Dept: RADIATION ONCOLOGY | Facility: CLINIC | Age: 63
End: 2018-02-26
Payer: COMMERCIAL

## 2018-02-26 VITALS — BODY MASS INDEX: 21.95 KG/M2 | OXYGEN SATURATION: 99 % | HEART RATE: 73 BPM | WEIGHT: 153 LBS

## 2018-02-26 DIAGNOSIS — M25.551 RIGHT HIP PAIN: ICD-10-CM

## 2018-02-26 DIAGNOSIS — C90.30 PLASMACYTOMA (H): Primary | ICD-10-CM

## 2018-02-26 LAB
COPATH REPORT: NORMAL
COPATH REPORT: NORMAL

## 2018-02-26 PROCEDURE — 99207 ZZC DROP WITH A PROCEDURE: CPT | Performed by: RADIOLOGY

## 2018-02-26 PROCEDURE — 77412 RADIATION TX DELIVERY LVL 3: CPT | Performed by: RADIOLOGY

## 2018-02-26 PROCEDURE — G6002 STEREOSCOPIC X-RAY GUIDANCE: HCPCS | Performed by: RADIOLOGY

## 2018-02-26 RX ORDER — TRAMADOL HYDROCHLORIDE 50 MG/1
50-100 TABLET ORAL EVERY 6 HOURS PRN
Qty: 30 TABLET | Refills: 0 | Status: ON HOLD | OUTPATIENT
Start: 2018-02-26 | End: 2018-04-19

## 2018-02-26 ASSESSMENT — PAIN SCALES - GENERAL: PAINLEVEL: NO PAIN (0)

## 2018-02-26 NOTE — MR AVS SNAPSHOT
After Visit Summary   2/26/2018    Dong Joseph    MRN: [id number]           Patient Information     Date Of Birth          1955        Visit Information        Provider Department      2/26/2018 11:30 AM Garrett Dejesus MD [facility]        Today's Diagnoses     Plasmacytoma (H)    -  1    Right hip pain          Care Instructions    [facility] RN with questions or concerns following today's appointment: [phone].    Thank you!            Follow-ups after your visit        Your next 10 appointments already scheduled     Feb 27, 2018 11:15 AM CST   TREATMENT with RADIATION THERAPIST   [facility] ([facility])    [address] [facility] [address]   [phone]            Feb 28, 2018 11:15 AM CST   TREATMENT with RADIATION THERAPIST   [facility] ([facility])    [address] [facility] [address]   [phone]            Mar 01, 2018 11:15 AM CST   TREATMENT with RADIATION THERAPIST   [facility] ([facility])    [address] [facility] [address]   [phone]            Mar 02, 2018 11:15 AM CST   TREATMENT with RADIATION THERAPIST   [facility] ([facility])    [address] [facility] [address]   [phone]            Mar 05, 2018 11:15 AM CST   TREATMENT with RADIATION THERAPIST   [facility] ([facility])    [address] [facility] [address]   [phone]            Mar 05, 2018 11:30 AM CST   on treatment visit with Garrett Dejesus MD   [facility] ([facility])    [address] [facility] [address]   [phone]            Mar 06, 2018 11:15 AM CST   TREATMENT with RADIATION THERAPIST   [facility] ([facility])     [address] [facility] [address]   [phone]            Mar 07, 2018 11:15 AM CST   TREATMENT with RADIATION THERAPIST   [facility] ([facility])    [address] [facility] [address]   [phone]            Mar 08, 2018 11:15 AM CST   TREATMENT with RADIATION THERAPIST   [facility] ([facility])    [address] [facility] [address]   [phone]            Mar 09, 2018 11:15 AM CST   TREATMENT with RADIATION THERAPIST   [facility] ([facility])    [address] [facility] [address]   [phone]              Who to contact     If you have questions or need follow up information about today's clinic visit or your schedule please contact [facility] directly at [phone].  Normal or non-critical lab and imaging results will be communicated to you by Dreamstreet Golfhart, letter or phone within 4 business days after the clinic has received the results. If you do not hear from us within 7 days, please contact the clinic through Somat or phone. If you have a critical or abnormal lab result, we will notify you by phone as soon as possible.  Submit refill requests through BiolineRx or call your pharmacy and they will forward the refill request to us. Please allow 3 business days for your refill to be completed.          Additional Information About Your Visit        Dreamstreet GolfharKronomav Sistemas Information     BiolineRx gives you secure access to your electronic health record. If you see a primary care provider, you can also send messages to your care team and make appointments. If you have questions, please call your primary care clinic.  If you do not have a primary care provider, please call [phone] and they will assist you.      BiolineRx is an electronic gateway that provides easy, online access to your medical records. With BiolineRx, you can request a clinic  appointment, read your test results, renew a prescription or communicate with your care team.     To access your existing account, please contact your [facility] or call [phone] for assistance.        Care EveryWhere ID     This is your Care EveryWhere ID. This could be used by other organizations to access your [place] medical records  [id number]        Your Vitals Were     Pulse Pulse Oximetry BMI (Body Mass Index)             73 99% 21.95 kg/m2          Blood Pressure from Last 3 Encounters:   02/14/18 136/79   02/07/18 132/75   02/05/18 113/64    Weight from Last 3 Encounters:   02/26/18 153 lb   02/19/18 152 lb   02/14/18 158 lb 4.8 oz              Today, you had the following     No orders found for display         Today's Medication Changes          These changes are accurate as of 2/26/18 12:03 PM.  If you have any questions, ask your nurse or doctor.               These medicines have changed or have updated prescriptions.        Dose/Directions    atorvastatin 20 MG tablet   Commonly known as:  LIPITOR   This may have changed:  when to take this   Used for:  Pure hypercholesterolemia, Hyperlipidemia LDL goal <130        Dose:  20 mg   Take 1 tablet (20 mg) by mouth daily   Quantity:  90 tablet   Refills:  3            Where to get your medicines      Some of these will need a paper prescription and others can be bought over the counter.  Ask your nurse if you have questions.     Bring a paper prescription for each of these medications     traMADol 50 MG tablet                Primary Care Provider Office Phone # Fax #    Naomi Kuo PA-C [phone] [address] [place] [address]        Equal Access to Services     [facility] MAKEDA : Latanya Cedeno, waaminata lutitus, qaybta kaalmada koffi, tejinder marr. So [facility] [phone].    ATENCIÓN: Si habla español, tiene a gonzalez disposición servicios gratuitos  de asistencia lingüística. Naima dunham [phone].    We comply with applicable federal civil rights laws and [place] laws. We do not discriminate on the basis of race, color, national origin, age, disability, sex, sexual orientation, or gender identity.            Thank you!     Thank you for choosing [facility]  for your care. Our goal is always to provide you with excellent care. Hearing back from our patients is one way we can continue to improve our services. Please take a few minutes to complete the written survey that you may receive in the mail after your visit with us. Thank you!             Your Updated Medication List - Protect others around you: Learn how to safely use, store and throw away your medicines at www.disposemymeds.org.          This list is accurate as of 2/26/18 12:03 PM.  Always use your most recent med list.                   Brand Name Dispense Instructions for use Diagnosis    aspirin 81 MG tablet     90 tablet    Take 1 tablet by mouth daily.    Pure hypercholesterolemia       atorvastatin 20 MG tablet    LIPITOR    90 tablet    Take 1 tablet (20 mg) by mouth daily    Pure hypercholesterolemia, Hyperlipidemia LDL goal <130       traMADol 50 MG tablet    ULTRAM    30 tablet    Take 1-2 tablets ( mg) by mouth every 6 hours as needed for pain    Right hip pain       TYLENOL PO      Take 1,000 mg by mouth

## 2018-02-26 NOTE — PATIENT INSTRUCTIONS
Please contact [place] [facility] RN with questions or concerns following today's appointment: [phone].    Thank you!

## 2018-02-26 NOTE — PROGRESS NOTES
[facility] PHYSICIANS  SPECIALIZING IN BREAKTHROUGHS  Radiation Oncology    On Treatment Visit Note      Dong Joseph      Date: 2018   MRN: [id number]   : 1955  Diagnosis: plasmacytoma of bone      Reason for Visit:  On Radiation Treatment Visit     Treatment Summary to Date  Treatment Site: right pelvis Current Dose: 1620/4500 cGy Fractions:       Chemotherapy  Chemo concurrent with radx?: No    Subjective:   Pain in R hip improving, but still needing tramadol twice a day. No diarrhea or nausea. Dry skin in R hip.    Nursing ROS:   Nutrition Alteration  Diet Type: Patient's Preference              Gastrointestinal  Nausea: 0 - None  Vomitin - No vomiting (ons)  Diarrhea: 0 - None  GI Note: patient reports 1 to 2 soft formed bowel movements daily  Genitourinary  Urinary Status: 0 - Normal  Psychosocial  Pyschosocial Note: slight fatigue  Pain Assessment  0-10 Pain Scale: 0  Pain Treatment: Tramadol two tablets po daily  Pain Note: patient reports pain with getting up from sitting and bending. patient states no pain at current visit      Objective:   Pulse 73  Wt 153 lb  SpO2 99%  BMI 21.95 kg/m2  NAD    Labs:  CBC RESULTS:   Recent Labs   Lab Test  18   0905   WBC  7.1   RBC  4.83   HGB  14.6   HCT  44.7   MCV  93   MCH  30.2   MCHC  32.7   RDW  12.2   PLT  248     ELECTROLYTES:  Recent Labs   Lab Test  18   1144   NA  139   POTASSIUM  4.3   CHLORIDE  103   CHANCE  9.9   CO2  31   BUN  21   CR  0.83   GLC  105*       Assessment:    Tolerating radiation therapy well.  All questions and concerns addressed.    Plan:   1. Continue current therapy.  Refill on tramadol given. aquaphor on skin.      Mosaiq chart and setup information reviewed  MVCT/IGRT images checked    Medication Review  Med list reviewed with patient?: Yes           Garrett Dejesus MD

## 2018-02-27 ENCOUNTER — APPOINTMENT (OUTPATIENT)
Dept: RADIATION ONCOLOGY | Facility: CLINIC | Age: 63
End: 2018-02-27
Payer: COMMERCIAL

## 2018-02-27 PROCEDURE — 77412 RADIATION TX DELIVERY LVL 3: CPT | Performed by: RADIOLOGY

## 2018-02-27 PROCEDURE — G6002 STEREOSCOPIC X-RAY GUIDANCE: HCPCS | Performed by: RADIOLOGY

## 2018-02-27 PROCEDURE — 77427 RADIATION TX MANAGEMENT X5: CPT | Performed by: RADIOLOGY

## 2018-02-28 ENCOUNTER — DOCUMENTATION ONLY (OUTPATIENT)
Dept: SPIRITUAL SERVICES | Facility: CLINIC | Age: 63
End: 2018-02-28

## 2018-02-28 ENCOUNTER — APPOINTMENT (OUTPATIENT)
Dept: RADIATION ONCOLOGY | Facility: CLINIC | Age: 63
End: 2018-02-28
Payer: COMMERCIAL

## 2018-02-28 DIAGNOSIS — Z71.81 SPIRITUAL OR RELIGIOUS COUNSELING: Primary | ICD-10-CM

## 2018-02-28 PROCEDURE — G6002 STEREOSCOPIC X-RAY GUIDANCE: HCPCS | Performed by: RADIOLOGY

## 2018-02-28 PROCEDURE — 77412 RADIATION TX DELIVERY LVL 3: CPT | Performed by: RADIOLOGY

## 2018-02-28 NOTE — PROGRESS NOTES
"SPIRITUAL HEALTH SERVICES  SPIRITUAL ASSESSMENT Progress Note  [facility]     introduced himself to Dong Joseph and informed him of his availability.  He mentioned he had a \"strong [place] family.\"    Teofilo Remy M.Div., [facility]  Staff   Office tel: [phone]    "

## 2018-03-01 ENCOUNTER — APPOINTMENT (OUTPATIENT)
Dept: RADIATION ONCOLOGY | Facility: CLINIC | Age: 63
End: 2018-03-01
Payer: COMMERCIAL

## 2018-03-01 PROCEDURE — G6002 STEREOSCOPIC X-RAY GUIDANCE: HCPCS | Performed by: RADIOLOGY

## 2018-03-01 PROCEDURE — 77412 RADIATION TX DELIVERY LVL 3: CPT | Performed by: RADIOLOGY

## 2018-03-02 ENCOUNTER — APPOINTMENT (OUTPATIENT)
Dept: RADIATION ONCOLOGY | Facility: CLINIC | Age: 63
End: 2018-03-02
Payer: COMMERCIAL

## 2018-03-02 PROCEDURE — 77412 RADIATION TX DELIVERY LVL 3: CPT | Performed by: RADIOLOGY

## 2018-03-02 PROCEDURE — G6002 STEREOSCOPIC X-RAY GUIDANCE: HCPCS | Performed by: RADIOLOGY

## 2018-03-05 ENCOUNTER — APPOINTMENT (OUTPATIENT)
Dept: RADIATION ONCOLOGY | Facility: CLINIC | Age: 63
End: 2018-03-05
Payer: COMMERCIAL

## 2018-03-05 ENCOUNTER — OFFICE VISIT (OUTPATIENT)
Dept: RADIATION ONCOLOGY | Facility: CLINIC | Age: 63
End: 2018-03-05
Payer: COMMERCIAL

## 2018-03-05 VITALS — BODY MASS INDEX: 21.95 KG/M2 | WEIGHT: 153 LBS

## 2018-03-05 DIAGNOSIS — M25.551 RIGHT HIP PAIN: ICD-10-CM

## 2018-03-05 DIAGNOSIS — C90.30 PLASMACYTOMA OF BONE (H): Primary | ICD-10-CM

## 2018-03-05 PROCEDURE — G6002 STEREOSCOPIC X-RAY GUIDANCE: HCPCS | Performed by: RADIOLOGY

## 2018-03-05 PROCEDURE — 99207 ZZC DROP WITH A PROCEDURE: CPT | Performed by: RADIOLOGY

## 2018-03-05 PROCEDURE — 77412 RADIATION TX DELIVERY LVL 3: CPT | Performed by: RADIOLOGY

## 2018-03-05 ASSESSMENT — PAIN SCALES - GENERAL: PAINLEVEL: MILD PAIN (3)

## 2018-03-05 NOTE — MR AVS SNAPSHOT
After Visit Summary   3/5/2018    Dong Joseph    MRN: [id number]           Patient Information     Date Of Birth          1955        Visit Information        Provider Department      3/5/2018 11:30 AM Garrett Dejesus MD [facility]        Today's Diagnoses     Plasmacytoma of bone (H)    -  1    Right hip pain          Care Instructions    Please contact [facility] RN with questions or concerns following today's appointment: [phone].    Thank you!            Follow-ups after your visit        Your next 10 appointments already scheduled     Mar 06, 2018 11:15 AM CST   TREATMENT with RADIATION THERAPIST   [facility] ([facility])    [address] [facility] [address]   [phone]            Mar 07, 2018 11:15 AM CST   TREATMENT with RADIATION THERAPIST   [facility] ([facility])    [address] [facility] [address]   [phone]            Mar 08, 2018 11:15 AM CST   TREATMENT with RADIATION THERAPIST   [facility] ([facility])    [address] [facility] [address]   [phone]            Mar 09, 2018 11:15 AM CST   TREATMENT with RADIATION THERAPIST   [facility] ([facility])    [address] [facility] [address]   [phone]            Mar 12, 2018 11:15 AM CDT   TREATMENT with RADIATION THERAPIST   [facility] ([facility])    [address] [facility] [address]   [phone]            Mar 13, 2018 11:15 AM CDT   TREATMENT with RADIATION THERAPIST   [facility] ([facility])    [address] [facility] [address]   [phone]            Mar 13, 2018 11:30 AM CDT   on treatment visit with Garrett Dejesus MD   [facility] ([facility])    [address] [facility] [address]   [phone]            Mar 14, 2018 11:30 AM CDT   TREATMENT with RADIATION THERAPIST   [facility]    [address] [facility] [address]   [phone]            Mar 15, 2018 11:15 AM CDT   TREATMENT with RADIATION THERAPIST   [facility] ([facility])    [address] [facility] [address]   [phone]            Mar 16, 2018 11:15 AM CDT   TREATMENT with RADIATION THERAPIST   [facility] ([facility])    [address] [place] [address]   [phone]              Who to contact     If you have questions or need follow up information about today's clinic visit or your schedule please contact [facility] directly at [phone].  Normal or non-critical lab and imaging results will be communicated to you by MaintenanceNethart, letter or phone within 4 business days after the clinic has received the results. If you do not hear from us within 7 days, please contact the clinic through [employer] or phone. If you have a critical or abnormal lab result, we will notify you by phone as soon as possible.  Submit refill requests through [employer] or call your pharmacy and they will forward the refill request to us. Please allow 3 business days for your refill to be completed.          Additional Information About Your Visit        [employer] Information     [employer] gives you secure access to your electronic health record. If you see a primary care provider, you can also send messages to your care team and make appointments. If you have questions, please call your primary care clinic.  If you do not have a primary care provider, please call [phone] and they will assist you.      [employer] is an electronic gateway that provides easy, online access to your medical records. With [employer], you can request a clinic  appointment, read your test results, renew a prescription or communicate with your care team.     To access your existing account, please contact your [facility] or call [phone] for assistance.        Care EveryWhere ID     This is your Care EveryWhere ID. This could be used by other organizations to access your [facility] medical records  [id number]        Your Vitals Were     BMI (Body Mass Index)                   21.95 kg/m2            Blood Pressure from Last 3 Encounters:   02/14/18 136/79   02/07/18 132/75   02/05/18 113/64    Weight from Last 3 Encounters:   03/05/18 153 lb   02/26/18 153 lb   02/19/18 152 lb              Today, you had the following     No orders found for display         Today's Medication Changes          These changes are accurate as of 3/5/18 11:47 AM.  If you have any questions, ask your nurse or doctor.               These medicines have changed or have updated prescriptions.        Dose/Directions    atorvastatin 20 MG tablet   Commonly known as:  LIPITOR   This may have changed:  when to take this   Used for:  Pure hypercholesterolemia, Hyperlipidemia LDL goal <130        Dose:  20 mg   Take 1 tablet (20 mg) by mouth daily   Quantity:  90 tablet   Refills:  3                Primary Care Provider Office Phone # Fax #    Naomi Kuo PA-C [phone] [address] [place] [address]        Equal Access to Services     ROSA ASHFORD : Latanya lindo Somurray, waaxda luqadaha, qaybta kaalmada koffi, tejinder marr. So [facility] [phone].    ATENCIÓN: Si habla español, tiene a gonzalez disposición servicios gratuitos de asistencia lingüística. Naima al [phone].    We comply with applicable federal civil rights laws and [place] laws. We do not discriminate on the basis of race, color, national origin, age, disability, sex, sexual orientation, or gender identity.            Thank you!      Thank you for choosing [facility]  for your care. Our goal is always to provide you with excellent care. Hearing back from our patients is one way we can continue to improve our services. Please take a few minutes to complete the written survey that you may receive in the mail after your visit with us. Thank you!             Your Updated Medication List - Protect others around you: Learn how to safely use, store and throw away your medicines at www.disposemymeds.org.          This list is accurate as of 3/5/18 11:47 AM.  Always use your most recent med list.                   Brand Name Dispense Instructions for use Diagnosis    aspirin 81 MG tablet     90 tablet    Take 1 tablet by mouth daily.    Pure hypercholesterolemia       atorvastatin 20 MG tablet    LIPITOR    90 tablet    Take 1 tablet (20 mg) by mouth daily    Pure hypercholesterolemia, Hyperlipidemia LDL goal <130       traMADol 50 MG tablet    ULTRAM    30 tablet    Take 1-2 tablets ( mg) by mouth every 6 hours as needed for pain    Right hip pain       TYLENOL PO      Take 1,000 mg by mouth

## 2018-03-05 NOTE — PATIENT INSTRUCTIONS
Please contact [facility] RN with questions or concerns following today's appointment: [phone].    Thank you!

## 2018-03-05 NOTE — PROGRESS NOTES
"[facility] PHYSICIANS  SPECIALIZING IN BREAKTHROUGHS  Radiation Oncology    On Treatment Visit Note      Dong Joseph      Date: 3/5/2018   MRN: [id number]   : 1955  Diagnosis: plasmacytoma of bone      Reason for Visit:  On Radiation Treatment Visit     Treatment Summary to Date  Treatment Site: right pelvis Current Dose: 2520/4500 cGy Fractions:       Chemotherapy  Chemo concurrent with radx?: No    Subjective:   Pain improving, taking tramadol 0-1x/day and walking with cane only, no wheelchair. No nausea/diarrhea.    Nursing ROS:   Nutrition Alteration  Diet Type: Patient's Preference  Skin  Skin Intervention: patient using Aquaphor daily           Gastrointestinal  Nausea: 0 - None  Vomitin - No vomiting (ons)  Diarrhea: 0 - None  Genitourinary  Urinary Status: 0 - Normal  Psychosocial  Pyschosocial Note: slight fatigue  Pain Assessment  0-10 Pain Scale: 3  Pain Descriptors: Aching, Dull  Pain Treatment: patient reports taking Tramadol po one tablet one time daily  Pain Note: patient reports significant improvement in pain, using cane for stability and \"as a reminder not to over due it\"      Objective:   Wt 153 lb  BMI 21.95 kg/m2  NAD  Ambulating with cane    Labs:  CBC RESULTS:   Recent Labs   Lab Test  18   0905   WBC  7.1   RBC  4.83   HGB  14.6   HCT  44.7   MCV  93   MCH  30.2   MCHC  32.7   RDW  12.2   PLT  248     ELECTROLYTES:  Recent Labs   Lab Test  18   1144   NA  139   POTASSIUM  4.3   CHLORIDE  103   CHANCE  9.9   CO2  31   BUN  21   CR  0.83   GLC  105*       Assessment:    Tolerating radiation therapy well.  All questions and concerns addressed.    Plan:   1. Continue current therapy.        Mosaiq chart and setup information reviewed  MVCT/IGRT images checked    Medication Review  Med list reviewed with patient?: Yes  Med list printed and given: Offered and declined           Garrett Dejesus MD      "

## 2018-03-06 ENCOUNTER — APPOINTMENT (OUTPATIENT)
Dept: RADIATION ONCOLOGY | Facility: CLINIC | Age: 63
End: 2018-03-06
Payer: COMMERCIAL

## 2018-03-06 PROCEDURE — 77427 RADIATION TX MANAGEMENT X5: CPT | Performed by: RADIOLOGY

## 2018-03-06 PROCEDURE — G6002 STEREOSCOPIC X-RAY GUIDANCE: HCPCS | Performed by: RADIOLOGY

## 2018-03-06 PROCEDURE — 77412 RADIATION TX DELIVERY LVL 3: CPT | Performed by: RADIOLOGY

## 2018-03-06 PROCEDURE — 77336 RADIATION PHYSICS CONSULT: CPT | Performed by: RADIOLOGY

## 2018-03-07 ENCOUNTER — APPOINTMENT (OUTPATIENT)
Dept: RADIATION ONCOLOGY | Facility: CLINIC | Age: 63
End: 2018-03-07
Payer: COMMERCIAL

## 2018-03-07 PROCEDURE — 77412 RADIATION TX DELIVERY LVL 3: CPT | Performed by: RADIOLOGY

## 2018-03-07 PROCEDURE — G6002 STEREOSCOPIC X-RAY GUIDANCE: HCPCS | Performed by: RADIOLOGY

## 2018-03-08 ENCOUNTER — APPOINTMENT (OUTPATIENT)
Dept: RADIATION ONCOLOGY | Facility: CLINIC | Age: 63
End: 2018-03-08
Payer: COMMERCIAL

## 2018-03-08 PROCEDURE — 77412 RADIATION TX DELIVERY LVL 3: CPT | Performed by: RADIOLOGY

## 2018-03-08 PROCEDURE — G6002 STEREOSCOPIC X-RAY GUIDANCE: HCPCS | Performed by: RADIOLOGY

## 2018-03-09 ENCOUNTER — APPOINTMENT (OUTPATIENT)
Dept: RADIATION ONCOLOGY | Facility: CLINIC | Age: 63
End: 2018-03-09
Payer: COMMERCIAL

## 2018-03-09 PROCEDURE — G6002 STEREOSCOPIC X-RAY GUIDANCE: HCPCS | Performed by: RADIOLOGY

## 2018-03-09 PROCEDURE — 77412 RADIATION TX DELIVERY LVL 3: CPT | Performed by: RADIOLOGY

## 2018-03-12 ENCOUNTER — APPOINTMENT (OUTPATIENT)
Dept: RADIATION ONCOLOGY | Facility: CLINIC | Age: 63
End: 2018-03-12
Payer: COMMERCIAL

## 2018-03-12 PROCEDURE — 77412 RADIATION TX DELIVERY LVL 3: CPT | Performed by: RADIOLOGY

## 2018-03-12 PROCEDURE — G6002 STEREOSCOPIC X-RAY GUIDANCE: HCPCS | Performed by: RADIOLOGY

## 2018-03-13 ENCOUNTER — OFFICE VISIT (OUTPATIENT)
Dept: RADIATION ONCOLOGY | Facility: CLINIC | Age: 63
End: 2018-03-13
Payer: COMMERCIAL

## 2018-03-13 ENCOUNTER — APPOINTMENT (OUTPATIENT)
Dept: RADIATION ONCOLOGY | Facility: CLINIC | Age: 63
End: 2018-03-13
Payer: COMMERCIAL

## 2018-03-13 VITALS — BODY MASS INDEX: 22.1 KG/M2 | WEIGHT: 154 LBS

## 2018-03-13 DIAGNOSIS — C90.30 PLASMACYTOMA OF BONE (H): Primary | ICD-10-CM

## 2018-03-13 PROCEDURE — 77336 RADIATION PHYSICS CONSULT: CPT | Performed by: RADIOLOGY

## 2018-03-13 PROCEDURE — 99207 ZZC DROP WITH A PROCEDURE: CPT | Performed by: RADIOLOGY

## 2018-03-13 PROCEDURE — G6002 STEREOSCOPIC X-RAY GUIDANCE: HCPCS | Performed by: RADIOLOGY

## 2018-03-13 PROCEDURE — 77412 RADIATION TX DELIVERY LVL 3: CPT | Performed by: RADIOLOGY

## 2018-03-13 PROCEDURE — 77427 RADIATION TX MANAGEMENT X5: CPT | Performed by: RADIOLOGY

## 2018-03-13 ASSESSMENT — PAIN SCALES - GENERAL: PAINLEVEL: NO PAIN (0)

## 2018-03-13 NOTE — MR AVS SNAPSHOT
After Visit Summary   3/13/2018    Dong Joseph    MRN: [id number]           Patient Information     Date Of Birth          1955        Visit Information        Provider Department      3/13/2018 12:15 PM Garrett Dejesus MD [facility]        Today's Diagnoses     Plasmacytoma of bone (H)    -  1      Care Instructions    Please contact [facility] RN with questions or concerns following today's appointment: [phone].    Thank you!            Follow-ups after your visit        Your next 10 appointments already scheduled     Mar 14, 2018 11:30 AM CDT   TREATMENT with RADIATION THERAPIST   [facility] ([facility])    [address] [facility] [address]   [phone]            Mar 15, 2018 11:15 AM CDT   TREATMENT with RADIATION THERAPIST   [facility] ([facility])    [address] [facility] [address]   [phone]            Mar 16, 2018 12:00 PM CDT   TREATMENT with RADIATION THERAPIST   [facility] ([facility])    [address] [facility] [address]   [phone]            Mar 19, 2018 10:15 AM CDT   TREATMENT with RADIATION THERAPIST   [facility] ([facility])    [address] [facility] [address]   [phone]            Mar 19, 2018 10:30 AM CDT   on treatment visit with Garrett Dejesus MD   [facility])    [address] [facility] [address]   [phone]            Mar 20, 2018 11:30 AM CDT   TREATMENT with RADIATION THERAPIST   [facility] ([facility])    [address] [facility] [address]   [phone]            Jun 07, 2018 11:00 AM CDT   LAB with NL LAB [facility] ([facility])    [address]  [facility] [address] [phone]           Please do not eat 10-12 hours before your appointment if you are coming in fasting for labs on lipids, cholesterol, or glucose (sugar). This does not apply to pregnant women. Water, hot tea and black coffee (with nothing added) are okay. Do not drink other fluids, diet soda or chew gum.            Jun 14, 2018 11:00 AM CDT   Return Visit with Froilan Peres MD   [facility] ([facility])    [address] [place] [id number] [phone]              Who to contact     If you have questions or need follow up information about today's clinic visit or your schedule please contact [facility] directly at [phone].  Normal or non-critical lab and imaging results will be communicated to you by Bannermanhart, letter or phone within 4 business days after the clinic has received the results. If you do not hear from us within 7 days, please contact the clinic through Ake[employer] or phone. If you have a critical or abnormal lab result, we will notify you by phone as soon as possible.  Submit refill requests through Storyz or call your pharmacy and they will forward the refill request to us. Please allow 3 business days for your refill to be completed.          Additional Information About Your Visit        Storyz Information     [employer] gives you secure access to your electronic health record. If you see a primary care provider, you can also send messages to your care team and make appointments. If you have questions, please call your primary care clinic.  If you do not have a primary care provider, please call [phone] and they will assist you.      [employer] is an electronic gateway that provides easy, online access to your medical records. With [employer], you can request a clinic appointment, read your test results, renew a prescription or communicate with your care team.     To access your existing account, please  contact your [facility] or call [phone] for assistance.        Care EveryWhere ID     This is your Care EveryWhere ID. This could be used by other organizations to access your [place] medical records  [id number]        Your Vitals Were     BMI (Body Mass Index)                   22.1 kg/m2            Blood Pressure from Last 3 Encounters:   02/14/18 136/79   02/07/18 132/75   02/05/18 113/64    Weight from Last 3 Encounters:   03/13/18 154 lb   03/05/18 153 lb   02/26/18 153 lb              Today, you had the following     No orders found for display         Today's Medication Changes          These changes are accurate as of 3/13/18  1:16 PM.  If you have any questions, ask your nurse or doctor.               These medicines have changed or have updated prescriptions.        Dose/Directions    atorvastatin 20 MG tablet   Commonly known as:  LIPITOR   This may have changed:  when to take this   Used for:  Pure hypercholesterolemia, Hyperlipidemia LDL goal <130        Dose:  20 mg   Take 1 tablet (20 mg) by mouth daily   Quantity:  90 tablet   Refills:  3                Primary Care Provider Office Phone # Fax #    Naomi Kuo PA-C [phone] [address] [place] [address]        Equal Access to Services     ROSA ASHFORD AH: Latanya browno Somurray, waaxda luqadaha, qaybta kaalmada adeoctavioda, tejinder marr. So [facility] [phone].    ATENCIÓN: Si habla español, tiene a gonzalez disposición servicios gratuitos de asistencia lingüística. Llame al [phone].    We comply with applicable federal civil rights laws and [place] laws. We do not discriminate on the basis of race, color, national origin, age, disability, sex, sexual orientation, or gender identity.            Thank you!     Thank you for choosing [facility]  for your care. Our goal is always to provide you with excellent care. Hearing back from  our patients is one way we can continue to improve our services. Please take a few minutes to complete the written survey that you may receive in the mail after your visit with us. Thank you!             Your Updated Medication List - Protect others around you: Learn how to safely use, store and throw away your medicines at www.disposemymeds.org.          This list is accurate as of 3/13/18  1:16 PM.  Always use your most recent med list.                   Brand Name Dispense Instructions for use Diagnosis    aspirin 81 MG tablet     90 tablet    Take 1 tablet by mouth daily.    Pure hypercholesterolemia       atorvastatin 20 MG tablet    LIPITOR    90 tablet    Take 1 tablet (20 mg) by mouth daily    Pure hypercholesterolemia, Hyperlipidemia LDL goal <130       traMADol 50 MG tablet    ULTRAM    30 tablet    Take 1-2 tablets ( mg) by mouth every 6 hours as needed for pain    Right hip pain       TYLENOL PO      Take 1,000 mg by mouth

## 2018-03-13 NOTE — PATIENT INSTRUCTIONS
Please contact [facility] RN with questions or concerns following today's appointment: [phone].    Thank you!

## 2018-03-13 NOTE — PROGRESS NOTES
[facility] PHYSICIANS  SPECIALIZING IN BREAKTHROUGHS  Radiation Oncology    On Treatment Visit Note      Dong Joseph      Date: 3/13/2018   MRN: [id number]   : 1955  Diagnosis: plasmacytoma of bone      Reason for Visit:  On Radiation Treatment Visit     Treatment Summary to Date  Treatment Site: right pelvis Current Dose: 3600/4500 cGy Fractions: 20/25      Chemotherapy  Chemo concurrent with radx?: No    Subjective:   Pain in hip improving, no longer needing tramadol. No nausea/diarrhea. No skin changes.    Nursing ROS:   Nutrition Alteration  Diet Type: Patient's Preference  Skin  Skin Intervention: patient using Aquaphor daily           Gastrointestinal  Nausea: 0 - None  Vomitin - No vomiting (ons)  Diarrhea: 0 - None  GI Note: patient reports 1 to 2 soft formed bowel movements daily  Genitourinary  Urinary Status: 0 - Normal  Psychosocial  Pyschosocial Note: slight fatigue  Pain Assessment  0-10 Pain Scale: 0  Pain Descriptors: Sore  Pain Treatment: patient reports no longer taking tramadol for the past week and is now taking Tylenol PRN   Pain Note: patient reports right buttock/hip soreness      Objective:   Wt 154 lb  BMI 22.1 kg/m2  NAD  Ambulating with cane    Labs:  CBC RESULTS:   Recent Labs   Lab Test  18   0905   WBC  7.1   RBC  4.83   HGB  14.6   HCT  44.7   MCV  93   MCH  30.2   MCHC  32.7   RDW  12.2   PLT  248     ELECTROLYTES:  Recent Labs   Lab Test  18   1144   NA  139   POTASSIUM  4.3   CHLORIDE  103   CHANCE  9.9   CO2  31   BUN  21   CR  0.83   GLC  105*       Assessment:    Tolerating radiation therapy well.  All questions and concerns addressed.    Plan:   1. Continue current therapy.        Mosaiq chart and setup information reviewed  MVCT/IGRT images checked    Medication Review  Med list reviewed with patient?: Yes           Garrett Dejesus MD

## 2018-03-14 ENCOUNTER — APPOINTMENT (OUTPATIENT)
Dept: RADIATION ONCOLOGY | Facility: CLINIC | Age: 63
End: 2018-03-14
Payer: COMMERCIAL

## 2018-03-14 PROCEDURE — 77412 RADIATION TX DELIVERY LVL 3: CPT | Performed by: RADIOLOGY

## 2018-03-14 PROCEDURE — G6002 STEREOSCOPIC X-RAY GUIDANCE: HCPCS | Performed by: RADIOLOGY

## 2018-03-15 ENCOUNTER — APPOINTMENT (OUTPATIENT)
Dept: RADIATION ONCOLOGY | Facility: CLINIC | Age: 63
End: 2018-03-15
Payer: COMMERCIAL

## 2018-03-15 PROCEDURE — G6002 STEREOSCOPIC X-RAY GUIDANCE: HCPCS | Performed by: RADIOLOGY

## 2018-03-15 PROCEDURE — 77412 RADIATION TX DELIVERY LVL 3: CPT | Performed by: RADIOLOGY

## 2018-03-16 ENCOUNTER — APPOINTMENT (OUTPATIENT)
Dept: RADIATION ONCOLOGY | Facility: CLINIC | Age: 63
End: 2018-03-16
Payer: COMMERCIAL

## 2018-03-16 PROCEDURE — 77412 RADIATION TX DELIVERY LVL 3: CPT | Performed by: RADIOLOGY

## 2018-03-16 PROCEDURE — G6002 STEREOSCOPIC X-RAY GUIDANCE: HCPCS | Performed by: RADIOLOGY

## 2018-03-19 ENCOUNTER — APPOINTMENT (OUTPATIENT)
Dept: RADIATION ONCOLOGY | Facility: CLINIC | Age: 63
End: 2018-03-19
Payer: COMMERCIAL

## 2018-03-19 ENCOUNTER — OFFICE VISIT (OUTPATIENT)
Dept: RADIATION ONCOLOGY | Facility: CLINIC | Age: 63
End: 2018-03-19
Payer: COMMERCIAL

## 2018-03-19 VITALS — BODY MASS INDEX: 22.1 KG/M2 | WEIGHT: 154 LBS

## 2018-03-19 DIAGNOSIS — C90.30 PLASMACYTOMA OF BONE (H): Primary | ICD-10-CM

## 2018-03-19 PROCEDURE — G6002 STEREOSCOPIC X-RAY GUIDANCE: HCPCS | Performed by: RADIOLOGY

## 2018-03-19 PROCEDURE — 77412 RADIATION TX DELIVERY LVL 3: CPT | Performed by: RADIOLOGY

## 2018-03-19 PROCEDURE — 99207 ZZC DROP WITH A PROCEDURE: CPT | Performed by: RADIOLOGY

## 2018-03-19 RX ORDER — DEXAMETHASONE 4 MG/1
4 TABLET ORAL 2 TIMES DAILY WITH MEALS
Qty: 14 TABLET | Refills: 0 | Status: SHIPPED | OUTPATIENT
Start: 2018-03-19 | End: 2018-04-11

## 2018-03-19 ASSESSMENT — PAIN SCALES - GENERAL: PAINLEVEL: MODERATE PAIN (5)

## 2018-03-19 NOTE — PROGRESS NOTES
"[facility] PHYSICIANS  SPECIALIZING IN BREAKTHROUGHS  Radiation Oncology    On Treatment Visit Note      Dong Joseph      Date: 3/19/2018   MRN: [id number]   : 1955  Diagnosis: plasmacytoma of bone      Reason for Visit:  On Radiation Treatment Visit     Treatment Summary to Date  Treatment Site: right pelvis Current Dose: 4320/4500 cGy Fractions: /      Chemotherapy  Chemo concurrent with radx?: No    Subjective:   Had progressive worsening of R hip pain on Friday that continues today. Not taking tramadol, but pain is with weight bearing and radiates down anterior R thigh and lateral R shin. Movement limited by pain.    Nursing ROS:   Nutrition Alteration  Diet Type: Patient's Preference  Skin  Skin Reaction: 0 - No changes  Skin Intervention: patient using Aquaphor daily           Gastrointestinal  Nausea: 0 - None  Vomitin - No vomiting (ons)  Diarrhea: 0 - None  GI Note: patient reports 1 to 2 soft formed bowel movements daily  Genitourinary  Urinary Status: 0 - Normal  Psychosocial  Mood - Anxiety: 0 - Normal  Mood - Depression: 0 - Normal  Pyschosocial Note: slight fatigue  Pain Assessment  0-10 Pain Scale: 5  Pain Descriptors: Sharp, Shooting  Pain Treatment: patient denies need for Tramadol, taking Tylenol po as needed  Pain Note: patient reports new intermittent sharp shooting pain right side of right lower extremity, first noticed Thursday or Friday of this past week.  Reports \"it takes very little movement to make that pain happen\", continues to note intermittent pains of right groin and buttock      Objective:   Wt 154 lb  BMI 22.1 kg/m2  NAD  4/5 strength in RLE, but primarily limited due to pain not nae weakness  Able to ambulate with cane    Labs:  CBC RESULTS:   Recent Labs   Lab Test  18   0905   WBC  7.1   RBC  4.83   HGB  14.6   HCT  44.7   MCV  93   MCH  30.2   MCHC  32.7   RDW  12.2   PLT  248     ELECTROLYTES:  Recent Labs   Lab Test  18   1144   NA "  139   POTASSIUM  4.3   CHLORIDE  103   CHANCE  9.9   CO2  31   BUN  21   CR  0.83   GLC  105*       Assessment:    Tolerating radiation therapy well.  All questions and concerns addressed.    Plan:   1. Continue current therapy, will complete RT tomorrow. Sudden hip pain would be unexpected from tumor progression, as he has been on RT with good response thus far. More concerned for RT related swelling or bone/joint instability or fracture in the setting of tumor regression. Will start decadron 4 mg PO BID x 1 week, instructed to take with meals and add pepcid daily for GI ppx. Will obtain MRI later this week and follow up with him.      [employer] chart and setup information reviewed  MVCT/IGRT images checked    Medication Review  Med list reviewed with patient?: Yes  Med list printed and given: Offered and declined    Educational Topic Discussed  Additional Instructions: follow-up with Dr. Peres on 6/4/2018  Education Instructions: reviewed continued skin cares      Garrett Dejesus MD

## 2018-03-19 NOTE — NURSING NOTE
Patient scheduled for MRI right hip with and without contrast on Thursday, 3/22/2018 at 1230 at [facility] in [place], arrival time of 1215, no prep for imaging exam.  Patient notified of appointment date and time, aware of clinic and imaging location.  Patient verbalized understanding of all information and had no questions at this time.    Mary Grace Ryan, RN BSN OCN

## 2018-03-19 NOTE — PATIENT INSTRUCTIONS
Managing radiation side effects after treatment has ended    The side effects of radiation therapy should gradually decrease in 2 to 3 weeks after you have finished radiation.  Some effects take longer to resolve.    Fatigue caused by radiation therapy will decrease and your energy will improve.    Skin reactions:  Skin changes (such as redness or irritation) will slowly begin to get better. Some people may have a permanent change in skin color.  Their skin may be more pink or  tan  than the untreated skin. The skin may be thinner or more fragile than before treatment.  Continue to use a gentle moisturizing lotion for several months.  You should always protect the skin in the area that was treated by using sunscreen of SPF30 or higher.      Other skin care instructions: continue with daily skin cares using Aquaphor for the next one month.        For concerns or questions call [facility] [phone]

## 2018-03-19 NOTE — MR AVS SNAPSHOT
After Visit Summary   3/19/2018    Dong Joseph    MRN: [id number]           Patient Information     Date Of Birth          1955        Visit Information        Provider Department      3/19/2018 10:30 AM Garrett Dejesus MD [facility]        Today's Diagnoses     Plasmacytoma of bone (H)    -  1      Care Instructions          Managing radiation side effects after treatment has ended    The side effects of radiation therapy should gradually decrease in 2 to 3 weeks after you have finished radiation.  Some effects take longer to resolve.    Fatigue caused by radiation therapy will decrease and your energy will improve.    Skin reactions:  Skin changes (such as redness or irritation) will slowly begin to get better. Some people may have a permanent change in skin color.  Their skin may be more pink or  tan  than the untreated skin. The skin may be thinner or more fragile than before treatment.  Continue to use a gentle moisturizing lotion for several months.  You should always protect the skin in the area that was treated by using sunscreen of SPF30 or higher.      Other skin care instructions: continue with daily skin cares using Aquaphor for the next one month.        For concerns or questions call [facility] [phone]          Follow-ups after your visit        Your next 10 appointments already scheduled     Mar 20, 2018 11:30 AM CDT   TREATMENT with RAD[facility] THERAPIST   [facility]    [address] [place] [address] [phone]            Mar 22, 2018 12:30 PM CDT   (Arrive by 12:15 PM)   MR HIP RIGHT W/O & W CONTRAST with PHMR1   [facility] ([facility])    [address] [facility] [address] [phone]           Take your medicines as usual, unless your doctor tells you not to. Bring a list of your current medicines to your exam (including  vitamins, minerals and over-the-counter drugs).  You may or may not receive intravenous (IV) contrast for this exam pending the discretion of the Radiologist.  You do not need to do anything special to prepare.  The MRI machine uses a strong magnet. Please wear clothes without metal (snaps, zippers). A sweatsuit works well, or we may give you a hospital gown.  Please remove any body piercings and hair extensions before you arrive. You will also remove watches, jewelry, hairpins, wallets, dentures, partial dental plates and hearing aids. You may wear contact lenses, and you may be able to wear your rings. We have a safe place to keep your personal items, but it is safer to leave them at home.  **IMPORTANT** THE INSTRUCTIONS BELOW ARE ONLY FOR THOSE PATIENTS WHO HAVE BEEN PRESCRIBED SEDATION OR GENERAL ANESTHESIA DURING THEIR MRI PROCEDURE:  IF YOUR DOCTOR PRESCRIBED ORAL SEDATION (take medicine to help you relax during your exam):   You must get the medicine from your doctor (oral medication) before you arrive. Bring the medicine to the exam. Do not take it at home. You ll be told when to take it upon arriving for your exam.   Arrive one hour early. Bring someone who can take you home after the test. Your medicine will make you sleepy. After the exam, you may not drive, take a bus or take a taxi by yourself.  IF YOUR DOCTOR PRESCRIBED IV SEDATION:   Arrive one hour early. Bring someone who can take you home after the test. Your medicine will make you sleepy. After the exam, you may not drive, take a bus or take a taxi by yourself.   No eating 6 hours before your exam. You may have clear liquids up until 4 hours before your exam. (Clear liquids include water, clear tea, black coffee and fruit juice without pulp.)  IF YOUR DOCTOR PRESCRIBED ANESTHESIA (be asleep for your exam):   Arrive 1 1/2 hours early. Bring someone who can take you home after the test. You may not drive, take a bus or take a taxi by yourself.   No  eating 8 hours before your exam. You may have clear liquids up until 4 hours before your exam. (Clear liquids include water, clear tea, black coffee and fruit juice without pulp.)   You will spend four to five hours in the recovery room.  Please call the Imaging Department at your exam site with any questions.            Apr 24, 2018 12:00 PM CDT   Return Visit with Garrett Dejesus MD   [facility] ([facility])    [id number] [place] [address] [phone]            Jun 07, 2018 11:00 AM CDT   LAB with NL LAB [facility] ([facility])    [address] [facility] [address] [phone]           Please do not eat 10-12 hours before your appointment if you are coming in fasting for labs on lipids, cholesterol, or glucose (sugar). This does not apply to pregnant women. Water, hot tea and black coffee (with nothing added) are okay. Do not drink other fluids, diet soda or chew gum.            Jun 14, 2018 11:00 AM CDT   Return Visit with Froilan Peres MD   [facility]    [address] [facility] [address] [phone]              Future tests that were ordered for you today     Open Future Orders        Priority Expected Expires Ordered    MR Hip Right w/o & w Contrast Routine 3/22/2018 3/19/2019 3/19/2018            Who to contact     If you have questions or need follow up information about today's clinic visit or your schedule please contact [facility] directly at [phone].  Normal or non-critical lab and imaging results will be communicated to you by MyChart, letter or phone within 4 business days after the clinic has received the results. If you do not hear from us within 7 days, please contact the clinic through MyChart or phone. If you have a critical or abnormal lab result, we will notify you by phone as soon as possible.  Submit  refill requests through [email or web address] or call your pharmacy and they will forward the refill request to us. Please allow 3 business days for your refill to be completed.          Additional Information About Your Visit        [email or web address] Information     [email or web address] gives you secure access to your electronic health record. If you see a primary care provider, you can also send messages to your care team and make appointments. If you have questions, please call your primary care clinic.  If you do not have a primary care provider, please call [phone] and they will assist you.      [email or web address] is an electronic gateway that provides easy, online access to your medical records. With [email or web address], you can request a clinic appointment, read your test results, renew a prescription or communicate with your care team.     To access your existing account, please contact your [facility] or call [phone] for assistance.        Care EveryWhere ID     This is your Care EveryWhere ID. This could be used by other organizations to access your [place] medical records  [id number]        Your Vitals Were     BMI (Body Mass Index)                   22.1 kg/m2            Blood Pressure from Last 3 Encounters:   02/14/18 136/79   02/07/18 132/75   02/05/18 113/64    Weight from Last 3 Encounters:   03/19/18 154 lb   03/13/18 154 lb   03/05/18 153 lb                 Today's Medication Changes          These changes are accurate as of 3/19/18 10:49 AM.  If you have any questions, ask your nurse or doctor.               Start taking these medicines.        Dose/Directions    dexamethasone 4 MG tablet   Commonly known as:  DECADRON   Used for:  Plasmacytoma of bone (H)   Started by:  Garrett Dejesus MD        Dose:  4 mg   Take 1 tablet (4 mg) by mouth 2 times daily (with meals)   Quantity:  14 tablet   Refills:  0         These medicines have changed or have updated prescriptions.        Dose/Directions    atorvastatin  20 MG tablet   Commonly known as:  LIPITOR   This may have changed:  when to take this   Used for:  Pure hypercholesterolemia, Hyperlipidemia LDL goal <130        Dose:  20 mg   Take 1 tablet (20 mg) by mouth daily   Quantity:  90 tablet   Refills:  3            Where to get your medicines      These medications were sent to [facility] - [place], [place] - [address] AT [facility] [address]  [address], [place] [facility] [address]     Phone:  [phone]     dexamethasone 4 MG tablet                Primary Care Provider Office Phone # Fax #    Naomi Kuo PA-C [phone] [address] [place] [address]        Equal Access to Services     ROSA ASHFORD : Hadii emily payne hadasho Soomaali, waaxda luqadaha, qaybta kaalmada adeegyada, tejinder marr. So [facility] [phone].    ATENCIÓN: Si habla español, tiene a gonzalez disposición servicios gratuitos de asistencia lingüística. [facility] [phone].    We comply with applicable federal civil rights laws and [place] laws. We do not discriminate on the basis of race, color, national origin, age, disability, sex, sexual orientation, or gender identity.            Thank you!     Thank you for choosing [facility]  for your care. Our goal is always to provide you with excellent care. Hearing back from our patients is one way we can continue to improve our services. Please take a few minutes to complete the written survey that you may receive in the mail after your visit with us. Thank you!             Your Updated Medication List - Protect others around you: Learn how to safely use, store and throw away your medicines at www.disposemymeds.org.          This list is accurate as of 3/19/18 10:49 AM.  Always use your most recent med list.                   Brand Name Dispense Instructions for use Diagnosis    aspirin 81 MG tablet     90 tablet    Take 1 tablet by mouth daily.    Pure  hypercholesterolemia       atorvastatin 20 MG tablet    LIPITOR    90 tablet    Take 1 tablet (20 mg) by mouth daily    Pure hypercholesterolemia, Hyperlipidemia LDL goal <130       dexamethasone 4 MG tablet    DECADRON    14 tablet    Take 1 tablet (4 mg) by mouth 2 times daily (with meals)    Plasmacytoma of bone (H)       traMADol 50 MG tablet    ULTRAM    30 tablet    Take 1-2 tablets ( mg) by mouth every 6 hours as needed for pain    Right hip pain       TYLENOL PO      Take 1,000 mg by mouth

## 2018-03-20 ENCOUNTER — APPOINTMENT (OUTPATIENT)
Dept: RADIATION ONCOLOGY | Facility: CLINIC | Age: 63
End: 2018-03-20
Payer: COMMERCIAL

## 2018-03-20 PROCEDURE — 77427 RADIATION TX MANAGEMENT X5: CPT | Performed by: RADIOLOGY

## 2018-03-20 PROCEDURE — G6002 STEREOSCOPIC X-RAY GUIDANCE: HCPCS | Performed by: RADIOLOGY

## 2018-03-20 PROCEDURE — 77336 RADIATION PHYSICS CONSULT: CPT | Performed by: RADIOLOGY

## 2018-03-20 PROCEDURE — 77412 RADIATION TX DELIVERY LVL 3: CPT | Performed by: RADIOLOGY

## 2018-03-21 NOTE — PROCEDURES
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     Treatment Summary:  Radiation Oncology - Course: 1    Treatment Site Current Dose Modality From  To Elapsed Days Fx.  1 Rt pelvis   4,500 cGy 6X/18X   2/14/2018  3/20/2018  34 25                Dose per Fraction:   180 cGy     Total Dose:      4500 cGy        COMMENTS:   He initially had good relief of pain and was able to stop tramadol. However, during the last   week of treatment, he had sudden worsening of pain. MRI was ordered and he was started on decadron.                     PAIN MANAGEMENT:  Tramadol prn                            FOLLOW UP PLAN:  MRI of R hip on 3/22/18, and I will follow up with him. He will also follow up with   Dr. Peres.                         Staff Physician: Garrett Dejesus M.D.  Physicist: Anjelica Arauz MS     CC: MD Johnnie Fraire MD              Radiation Oncology [address] [address] [place] [address] Phone: [phone]

## 2018-03-22 ENCOUNTER — NURSE TRIAGE (OUTPATIENT)
Dept: NURSING | Facility: CLINIC | Age: 63
End: 2018-03-22

## 2018-03-22 ENCOUNTER — HOSPITAL ENCOUNTER (OUTPATIENT)
Dept: MRI IMAGING | Facility: CLINIC | Age: 63
Discharge: HOME | End: 2018-03-22
Attending: RADIOLOGY | Admitting: RADIOLOGY
Payer: COMMERCIAL

## 2018-03-22 DIAGNOSIS — C90.30 PLASMACYTOMA OF BONE (H): ICD-10-CM

## 2018-03-22 DIAGNOSIS — C90.30 PLASMACYTOMA OF BONE (H): Primary | ICD-10-CM

## 2018-03-22 PROCEDURE — A9585 GADOBUTROL INJECTION: HCPCS | Performed by: RADIOLOGY

## 2018-03-22 PROCEDURE — 25000128 H RX IP 250 OP 636: Performed by: RADIOLOGY

## 2018-03-22 PROCEDURE — 72197 MRI PELVIS W/O & W/DYE: CPT

## 2018-03-22 RX ORDER — GADOBUTROL 604.72 MG/ML
7.5 INJECTION INTRAVENOUS ONCE
Status: COMPLETED | OUTPATIENT
Start: 2018-03-22 | End: 2018-03-22

## 2018-03-22 RX ORDER — GABAPENTIN 300 MG/1
300 CAPSULE ORAL 3 TIMES DAILY
Qty: 30 CAPSULE | Refills: 1 | Status: SHIPPED | OUTPATIENT
Start: 2018-03-22 | End: 2018-04-11

## 2018-03-22 RX ADMIN — GADOBUTROL 7 ML: 604.72 INJECTION INTRAVENOUS at 13:04

## 2018-03-22 NOTE — TELEPHONE ENCOUNTER
Patient said he had contacted his doctor earlier today to request a pain medication. Is wondering if his doctor took care of his request. Medication tab was checked. Patient was told that a Gabapentin prescription was sent to his [facility] in [place] today.    Naya Mendoza RN/BYRON    Additional Information    Negative: [1] Caller is not with the adult (patient) AND [2] reporting urgent symptoms    Negative: Lab result questions    Negative: Medication questions    Negative: Caller cannot be reached by phone    Negative: Caller has already spoken to PCP or another triager    Negative: RN needs further essential information from caller in order to complete triage    Negative: Requesting regular office appointment    Negative: [1] Caller requesting NON-URGENT health information AND [2] PCP's office is the best resource    General information question, no triage required and triager able to answer question    Negative: Health Information question, no triage required and triager able to answer question    Protocols used: INFORMATION ONLY [facility]

## 2018-03-23 ENCOUNTER — TELEPHONE (OUTPATIENT)
Dept: RADIATION ONCOLOGY | Facility: CLINIC | Age: 63
End: 2018-03-23

## 2018-03-23 NOTE — TELEPHONE ENCOUNTER
Received message from patient requesting phone call about recent prescription from Dr Dejesus. Spoke with patient and they stated if he was to take gabapentin, tramdol, and decadron. This writer spoke with Dr Dejesus and per Dr Dejesus patient is to take all three medications as prescribed. This writer shared Dr Dejesus's response to patients request and patient verbalized understanding     Current Outpatient Prescriptions   Medication     gabapentin (NEURONTIN) 300 MG capsule     dexamethasone (DECADRON) 4 MG tablet     traMADol (ULTRAM) 50 MG tablet     Acetaminophen (TYLENOL PO)     atorvastatin (LIPITOR) 20 MG tablet     aspirin 81 MG tablet     No current facility-administered medications for this visit.         Aj LOCKHART

## 2018-03-26 ENCOUNTER — TELEPHONE (OUTPATIENT)
Dept: RADIATION ONCOLOGY | Facility: CLINIC | Age: 63
End: 2018-03-26

## 2018-03-26 ENCOUNTER — CARE COORDINATION (OUTPATIENT)
Dept: RADIATION ONCOLOGY | Facility: CLINIC | Age: 63
End: 2018-03-26

## 2018-03-26 DIAGNOSIS — C90.30 PLASMACYTOMA OF BONE (H): Primary | ICD-10-CM

## 2018-03-26 RX ORDER — HYDROCODONE BITARTRATE AND ACETAMINOPHEN 10; 325 MG/1; MG/1
1 TABLET ORAL EVERY 6 HOURS PRN
Qty: 20 TABLET | Refills: 0 | Status: ON HOLD | OUTPATIENT
Start: 2018-03-26 | End: 2018-04-19

## 2018-03-26 NOTE — PROGRESS NOTES
"Received telephone call from patient reporting he has completed course of steroids as prescribed by Dr. Dejesus and does not report improvement in pain.  He reports he continues with sharp, stabbing pain of right lower extremity, difficult to bear weight, rating pain \"6-8/10\".  He notes that he continues to take Gabapentin two times daily, taking Tramadol and Tylenol po as needed.  He completed 4,500 cGy to right pelvis on 3/20/2018.  During his course of treatment, he noted improvement of his pain and in the last week of radiation experienced acute onset of right lower extremity pain.  Patient completed a MRI of pelvis on 3/22/2018 as ordered by Dr. Dejesus.  Informed patient that this RN would further review with Dr. Dejesus and return call to patient.    Above information reviewed with Dr. Dejesus, will send [facility] pain prescription, increased Gabapentin 300 mg po to three times daily, schedule return visit with Dr. Ellsworth to assess for joint instability.      This RN returned call to patient, reviewed above information after discussion with Dr. Dejesus, patient agreeable with plan for pain medication and verbalized understanding and will increase Gabapentin to every eight hours versus every 12 hours currently.  He is also agreeable with plan to see Dr. Ellsworth and this RN will assist in scheduling as soon as possible.  Patient had no further questions at this time.    This RN attempted contact with May Garvin at [phone], no answer, voice message left requesting return call to this RN with patient details.    Mary Grace Ryan, RN BSN OCN    "

## 2018-03-26 NOTE — PROGRESS NOTES
Patient scheduled to see Dr. Ellsworth on Friday, 3/30/2018.  This RN contacted patient to confirm he received appointment date and time, he states that he has.  He will await prescription for pain medication.  Patient had no questions at this time.    Mary Grace Ryan RN BSN OCN

## 2018-03-30 ENCOUNTER — OFFICE VISIT (OUTPATIENT)
Dept: ORTHOPEDICS | Facility: CLINIC | Age: 63
End: 2018-03-30
Payer: COMMERCIAL

## 2018-03-30 VITALS — WEIGHT: 154 LBS | HEIGHT: 70 IN | BODY MASS INDEX: 22.05 KG/M2

## 2018-03-30 DIAGNOSIS — C90.30 PLASMACYTOMA OF BONE (H): Primary | ICD-10-CM

## 2018-03-30 ASSESSMENT — ENCOUNTER SYMPTOMS
PARALYSIS: 0
DISTURBANCES IN COORDINATION: 1
SPEECH CHANGE: 0
WEAKNESS: 1
ARTHRALGIAS: 1
TINGLING: 1
NECK PAIN: 0
TREMORS: 0
MUSCLE WEAKNESS: 1
HEADACHES: 0
JOINT SWELLING: 0
LOSS OF CONSCIOUSNESS: 0
MYALGIAS: 1
DIZZINESS: 0
MEMORY LOSS: 0
BACK PAIN: 1
SEIZURES: 0
MUSCLE CRAMPS: 1
NUMBNESS: 1
STIFFNESS: 0

## 2018-03-30 NOTE — LETTER
3/30/2018       RE: Dong Joseph  [id number]  [place] [address]     Dear Colleague,    Thank you for referring your patient, Dong Joseph, to the [facility] at [facility]. Please see a copy of my visit note below.    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  He has finished radiation about a week ago.  He still has significant pain in the hip but no interval changes over the last week.  His pain is chiefly posteriorly and does not radiate beyond the knee however.  There is no numbness or tingling.  It is more painful to bear weight.    I reviewed his MRI from [facility].  It does not show any fracture or change in the extent of the myeloma.  He has significant bone loss in the acetabulum and it is expected that he will have some pain with weightbearing.    We had an extensive conversation about the reconstruction needed to fix his problem if he were to have surgery now.  There is a possibility that this will fill in with bone and he will have less pain with weightbearing but this may take 3-6 months to happen.  In the meantime he would be very limited in his ability to stand or walk.  He will see if he has any improvement over the next 2 weeks and then call us.  He will also decide if he wants to have his reconstruction surgery immediately or if he is able to wait it out to see if there are improvements.  He is aware that the reconstruction is a challenging case and will take months to rehabilitate from as well.  I spent 10 or more minutes of time discussing the above things which was more than half his visit.    Again, thank you for allowing me to participate in the care of your patient.      Sincerely,    Johnnie Ellsworth MD

## 2018-03-30 NOTE — PROGRESS NOTES
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  He has finished radiation about a week ago.  He still has significant pain in the hip but no interval changes over the last week.  His pain is chiefly posteriorly and does not radiate beyond the knee however.  There is no numbness or tingling.  It is more painful to bear weight.    I reviewed his MRI from 322.  It does not show any fracture or change in the extent of the myeloma.  He has significant bone loss in the acetabulum and it is expected that he will have some pain with weightbearing.    We had an extensive conversation about the reconstruction needed to fix his problem if he were to have surgery now.  There is a possibility that this will fill in with bone and he will have less pain with weightbearing but this may take 3-6 months to happen.  In the meantime he would be very limited in his ability to stand or walk.  He will see if he has any improvement over the next 2 weeks and then call us.  He will also decide if he wants to have his reconstruction surgery immediately or if he is able to wait it out to see if there are improvements.  He is aware that the reconstruction is a challenging case and will take months to rehabilitate from as well.  I spent 10 or more minutes of time discussing the above things which was more than half his visit.

## 2018-03-30 NOTE — MR AVS SNAPSHOT
After Visit Summary   3/30/2018    Dong Joseph    MRN: [id number]           Patient Information     Date Of Birth          1955        Visit Information        Provider Department      3/30/2018 8:45 AM Johnnie Ellsworth MD [facility]        Today's Diagnoses     Plasmacytoma of bone (H)    -  1       Follow-ups after your visit        Your next 10 appointments already scheduled     Apr 24, 2018 12:00 PM CDT   Return Visit with Garrett Dejesus MD   [facility] ([facility])    [address] [place] [id number] [phone]            Jun 07, 2018 11:00 AM CDT   LAB with NL LAB [facility] ([facility])    [address] [place] [id number] [phone]           Please do not eat 10-12 hours before your appointment if you are coming in fasting for labs on lipids, cholesterol, or glucose (sugar). This does not apply to pregnant women. Water, hot tea and black coffee (with nothing added) are okay. Do not drink other fluids, diet soda or chew gum.            Jun 14, 2018 11:00 AM CDT   [facility] Visit with Froilan Peres MD   [facility]    [address] [place] [address] [phone]              Who to contact     Please call your clinic at [phone] to:    Ask questions about your health    Make or cancel appointments    Discuss your medicines    Learn about your test results    Speak to your doctor            Additional Information About Your Visit        MyChart Information     [employer] gives you secure access to your electronic health record. If you see a primary care provider, you can also send messages to your care team and make appointments. If you have questions, please call your primary care clinic.  If you do not have a primary care provider, please call [phone] and they will assist you.      [employer] is  "an electronic gateway that provides easy, online access to your medical records. With [employer], you can request a clinic appointment, read your test results, renew a prescription or communicate with your care team.     To access your existing account, please contact your [facility] or call [phone] for assistance.        Care EveryWhere ID     This is your Care EveryWhere ID. This could be used by other organizations to access your [place] medical records  [id number]        Your Vitals Were     Height BMI (Body Mass Index)                1.778 m (5' 10\") 22.1 kg/m2           Blood Pressure from Last 3 Encounters:   02/14/18 136/79   02/07/18 132/75   02/05/18 113/64    Weight from Last 3 Encounters:   03/30/18 69.9 kg (154 lb)   03/19/18 69.9 kg (154 lb)   03/13/18 69.9 kg (154 lb)              Today, you had the following     No orders found for display         Today's Medication Changes          These changes are accurate as of 3/30/18  9:39 AM.  If you have any questions, ask your nurse or doctor.               These medicines have changed or have updated prescriptions.        Dose/Directions    atorvastatin 20 MG tablet   Commonly known as:  LIPITOR   This may have changed:  when to take this   Used for:  Pure hypercholesterolemia, Hyperlipidemia LDL goal <130        Dose:  20 mg   Take 1 tablet (20 mg) by mouth daily   Quantity:  90 tablet   Refills:  3                Primary Care Provider Office Phone # Fax #    Naomi Kuo PA-C [phone] [address] [place] [address]        Equal Access to Services     ROSA ASHFORD AH: Hadii emily browno Somurray, waaxda luqadaha, qaybta kaalmada adeegyaarnel, tejinder marr. So [facility] [phone].    ATENCIÓN: Si habla español, tiene a gonzalez disposición servicios gratuitos de asistencia lingüística. Llame al [phone].    We comply with applicable federal civil rights laws and " [place] laws. We do not discriminate on the basis of race, color, national origin, age, disability, sex, sexual orientation, or gender identity.            Thank you!     Thank you for choosing [facility]  for your care. Our goal is always to provide you with excellent care. Hearing back from our patients is one way we can continue to improve our services. Please take a few minutes to complete the written survey that you may receive in the mail after your visit with us. Thank you!             Your Updated Medication List - Protect others around you: Learn how to safely use, store and throw away your medicines at www.disposemymeds.org.          This list is accurate as of 3/30/18  9:39 AM.  Always use your most recent med list.                   Brand Name Dispense Instructions for use Diagnosis    aspirin 81 MG tablet     90 tablet    Take 1 tablet by mouth daily.    Pure hypercholesterolemia       atorvastatin 20 MG tablet    LIPITOR    90 tablet    Take 1 tablet (20 mg) by mouth daily    Pure hypercholesterolemia, Hyperlipidemia LDL goal <130       dexamethasone 4 MG tablet    DECADRON    14 tablet    Take 1 tablet (4 mg) by mouth 2 times daily (with meals)    Plasmacytoma of bone (H)       gabapentin 300 MG capsule    NEURONTIN    30 capsule    Take 1 capsule (300 mg) by mouth 3 times daily    Plasmacytoma of bone (H)       HYDROcodone-acetaminophen  MG per tablet    NORCO    20 tablet    Take 1 tablet by mouth every 6 hours as needed for moderate to severe pain maximum 4 tablet(s) per day    Plasmacytoma of bone (H)       traMADol 50 MG tablet    ULTRAM    30 tablet    Take 1-2 tablets ( mg) by mouth every 6 hours as needed for pain    Right hip pain       TYLENOL PO      Take 1,000 mg by mouth

## 2018-03-30 NOTE — NURSING NOTE
Reason For Visit:   Chief Complaint   Patient presents with     RECHECK     Pt. states that he is here today for Increase of Pelvis Pain. He completed his Cancer Treatment Last Week.  HX: Needle Biopsy Right Pelvis. DOS: 01/25/2018.       Pain Assessment  Patient Currently in Pain: Yes  0-10 Pain Scale: 10  Primary Pain Location: Pelvis  Pain Orientation: Right  Pain Descriptors: Discomfort, Aching, Sharp, Shooting, Constant  Alleviating Factors: Rest, Pain medication  Aggravating Factors: Movement, Standing, Walking, Sitting               HEIGHT: Data Unavailable, WEIGHT: 0 lbs 0 oz, BMI: There is no height or weight on file to calculate BMI.      Current Outpatient Prescriptions   Medication Sig Dispense Refill     HYDROcodone-acetaminophen (NORCO)  MG per tablet Take 1 tablet by mouth every 6 hours as needed for moderate to severe pain maximum 4 tablet(s) per day 20 tablet 0     gabapentin (NEURONTIN) 300 MG capsule Take 1 capsule (300 mg) by mouth 3 times daily 30 capsule 1     dexamethasone (DECADRON) 4 MG tablet Take 1 tablet (4 mg) by mouth 2 times daily (with meals) 14 tablet 0     traMADol (ULTRAM) 50 MG tablet Take 1-2 tablets ( mg) by mouth every 6 hours as needed for pain 30 tablet 0     Acetaminophen (TYLENOL PO) Take 1,000 mg by mouth       atorvastatin (LIPITOR) 20 MG tablet Take 1 tablet (20 mg) by mouth daily (Patient taking differently: Take 20 mg by mouth At Bedtime ) 90 tablet 3     aspirin 81 MG tablet Take 1 tablet by mouth daily. 90 tablet 3        No Known Allergies

## 2018-04-03 ENCOUNTER — TELEPHONE (OUTPATIENT)
Dept: ORTHOPEDICS | Facility: CLINIC | Age: 63
End: 2018-04-03

## 2018-04-03 NOTE — TELEPHONE ENCOUNTER
[facility] Call Center    Phone Message    May a detailed message be left on voicemail: yes    Reason for Call: Other: Pt. wants to speak to Dr. Jodee Olvera's Nurse.  He has decided to proceed with the surgery as soon as possible.     Action Taken: Message routed to:  [facility] Ortho

## 2018-04-04 ENCOUNTER — TELEPHONE (OUTPATIENT)
Dept: ORTHOPEDICS | Facility: CLINIC | Age: 63
End: 2018-04-04

## 2018-04-04 DIAGNOSIS — C90.00 MULTIPLE MYELOMA NOT HAVING ACHIEVED REMISSION (H): Primary | ICD-10-CM

## 2018-04-04 NOTE — TELEPHONE ENCOUNTER
Pt calls triage today to speak with May regarding scheduling surgery. Pt last seen by Dr Ellsworth on 03/30/18 and would like his team to know that he would like to schedule surgery for reconstruction ASAP. Message routed to Dr Ellsworth and team for review and follow up with pt.

## 2018-04-04 NOTE — TELEPHONE ENCOUNTER
Dong was phoned regarding his request for surgery ASAP for his right hip.  Dr Ellsworth wrote surgery worksheet, surgery scheduler will phone pt regarding date.  Preop packet was mailed to pt's home including BRADLEY book, prophylactic antibiotic card, total joint class, capnography handout.

## 2018-04-05 ENCOUNTER — TELEPHONE (OUTPATIENT)
Dept: ORTHOPEDICS | Facility: CLINIC | Age: 63
End: 2018-04-05

## 2018-04-05 NOTE — TELEPHONE ENCOUNTER
Received call from patient requesting to schedule surgery with Dr. Ellsworth. Patient requested soonest available surgery date, and has been scheduled for 4/16/18 at [facility]. He will anticipate receiving his surgery packet in the mail with all instructions, and will call us if he does not receive it within a few days. He will call his PCP to set up his pre-op H&P.

## 2018-04-06 NOTE — PROGRESS NOTES
[facility]  [address] [place] [id number] [phone]  Dept: [phone]    PRE-OP EVALUATION:  Today's date: 2018    Dong Joseph (: 1955) presents for pre-operative evaluation assessment as requested by Dr. Ellsworth.  He requires evaluation and anesthesia risk assessment prior to undergoing surgery/procedure for treatment of hip .    Fax number for surgical facility:   Primary Physician: Naomi Kuo  Type of Anesthesia Anticipated: General    Patient has a Health Care Directive or Living Will:  NO    Preop Questions 2018   Who is doing your surgery? dr ellsworth   What are you having done? total hip arthroplasty   Date of Surgery/Procedure: 2018   Facility or Hospital where procedure/surgery will be performed: [facility]   1.  Do you have a history of Heart attack, stroke, stent, coronary bypass surgery, or other heart surgery? No   2.  Do you ever have any pain or discomfort in your chest? No   3.  Do you have a history of  Heart Failure? No   4.   Are you troubled by shortness of breath when:  walking on a level surface, or up a slight hill, or at night? No   5.  Do you currently have a cold, bronchitis or other respiratory infection? No   6.  Do you have a cough, shortness of breath, or wheezing? No   7.  Do you sometimes get pains in the calves of your legs when you walk? No   8. Do you or anyone in your family have previous history of blood clots? YES - father, he had a stroke that was caused by blood clot in brain, he does recall a blood clot in his father's leg after his stroke   9.  Do you or does anyone in your family have a serious bleeding problem such as prolonged bleeding following surgeries or cuts? No   10. Have you ever had problems with anemia or been told to take iron pills? No   11. Have you had any abnormal blood loss such as black, tarry or bloody stools? No   12. Have you ever had a blood transfusion? No   13. Have you or any of  your relatives ever had problems with anesthesia? No   14. Do you have sleep apnea, excessive snoring or daytime drowsiness? No   15. Do you have any prosthetic heart valves? No   16. Do you have prosthetic joints? No         HPI:     HPI related to upcoming procedure: He started with right hip pain approximately 6 months ago.  Symptoms progressively worsened and it was eventually evaluated initially with lumbar MRI though when this was nondiagnostic hip x-ray and hip MRI was ordered.  He was found to have a plasmacytoma.  He has completed 5 weeks of radiation and now will do reconstruction of his right hip with arthroplasty.  He is feeling well though still having pain in his hip.  He treats this primarily with Tylenol.      See problem list for active medical problems.  Problems all longstanding and stable, except as noted/documented.  See ROS for pertinent symptoms related to these conditions.                                                                                                                                                          .    MEDICAL HISTORY:     Patient Active Problem List    Diagnosis Date Noted     Plasmacytoma of bone (H) 02/05/2018     Priority: Medium     HYPERLIPIDEMIA LDL GOAL <130 10/31/2010     Priority: Medium     Impotence of organic origin      Priority: Medium     Pure hypercholesterolemia      Priority: Medium      Past Medical History:   Diagnosis Date     Impotence of organic origin 2003     Plasma cell neoplasm 01/25/2018    S/P Needle biopsy of right pelvis bone tumor     Pure hypercholesterolemia 2002    statin started circa 2002     Past Surgical History:   Procedure Laterality Date     BIOPSY BONE PELVIS Right 1/25/2018    Procedure: BIOPSY BONE PELVIS;  Needle Biopsy Right Pelvis;  Surgeon: Johnnie Ellsworth MD;  Location: [facility]     COLONOSCOPY  12/22/2010    COLONOSCOPY performed by DONNA WHITEHEAD at  GI     HERNIA REPAIR, INGUINAL RT/LT Bilateral 1979-80     Inguinal     HERNIA REPAIR, INGUINAL RT/LT  03/30/2006    Recurrent left inguinal hernia.     Current Outpatient Prescriptions   Medication Sig Dispense Refill     HYDROcodone-acetaminophen (NORCO)  MG per tablet Take 1 tablet by mouth every 6 hours as needed for moderate to severe pain maximum 4 tablet(s) per day 20 tablet 0     traMADol (ULTRAM) 50 MG tablet Take 1-2 tablets ( mg) by mouth every 6 hours as needed for pain 30 tablet 0     Acetaminophen (TYLENOL PO) Take 1,000 mg by mouth       atorvastatin (LIPITOR) 20 MG tablet Take 1 tablet (20 mg) by mouth daily (Patient taking differently: Take 20 mg by mouth At Bedtime ) 90 tablet 3     aspirin 81 MG tablet Take 1 tablet by mouth daily. 90 tablet 3     OTC products:Fish oil and niacin, he will stop taking both of these today  No Known Allergies   Latex Allergy: NO    Social History   Substance Use Topics     Smoking status: Former Smoker     Packs/day: 0.75     Years: 5.00     Types: Cigarettes     Start date: 6/1/1973     Quit date: 6/1/1978     Smokeless tobacco: Never Used     Alcohol use 1.0 oz/week      Comment: Patient reports one drink bi-weekly     History   Drug Use No       REVIEW OF SYSTEMS:   CONSTITUTIONAL: NEGATIVE for fever, chills, change in weight  INTEGUMENTARY/SKIN: NEGATIVE for worrisome rashes, moles or lesions  EYES: NEGATIVE for vision changes or irritation  ENT/MOUTH: NEGATIVE for ear, mouth and throat problems  RESP: NEGATIVE for significant cough or SOB  CV: NEGATIVE for chest pain, palpitations or peripheral edema  GI: NEGATIVE for nausea, abdominal pain, heartburn, or change in bowel habits  : NEGATIVE for frequency, dysuria, or hematuria  MUSCULOSKELETAL: Right hip pain, plasmacytoma  NEURO: He does have some paresthesias into his right foot thought to be related to his plasmacytoma  ENDOCRINE: NEGATIVE for temperature intolerance, skin/hair changes  HEME: NEGATIVE for bleeding problems  PSYCHIATRIC: NEGATIVE for  changes in mood or affect    EXAM:   Wt 159 lb (72.1 kg)  BMI 22.81 kg/m2    GENERAL APPEARANCE: healthy, alert and no distress     EYES: EOMI,  PERRL     HENT: ear canals and TM's normal and nose and mouth without ulcers or lesions     NECK: no adenopathy, no asymmetry, masses, or scars and thyroid normal to palpation     RESP: lungs clear to auscultation - no rales, rhonchi or wheezes     CV: regular rates and rhythm, normal S1 S2, no S3 or S4 and no murmur, click or rub     ABDOMEN:  soft, nontender, no HSM or masses and bowel sounds normal     MS: appears in pain, moves with use of a cane      SKIN: no suspicious lesions or rashes     NEURO: Normal strength and tone, sensory exam grossly normal, mentation intact and speech normal     PSYCH: mentation appears normal. and affect normal/bright     LYMPHATICS: No cervical adenopathy    DIAGNOSTICS:   EKG: sinus bradycardia, normal axis, normal intervals, no acute ST/T changes c/w ischemia, no LVH by voltage criteria, dated January 24, 2018    Recent Labs   Lab Test  02/05/18   0905  02/02/18   1144  01/24/18   1548   HGB  14.6   --   15.4   PLT  248   --   202   NA   --   139  138   POTASSIUM   --   4.3  3.7   CR   --   0.83  0.86        IMPRESSION:   Reason for surgery/procedure: Right plasmacytoma, resected tumor arthroplasty of right hip  Diagnosis/reason for consult: Hyperlipidemia, plasmacytoma and anesthesia clearance    The proposed surgical procedure is considered INTERMEDIATE risk.    REVISED CARDIAC RISK INDEX  The patient has the following serious cardiovascular risks for perioperative complications such as (MI, PE, VFib and 3  AV Block):  No serious cardiac risks  INTERPRETATION: 0 risks: Class I (very low risk - 0.4% complication rate)    The patient has the following additional risks for perioperative complications:  No identified additional risks      ICD-10-CM    1. Preop general physical exam Z01.818        RECOMMENDATIONS:     --Consult hospital  rounder / IM to assist post-op medical management as needed    Cardiovascular Risk  Performs 4 METs exercise without symptoms.       --Patient is to take all scheduled medications on the day of surgery EXCEPT for modifications listed below.    Anticoagulant or Antiplatelet Medication Use  ASPIRIN: Discontinue ASA 7-10 days prior to procedure to reduce bleeding risk.  It should be resumed post-operatively.        APPROVAL GIVEN to proceed with proposed procedure, without further diagnostic evaluation       Signed Electronically by: Naomi Kuo PA-C    Copy of this evaluation report is provided to requesting physician.    [facility] Preop Guidelines    Revised Cardiac Risk Index

## 2018-04-09 NOTE — TELEPHONE ENCOUNTER
Dong Joseph is a 62 year old man with a solitary plasmacytoma of the R pelvis, with a large 7-8 cm tumor in the R acetabulum/iliac bone. He recently completed a course of radiation therapy (encompassing all areas of involvement plus a margin, to 45 Gy) on 3/20/18. He initially had significant improvement in his pain during radiation therapy, and was able to stop his pain medication and ambulate without pain. However, during the last week of treatment, his pain suddenly worsened, described as sharp pain in his R hip that radiates down the R leg. I have been following up with Dong Joseph by telephone over the last week. I initially gave him a trial of decadron 4 mg BID x 1 week, to see if perhaps his symptoms were from radiation-induced swelling. However, this did not improve his symptoms. I ordered a R hip MRI, which on 3/22 showed a stable 7.4 cm R iliac mass, with no significant progression and no new areas of tumor involvement or hip effusion. A trial of gabapentin also did not result in relief.    It is rather unusual to have such sudden worsening of pain after significant relief from radiation. I suspect that he may have subtle joint instability, soft tissue injury, or fracture, perhaps resulting from structural compromise after tumor regression from treatment, or an injury that resulted from his increased activity after he had initial pain improvement. I have referred him to orthopedic surgery for evaluation and recommendations on weight-bearing/activity. In the meantime, I have uptitrated his gabapentin and will give him norco 10/325 prn.    Garrett Dejesus M.D.  Attending Physician  Radiation Oncology  Clinic Phone: [phone]  Pager #: [phone]     10/2017

## 2018-04-11 ENCOUNTER — OFFICE VISIT (OUTPATIENT)
Dept: FAMILY MEDICINE | Facility: OTHER | Age: 63
End: 2018-04-11
Payer: COMMERCIAL

## 2018-04-11 VITALS
HEIGHT: 70 IN | WEIGHT: 159 LBS | HEART RATE: 88 BPM | RESPIRATION RATE: 16 BRPM | SYSTOLIC BLOOD PRESSURE: 134 MMHG | DIASTOLIC BLOOD PRESSURE: 70 MMHG | TEMPERATURE: 98.9 F | BODY MASS INDEX: 22.76 KG/M2

## 2018-04-11 DIAGNOSIS — C90.30 PLASMACYTOMA OF BONE (H): ICD-10-CM

## 2018-04-11 DIAGNOSIS — Z01.818 PREOP GENERAL PHYSICAL EXAM: Primary | ICD-10-CM

## 2018-04-11 PROCEDURE — 99214 OFFICE O/P EST MOD 30 MIN: CPT | Performed by: PHYSICIAN ASSISTANT

## 2018-04-11 ASSESSMENT — PAIN SCALES - GENERAL: PAINLEVEL: SEVERE PAIN (6)

## 2018-04-11 NOTE — MR AVS SNAPSHOT
After Visit Summary   4/11/2018    Dong Joseph    MRN: [id number]           Patient Information     Date Of Birth          1955        Visit Information        Provider Department      4/11/2018 2:00 PM Naomi Kuo PA-C [facility]        Today's Diagnoses     Preop general physical exam    -  1    Plasmacytoma of bone (H)          Care Instructions      Before Your Surgery      Call your surgeon if there is any change in your health. This includes signs of a cold or flu (such as a sore throat, runny nose, cough, rash or fever).    Do not smoke, drink alcohol or take over the counter medicine (unless your surgeon or primary care doctor tells you to) for the 24 hours before and after surgery.    If you take prescribed drugs: Follow your doctor s orders about which medicines to take and which to stop until after surgery.    Eating and drinking prior to surgery: follow the instructions from your surgeon    Take a shower or bath the night before surgery. Use the soap your surgeon gave you to gently clean your skin. If you do not have soap from your surgeon, use your regular soap. Do not shave or scrub the surgery site.  Wear clean pajamas and have clean sheets on your bed.           Follow-ups after your visit        Your next 10 appointments already scheduled     Apr 16, 2018   Procedure with Johnnie Ellsworth MD   [facility], [place], Same Day Surgery (--)    [facility] [facility] [id number] [phone]            Apr 24, 2018 12:00 PM CDT   Return Visit with Garrtet Dejesus MD   [facility]    [id number] [place] [id number] [phone]            May 02, 2018 12:00 PM CDT   (Arrive by 11:45 AM)   Return Visit with Johnnie Ellsworth MD   [facility] ([facility])    [address] [id number] [phone]             Jun 07, 2018 11:00 AM CDT   LAB with NL LAB C   [facility] ([facility])    [address] [facility] [address] [phone]           Please do not eat 10-12 hours before your appointment if you are coming in fasting for labs on lipids, cholesterol, or glucose (sugar). This does not apply to pregnant women. Water, hot tea and black coffee (with nothing added) are okay. Do not drink other fluids, diet soda or chew gum.            Jun 14, 2018 11:00 AM CDT   [facility] Visit with Froilan Peres MD   [facility]    [address] [facility] [address] [phone]              Who to contact     If you have questions or need follow up information about today's clinic visit or your schedule please contact [facility] directly at [phone].  Normal or non-critical lab and imaging results will be communicated to you by [employer], letter or phone within 4 business days after the clinic has received the results. If you do not hear from us within 7 days, please contact the clinic through [email or web address] or phone. If you have a critical or abnormal lab result, we will notify you by phone as soon as possible.  Submit refill requests through [employer] or call your pharmacy and they will forward the refill request to us. Please allow 3 business days for your refill to be completed.          Additional Information About Your Visit        [employer] Information     [employer] gives you secure access to your electronic health record. If you see a primary care provider, you can also send messages to your care team and make appointments. If you have questions, please call your primary care clinic.  If you do not have a primary care provider, please call [phone] and they will assist you.        Care EveryWhere ID     This is your Care EveryWhere ID. This could be used by other organizations to access your [facility]  "records  [id number]        Your Vitals Were     Pulse Temperature Respirations Height BMI (Body Mass Index)       88 98.9  F (37.2  C) (Temporal) 16 5' 10\" (1.778 m) 22.81 kg/m2        Blood Pressure from Last 3 Encounters:   04/11/18 134/70   02/14/18 136/79   02/07/18 132/75    Weight from Last 3 Encounters:   04/11/18 159 lb (72.1 kg)   03/30/18 154 lb (69.9 kg)   03/19/18 154 lb (69.9 kg)              Today, you had the following     No orders found for display         Today's Medication Changes          These changes are accurate as of 4/11/18  4:56 PM.  If you have any questions, ask your nurse or doctor.               These medicines have changed or have updated prescriptions.        Dose/Directions    atorvastatin 20 MG tablet   Commonly known as:  LIPITOR   This may have changed:  when to take this   Used for:  Pure hypercholesterolemia, Hyperlipidemia LDL goal <130        Dose:  20 mg   Take 1 tablet (20 mg) by mouth daily   Quantity:  90 tablet   Refills:  3                Primary Care Provider Office Phone # Fax #    Naomi Kuo PA-C [phone] [address] [place] [address]        Equal Access to Services     ROSA ASHFORD AH: Latanya browno Somurray, waaxda luqadaha, qaybta kaalmada adeegyada, tejinder marr. So [facility] [phone].    ATENCIÓN: Si habla español, tiene a gonzalez disposición servicios gratuitos de asistencia lingüística. Llame al [phone].    We comply with applicable federal civil rights laws and [place] laws. We do not discriminate on the basis of race, color, national origin, age, disability, sex, sexual orientation, or gender identity.            Thank you!     Thank you for choosing [facility]  for your care. Our goal is always to provide you with excellent care. Hearing back from our patients is one way we can continue to improve our services. Please take a few minutes to complete the written survey that " you may receive in the mail after your visit with us. Thank you!             Your Updated Medication List - Protect others around you: Learn how to safely use, store and throw away your medicines at www.disposemymeds.org.          This list is accurate as of 4/11/18  4:56 PM.  Always use your most recent med list.                   Brand Name Dispense Instructions for use Diagnosis    aspirin 81 MG tablet     90 tablet    Take 1 tablet by mouth daily.    Pure hypercholesterolemia       atorvastatin 20 MG tablet    LIPITOR    90 tablet    Take 1 tablet (20 mg) by mouth daily    Pure hypercholesterolemia, Hyperlipidemia LDL goal <130       HYDROcodone-acetaminophen  MG per tablet    NORCO    20 tablet    Take 1 tablet by mouth every 6 hours as needed for moderate to severe pain maximum 4 tablet(s) per day    Plasmacytoma of bone (H)       traMADol 50 MG tablet    ULTRAM    30 tablet    Take 1-2 tablets ( mg) by mouth every 6 hours as needed for pain    Right hip pain       TYLENOL PO      Take 1,000 mg by mouth

## 2018-04-12 ENCOUNTER — TELEPHONE (OUTPATIENT)
Dept: ORTHOPEDICS | Facility: CLINIC | Age: 63
End: 2018-04-12

## 2018-04-12 NOTE — TELEPHONE ENCOUNTER
Dong left a voice message with Nkechi,  regarding he had some questions.  RN called him back, his questions are answered.  He will arrive for surgery on MOnday.

## 2018-04-15 ENCOUNTER — ANESTHESIA EVENT (OUTPATIENT)
Dept: SURGERY | Facility: CLINIC | Age: 63
DRG: 824 | End: 2018-04-15
Payer: COMMERCIAL

## 2018-04-16 ENCOUNTER — ANESTHESIA (OUTPATIENT)
Dept: SURGERY | Facility: CLINIC | Age: 63
DRG: 824 | End: 2018-04-16
Payer: COMMERCIAL

## 2018-04-16 ENCOUNTER — HOSPITAL ENCOUNTER (INPATIENT)
Facility: CLINIC | Age: 63
LOS: 3 days | Discharge: HOME | DRG: 824 | End: 2018-04-19
Attending: ORTHOPAEDIC SURGERY | Admitting: ORTHOPAEDIC SURGERY
Payer: COMMERCIAL

## 2018-04-16 ENCOUNTER — APPOINTMENT (OUTPATIENT)
Dept: GENERAL RADIOLOGY | Facility: CLINIC | Age: 63
DRG: 824 | End: 2018-04-16
Attending: ORTHOPAEDIC SURGERY
Payer: COMMERCIAL

## 2018-04-16 ENCOUNTER — TELEPHONE (OUTPATIENT)
Dept: FAMILY MEDICINE | Facility: OTHER | Age: 63
End: 2018-04-16

## 2018-04-16 DIAGNOSIS — Z98.890 STATUS POST HIP SURGERY: Primary | ICD-10-CM

## 2018-04-16 LAB
CREAT SERPL-MCNC: 0.81 MG/DL (ref 0.66–1.25)
GFR SERPL CREATININE-BSD FRML MDRD: >90 ML/MIN/1.7M2
GLUCOSE SERPL-MCNC: 97 MG/DL (ref 70–99)
HGB BLD-MCNC: 13.6 G/DL (ref 13.3–17.7)
PLATELET # BLD AUTO: 236 10E9/L (ref 150–450)

## 2018-04-16 PROCEDURE — 25000132 ZZH RX MED GY IP 250 OP 250 PS 637: Performed by: ORTHOPAEDIC SURGERY

## 2018-04-16 PROCEDURE — 25000128 H RX IP 250 OP 636: Performed by: ANESTHESIOLOGY

## 2018-04-16 PROCEDURE — 25000128 H RX IP 250 OP 636: Performed by: NURSE ANESTHETIST, CERTIFIED REGISTERED

## 2018-04-16 PROCEDURE — C1713 ANCHOR/SCREW BN/BN,TIS/BN: HCPCS | Performed by: ORTHOPAEDIC SURGERY

## 2018-04-16 PROCEDURE — 85049 AUTOMATED PLATELET COUNT: CPT | Performed by: ORTHOPAEDIC SURGERY

## 2018-04-16 PROCEDURE — 88341 IMHCHEM/IMCYTCHM EA ADD ANTB: CPT | Performed by: ORTHOPAEDIC SURGERY

## 2018-04-16 PROCEDURE — 37000008 ZZH ANESTHESIA TECHNICAL FEE, 1ST 30 MIN: Performed by: ORTHOPAEDIC SURGERY

## 2018-04-16 PROCEDURE — 71000015 ZZH RECOVERY PHASE 1 LEVEL 2 EA ADDTL HR: Performed by: ORTHOPAEDIC SURGERY

## 2018-04-16 PROCEDURE — 40000170 ZZH STATISTIC PRE-PROCEDURE ASSESSMENT II: Performed by: ORTHOPAEDIC SURGERY

## 2018-04-16 PROCEDURE — 27210794 ZZH OR GENERAL SUPPLY STERILE: Performed by: ORTHOPAEDIC SURGERY

## 2018-04-16 PROCEDURE — 85018 HEMOGLOBIN: CPT | Performed by: ANESTHESIOLOGY

## 2018-04-16 PROCEDURE — 86850 RBC ANTIBODY SCREEN: CPT | Performed by: ORTHOPAEDIC SURGERY

## 2018-04-16 PROCEDURE — 0SR9029 REPLACEMENT OF RIGHT HIP JOINT WITH METAL ON POLYETHYLENE SYNTHETIC SUBSTITUTE, CEMENTED, OPEN APPROACH: ICD-10-PCS | Performed by: ORTHOPAEDIC SURGERY

## 2018-04-16 PROCEDURE — 37000009 ZZH ANESTHESIA TECHNICAL FEE, EACH ADDTL 15 MIN: Performed by: ORTHOPAEDIC SURGERY

## 2018-04-16 PROCEDURE — 36000076 ZZH SURGERY LEVEL 6 EA 15 ADDTL MIN - UMMC: Performed by: ORTHOPAEDIC SURGERY

## 2018-04-16 PROCEDURE — C9399 UNCLASSIFIED DRUGS OR BIOLOG: HCPCS | Performed by: NURSE ANESTHETIST, CERTIFIED REGISTERED

## 2018-04-16 PROCEDURE — 86923 COMPATIBILITY TEST ELECTRIC: CPT | Performed by: ORTHOPAEDIC SURGERY

## 2018-04-16 PROCEDURE — 27210995 ZZH RX 272: Performed by: ORTHOPAEDIC SURGERY

## 2018-04-16 PROCEDURE — 88313 SPECIAL STAINS GROUP 2: CPT | Mod: 26 | Performed by: ORTHOPAEDIC SURGERY

## 2018-04-16 PROCEDURE — 88342 IMHCHEM/IMCYTCHM 1ST ANTB: CPT | Mod: 26 | Performed by: ORTHOPAEDIC SURGERY

## 2018-04-16 PROCEDURE — 71000014 ZZH RECOVERY PHASE 1 LEVEL 2 FIRST HR: Performed by: ORTHOPAEDIC SURGERY

## 2018-04-16 PROCEDURE — 73502 X-RAY EXAM HIP UNI 2-3 VIEWS: CPT

## 2018-04-16 PROCEDURE — 36415 COLL VENOUS BLD VENIPUNCTURE: CPT | Performed by: ANESTHESIOLOGY

## 2018-04-16 PROCEDURE — 40000278 XR SURGERY CARM FLUORO LESS THAN 5 MIN: Mod: TC

## 2018-04-16 PROCEDURE — 25000125 ZZHC RX 250: Performed by: NURSE ANESTHETIST, CERTIFIED REGISTERED

## 2018-04-16 PROCEDURE — 88342 IMHCHEM/IMCYTCHM 1ST ANTB: CPT | Performed by: ORTHOPAEDIC SURGERY

## 2018-04-16 PROCEDURE — 25000132 ZZH RX MED GY IP 250 OP 250 PS 637: Performed by: ANESTHESIOLOGY

## 2018-04-16 PROCEDURE — 36415 COLL VENOUS BLD VENIPUNCTURE: CPT | Performed by: ORTHOPAEDIC SURGERY

## 2018-04-16 PROCEDURE — 82565 ASSAY OF CREATININE: CPT | Performed by: ORTHOPAEDIC SURGERY

## 2018-04-16 PROCEDURE — 88307 TISSUE EXAM BY PATHOLOGIST: CPT | Mod: 26 | Performed by: ORTHOPAEDIC SURGERY

## 2018-04-16 PROCEDURE — 36000078 ZZH SURGERY LEVEL 6 W FLUORO 1ST 30 MIN - UMMC: Performed by: ORTHOPAEDIC SURGERY

## 2018-04-16 PROCEDURE — P9041 ALBUMIN (HUMAN),5%, 50ML: HCPCS | Performed by: NURSE ANESTHETIST, CERTIFIED REGISTERED

## 2018-04-16 PROCEDURE — 82947 ASSAY GLUCOSE BLOOD QUANT: CPT | Performed by: ANESTHESIOLOGY

## 2018-04-16 PROCEDURE — 25800025 ZZH RX 258: Performed by: ORTHOPAEDIC SURGERY

## 2018-04-16 PROCEDURE — 12000001 ZZH R&B MED SURG/OB UMMC

## 2018-04-16 PROCEDURE — 88313 SPECIAL STAINS GROUP 2: CPT | Performed by: ORTHOPAEDIC SURGERY

## 2018-04-16 PROCEDURE — C1776 JOINT DEVICE (IMPLANTABLE): HCPCS | Performed by: ORTHOPAEDIC SURGERY

## 2018-04-16 PROCEDURE — 25000128 H RX IP 250 OP 636: Performed by: ORTHOPAEDIC SURGERY

## 2018-04-16 PROCEDURE — 88307 TISSUE EXAM BY PATHOLOGIST: CPT | Performed by: ORTHOPAEDIC SURGERY

## 2018-04-16 PROCEDURE — 27810169 ZZH OR IMPLANT GENERAL: Performed by: ORTHOPAEDIC SURGERY

## 2018-04-16 PROCEDURE — 25000566 ZZH SEVOFLURANE, EA 15 MIN: Performed by: ORTHOPAEDIC SURGERY

## 2018-04-16 PROCEDURE — 88341 IMHCHEM/IMCYTCHM EA ADD ANTB: CPT | Mod: 26 | Performed by: ORTHOPAEDIC SURGERY

## 2018-04-16 PROCEDURE — 86900 BLOOD TYPING SEROLOGIC ABO: CPT | Performed by: ORTHOPAEDIC SURGERY

## 2018-04-16 PROCEDURE — 86901 BLOOD TYPING SEROLOGIC RH(D): CPT | Performed by: ORTHOPAEDIC SURGERY

## 2018-04-16 DEVICE — IMPLANTABLE DEVICE: Type: IMPLANTABLE DEVICE | Site: HIP | Status: FUNCTIONAL

## 2018-04-16 DEVICE — BONE CEMENT SIMPLEX FULL DOSE 6191-1-001: Type: IMPLANTABLE DEVICE | Site: HIP | Status: FUNCTIONAL

## 2018-04-16 RX ORDER — ONDANSETRON 4 MG/1
4 TABLET, ORALLY DISINTEGRATING ORAL EVERY 30 MIN PRN
Status: DISCONTINUED | OUTPATIENT
Start: 2018-04-16 | End: 2018-04-16

## 2018-04-16 RX ORDER — PROCHLORPERAZINE MALEATE 5 MG
10 TABLET ORAL EVERY 6 HOURS PRN
Status: DISCONTINUED | OUTPATIENT
Start: 2018-04-16 | End: 2018-04-19 | Stop reason: HOSPADM

## 2018-04-16 RX ORDER — HYDROMORPHONE HYDROCHLORIDE 1 MG/ML
.3-.5 INJECTION, SOLUTION INTRAMUSCULAR; INTRAVENOUS; SUBCUTANEOUS EVERY 10 MIN PRN
Status: DISCONTINUED | OUTPATIENT
Start: 2018-04-16 | End: 2018-04-16 | Stop reason: HOSPADM

## 2018-04-16 RX ORDER — SODIUM CHLORIDE, SODIUM LACTATE, POTASSIUM CHLORIDE, CALCIUM CHLORIDE 600; 310; 30; 20 MG/100ML; MG/100ML; MG/100ML; MG/100ML
INJECTION, SOLUTION INTRAVENOUS CONTINUOUS
Status: DISCONTINUED | OUTPATIENT
Start: 2018-04-16 | End: 2018-04-16

## 2018-04-16 RX ORDER — CEFAZOLIN SODIUM 1 G/3ML
1 INJECTION, POWDER, FOR SOLUTION INTRAMUSCULAR; INTRAVENOUS SEE ADMIN INSTRUCTIONS
Status: DISCONTINUED | OUTPATIENT
Start: 2018-04-16 | End: 2018-04-16 | Stop reason: HOSPADM

## 2018-04-16 RX ORDER — ONDANSETRON 2 MG/ML
4 INJECTION INTRAMUSCULAR; INTRAVENOUS EVERY 6 HOURS PRN
Status: DISCONTINUED | OUTPATIENT
Start: 2018-04-16 | End: 2018-04-19 | Stop reason: HOSPADM

## 2018-04-16 RX ORDER — SODIUM CHLORIDE, SODIUM LACTATE, POTASSIUM CHLORIDE, CALCIUM CHLORIDE 600; 310; 30; 20 MG/100ML; MG/100ML; MG/100ML; MG/100ML
INJECTION, SOLUTION INTRAVENOUS CONTINUOUS PRN
Status: DISCONTINUED | OUTPATIENT
Start: 2018-04-16 | End: 2018-04-16

## 2018-04-16 RX ORDER — METOCLOPRAMIDE 10 MG/1
10 TABLET ORAL EVERY 6 HOURS PRN
Status: DISCONTINUED | OUTPATIENT
Start: 2018-04-16 | End: 2018-04-19 | Stop reason: HOSPADM

## 2018-04-16 RX ORDER — CEFAZOLIN SODIUM 1 G/3ML
1 INJECTION, POWDER, FOR SOLUTION INTRAMUSCULAR; INTRAVENOUS EVERY 8 HOURS
Status: COMPLETED | OUTPATIENT
Start: 2018-04-17 | End: 2018-04-17

## 2018-04-16 RX ORDER — NALOXONE HYDROCHLORIDE 0.4 MG/ML
.1-.4 INJECTION, SOLUTION INTRAMUSCULAR; INTRAVENOUS; SUBCUTANEOUS
Status: DISCONTINUED | OUTPATIENT
Start: 2018-04-16 | End: 2018-04-16

## 2018-04-16 RX ORDER — ALBUMIN, HUMAN INJ 5% 5 %
SOLUTION INTRAVENOUS CONTINUOUS PRN
Status: DISCONTINUED | OUTPATIENT
Start: 2018-04-16 | End: 2018-04-16

## 2018-04-16 RX ORDER — MEPERIDINE HYDROCHLORIDE 25 MG/ML
12.5 INJECTION INTRAMUSCULAR; INTRAVENOUS; SUBCUTANEOUS
Status: DISCONTINUED | OUTPATIENT
Start: 2018-04-16 | End: 2018-04-16

## 2018-04-16 RX ORDER — PROPOFOL 10 MG/ML
INJECTION, EMULSION INTRAVENOUS PRN
Status: DISCONTINUED | OUTPATIENT
Start: 2018-04-16 | End: 2018-04-16

## 2018-04-16 RX ORDER — AMOXICILLIN 250 MG
1 CAPSULE ORAL 2 TIMES DAILY
Status: DISCONTINUED | OUTPATIENT
Start: 2018-04-16 | End: 2018-04-19 | Stop reason: HOSPADM

## 2018-04-16 RX ORDER — ONDANSETRON 2 MG/ML
4 INJECTION INTRAMUSCULAR; INTRAVENOUS EVERY 30 MIN PRN
Status: DISCONTINUED | OUTPATIENT
Start: 2018-04-16 | End: 2018-04-16 | Stop reason: HOSPADM

## 2018-04-16 RX ORDER — ESMOLOL HYDROCHLORIDE 10 MG/ML
INJECTION INTRAVENOUS PRN
Status: DISCONTINUED | OUTPATIENT
Start: 2018-04-16 | End: 2018-04-16

## 2018-04-16 RX ORDER — ACETAMINOPHEN 325 MG/1
650 TABLET ORAL EVERY 4 HOURS PRN
Status: DISCONTINUED | OUTPATIENT
Start: 2018-04-19 | End: 2018-04-17

## 2018-04-16 RX ORDER — AMOXICILLIN 250 MG
2 CAPSULE ORAL 2 TIMES DAILY
Status: DISCONTINUED | OUTPATIENT
Start: 2018-04-16 | End: 2018-04-19 | Stop reason: HOSPADM

## 2018-04-16 RX ORDER — ATORVASTATIN CALCIUM 20 MG/1
20 TABLET, FILM COATED ORAL AT BEDTIME
Status: DISCONTINUED | OUTPATIENT
Start: 2018-04-16 | End: 2018-04-19 | Stop reason: HOSPADM

## 2018-04-16 RX ORDER — ONDANSETRON 4 MG/1
4 TABLET, ORALLY DISINTEGRATING ORAL EVERY 6 HOURS PRN
Status: DISCONTINUED | OUTPATIENT
Start: 2018-04-16 | End: 2018-04-19 | Stop reason: HOSPADM

## 2018-04-16 RX ORDER — NALOXONE HYDROCHLORIDE 0.4 MG/ML
.1-.4 INJECTION, SOLUTION INTRAMUSCULAR; INTRAVENOUS; SUBCUTANEOUS
Status: DISCONTINUED | OUTPATIENT
Start: 2018-04-16 | End: 2018-04-16 | Stop reason: HOSPADM

## 2018-04-16 RX ORDER — METOCLOPRAMIDE HYDROCHLORIDE 5 MG/ML
10 INJECTION INTRAMUSCULAR; INTRAVENOUS EVERY 6 HOURS PRN
Status: DISCONTINUED | OUTPATIENT
Start: 2018-04-16 | End: 2018-04-19 | Stop reason: HOSPADM

## 2018-04-16 RX ORDER — FENTANYL CITRATE 50 UG/ML
INJECTION, SOLUTION INTRAMUSCULAR; INTRAVENOUS PRN
Status: DISCONTINUED | OUTPATIENT
Start: 2018-04-16 | End: 2018-04-16

## 2018-04-16 RX ORDER — OXYCODONE HYDROCHLORIDE 5 MG/1
5-10 TABLET ORAL
Status: DISCONTINUED | OUTPATIENT
Start: 2018-04-16 | End: 2018-04-19 | Stop reason: HOSPADM

## 2018-04-16 RX ORDER — LIDOCAINE 40 MG/G
CREAM TOPICAL
Status: DISCONTINUED | OUTPATIENT
Start: 2018-04-16 | End: 2018-04-19 | Stop reason: HOSPADM

## 2018-04-16 RX ORDER — HYDROMORPHONE HYDROCHLORIDE 1 MG/ML
.3-.5 INJECTION, SOLUTION INTRAMUSCULAR; INTRAVENOUS; SUBCUTANEOUS
Status: DISCONTINUED | OUTPATIENT
Start: 2018-04-16 | End: 2018-04-19 | Stop reason: HOSPADM

## 2018-04-16 RX ORDER — HYDROXYZINE HYDROCHLORIDE 25 MG/1
25 TABLET, FILM COATED ORAL EVERY 6 HOURS PRN
Status: DISCONTINUED | OUTPATIENT
Start: 2018-04-16 | End: 2018-04-17

## 2018-04-16 RX ORDER — ACETAMINOPHEN 325 MG/1
975 TABLET ORAL EVERY 8 HOURS
Status: DISCONTINUED | OUTPATIENT
Start: 2018-04-16 | End: 2018-04-17

## 2018-04-16 RX ORDER — GABAPENTIN 300 MG/1
300 CAPSULE ORAL ONCE
Status: COMPLETED | OUTPATIENT
Start: 2018-04-16 | End: 2018-04-16

## 2018-04-16 RX ORDER — EPHEDRINE SULFATE 50 MG/ML
INJECTION, SOLUTION INTRAMUSCULAR; INTRAVENOUS; SUBCUTANEOUS PRN
Status: DISCONTINUED | OUTPATIENT
Start: 2018-04-16 | End: 2018-04-16

## 2018-04-16 RX ORDER — NALOXONE HYDROCHLORIDE 0.4 MG/ML
.1-.4 INJECTION, SOLUTION INTRAMUSCULAR; INTRAVENOUS; SUBCUTANEOUS
Status: DISCONTINUED | OUTPATIENT
Start: 2018-04-16 | End: 2018-04-19 | Stop reason: HOSPADM

## 2018-04-16 RX ORDER — MAGNESIUM HYDROXIDE 1200 MG/15ML
LIQUID ORAL PRN
Status: DISCONTINUED | OUTPATIENT
Start: 2018-04-16 | End: 2018-04-16 | Stop reason: HOSPADM

## 2018-04-16 RX ORDER — CEFAZOLIN SODIUM 2 G/100ML
2 INJECTION, SOLUTION INTRAVENOUS
Status: COMPLETED | OUTPATIENT
Start: 2018-04-16 | End: 2018-04-16

## 2018-04-16 RX ORDER — SODIUM CHLORIDE, SODIUM LACTATE, POTASSIUM CHLORIDE, CALCIUM CHLORIDE 600; 310; 30; 20 MG/100ML; MG/100ML; MG/100ML; MG/100ML
INJECTION, SOLUTION INTRAVENOUS CONTINUOUS
Status: DISCONTINUED | OUTPATIENT
Start: 2018-04-16 | End: 2018-04-18

## 2018-04-16 RX ORDER — ONDANSETRON 2 MG/ML
INJECTION INTRAMUSCULAR; INTRAVENOUS PRN
Status: DISCONTINUED | OUTPATIENT
Start: 2018-04-16 | End: 2018-04-16

## 2018-04-16 RX ORDER — ACETAMINOPHEN 325 MG/1
975 TABLET ORAL ONCE
Status: COMPLETED | OUTPATIENT
Start: 2018-04-16 | End: 2018-04-16

## 2018-04-16 RX ORDER — FENTANYL CITRATE 50 UG/ML
25-50 INJECTION, SOLUTION INTRAMUSCULAR; INTRAVENOUS
Status: DISCONTINUED | OUTPATIENT
Start: 2018-04-16 | End: 2018-04-16

## 2018-04-16 RX ORDER — MEPERIDINE HYDROCHLORIDE 25 MG/ML
12.5 INJECTION INTRAMUSCULAR; INTRAVENOUS; SUBCUTANEOUS
Status: DISCONTINUED | OUTPATIENT
Start: 2018-04-16 | End: 2018-04-16 | Stop reason: HOSPADM

## 2018-04-16 RX ORDER — SODIUM CHLORIDE, SODIUM LACTATE, POTASSIUM CHLORIDE, CALCIUM CHLORIDE 600; 310; 30; 20 MG/100ML; MG/100ML; MG/100ML; MG/100ML
INJECTION, SOLUTION INTRAVENOUS CONTINUOUS
Status: DISCONTINUED | OUTPATIENT
Start: 2018-04-16 | End: 2018-04-16 | Stop reason: HOSPADM

## 2018-04-16 RX ORDER — LIDOCAINE HYDROCHLORIDE 20 MG/ML
INJECTION, SOLUTION INFILTRATION; PERINEURAL PRN
Status: DISCONTINUED | OUTPATIENT
Start: 2018-04-16 | End: 2018-04-16

## 2018-04-16 RX ORDER — ONDANSETRON 4 MG/1
4 TABLET, ORALLY DISINTEGRATING ORAL EVERY 30 MIN PRN
Status: DISCONTINUED | OUTPATIENT
Start: 2018-04-16 | End: 2018-04-16 | Stop reason: HOSPADM

## 2018-04-16 RX ORDER — BISACODYL 10 MG
10 SUPPOSITORY, RECTAL RECTAL DAILY
Status: DISCONTINUED | OUTPATIENT
Start: 2018-04-17 | End: 2018-04-18

## 2018-04-16 RX ORDER — ONDANSETRON 2 MG/ML
4 INJECTION INTRAMUSCULAR; INTRAVENOUS EVERY 30 MIN PRN
Status: DISCONTINUED | OUTPATIENT
Start: 2018-04-16 | End: 2018-04-16

## 2018-04-16 RX ORDER — FENTANYL CITRATE 50 UG/ML
25-50 INJECTION, SOLUTION INTRAMUSCULAR; INTRAVENOUS EVERY 5 MIN PRN
Status: DISCONTINUED | OUTPATIENT
Start: 2018-04-16 | End: 2018-04-16 | Stop reason: HOSPADM

## 2018-04-16 RX ORDER — SODIUM CHLORIDE 9 MG/ML
INJECTION, SOLUTION INTRAVENOUS CONTINUOUS PRN
Status: DISCONTINUED | OUTPATIENT
Start: 2018-04-16 | End: 2018-04-16

## 2018-04-16 RX ADMIN — FENTANYL CITRATE 50 MCG: 50 INJECTION, SOLUTION INTRAMUSCULAR; INTRAVENOUS at 15:10

## 2018-04-16 RX ADMIN — ROCURONIUM BROMIDE 70 MG: 10 INJECTION INTRAVENOUS at 15:20

## 2018-04-16 RX ADMIN — ALBUMIN (HUMAN): 12.5 SOLUTION INTRAVENOUS at 17:57

## 2018-04-16 RX ADMIN — FENTANYL CITRATE 50 MCG: 50 INJECTION, SOLUTION INTRAMUSCULAR; INTRAVENOUS at 16:41

## 2018-04-16 RX ADMIN — SODIUM CHLORIDE, POTASSIUM CHLORIDE, SODIUM LACTATE AND CALCIUM CHLORIDE: 600; 310; 30; 20 INJECTION, SOLUTION INTRAVENOUS at 17:07

## 2018-04-16 RX ADMIN — HYDROMORPHONE HYDROCHLORIDE 0.5 MG: 1 INJECTION, SOLUTION INTRAMUSCULAR; INTRAVENOUS; SUBCUTANEOUS at 19:36

## 2018-04-16 RX ADMIN — FENTANYL CITRATE 50 MCG: 50 INJECTION, SOLUTION INTRAMUSCULAR; INTRAVENOUS at 19:16

## 2018-04-16 RX ADMIN — Medication 5 MG: at 17:07

## 2018-04-16 RX ADMIN — CEFAZOLIN SODIUM 2 G: 2 INJECTION, SOLUTION INTRAVENOUS at 15:30

## 2018-04-16 RX ADMIN — PHENYLEPHRINE HYDROCHLORIDE 50 MCG: 10 INJECTION, SOLUTION INTRAMUSCULAR; INTRAVENOUS; SUBCUTANEOUS at 17:45

## 2018-04-16 RX ADMIN — PHENYLEPHRINE HYDROCHLORIDE 100 MCG: 10 INJECTION, SOLUTION INTRAMUSCULAR; INTRAVENOUS; SUBCUTANEOUS at 15:24

## 2018-04-16 RX ADMIN — FENTANYL CITRATE 50 MCG: 50 INJECTION, SOLUTION INTRAMUSCULAR; INTRAVENOUS at 16:26

## 2018-04-16 RX ADMIN — SENNOSIDES AND DOCUSATE SODIUM 1 TABLET: 8.6; 5 TABLET ORAL at 22:47

## 2018-04-16 RX ADMIN — HYDROMORPHONE HYDROCHLORIDE 0.5 MG: 1 INJECTION, SOLUTION INTRAMUSCULAR; INTRAVENOUS; SUBCUTANEOUS at 19:56

## 2018-04-16 RX ADMIN — HYDROMORPHONE HYDROCHLORIDE 0.5 MG: 1 INJECTION, SOLUTION INTRAMUSCULAR; INTRAVENOUS; SUBCUTANEOUS at 19:46

## 2018-04-16 RX ADMIN — SODIUM CHLORIDE, POTASSIUM CHLORIDE, SODIUM LACTATE AND CALCIUM CHLORIDE: 600; 310; 30; 20 INJECTION, SOLUTION INTRAVENOUS at 15:10

## 2018-04-16 RX ADMIN — Medication 5 MG: at 16:36

## 2018-04-16 RX ADMIN — ALBUMIN (HUMAN): 12.5 SOLUTION INTRAVENOUS at 18:15

## 2018-04-16 RX ADMIN — PHENYLEPHRINE HYDROCHLORIDE 50 MCG: 10 INJECTION, SOLUTION INTRAMUSCULAR; INTRAVENOUS; SUBCUTANEOUS at 17:38

## 2018-04-16 RX ADMIN — FENTANYL CITRATE 50 MCG: 50 INJECTION, SOLUTION INTRAMUSCULAR; INTRAVENOUS at 19:23

## 2018-04-16 RX ADMIN — ATORVASTATIN CALCIUM 20 MG: 20 TABLET, FILM COATED ORAL at 22:47

## 2018-04-16 RX ADMIN — ONDANSETRON 4 MG: 2 INJECTION INTRAMUSCULAR; INTRAVENOUS at 18:35

## 2018-04-16 RX ADMIN — SODIUM CHLORIDE, POTASSIUM CHLORIDE, SODIUM LACTATE AND CALCIUM CHLORIDE: 600; 310; 30; 20 INJECTION, SOLUTION INTRAVENOUS at 16:00

## 2018-04-16 RX ADMIN — PHENYLEPHRINE HYDROCHLORIDE 50 MCG: 10 INJECTION, SOLUTION INTRAMUSCULAR; INTRAVENOUS; SUBCUTANEOUS at 17:18

## 2018-04-16 RX ADMIN — ESMOLOL HYDROCHLORIDE 20 MG: 10 INJECTION, SOLUTION INTRAVENOUS at 16:46

## 2018-04-16 RX ADMIN — FENTANYL CITRATE 50 MCG: 50 INJECTION, SOLUTION INTRAMUSCULAR; INTRAVENOUS at 19:32

## 2018-04-16 RX ADMIN — PHENYLEPHRINE HYDROCHLORIDE 50 MCG: 10 INJECTION, SOLUTION INTRAMUSCULAR; INTRAVENOUS; SUBCUTANEOUS at 17:25

## 2018-04-16 RX ADMIN — PHENYLEPHRINE HYDROCHLORIDE 100 MCG: 10 INJECTION, SOLUTION INTRAMUSCULAR; INTRAVENOUS; SUBCUTANEOUS at 15:27

## 2018-04-16 RX ADMIN — PROPOFOL 180 MG: 10 INJECTION, EMULSION INTRAVENOUS at 15:20

## 2018-04-16 RX ADMIN — FENTANYL CITRATE 50 MCG: 50 INJECTION, SOLUTION INTRAMUSCULAR; INTRAVENOUS at 15:20

## 2018-04-16 RX ADMIN — CEFAZOLIN SODIUM 1 G: 2 INJECTION, SOLUTION INTRAVENOUS at 17:25

## 2018-04-16 RX ADMIN — LIDOCAINE HYDROCHLORIDE 100 MG: 20 INJECTION, SOLUTION INFILTRATION; PERINEURAL at 15:20

## 2018-04-16 RX ADMIN — OXYCODONE HYDROCHLORIDE 5 MG: 5 TABLET ORAL at 22:02

## 2018-04-16 RX ADMIN — MIDAZOLAM 2 MG: 1 INJECTION INTRAMUSCULAR; INTRAVENOUS at 15:07

## 2018-04-16 RX ADMIN — PHENYLEPHRINE HYDROCHLORIDE 0.1 MCG/KG/MIN: 10 INJECTION, SOLUTION INTRAMUSCULAR; INTRAVENOUS; SUBCUTANEOUS at 17:27

## 2018-04-16 RX ADMIN — FENTANYL CITRATE 50 MCG: 50 INJECTION, SOLUTION INTRAMUSCULAR; INTRAVENOUS at 19:03

## 2018-04-16 RX ADMIN — Medication 5 MG: at 17:00

## 2018-04-16 RX ADMIN — PHENYLEPHRINE HYDROCHLORIDE 100 MCG: 10 INJECTION, SOLUTION INTRAMUSCULAR; INTRAVENOUS; SUBCUTANEOUS at 15:51

## 2018-04-16 RX ADMIN — ROCURONIUM BROMIDE 30 MG: 10 INJECTION INTRAVENOUS at 17:00

## 2018-04-16 RX ADMIN — ESMOLOL HYDROCHLORIDE 50 MG: 10 INJECTION, SOLUTION INTRAVENOUS at 15:20

## 2018-04-16 RX ADMIN — ACETAMINOPHEN 975 MG: 325 TABLET ORAL at 12:21

## 2018-04-16 RX ADMIN — SODIUM CHLORIDE: 9 INJECTION, SOLUTION INTRAVENOUS at 16:00

## 2018-04-16 RX ADMIN — HYDROMORPHONE HYDROCHLORIDE 0.5 MG: 1 INJECTION, SOLUTION INTRAMUSCULAR; INTRAVENOUS; SUBCUTANEOUS at 19:05

## 2018-04-16 RX ADMIN — PHENYLEPHRINE HYDROCHLORIDE 100 MCG: 10 INJECTION, SOLUTION INTRAMUSCULAR; INTRAVENOUS; SUBCUTANEOUS at 17:11

## 2018-04-16 RX ADMIN — FENTANYL CITRATE 50 MCG: 50 INJECTION, SOLUTION INTRAMUSCULAR; INTRAVENOUS at 18:00

## 2018-04-16 RX ADMIN — PROCHLORPERAZINE EDISYLATE 10 MG: 5 INJECTION INTRAMUSCULAR; INTRAVENOUS at 21:52

## 2018-04-16 RX ADMIN — GABAPENTIN 300 MG: 300 CAPSULE ORAL at 12:21

## 2018-04-16 RX ADMIN — FENTANYL CITRATE 100 MCG: 50 INJECTION, SOLUTION INTRAMUSCULAR; INTRAVENOUS at 15:48

## 2018-04-16 RX ADMIN — ACETAMINOPHEN 975 MG: 325 TABLET ORAL at 20:32

## 2018-04-16 RX ADMIN — FENTANYL CITRATE 50 MCG: 50 INJECTION, SOLUTION INTRAMUSCULAR; INTRAVENOUS at 19:27

## 2018-04-16 RX ADMIN — PHENYLEPHRINE HYDROCHLORIDE 50 MCG: 10 INJECTION, SOLUTION INTRAMUSCULAR; INTRAVENOUS; SUBCUTANEOUS at 18:30

## 2018-04-16 RX ADMIN — HYDROMORPHONE HYDROCHLORIDE 0.5 MG: 1 INJECTION, SOLUTION INTRAMUSCULAR; INTRAVENOUS; SUBCUTANEOUS at 19:10

## 2018-04-16 RX ADMIN — SUGAMMADEX 140 MG: 100 INJECTION, SOLUTION INTRAVENOUS at 18:50

## 2018-04-16 RX ADMIN — Medication 5 MG: at 16:02

## 2018-04-16 ASSESSMENT — LIFESTYLE VARIABLES: TOBACCO_USE: 1

## 2018-04-16 NOTE — BRIEF OP NOTE
[facility] Brief Operative Note    Pre-operative diagnosis: Tumor   Post-operative diagnosis Tumor right pelvis   Procedure: Procedure(s):  Right Total Hip Arthroplasty And Removal Of Right Pelvic Tumor - Wound Class: I-Clean   - Wound Class: I-Clean   Surgeon(s): Surgeon(s) and Role:  Panel 1:     * Johnnie Ellsworth MD - Primary     * Maia Pike PA-C     * Michael Terrazas MD - Resident - Assisting    Panel 2:     * Johnnie Ellsworth MD - Primary     * Maia Pike PA-C     * Michael Terrazas MD - Resident - Assisting   Estimated blood loss: 1450 mL    Specimens:   ID Type Source Tests Collected by Time Destination   A : Right Pelvic Tumor Tissue Pelvis, Right SURGICAL PATHOLOGY EXAM Johnnie Ellsworth MD 4/16/2018  4:56 PM       Findings: Tumor right pelvis with bony destruction    Post-op Plan: Admit to floor x 3-4 days  WB status:   FWB, hip precautions  Device: Hip abduction pillow  DVT Prophylaxis:  Lovenox x 4 weeks  Follow-up:  2 weeks with Dr. Ellsworth/DEMI for wound check

## 2018-04-16 NOTE — TELEPHONE ENCOUNTER
I will wait to add chronic pain to his problem list, will see if this is a chronic issue once he has recovered from surgery.  Naomi Kuo PA-C

## 2018-04-16 NOTE — IP AVS SNAPSHOT
Dong Joseph #[id number] (CSN: [id number])  (63 year old M)  (Adm: 18)     [id number]               UR 8A: [phone]            Patient Demographics     Patient Name Sex          Age SSN Address Phone    JakeDong Male 1955 (63 year old) [id number]  [place] [address] [phone] (Home)  [phone] (Work)  [phone] (Mobile)      PCP and Center    Primary Care Provider  Phone [place]     ShielaKushalle [phone] [address] [place] [address]        Emergency Contact(s)     Name Relation Home Work Mobile    Mayito Joseph [phone]      Admission Information     Attending Provider Admitting Provider Admission Type Admission Date/Time    Johnnie Ellsworth MD Ogilvie, Christian McKay, MD Elective 18  1123    Discharge Date Hospital Service Auth/Cert Status Service Area     Surgery Incomplete [facility]    Unit Room/Bed Admission Status       UR 8A  Admission (Confirmed)       Admission     Complaint    Tumor, Status post hip surgery      Hospital Account     Name Acct ID Class Status Primary Coverage    Dong Joseph [id number] Inpatient Open Your Tribute - Your Tribute OPEN ACCESS            Guarantor Account (for Hospital Account #[id number])     Name Relation to Pt Service Area Active? Acct Type    Dong Joseph  FCS Yes Personal/Family    Address Phone          [address]  [place] [address] [phone](H)  [phone](O)              Coverage Information (for Hospital Account #[id number])     F/O Payor/Plan Precert #    Your Tribute/Your Tribute OPEN ACCESS     Subscriber Subscriber #    Dong Joseph [id number]    Address Phone    [address]  [place] [address] [phone]                                                INTERAGENCY TRANSFER FORM - PHYSICIAN ORDERS   2018                       UR 8A: [phone]            Attending  "Provider: Johnnie Ellsworth MD     Allergies:  No Known Allergies    Infection:  None   Service:  SURGERY    Ht:  1.778 m (5' 10\")   Wt:  71.4 kg (157 lb 6.5 oz)   Admission Wt:  71.4 kg (157 lb 6.5 oz)    BMI:  22.59 kg/m 2   BSA:  1.88 m 2            ED Clinical Impression     Diagnosis Description Comment Added By Time Added    Status post hip surgery [Z98.890] Status post hip surgery [Z98.890]  Michael Terrazas MD 4/19/2018  7:47 AM      Hospital Problems as of 4/19/2018              Priority Class Noted POA    Status post hip surgery Medium  4/16/2018 Yes      Non-Hospital Problems as of 4/19/2018              Priority Class Noted    Pure hypercholesterolemia Medium  Unknown    Impotence of organic origin Medium  Unknown    HYPERLIPIDEMIA LDL GOAL <130 Medium  10/31/2010    Plasmacytoma of bone (H) Medium  2/5/2018      Code Status History     Date Active Date Inactive Code Status Order ID Comments User Context    This patient has a current code status but no historical code status.      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                Medication Review      START taking        Dose / Directions Comments    enoxaparin 40 MG/0.4ML injection   Commonly known as:  LOVENOX        Dose:  40 mg   Inject 0.4 mLs (40 mg) Subcutaneous every 24 hours   Quantity:  10 mL   Refills:  0        gabapentin 100 MG capsule   Commonly known as:  NEURONTIN        Dose:  100 mg   Take 1 capsule (100 mg) by mouth 3 times daily   Quantity:  30 capsule   Refills:  0        oxyCODONE IR 5 MG tablet   Commonly known as:  ROXICODONE        Dose:  5-10 mg   Take 1-2 tablets (5-10 mg) by mouth every 3 hours as needed for other (pain control or improvement in physical function. Hold dose for analgesic side effects.) "   Quantity:  60 tablet   Refills:  0        senna-docusate 8.6-50 MG per tablet   Commonly known as:  SENOKOT-S;PERICOLACE        Dose:  1 tablet   Take 1 tablet by mouth 2 times daily   Quantity:  28 tablet   Refills:  0          CONTINUE these medications which may have CHANGED, or have new prescriptions. If we are uncertain of the size of tablets/capsules you have at home, strength may be listed as something that might have changed.        Dose / Directions Comments    atorvastatin 20 MG tablet   Commonly known as:  LIPITOR   This may have changed:  when to take this   Used for:  Pure hypercholesterolemia, Hyperlipidemia LDL goal <130        Dose:  20 mg   Take 1 tablet (20 mg) by mouth daily   Quantity:  90 tablet   Refills:  3          CONTINUE these medications which have NOT CHANGED        Dose / Directions Comments    aspirin 81 MG tablet   Used for:  Pure hypercholesterolemia        Dose:  81 mg   Take 1 tablet by mouth daily.   Quantity:  90 tablet   Refills:  3        TYLENOL PO        Dose:  1000 mg   Take 1,000 mg by mouth   Refills:  0          STOP taking     HYDROcodone-acetaminophen  MG per tablet   Commonly known as:  NORCO           traMADol 50 MG tablet   Commonly known as:  ULTRAM                   After Care     Activity       Your activity upon discharge: activity as tolerated, posterior hip precautions, weightbearing as tolerated.       Diet       Follow this diet upon discharge: Regular       Discharge Instructions       Surgery: Right Total Hip Replacement    If you have any questions contact Dr. Ellsworth at the following numbers: if you see Dr. Ellsworth at [facility] call [phone].  If you see him at [facility] [place] call [phone].      Follow up appointment:   You are scheduled to follow up with Dr. Ellsworth approximately 2 weeks after your surgery.  If you were seen at [facility], your appointment will most likely be there.  If you were seen at the [facility] at [facility], your  appointment will likely be there.         Anticoagulation:   Take Lovenox prescribed for the total of 4 weeks.  This is given to help minimize your risk of blood clot.    Activity:   -Continue to weight bear on your operative leg as tolerated by pain.  As you are more comfortable, you can discontinue use of the walker and transition to a cane.  Once you are comfortable with the cane, you can also wean from the cane and progress to using no assistive devices.  Do not transition until you are comfortable and have good balance.      -Follow the posterior hip precautions you were taught while at the hospital.        Pain Medications:   -Take pain medications as prescribed.  Wean off the the narcotic pain medications (Oxycodone, Dilaudid, etc) as tolerated by pain.  To help with this, take the Tylenol and Celebrex (if prescribed) to minimize the amount of narcotic pain medication you need to take.      -Do not drive while on pain medications or until your leg feels well enough to do so.    Bandage Care and Showering:   -You can shower with the adhesive bandage covering your knee at the time of discharge.    -Remove the adhesive bandage 7 days after your surgery.  This can be removed at home.  Once removed, it is common to have a few scabs.  Let the scabs fall off on their own - they will do so over the next week or two.  The sutures are resorbable and nothing needs to be removed.    --Once the bandage is removed, you can shower without covering the incision.  Do not soak or bathe the incision in water.  Do not scrub the incision.  Just let the water from the shower run over the incision.    --Contact Dr. Ellsworth or his staff if there is any drainage from the incision or redness.    Leg Swelling and Icing  -Continue icing the hip 5-6 times per day for approximately 20 minutes each time.  This will help with swelling.    -Some swelling in the leg is normal after surgery.  This type of swelling is usually gravity dependent  and is improved in the morning after sleeping.  If the swelling is painful in the calf or the swelling is getting worse, contact Dr. Ellsworth's office.  We may recommend presenting to the Emergency Department to obtain an ultrasound of the leg if there is concern for a DVT.      -Elevate the leg if you are sitting as this will help reduce swelling.    Contact clinic if you note any of the following:  -Fevers greater than 101.5 chills  -Increased pain, redness, swelling or discharge at the surgical site.   -During regular business hours call Dr Ellsworth's office and request to speak with his nurse or the triage nurses. If you see him at [facility] call [phone]. If you see him at [facility] call [phone].   -After hours or on weekends call the hospital  at [phone] and ask to speak with the Orthopaedic resident on call.       Discharge Instructions       [facility] with RN - start 4/20.  Check clinically, VS, CBC with plts, review meds - update PMD.  Call primary MD to arrange/discuss follow up  Suppository if 3 days and no BM. .       Wound care and dressings       Instructions to care for your wound at home: May shower with dressing in place, as it is waterproof.  If water gets underneath the dressing, you must remove it and replace with a dry (e.g. Gauze and tape) dressing.  If current dressing stays intact, you may leave the dressing in place until post-operative day #10.  On post-op day #10, you may remove dressing and shower normally, allowing water to run over the incision.  Do not soak (e.g. bath) or scrub the incision site.  Keep wound clean and dry otherwise.             Referrals     Home Care PT Referral for Hospital Discharge       [facility]  Phone [phone]  Fax [phone]    PT to eval and treat    Your provider has ordered home care - physical therapy. If you have not been contacted within 2 days of your discharge please call the department phone number  listed on the top of this document.       Home care nursing referral       [facility].  Phone  [phone]  Fax  [phone]      Skilled nurse to eval and treat    Requesting SNV to  assess vital signs and weight, respiratory and cardiac status, pain level and activity tolerance, incision for signs/symptoms of infection, hydration, nutrition and home safety.    Your provider has ordered home care nursing services. If you have not been contacted within 2 days of your discharge please call the inpatient department phone number at [phone] .             Follow-Up Appointment Instructions     Follow Up and recommended labs and tests       You are scheduled to follow up with Dr. Ellsworth approximately 2 weeks after your surgery.    If you were seen at the [facility], your appointment will likely be there.    Contact clinic if you have any questions about the date or time.             Your next 10 appointments already scheduled     Apr 24, 2018 12:00 PM CDT   Return Visit with Garrett Dejesus MD   [facility]    [address] [place] [address] [phone]            May 02, 2018 12:00 PM CDT   (Arrive by 11:45 AM)   Return Visit with Johnnie Ellsworth MD   [facility] ([facility])    [address] [address] [phone]            Jun 07, 2018 11:00 AM CDT   LAB with NL LAB [facility] ([facility])    [address] [facility] [place] [address] [phone]           Please do not eat 10-12 hours before your appointment if you are coming in fasting for labs on lipids, cholesterol, or glucose (sugar). This does not apply to pregnant women. Water, hot tea and black coffee (with nothing added) are okay. Do not drink other fluids, diet soda or chew gum.            Jun 14, 2018 11:00 AM CDT   Return Visit with Froilan ESTRADA  "MD Ja   [facility]    [address] [facility] [id number] [phone]              Statement of Approval     Ordered          04/19/18 1035  I have reviewed and agree with all the recommendations and orders detailed in this document.  EFFECTIVE NOW     Approved and electronically signed by:  Margot Coley APRN CNP           04/19/18 1421  I have reviewed and agree with all the recommendations and orders detailed in this document.     Approved and electronically signed by:  Tavo Lopes MD                                                 INTERAGENCY TRANSFER FORM - NURSING   4/16/2018                       UR 8A: [phone]            Attending Provider: Johnnie Ellsworth MD     Allergies:  No Known Allergies    Infection:  None   Service:  SURGERY    Ht:  1.778 m (5' 10\")   Wt:  71.4 kg (157 lb 6.5 oz)   Admission Wt:  71.4 kg (157 lb 6.5 oz)    BMI:  22.59 kg/m 2   BSA:  1.88 m 2            Advance Directives        Scanned docmt in ACP Activity?           No scanned doc        Immunizations     Name Date      Influenza (IIV3) PF 12/08/12     Influenza (IIV3) PF 10/22/11     Influenza (IIV3) PF 10/02/10     TDAP Vaccine (Adacel) 04/21/10       ASSESSMENT     Discharge Profile Flowsheet     DISCHARGE NEEDS ASSESSMENT     Procedural focused assessment (identify areas inspected)   Heel, right;Foot, right;Knee, right;Hip, right;Ankle, right 04/16/18 1915    Equipment Currently Used at Home  cane, straight 04/17/18 1351   Inspection under devices  Full except (identify device(s) not inspected) 04/19/18 1008    Transportation Available  car;family or friend will provide 04/17/18 1351   Not Inspected under devices  Other (sugical dressings) 04/19/18 1008    GASTROINTESTINAL (ADULT,PEDIATRIC,OB)     Skin WDL  ex 04/19/18 1008    GI WDL  WDL 04/19/18 1008   Skin Temperature  warm 04/19/18 1008    Last Bowel Movement  04/15/18 04/19/18 1008   " "Skin Moisture  dry 04/19/18 1008    Passing flatus  yes 04/19/18 1008   Skin Elasticity  quick return to original state 04/19/18 1008    COMMUNICATION ASSESSMENT     Skin Integrity  drain/device(s) 04/19/18 0220    Patient's communication style  spoken language (English or Bilingual) 04/12/18 1443   SAFETY      SKIN     Safety WDL  WDL 04/19/18 1022    Inspection of bony prominences  Full except (identify areas not inspected) 04/19/18 1008   Safety Factors  bed in low position;call light in reach;wheels locked;ID band on;upper side rails raised x 2 04/19/18 1022    Full except areas not inspected   Spine;Buttock, left;Knee, left;Knee, right 04/19/18 1008   All Alarms  none present 04/19/18 1022                 Assessment WDL (Within Defined Limits) Definitions           Safety WDL     Effective: 09/28/15    Row Information: <b>WDL Definition:</b> Bed in low position, wheels locked; call light in reach; upper side rails up x 2; ID band on<br> <font color=\"gray\"><i>Item=AS safety wdl>>List=AS safety wdl>>Version=F14</i></font>      Skin WDL     Effective: 09/28/15    Row Information: <b>WDL Definition:</b> Warm; dry; intact; elastic; without discoloration; pressure points without redness<br> <font color=\"gray\"><i>Item=AS skin wdl>>List=AS skin wdl>>Version=F14</i></font>      Vitals     Vital Signs Flowsheet     VITAL SIGNS     Pain Orientation  Right 04/19/18 1239    Temp  98.6  F (37  C) 04/19/18 0950   Pain Descriptors  Stabbing 04/19/18 1239    Temp src  Oral 04/19/18 0950   Pain Intervention(s)  Medication (See eMAR) 04/19/18 1239    Resp  16 04/19/18 0950   Response to Interventions  Relief 04/19/18 1003    Heart Rate  101 04/19/18 0950   Additional Documentation  CAPA (Group) 04/17/18 0259    Pulse/Heart Rate Source  Monitor 04/19/18 0950   CLINICALLY ALIGNED PAIN ASSESSMENT (CAPA) ([facility] ADULTS ONLY)      BP  123/56 04/19/18 0950   Comfort  tolerable with discomfort 04/19/18 1239    BP " "Location  Right arm 04/19/18 0950   Change in Pain  getting better 04/19/18 0543    Patient Position  Sitting 04/16/18 1159   Pain Control  partially effective 04/19/18 0543    LYING ORTHOSTATIC BP     Functioning  can do most things, but pain gets in the way of some 04/19/18 0543    Lying Orthostatic BP  129/68 04/18/18 1032   Sleep  awake with occasional pain 04/19/18 0543    Lying Orthostatic Pulse  92 bpm 04/18/18 1032   ANALGESIA SIDE EFFECTS MONITORING      SITTING ORTHOSTATIC BP     Side Effects Monitoring: Respiratory Quality  R 04/19/18 0543    Sitting Orthostatic BP  120/67 04/18/18 1032   Side Effects Monitoring: Respiratory Depth  N 04/19/18 0543    Sitting Orthostatic Pulse  112 bpm 04/18/18 1032   Side Effects Monitoring: Sedation Level  1 04/19/18 0543    STANDING ORTHOSTATIC BP     HEIGHT AND WEIGHT      Standing Orthostatic BP  121/47 04/18/18 1032   Height  1.778 m (5' 10\") 04/16/18 1159    Standing Orthostatic Pulse  102 bpm 04/18/18 1032   Weight  71.4 kg (157 lb 6.5 oz) 04/16/18 1159    OXYGEN THERAPY     BSA (Calculated - sq m)  1.88 04/16/18 1159    SpO2  95 % 04/19/18 0742   BMI (Calculated)  22.63 04/16/18 1159    O2 Device  None (Room air) 04/19/18 0742   POSITIONING      Oxygen Delivery  1 LPM 04/17/18 0836   Body Position  independently positioning 04/19/18 1022    RESPIRATORY MONITORING     Head of Bed (HOB)  HOB at 30 degrees 04/19/18 1022    Respiratory Monitoring (EtCO2)  36 mmHg 04/17/18 2241   ECG      Integrated Pulmonary Index (IPI)  10 04/17/18 2241   ECG Rhythm  Normal sinus rhythm 04/16/18 1919    PAIN/COMFORT     Ectopy  None 04/16/18 1919    Patient Currently in Pain  yes 04/19/18 1239   Lead Monitored  Lead II;V 5 04/16/18 1958    Preferred Pain Scale  CAPA (Clinically Aligned Pain Assessment) ([facility] Adults Only) 04/19/18 1239   DAILY CARE      0-10 Pain Scale  5 04/16/18 2055   Activity Management  activity adjusted per tolerance 04/19/18 1022    Pain " Location  Hip 04/19/18 1239   Activity Assistance Provided  independent;assistance, 1 person;other (see comments) (SBA x1) 04/19/18 1022            Patient Lines/Drains/Airways Status    Active LINES/DRAINS/AIRWAYS     Name: Placement date: Placement time: Site: Days: Last dressing change:    Peripheral IV 04/19/18 Left Lower forearm 04/19/18   0435   Lower forearm   less than 1     Incision/Surgical Site 01/25/18 Right Hip 01/25/18   1638    83     Incision/Surgical Site 04/16/18 Lateral;Right Hip 04/16/18       3     Incision/Surgical Site 04/16/18 Lateral;Lower;Right Thigh 04/16/18       3     Incision/Surgical Site 04/16/18 Lateral;Upper;Right Hip 04/16/18       3             Patient Lines/Drains/Airways Status    Active PICC/CVC     None            Intake/Output Detail Report     Date Intake         Output   Net    Shift P.O. I.V. IV Piggyback Colloid Blood Components Total Urine Blood Total       Day 04/18/18 0700 - 04/18/18 1459 -- -- -- -- -- -- 350 -- 350 -350    Joanne 04/18/18 1500 - 04/18/18 2259 -- -- -- -- -- -- -- -- -- 0    Noc 04/18/18 2300 - 04/19/18 0659 -- -- -- -- -- -- 300 -- 300 -300    Day 04/19/18 0700 - 04/19/18 1459 -- -- -- -- 303 303 300 -- 300 3    Joanne 04/19/18 1500 - 04/19/18 2259 -- -- -- -- -- -- -- -- -- 0      Case Management/Discharge Planning     Case Management/Discharge Planning Flowsheet     LIVING ENVIRONMENT     / CAREGIVER      Lives With  child(bozena), adult 04/17/18 1351   Filed Complexity Screen Score  5 04/19/18 1130    Living Arrangements  house 04/17/18 1351   ABUSE RISK SCREEN      COPING/STRESS     QUESTION TO PATIENT:  Has a member of your family or a partner(now or in the past) intimidated, hurt, manipulated, or controlled you in any way?  no 04/16/18 1207    Major Change/Loss/Stressor  hospitalization;surgery/procedure 04/12/18 1445   QUESTION TO PATIENT: Do you feel safe going back to the place where you are living?  yes 04/16/18 1206    DISCHARGE PLANNING      OBSERVATION: Is there reason to believe there has been maltreatment of a vulnerable adult (ie. Physical/Sexual/Emotional abuse, self neglect, lack of adequate food, shelter, medical care, or financial exploitation)?  no 04/16/18 1207    Transportation Available  car;family or friend will provide 04/17/18 1351   OTHER      FINAL RESOURCES     Are you depressed or being treated for depression?  No 04/17/18 1652    Equipment Currently Used at Home  cane, straight 04/17/18 1351                        8A: [phone]            Medication Administration Report for Dong Joseph as of 04/19/18 1511   Legend:    Given Hold Not Given Due Canceled Entry Other Actions    Time Time (Time) Time  Time-Action       Inactive    Active    Linked        Medications 04/13/18 04/14/18 04/15/18 04/16/18 04/17/18 04/18/18 04/19/18    acetaminophen (TYLENOL) tablet 1,000 mg  Dose: 1,000 mg  Freq: EVERY 6 HOURS Route: PO  Start: 04/17/18 1200   Admin Instructions: Maximum acetaminophen dose from all sources = 75 mg/kg/day not to exceed 4 gram    Admin. Amount: 2 tablet (2 × 500 mg tablet)  Last Admin: 04/19/18 1240  Dispense Loc: [facility]         1318 (1,000 mg)-Given       1731 (1,000 mg)-Given       2352 (1,000 mg)-Given        0606 (1,000 mg)-Given       1301 (1,000 mg)-Given       1905 (1,000 mg)-Given        0113 (1,000 mg)-Given       0623 (1,000 mg)-Given       1240 (1,000 mg)-Given       [ ] 1900           atorvastatin (LIPITOR) tablet 20 mg  Dose: 20 mg  Freq: AT BEDTIME Route: PO  Start: 04/16/18 2200   Admin. Amount: 1 tablet (1 × 20 mg tablet)  Last Admin: 04/18/18 2219  Dispense Loc: [facility]        2247 (20 mg)-Given        2139 (20 mg)-Given        2219 (20 mg)-Given        [ ] 2200           benzocaine-menthol (CEPACOL) 15-3.6 MG lozenge 1-2 lozenge  Dose: 1-2 lozenge  Freq: EVERY 1 HOUR PRN Route: BU  PRN Reason: sore throat  PRN Comment: sore throat without fever  Start: 04/16/18 2121   Admin.  Amount: 1-2 lozenge  Dispense Loc: [facility]  POC: Post-procedure               bisacodyl (DULCOLAX) Suppository 10 mg  Dose: 10 mg  Freq: DAILY PRN Route: RE  PRN Reason: constipation  Start: 04/18/18 1030   Admin Instructions: Start POD 1.  May discontinue if patient having bowel movement.    Admin. Amount: 1 suppository (1 × 10 mg suppository)  Dispense Loc: [facility]  POC: Post-procedure               enoxaparin (LOVENOX) injection 40 mg  Dose: 40 mg  Freq: EVERY 24 HOURS Route: SC  Start: 04/17/18 1315   Admin Instructions: Check to make sure start date/time is 12-24 hours post op unless documented complication, AND no sooner than 22 hours post op if spinal anesthesia used.   Continue until discharge to home. HOLD if platelet count falls below 50% of baseline or less than 100,000/ L and notify provider.    Admin. Amount: 40 mg = 0.4 mL Conc: 40 mg/0.4 mL  Last Admin: 04/19/18 1240  [facility]  Volume: 0.4 mL  POC: Post-procedure         1318 (40 mg)-Given        1301 (40 mg)-Given        1240 (40 mg)-Given           gabapentin (NEURONTIN) capsule 100 mg  Dose: 100 mg  Freq: 3 TIMES DAILY Route: PO  Start: 04/17/18 1400   Admin. Amount: 1 capsule (1 × 100 mg capsule)  Last Admin: 04/19/18 1359  Dispense Loc: [facility]         1318 (100 mg)-Given       2031 (100 mg)-Given        0936 (100 mg)-Given       1418 (100 mg)-Given       1905 (100 mg)-Given        0839 (100 mg)-Given       1359 (100 mg)-Given       [ ] 2000           HYDROmorphone (PF) (DILAUDID) injection 0.3-0.5 mg  Dose: 0.3-0.5 mg  Freq: EVERY 2 HOURS PRN Route: IV  PRN Reasons: severe pain,other  PRN Comment: pain control or improvement in physical function. Hold dose for analgesic side effects.  Start: 04/16/18 2030   Admin Instructions: Start at the lowest dose.  May adjust dose by 0.1 mg every 2 hours as needed.  Hold dose for analgesic side effects.  Notify provider to assess for uncontrolled pain  "or analgesic side effects.   Hold while on IV PCA or with regular IV opioid dosing.  For ordered doses up to 4 mg give IV Push undiluted. Administer each 2mg over 2-5 minutes.    Admin. Amount: 0.3-0.5 mg  Last Admin: 04/17/18 1108  Dispense Loc: [facility] ADS 8AEAST  POC: Post-procedure         1108 (0.5 mg)-Given             lidocaine (LMX4) kit  Freq: EVERY 1 HOUR PRN Route: Top  PRN Reason: pain  PRN Comment: with VAD insertion or accessing implanted port.  Start: 04/16/18 2121   Admin Instructions: Do NOT give if patient has a history of allergy to any local anesthetic or any \"svetlana\" product.   Apply 30 minutes prior to VAD insertion or port access.  MAX Dose:  2.5 g (  of 5 g tube)    Dispense Loc: [facility] Floor Stock  POC: Post-procedure               lidocaine 1 % 1 mL  Dose: 1 mL  Freq: EVERY 1 HOUR PRN Route: OTHER  PRN Comment: mild pain with VAD insertion or accessing implanted port  Start: 04/16/18 2121   Admin Instructions: Do NOT give if patient has a history of allergy to any local anesthetic or any \"svetlana\" product. MAX dose 1 mL subcutaneous OR intradermal in divided doses.    Admin. Amount: 1 mL  Dispense Loc: [facility] ADS 8[facility]AST  Volume: 2 mL  POC: Post-procedure               metoclopramide (REGLAN) tablet 10 mg  Dose: 10 mg  Freq: EVERY 6 HOURS PRN Route: PO  PRN Reasons: nausea,vomiting  Start: 04/16/18 2121   Admin Instructions: This is Step 3 of nausea and vomiting management.  Give if nausea not resolved 15 minutes after giving prochlorperazine (COMPAZINE).  If nausea not resolved in 15-30 minutes, Notify provider.  Avoid use if patient has full bowel obstruction or perforation.    Admin. Amount: 1 tablet (1 × 10 mg tablet)  Dispense Loc: [facility] ADS [facility]AST  POC: Post-procedure              Or  metoclopramide (REGLAN) injection 10 mg  Dose: 10 mg  Freq: EVERY 6 HOURS PRN Route: IV  PRN Reasons: nausea,vomiting  Start: 04/16/18 2121   Admin Instructions: This is Step 3 of nausea and vomiting " management.  Give if nausea not resolved 15 minutes after giving prochlorperazine (COMPAZINE).  If nausea not resolved in 15-30 minutes, Notify provider.  Avoid use if patient has full bowel obstruction or perforation. Irritant. For ordered doses up to 10 mg, give IV Push undiluted over 2 minutes.    Admin. Amount: 10 mg = 2 mL Conc: 5 mg/mL  Dispense Loc: [facility] ADS 8AEAST  Infused Over: 2 Minutes  Volume: 2 mL  POC: Post-procedure               naloxone (NARCAN) injection 0.1-0.4 mg  Dose: 0.1-0.4 mg  Freq: EVERY 2 MIN PRN Route: IV  PRN Reason: opioid reversal  Start: 04/16/18 2121   Admin Instructions: For respiratory rate LESS than or EQUAL to 8.  Partial reversal dose:  0.1 mg titrated q 2 minutes for Analgesia Side Effects Monitoring Sedation Level of 3 (frequently drowsy, arousable, drifts to sleep during conversation).Full reversal dose:  0.4 mg bolus for Analgesia Side Effects Monitoring Sedation Level of 4 (somnolent, minimal or no response to stimulation).  For ordered doses up to 2mg give IVP. Give each 0.4mg over 15 seconds in emergency situations. For non-emergent situations further dilute in 9mL of NS to facilitate titration of response.    Admin. Amount: 0.1-0.4 mg = 0.25-1 mL Conc: 0.4 mg/mL  Dispense Loc: [facility] ADS 8[facility]AST  Volume: 1 mL  POC: Post-procedure               ondansetron (ZOFRAN-ODT) ODT tab 4 mg  Dose: 4 mg  Freq: EVERY 6 HOURS PRN Route: PO  PRN Reasons: nausea,vomiting  Start: 04/16/18 2121   Admin Instructions: This is Step 1 of nausea and vomiting management.  If nausea not resolved in 15 minutes, go to Step 2 prochlorperazine (COMPAZINE). Do not push through foil backing. Peel back foil and gently remove. Place on tongue immediately. Administration with liquid unnecessary  With dry hands, peel back foil backing and gently remove tablet; do not push oral disintegrating tablet through foil backing; administer immediately on tongue and oral disintegrating tablet dissolves in  seconds; then swallow with saliva; liquid not required.    Admin. Amount: 1 tablet (1 × 4 mg tablet)  Dispense Loc: [facility] ADS [facility]  POC: Post-procedure              Or  ondansetron (ZOFRAN) injection 4 mg  Dose: 4 mg  Freq: EVERY 6 HOURS PRN Route: IV  PRN Reasons: nausea,vomiting  Start: 04/16/18 2121   Admin Instructions: This is Step 1 of nausea and vomiting management.  If nausea not resolved in 15 minutes, go to Step 2 prochlorperazine (COMPAZINE).  Irritant. For ordered doses up to 4 mg, give IV Push undiluted over 2-5 minutes.    Admin. Amount: 4 mg = 2 mL Conc: 4 mg/2 mL  Dispense Loc: [facility] ADS [facility]  Infused Over: 2-5 Minutes  Volume: 2 mL  POC: Post-procedure               oxyCODONE IR (ROXICODONE) tablet 5-10 mg  Dose: 5-10 mg  Freq: EVERY 3 HOURS PRN Route: PO  PRN Reason: other  PRN Comment: pain control or improvement in physical function. Hold dose for analgesic side effects.  Start: 04/16/18 2030   Admin Instructions: Start with the lowest dose.    May adjust dose by 5 mg every 3 hours as needed.  Notify provider to assess for uncontrolled pain or analgesic side effects. Hold while on PCA or with regular IV opioid dosing. Maximum total is 80 mg in 24 hours.  Time dose before therapy    Admin. Amount: 1-2 tablet (1-2 × 5 mg tablet)  Last Admin: 04/19/18 1238  Dispense Loc: [facility] ADS [facility]  POC: Post-procedure        2202 (5 mg)-Given        0038 (5 mg)-Given       0257 (5 mg)-Given       0632 (5 mg)-Given       0949 (5 mg)-Given       1439 (10 mg)-Given       1731 (10 mg)-Given       2031 (10 mg)-Given       2352 (5 mg)-Given        0606 (10 mg)-Given       0936 (10 mg)-Given       1301 (10 mg)-Given       1557 (10 mg)-Given       1906 (10 mg)-Given       2223 (5 mg)-Given        0129 (5 mg)-Given       0501 (5 mg)-Given       0805 (5 mg)-Given       1238 (5 mg)-Given           prochlorperazine (COMPAZINE) injection 10 mg  Dose: 10 mg  Freq: EVERY 6 HOURS PRN Route: IV  PRN Reasons:  nausea,vomiting  Start: 04/16/18 2121   Admin Instructions: This is Step 2 of nausea and vomiting management.   If nausea not resolved in 15 minutes, give metoclopramide (REGLAN), if ordered (step 3 of nausea and vomiting management)  For ordered doses up to 10 mg, give IV Push undiluted. Each 5mg over 1 minute.    Admin. Amount: 10 mg = 2 mL Conc: 5 mg/mL  Last Admin: 04/16/18 2152  Dispense Loc: [facility] ADS [facility]  Infused Over: 1-2 Minutes  Volume: 2 mL  POC: Post-procedure        2152 (10 mg)-Given             Or  prochlorperazine (COMPAZINE) tablet 10 mg  Dose: 10 mg  Freq: EVERY 6 HOURS PRN Route: PO  PRN Reasons: nausea,vomiting  Start: 04/16/18 2121   Admin Instructions: This is Step 2 of nausea and vomiting management.   If nausea not resolved in 15 minutes, give metoclopramide (REGLAN), if ordered (step 3 of nausea and vomiting management)    Admin. Amount: 2 tablet (2 × 5 mg tablet)  Dispense Loc: [facility] ADS [facility]  POC: Post-procedure                      senna-docusate (SENOKOT-S;PERICOLACE) 8.6-50 MG per tablet 1 tablet  Dose: 1 tablet  Freq: 2 TIMES DAILY Route: PO  Start: 04/16/18 2130   Admin Instructions: If no bowel movement in 24 hours, increase to 2 tablets PO.  Hold for loose stools.    Admin. Amount: 1 tablet  Last Admin: 04/17/18 0830  Dispense Loc: [facility] ADS [facility]  POC: Post-procedure        2247 (1 tablet)-Given        0830 (1 tablet)-Given                                     [ ] 2000          Or  senna-docusate (SENOKOT-S;PERICOLACE) 8.6-50 MG per tablet 2 tablet  Dose: 2 tablet  Freq: 2 TIMES DAILY Route: PO  Start: 04/16/18 2130   Admin Instructions: Hold for loose stools.    Admin. Amount: 2 tablet  Last Admin: 04/19/18 0839  Dispense Loc: [facility] ADS [facility]  POC: Post-procedure                       2031 (2 tablet)-Given        0936 (2 tablet)-Given       1906 (2 tablet)-Given        0839 (2 tablet)-Given       [ ] 2000           sodium chloride (PF) 0.9% PF flush 3 mL  Dose: 3  mL  Freq: EVERY 8 HOURS Route: IK  Start: 18   Admin Instructions: And Q1H PRN, to lock peripheral IV dormant line.    Admin. Amount: 3 mL  Last Admin: 18  Dispense Loc: [facility] Floor Stock  Volume: 3 mL  POC: Post-procedure   Current Line: Peripheral IV 18 Left Lower forearm  [C]-PIV removed       ()-Not Given        0528 (3 mL)-Given       1321 (3 mL)-Given       2031 (3 mL)-Given               1425 (3 mL)-Given       2220 (3 mL)-Given        (0558)-Not Given       (1244)-Not Given       [ ] 0           sodium chloride (PF) 0.9% PF flush 3 mL  Dose: 3 mL  Freq: EVERY 1 HOUR PRN Route: IK  PRN Reason: line flush  PRN Comment: for peripheral IV flush post IV meds  Start: 18   Admin. Amount: 3 mL  Dispense Loc: [facility] Floor Stock  Volume: 3 mL  POC: Post-procedure               sodium chloride 0.9 % infusion  Start: 18 0307   End: 18 1514   Admin Instructions: Kelley Barber : cabinet override    Administrations Remainin                  1514-Med Discontinued       sodium chloride 0.9% infusion  Rate: 125 mL/hr   Freq: CONTINUOUS Route: IV  Start: 18   Admin Instructions: Start after NS bolus.    Last Admin: 18 0748  Dispense Loc: [facility] Floor Stock  Volume: 1,000 mL           0114 ( )-New Bag       0748 ( )-Restarted          Completed Medications  Medications 04/13/18 04/14/18 04/15/18 04/16/18 04/17/18 04/18/18 04/19/18         Dose: 1,000 mL  Freq: ONCE Route: IV  Last Dose: 1,000 mL (18)  Start: 18   End: 18   Admin. Amount: 1,000 mL  Last Admin: 18  Dispense Loc: [facility] Floor Stock  Infused Over: 2 Hours  Administrations Remainin  Volume: 1,000 mL          2219 (1,000 mL)-New Bag              Dose: 1 g  Freq: EVERY 8 HOURS Route: IV  Indications of Use: PERIOPERATIVE PHARMACOPROPHYLAXIS  Start: 18   End: 18   Admin Instructions: First post-op dose due 8 hours  after intra-op dose, see eMAR.    Admin. Amount: 1 g  Last Admin: 18  Dispense Loc: [facility] ADS [facility]  Infused Over: 30 Minutes  Administrations Remainin  POC: Post-procedure         0043 (1 g)-New Bag       0831 (1 g)-New Bag               Dose: 2 g  Freq: PRE-OP/PRE-PROCEDURE Route: IV  Indications of Use: PERIOPERATIVE PHARMACOPROPHYLAXIS  Start: 18 1158   End: 18   Admin Instructions: Give first dose within 1 hour PRIOR to incision. If patient weight is greater than or equal to 120 kg increase dose to 3 g.    Admin. Amount: 2 g = 100 mL Conc: 2 g/100 mL  Last Admin: 18  Dispense Loc: [facility] ADS 3APRE-OP  Infused Over: 30 Minutes  Administrations Remainin  Volume: 100 mL  POC: Pre-procedure        1530 (2 g)-Given       1725 (1 g)-Given                Dose: 1 g  Freq: ONCE Route: IV  Start: 18 0800   End: 1837   Admin. Amount: 1 g = 100 mL Conc: 1 g/100 mL  Last Admin: 1837  Dispense Loc: [facility]  Administrations Remainin  Volume: 100 mL          0937 (1 g)-New Bag           Discontinued Medications  Medications 04/13/18 04/14/18 04/15/18 04/16/18 04/17/18 04/18/18 04/19/18         Dose: 30 mL/kg  Weight Dosing Info: 71.4 kg  Freq: ONCE Route: IV  Start: 18   End: 18   Admin Instructions: Infuse 1 L at a time as rapidly as possible until MAP greater than or equal to 65 mmHg, then 150 mL/hr    Admin. Amount: 2,142 mL  Dispense Loc: [facility] Floor Stock  Administrations Remainin  Volume: 2,150 mL          -Med Discontinued                 Dose: 650 mg  Freq: EVERY 4 HOURS PRN Route: PO  PRN Reason: other  PRN Comment: multimodal surgical pain management along with NSAIDS and opioid medication as indicated based on pain control and physical function.  Start: 18 0000   End: 18   Admin Instructions: May give first dose 4 hours after last scheduled dose of acetaminophen  Maximum  acetaminophen dose from all sources = 75 mg/kg/day not to exceed 4 grams/day.    Admin. Amount: 2 tablet (2 × 325 mg tablet)  Dispense Loc: [facility] ADS [facility]  POC: Post-procedure         1150-Med Discontinued           Dose: 975 mg  Freq: EVERY 8 HOURS Route: PO  Start: 18   End: 18   Admin Instructions: Do not use if patient has an active opioid/acetaminophen combined analgesic product ordered for pain.  Maximum acetaminophen dose from all sources = 75 mg/kg/day not to exceed 4 grams/day.    Admin. Amount: 3 tablet (3 × 325 mg tablet)  Last Admin: 18 0525  Dispense Loc: [facility] ADS [facility]  Administrations Remainin  POC: Post-procedure         (975 mg)-Given        525 (975 mg)-Given       1150-Med Discontinued           Dose: 10 mg  Freq: DAILY Route: RE  Start: 18 0800   End: 18 1026   Admin Instructions: Start POD 1.  May discontinue if patient having bowel movement.    Admin. Amount: 1 suppository (1 × 10 mg suppository)  Dispense Loc: [facility] ADS [facility]  POC: Post-procedure         (831)-Not Given        (918)-Not Given       1026-Med Discontinued          Dose: 1 g  Freq: SEE ADMIN INSTRUCTIONS Route: IV  Indications of Use: PERIOPERATIVE PHARMACOPROPHYLAXIS  Start: 18   End: 18   Admin Instructions: Intra-Op Dose.  Give every 2 hours while patient in surgery, starting 2 hours after pre-op dose.  DO NOT GIVE intra-op dose if CrCl less than 10 mL/min (on dialysis).  If CrCL less than 50 mL/min, double the time interval between doses.    Admin. Amount: 1 g  Dispense Loc: [facility] ADS ANES-OR1  Infused Over: 30 Minutes  POC: Pre-procedure        -Med Discontinued            Dose: 25-50 mcg  Freq: EVERY 15 MIN PRN Route: IV  PRN Reason: other  PRN Comment: acute pain while in Phase II  Indications of Use: SEDATION  Start: 18   End: 18   Admin Instructions: MAX cumulative dose = 250 mcg.   Use fentaNYL (SUBLIMAZE)  initially, as a short acting agent for acute pain control. If insufficient, or a longer acting agent is needed, begin morphine or HYDROmorphone (DILAUDID) if ordered.  For ordered doses up to 100 mcg give IV Push undiluted over a minimum of 3-5 minutes.    Admin. Amount: 25-50 mcg = 0.5-1 mL Conc: 50 mcg/mL  Dispense Loc: [facility] ADS PREOP/PACU  Volume: 2 mL  POC: Phase ll        1928-Med Discontinued            Dose: 25-50 mcg  Freq: EVERY 5 MIN PRN Route: IV  PRN Reason: other  PRN Comment: acute pain while in PACU.  Start: 04/16/18 1834   End: 04/16/18 2120   Admin Instructions: MAX cumulative dose = 250 mcg.    Use Fentanyl initially, as a short acting agent for acute pain control.  If insufficient, or a longer acting agent is needed, begin Morphine or Hydromorphone if ordered.      For ordered doses up to 100 mcg give IV Push undiluted over a minimum of 3-5 minutes.    Admin. Amount: 25-50 mcg = 0.5-1 mL Conc: 50 mcg/mL  Last Admin: 04/16/18 1932  Dispense Loc: [facility] ADS ANES-OR1  Volume: 2 mL  POC: PACU        1916 (50 mcg)-Given       1923 (50 mcg)-Given       1927 (50 mcg)-Given       1932 (50 mcg)-Given       2120-Med Discontinued            Dose: 0.3-0.5 mg  Freq: EVERY 10 MIN PRN Route: IV  PRN Reason: other  PRN Comment: acute pain.  May administer if Respiratory Rate is greater than 10  Start: 04/16/18 1925   End: 04/16/18 2120   Admin Instructions: Max cumulative dose = 2 mg  If fentaNYL (SUBLIMAZE) is also ordered, use HYDROmorphone (DILAUDID) if pain control insufficient with fentaNYL (SUMBLIMAZE) or a longer acting agent is needed.  For ordered doses up to 4 mg give IV Push undiluted. Administer each 2mg over 2-5 minutes.    Admin. Amount: 0.3-0.5 mg  Last Admin: 04/16/18 1956  Dispense Loc: [facility] ADS PREOP/PACU  POC: PACU/Phase II        1936 (0.5 mg)-Given       1946 (0.5 mg)-Given       1956 (0.5 mg)-Given       2120-Med Discontinued            Dose: 25 mg  Freq: EVERY 6 HOURS PRN Route:  PO  PRN Reason: itching  Start: 04/16/18 2030   End: 04/17/18 1150   Admin Instructions: Caution to be used when administering multiple Central Nervous System (CNS) depressing meds within a short time frame.    Admin. Amount: 1 tablet (1 × 25 mg tablet)  Dispense Loc: [facility] ADS [facility]  POC: Post-procedure         1150-Med Discontinued           Rate: 100 mL/hr   Freq: CONTINUOUS Route: IV  Start: 04/16/18 1930   End: 04/16/18 2120   Admin Instructions: Continue until IV catheter is weaned    Dispense Loc: [facility] ADS PREOP/PACU  Volume: 1,000 mL  POC: PACU/Phase II               2120-Med Discontinued            Rate: 100 mL/hr   Freq: CONTINUOUS Route: IV  Start: 04/16/18 2130   End: 04/18/18 2207   Admin Instructions: Discontinue when patient PO intake is GREATER than 500 mL per shift.    Last Admin: 04/17/18 1317  Dispense Loc: [facility] Floor Stock  Volume: 1,000 mL  POC: Post-procedure         0831 ( )-Rate/Dose Verify       1317 ( )-New Bag        2207-Med Discontinued          Rate: 25 mL/hr   Freq: CONTINUOUS Route: IV  Start: 04/16/18 1200   End: 04/16/18 1907   Admin Instructions: IF patient NOT on dialysis.    Last Admin: 04/16/18 1707  Dispense Loc: [facility] Floor Stock  Volume: 1,000 mL  POC: Pre-procedure        1510 ( )-New Bag       1707 ( )-New Bag       1901 ( )-Anesthesia Volume Adjustment       1907-Med Discontinued            Rate: 100 mL/hr   Freq: CONTINUOUS Route: IV  Start: 04/16/18 1845   End: 04/16/18 1927   Admin Instructions: Continue until IV catheter is weaned    Dispense Loc: [facility] ADS PREOP/PACU  Volume: 1,000 mL  POC: PACU/Phase II               1927-Med Discontinued            Dose: 12.5 mg  Freq: EVERY 15 MIN PRN Route: IV  PRN Reason: post anesthesia shivering  Start: 04/16/18 1925   End: 04/16/18 2120   Admin Instructions: Give IV Push undiluted. 10-40 mg over 2-3 minutes, up to 125 mg over 3-15 minutes    Admin. Amount: 12.5 mg = 0.5 mL Conc: 25 mg/mL  Dispense Loc: [facility] ADS  PREOP/PACU  Administrations Remainin  Volume: 1 mL  POC: PACU/Phase II        Med Discontinued            Dose: 12.5 mg  Freq: EVERY 15 MIN PRN Route: IV  PRN Reason: post anesthesia shivering  Start: 18   End: 18   Admin Instructions: Give IV Push undiluted. 10-40 mg over 2-3 minutes, up to 125 mg over 3-15 minutes    Admin. Amount: 12.5 mg = 0.5 mL Conc: 25 mg/mL  Dispense Loc: [facility] ADS PREOP/PACU  Administrations Remainin  Volume: 1 mL  POC: PACU/Phase II        -Med Discontinued            Dose: 0.1-0.4 mg  Freq: EVERY 2 MIN PRN Route: IV  PRN Reason: opioid reversal  Start: 18   End: 18   Admin Instructions: For apnea or imminent respiratory arrest: give 0.4 mg IV undiluted Q 2 minutes PRN until desired degree of reversal is obtained, stop opioid and notify provider. Continue monitoring until discharge are criteria met for a minimum of 2 hours.  For severe sedation, decrease in respiratory depth, quality or Respiratory Rate greater than 8: give 0.1 mg IV Q 2 minutes x 3 doses, stop opioid and notify provider.  Try to minimize reversal of analgesia especially in end-of-life patients.  Continue monitoring until discharge criteria are met for a minimum of 2 hours.  For ordered doses up to 2mg give IVP. Give each 0.4mg over 15 seconds in emergency situations. For non-emergent situations further dilute in 9mL of NS to facilitate titration of response.    Admin. Amount: 0.1-0.4 mg = 0.25-1 mL Conc: 0.4 mg/mL  Dispense Loc: [facility] ADS PREOP/PACU  Volume: 1 mL  POC: PACU/Phase II        Med Discontinued            Dose: 0.1-0.4 mg  Freq: EVERY 2 MIN PRN Route: IV  PRN Reason: opioid reversal  Start: 18   End: 18   Admin Instructions: For apnea or imminent respiratory arrest: give 0.4 mg IV undiluted Q 2 minutes PRN until desired degree of reversal is obtained, stop opioid and notify provider. Continue monitoring until discharge  are criteria met for a minimum of 2 hours.  For severe sedation, decrease in respiratory depth, quality or Respiratory Rate greater than 8: give 0.1 mg IV Q 2 minutes x 3 doses, stop opioid and notify provider.  Try to minimize reversal of analgesia especially in end-of-life patients.  Continue monitoring until discharge criteria are met for a minimum of 2 hours.  For ordered doses up to 2mg give IVP. Give each 0.4mg over 15 seconds in emergency situations. For non-emergent situations further dilute in 9mL of NS to facilitate titration of response.    Admin. Amount: 0.1-0.4 mg = 0.25-1 mL Conc: 0.4 mg/mL  Dispense Loc: [facility] ADS OR-ENIO  Volume: 1 mL  POC: PACU/Phase II        -[facility] Discontinued            Dose: 4 mg  Freq: EVERY 30 MIN PRN Route: PO  PRN Reasons: nausea,vomiting  Start: 18   End: 18   Admin Instructions: MAX total dose = 8 mg, including OR dosing. This is step 1 of nausea and vomiting management.  If not resolved in 15 minutes, then go to step 2 [prochlorperazine (COMPAZINE) if ordered].  With dry hands, peel back foil backing and gently remove tablet; do not push oral disintegrating tablet through foil backing; administer immediately on tongue and oral disintegrating tablet dissolves in seconds; then swallow with saliva; liquid not required.    Admin. Amount: 1 tablet (1 × 4 mg tablet)  Administrations Remainin  POC: PACU/Phase II        -Med Discontinued         Or    Dose: 4 mg  Freq: EVERY 30 MIN PRN Route: IV  PRN Reasons: nausea,vomiting  Start: 18   End: 18   Admin Instructions: MAX total dose = 8 mg, including OR dosing. This is step 1 of nausea and vomiting management.  If not resolved in 15 minutes, then go to step 2 [prochlorperazine (COMPAZINE) if ordered].  Irritant. For ordered doses up to 4 mg, give IV Push undiluted over 2-5 minutes.    Admin. Amount: 4 mg = 2 mL Conc: 4 mg/2 mL  Infused Over: 2-5 Minutes  Administrations  Remainin  Volume: 2 mL  POC: PACU/Phase II        Med Discontinued            Dose: 4 mg  Freq: EVERY 30 MIN PRN Route: PO  PRN Reasons: nausea,vomiting  Start: 18   End: 18   Admin Instructions: MAX total dose = 8 mg, including OR dosing. This is step 1 of nausea and vomiting management.  If not resolved in 15 minutes, then go to step 2 [prochlorperazine (COMPAZINE) if ordered].  With dry hands, peel back foil backing and gently remove tablet; do not push oral disintegrating tablet through foil backing; administer immediately on tongue and oral disintegrating tablet dissolves in seconds; then swallow with saliva; liquid not required.    Admin. Amount: 1 tablet (1 × 4 mg tablet)  Dispense Loc: [facility]  Administrations Remainin  POC: PACU/Phase II        Med Discontinued         Or    Dose: 4 mg  Freq: EVERY 30 MIN PRN Route: IV  PRN Reasons: nausea,vomiting  Start: 18   End: 18   Admin Instructions: MAX total dose = 8 mg, including OR dosing. This is step 1 of nausea and vomiting management.  If not resolved in 15 minutes, then go to step 2 [prochlorperazine (COMPAZINE) if ordered].  Irritant. For ordered doses up to 4 mg, give IV Push undiluted over 2-5 minutes.    Admin. Amount: 4 mg = 2 mL Conc: 4 mg/2 mL  Dispense Loc: [facility] ADS PREOP/PACU  Infused Over: 2-5 Minutes  Administrations Remainin  Volume: 2 mL  POC: PACU/Phase II        2120-Med Discontinued            Freq: CONTINUOUS PRN Route: XX  Start: 18   End: 18   Admin Instructions: May administer oral pain medications as ordered by surgeon for take home use.  Discontinue IV pain medication prior to administration of oral pain medication.  If medication contains acetaminophen, ok to change to oxycodone 5mg to not exceed acetaminophen dose.    Dispense Loc: [facility]  POC: PACU/Phase II        Med Discontinued            Dose: 10 mg  Freq:  EVERY 6 HOURS PRN Route: IV  PRN Reasons: nausea,vomiting  Start: 18   End: 18   Admin Instructions: This is Step 2 of nausea and vomiting management.   If nausea not resolved in 15 minutes, give metoclopramide (REGLAN) if ordered [step 3 of nausea and vomiting management].  For ordered doses up to 10 mg, give IV Push undiluted. Each 5mg over 1 minute.    Admin. Amount: 10 mg = 2 mL Conc: 5 mg/mL  Dispense Loc: [facility] ADS PREOP/PACU  Infused Over: 1-2 Minutes  Volume: 2 mL  POC: PACU/Phase II        0-Med Discontinued            Dose: 10 mg  Freq: EVERY 6 HOURS PRN Route: IV  PRN Reasons: nausea,vomiting  Start: 18   End: 18   Admin Instructions: This is Step 2 of nausea and vomiting management.   If nausea not resolved in 15 minutes, give metoclopramide (REGLAN) if ordered [step 3 of nausea and vomiting management].  For ordered doses up to 10 mg, give IV Push undiluted. Each 5mg over 1 minute.    Admin. Amount: 10 mg = 2 mL Conc: 5 mg/mL  Dispense Loc: [facility] ADS PREOP/PACU  Infused Over: 1-2 Minutes  Volume: 2 mL  POC: PACU/Phase II        -Med Discontinued            Freq: PRN  Start: 18   End: 18   Last Admin: 18  POC: Intra-procedure        1847 (2,100 mL)-Given       0-Med Discontinued            Freq: PRN  Start: 18   End: 18   Last Admin: 18  POC: Intra-procedure        1847 (150 mL)-Given       2120-Med Discontinued            Dose: 1 g  Freq: ONCE Route: IV  Start: 18   End: 18   Admin Instructions: Each 1 gram to be infused over 10 minutes.    Admin. Amount: 1 g  Dispense Loc: [facility]  Administrations Remainin  Volume: 50 mL  POC: Pre-procedure   Mixture Administration Information:   Medication Type Amount   tranexamic acid 1000 MG/10ML SOLN Medications 1 g   sodium chloride 0.9 % SOLN Base 50 mL                       1907-Med Discontinued        Medications 04/13/18 04/14/18 04/15/18 04/16/18 04/17/18 04/18/18 04/19/18               INTERAGENCY TRANSFER FORM - NOTES (H&P, Discharge Summary, Consults, Procedures, Therapies)   4/16/2018                        8A: [phone]               History & Physicals      H&P signed by Princess Jara at 4/19/2018 12:15 PM      Author:  Princess Jara Service:  (none) Author Type:  Physician    Filed:  4/19/2018 12:15 PM Date of Service:  4/19/2018 10:37 AM Creation Time:  4/19/2018 12:15 PM    Status:  Signed :  Princess Jara (Physician)     Estefani on 4/19/2018 12:15 PM by Estefani, Provider : MARYA BATES PRE-OP EVAL/04/11/2018 1          Revision History        User Key Date/Time User Provider Type Action    > [N/A] 4/19/2018 12:15 PM Scan, Provider Physician Sign                  Discharge Summaries     No notes of this type exist for this encounter.         Consult Notes      Consults by Jah House MD at 4/17/2018  3:29 PM     Author:  Jah House MD Service:  Hospitalist Author Type:  Physician    Filed:  4/17/2018  3:29 PM Date of Service:  4/17/2018  3:29 PM Creation Time:  4/17/2018  3:29 PM    Status:  Signed :  Jah House MD (Physician)     Consult Orders:    1. Internal Medicine Adult IP Consult for [facility]: Patient to be seen: Routine within 24 hrs; Call back #: [id number]; Assistance with medical management; Consultant may enter orders: Yes [[id number]] ordered by Michael Terrazas MD at 04/16/18 1916                Consult completed[PD1.1]     Revision History        User Key Date/Time User Provider Type Action    > PD1.1 4/17/2018  3:29 PM Jah House MD Physician Sign                     Progress Notes - Physician (Notes for yesterday and today)      Progress Notes by Michael Terrazas MD at 4/19/2018  7:45 AM     Author:  Michael Terrazas MD Service:  Orthopedics Author Type:  Resident    Filed:  4/19/2018  7:47 AM Date of Service:  4/19/2018  7:45 AM Creation Time:  4/19/2018   "7:45 AM    Status:  Signed :  Michael Terrazas MD (Resident)         Ortho Progress Note     Subjective:  Did well with PT yesterday. Wants to go home today. Got unit blood overnight for Hgb 6.7, asymptomatic. No systemic feelings of fever/illness despite elevated temp overnight.     Objective:[RH1.1]  /51  Temp 100.6  F (38.1  C) (Oral)  Resp 15  Ht 1.778 m (5' 10\")  Wt 71.4 kg (157 lb 6.5 oz)  SpO2 95%  BMI 22.59 kg/m2[RH1.2]  Gen: A&Ox3, no acute distress  CV: 2+ dp/pt pulses, capillary refill < 2sec  Resp: breathing equal and non-labored, no wheezing  MSK:     RLE   Dressings C/D/I   Motor: EHL, TA, FHL, GS 5/5   SILT on SP, DP, S, S, and T nerve territories   Circulation: palpable DP and TP, foot warm[RH1.1]      Hemoglobin   Date Value Ref Range Status   04/18/2018 6.7 (LL) 13.3 - 17.7 g/dL Final     Comment:     This result has been called to AVI PAZ by Yolanda ESTRADA on 04 18 2018 at 2302,   and has been read back.      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  Acute blood loss anemia.      Elevated temps, No evidence of wound site infection / PNA at present.  Do not believe infection present.    -Weight Bearing Status: WBAT RLE  -Activity: Posterior hip precautions  -DVT PPx: Lovenox x4wk  -Pain control: IV and PO, wean to PO as able  -Dressing: Leave in place until POD 7. May shower with dressings in place as are waterproof  -Diet: Regular    -Disposition: Anticipate home today pending PT clearance   -Follow up: 2 weeks with Dr Jodee Terrazas MD  Orthopaedic Surgery PGY-4  Pager:  [phone][RH1.1]       Revision History        User Key Date/Time User Provider Type Action    > RH1.2 4/19/2018  7:47 AM Michael Terrazas MD Resident Sign     RH1.1 4/19/2018  7:45 AM Michael Terrazas MD Resident             Progress Notes by Ke Cardoso MD at 4/18/2018  " 9:49 PM     Author:  Ke Cardoso MD Service:  Hospitalist Author Type:  Physician    Filed:  4/18/2018 10:00 PM Date of Service:  4/18/2018  9:49 PM Creation Time:  4/18/2018  9:49 PM    Status:  Signed :  Ke Cardoso MD (Physician)         [facility], [place]    Sepsis Evaluation Progress Note    Date of Service:[HL1.1] 04/18/2018[HL1.2]    I was called to see Dong Joseph due to abnormal vital signs triggering the Sepsis SIRS screening alert. He is not known to have an infection.     Physical Exam    Vital Signs:[HL1.1]  Temp: 102.2  F (39  C) Temp src: Oral BP: 104/51   Heart Rate: 117 Resp: 14 SpO2: 94 % O2 Device: None (Room air)[HL1.2]      Lab:[HL1.1]No results found for: LACT[HL1.2]    The patient is at baseline mental status. He feels just mildly warm, but no fever, chills or sweats.  No coughing, sore throat, abd pain, N/V, diarrhea or dysuria.  No HA or neck pain. No calf pain or calf swelling. No SOB or CP.     The rest of the physical exam is significant for tachycardia and mild discomfort in R hip area. No erythema on the R hip.  No significant pain with R hip rotation or flexion. No neck discomfort. Chest/abd exam wnl. No calf pain or swelling.     Assessment and Plan    The SIRS and exam findings are likely due to post-op imflamatroy change and possible hematoma, there is no sign of sepsis at this time. Will investigate further with BC, CXR, CBC and will administer IVF. Recheck lactate and hgb at midnight. Will hold off on further abx at this time as no evidence of infection and follows labs. Discussed with attending Dr. Lopes.     Disposition: The patient will remain on the current unit. We will continue to monitor this patient closely.[HL1.1]    Ke Cardoso MD[HL1.2]         Revision History        User Key Date/Time User Provider Type Action    > HL1.2 4/18/2018 10:00 PM Ke Cardoso MD Physician Sign     HL1.1 4/18/2018  9:49 PM Ke Cardoso MD  "Physician             Progress Notes by Tavo Lopes MD at 4/18/2018  1:44 PM     Author:  Tavo Lopes MD Service:  Hospitalist Author Type:  Physician    Filed:  4/18/2018  1:58 PM Date of Service:  4/18/2018  1:44 PM Creation Time:  4/18/2018  1:44 PM    Status:  Signed :  Tavo Lopes MD (Physician)         [facility] Internal Medicine Progress Note            Interval History:   Record reviewed.  Seen with RN.  Difficulty mobilizing 4/17 due to severe pain and subsequent postural dizziness.  In general feeling better.  Improved, adequate pain control.  Stood at bedside this AM to void without postural dizziness.  Off O2.    No CP, SOB, cough, nausea, reflux.  Mild abd cramping and distention.  Passing flatus with last BM 2 days ago.  CS LN.  Voiding spontaneously since.           Medications:   All medications reviewed today          Physical Exam:   Blood pressure 110/52, temperature 98  F (36.7  C), temperature source Oral, resp. rate 16, height 1.778 m (5' 10\"), weight 71.4 kg (157 lb 6.5 oz), SpO2 93 %.    Intake/Output Summary (Last 24 hours) at 04/18/18 1344  Last data filed at 04/18/18 0607   Gross per 24 hour   Intake              250 ml   Output             1050 ml   Net             -800 ml       General:  Alert.  Appropriate.  No distress.  Off O2.     Heent:      Neck:    Skin:    Chest:  clear    Cardiac:  Reg without gallop, murmur.  No JVD.     Abdomen:  Non distended, soft, non-tender.  BS normal.     Extremities:  Perfused.  No edema, calf, posterior thigh tenderness to suggest DVT.     Neuro:            Data:[WR1.1]     Results for orders placed or performed during the hospital encounter of 04/16/18 (from the past 24 hour(s))   Basic metabolic panel   Result Value Ref Range    Sodium 138 133 - 144 mmol/L    Potassium 4.0 3.4 - 5.3 mmol/L    Chloride 103 94 - 109 mmol/L    Carbon Dioxide 25 20 - 32 mmol/L    Anion Gap 10 3 - 14 mmol/L    Glucose 183 (H) 70 " - 99 mg/dL    Urea Nitrogen 10 7 - 30 mg/dL    Creatinine 0.84 0.66 - 1.25 mg/dL    GFR Estimate >90 >60 mL/min/1.7m2    GFR Estimate If Black >90 >60 mL/min/1.7m2    Calcium 8.0 (L) 8.5 - 10.1 mg/dL   CBC with platelets   Result Value Ref Range    WBC 7.9 4.0 - 11.0 10e9/L    RBC Count 2.71 (L) 4.4 - 5.9 10e12/L    Hemoglobin 8.3 (L) 13.3 - 17.7 g/dL    Hematocrit 25.0 (L) 40.0 - 53.0 %    MCV 92 78 - 100 fl    MCH 30.6 26.5 - 33.0 pg    MCHC 33.2 31.5 - 36.5 g/dL    RDW 13.6 10.0 - 15.0 %    Platelet Count 238 150 - 450 10e9/L   INR   Result Value Ref Range    INR 1.31 (H) 0.86 - 1.14   Magnesium   Result Value Ref Range    Magnesium 1.5 (L) 1.6 - 2.3 mg/dL   Basic metabolic panel   Result Value Ref Range    Sodium 136 133 - 144 mmol/L    Potassium 3.7 3.4 - 5.3 mmol/L    Chloride 103 94 - 109 mmol/L    Carbon Dioxide 25 20 - 32 mmol/L    Anion Gap 8 3 - 14 mmol/L    Glucose 136 (H) 70 - 99 mg/dL    Urea Nitrogen 7 7 - 30 mg/dL    Creatinine 0.72 0.66 - 1.25 mg/dL    GFR Estimate >90 >60 mL/min/1.7m2    GFR Estimate If Black >90 >60 mL/min/1.7m2    Calcium 7.9 (L) 8.5 - 10.1 mg/dL   CBC with platelets   Result Value Ref Range    WBC 7.0 4.0 - 11.0 10e9/L    RBC Count 2.39 (L) 4.4 - 5.9 10e12/L    Hemoglobin 7.5 (L) 13.3 - 17.7 g/dL    Hematocrit 22.0 (L) 40.0 - 53.0 %    MCV 92 78 - 100 fl    MCH 31.4 26.5 - 33.0 pg    MCHC 34.1 31.5 - 36.5 g/dL    RDW 13.6 10.0 - 15.0 %    Platelet Count 234 150 - 450 10e9/L[WR1.2]                Assessment and Plan:   1.  Status post plasmacytoma resection right pelvis (iliac region) with right total hip/reconstruction.  Indication plasmocytoma right ilium.  Estimated blood loss 1450. Improved, adequate pain control.   2.  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  Electrophoresis/immunofixation demonstrated monoclonal free kappa light chain and immunoglobulin in the urine with increased kappa free light  chain in the serum.  Bone marrow biopsy no morphologic or immunophenotypic evidence for plasma cell neoplasm.  Bone survey demonstrated right iliac lesion with lucent area in the frontal region, thought likely to be benign.   3. Status post 5 weeks of radiation therapy for problem number 2. Completed 3/21.    4. Acute blood loss anemia. Progressive 2nd to soft tissue hemorrhage (on lovenox) and dilution.  Adequate presently.    5. Postural dizziness 2nd to orthostatic hypotension or vasovagal event, improved.  6. Fever likely 2nd to post surgical inflammatory change, hematoma.  Check UA with SC.  7. Urine retention with improved voiding pattern.  8. Hyperlipidemia, on statin.     PLAN:  1)  SL IV.  2)  IS, same opiates, bowel meds, supp prn, lovenox, mobilize as able.  Check PVR.  3)  PM Hgb and AM labs. 4)  Monitor clinically.   Disposition Plan   Expected discharge in 2-3 days to prior living arrangement once pain controlled, mobilizing safely, stable Hgb. .     Entered: Tavo Lopes 04/18/2018, 1:44 PM              Attestation:  I have reviewed today's vital signs, notes, medications, labs and imaging.     Tavo Lopes MD[WR1.1]             Revision History        User Key Date/Time User Provider Type Action    > WR1.2 4/18/2018  1:58 PM Tavo Lopes MD Physician Sign     WR1.1 4/18/2018  1:44 PM Tavo Lopes MD Physician             Progress Notes by Michael Terrazas MD at 4/18/2018  6:23 AM     Author:  Michael Terrazas MD Service:  Orthopedics Author Type:  Resident    Filed:  4/18/2018  6:26 AM Date of Service:  4/18/2018  6:23 AM Creation Time:  4/18/2018  6:23 AM    Status:  Signed :  Michael Terrazas MD (Resident)         Ortho Progress Note     Subjective:  Setbacks with both therapy sessions yesterday - first session limited by severe R hip pain, which didn't occur during second. Second session limited by orthostatic lightheadedness.  Stood this AM without any lightheadedness.  "No lightheadedness at rest. No pelvic pain.  Running elevated temp, though does not feel systemically ill.    Objective:[RH1.1]  /63 (BP Location: Right arm)  Temp 101.3  F (38.5  C) (Oral)  Resp 16  Ht 1.778 m (5' 10\")  Wt 71.4 kg (157 lb 6.5 oz)  SpO2 96%  BMI 22.59 kg/m2[RH1.2]  Gen: A&Ox3, no acute distress  CV: 2+ dp/pt pulses, capillary refill < 2sec  Resp: breathing equal and non-labored, no wheezing  MSK:     RLE   Dressings C/D/I   Motor: EHL, TA, FHL, GS 5/5   SILT on SP, DP, S, S, and T nerve territories   Circulation: palpable DP and TP, foot warm[RH1.1]      Hemoglobin   Date Value Ref Range Status   04/17/2018 8.3 (L) 13.3 - 17.7 g/dL Final   04/17/2018 8.4 (L) 13.3 - 17.7 g/dL Final[RH1.2]       Assessment/Plan:  63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  Acute blood loss anemia.      Elevated temps, will continue to monitor. No evidence of wound site infection / PNA at present.    -Weight Bearing Status: WBAT RLE  -Activity: Posterior hip precautions  -DVT PPx: Lovenox x4wk  -Pain control: IV and PO, wean to PO as able  -Dressing: Leave in place until POD 7. May shower with dressings in place as are waterproof  -Diet: Regular    -Disposition: Anticipate home around POD 3 pending PT, pain control.  -Follow up: 2 weeks with Dr Jodee Terrazas MD  Orthopaedic Surgery PGY-4  Pager:  [phone][RH1.1]       Revision History        User Key Date/Time User Provider Type Action    > RH1.2 4/18/2018  6:26 AM Michael Terrazas MD Resident Sign     RH1.1 4/18/2018  6:23 AM Michael Terrazas MD Resident                   Procedure Notes     No notes of this type exist for this encounter.         Progress Notes - Therapies (Notes from 04/16/18 through 04/19/18)      Progress Notes by Юлия You OT at 4/17/2018  2:02 PM     Author:  Юлия You OT Service:  (none) Author Type:  Occupational Therapist    Filed:  4/17/2018  " 2:03 PM Date of Service:  4/17/2018  2:02 PM Creation Time:  4/17/2018  2:02 PM    Status:  Signed :  Юлия You OT (Occupational Therapist)          04/17/18 1345   Quick Adds   Type of Visit Initial Occupational Therapy Evaluation   Living Environment   Lives With child(bozena), adult   Living Arrangements house   Home Accessibility bed and bath are not on the first floor;tub/shower is not walk in   Number of Stairs to Enter Home 1   Number of Stairs Within Home 14   Stair Railings at Home inside, present on right side   Transportation Available car;family or friend will provide   Living Environment Comment Pt lives with adult son in split level home. Son able to assist pt   Pt. has walk in shower downstairs, and tub/shower upstairs.   Self-Care   Usual Activity Tolerance good   Current Activity Tolerance fair   Regular Exercise no   Equipment Currently Used at Home cane, straight   Activity/Exercise/Self-Care Comment Pt has been usign cane for the past 3 months d/t hip pain   Functional Level Prior   Ambulation 1-->assistive equipment   Transferring 0-->independent   Toileting 0-->independent   Bathing 0-->independent   Dressing 0-->independent   Eating 0-->independent   Communication 0-->understands/communicates without difficulty   Swallowing 0-->swallows foods/liquids without difficulty   Cognition 0 - no cognition issues reported   Fall history within last six months no   Which of the above functional risks had a recent onset or change? ambulation;transferring;toileting;bathing;dressing   Prior Functional Level Comment Pt IND with all ADLs and mobility. Pt currently using cane for mobilizing. Pt works as  but on a medical leave right now hoping to return asap.   General Information   Onset of Illness/Injury or Date of Surgery - Date 04/16/18   Referring Physician Johnnie Ellsworth MD   Additional Occupational Profile Info/Pertinent History of Current Problem 63M with R pelvis  plasmacytoma now s/p 4/16 tumor debulking and placement of R BRADLEY with cage and cemented liner   Precautions/Limitations right hip precautions  (posterior hip precautions)   Weight-Bearing Status - RLE weight-bearing as tolerated   General Info Comments activity orders; up with A   Cognitive Status Examination   Orientation orientation to person, place and time   Cognitive Comment cognition appears baseline; WNL   Visual Perception   Visual Perception No deficits were identified   Pain Assessment   Patient Currently in Pain Yes, see Vital Sign flowsheet  (surgery site )   Range of Motion (ROM)   ROM Quick Adds No deficits were identified   Strength   Manual Muscle Testing Quick Adds No deficits were identified  (general post op weakness)   Transfer Skill: Bed to Chair/Chair to Bed   Level of [place]: Bed to Chair unable to perform   Transfer Skill: Sit to Stand   Level of [place]: Sit/Stand unable to perform   Lower Body Dressing   Level of [place]: Dress Lower Body maximum assist (25% patients effort)   Grooming   Level of [place]: Grooming stand-by assist  (from bed)   Eating/Self Feeding   Level of [place]: Eating independent   Instrumental Activities of Daily Living (IADL)   Previous Responsibilities meal prep;housekeeping;laundry;shopping;yardwork;medication management;finances;driving;work   Activities of Daily Living Analysis   Impairments Contributing to Impaired Activities of Daily Living balance impaired;pain;post surgical precautions   General Therapy Interventions   Planned Therapy Interventions ADL retraining;transfer training;progressive activity/exercise   Clinical Impression   Criteria for Skilled Therapeutic Interventions Met yes, treatment indicated   OT Diagnosis impaired ADLs and functional mobility   Influenced by the following impairments post op pain/precautions   Assessment of Occupational Performance 3-5 Performance Deficits   Identified Performance Deficits dressing,  "toileting, bathing, home mgmt.   Clinical Decision Making (Complexity) Low complexity   Therapy Frequency daily   Predicted Duration of Therapy Intervention (days/wks) ~ 5 days   Anticipated Equipment Needs at Discharge (TBD)   Anticipated Discharge Disposition Home with Assist;Acute Rehabilitation Facility;Transitional Care Facility   Risks and Benefits of Treatment have been explained. Yes   Patient, Family & other staff in agreement with plan of care Yes   [facility] AM-PAC TM \"6 Clicks\"   2016, Trustees of [facility], under license to [employer].  All rights reserved.   6 Clicks Short Forms Daily Activity Inpatient Short Form   [facility] AM-PAC  \"6 Clicks\" Daily Activity Inpatient Short Form   1. Putting on and taking off regular lower body clothing? 2 - A Lot   2. Bathing (including washing, rinsing, drying)? 2 - A Lot   3. Toileting, which includes using toilet, bedpan or urinal? 1 - Total   4. Putting on and taking off regular upper body clothing? 4 - None   5. Taking care of personal grooming such as brushing teeth? 4 - None   6. Eating meals? 4 - None   Daily Activity Raw Score (Score out of 24.Lower scores equate to lower levels of function) 17   Total Evaluation Time   Total Evaluation Time (Minutes) 8[KW1.1]        Revision History        User Key Date/Time User Provider Type Action    > KW1.1 4/17/2018  2:03 PM Юлия You OT Occupational Therapist Sign            Progress Notes by Beba Mantilla PT at 4/17/2018 12:00 PM     Author:  Beba Mantilla PT Service:  (none) Author Type:  Physical Therapist    Filed:  4/17/2018 12:00 PM Date of Service:  4/17/2018 12:00 PM Creation Time:  4/17/2018 12:00 PM    Status:  Signed :  Beba Mantilla PT (Physical Therapist)          04/17/18 1044   Quick Adds   Type of Visit Initial PT Evaluation   Living Environment   Lives With child(bozena), adult   Living Arrangements house   Home Accessibility bed and bath are not on the " first floor   Number of Stairs to Enter Home 1   Number of Stairs Within Home 14   Stair Railings at Home inside, present on right side   Transportation Available car;family or friend will provide   Living Environment Comment Pt lives with adult son in split level home. Son able to assist pt   Self-Care   Usual Activity Tolerance good   Current Activity Tolerance fair   Regular Exercise no   Equipment Currently Used at Home cane, straight   Activity/Exercise/Self-Care Comment Pt has been usign cane for the past 3 months d/t hip pain   Functional Level Prior   Ambulation 1-->assistive equipment   Transferring 0-->independent   Toileting 0-->independent   Bathing 0-->independent   Dressing 0-->independent   Eating 0-->independent   Communication 0-->understands/communicates without difficulty   Swallowing 0-->swallows foods/liquids without difficulty   Cognition 0 - no cognition issues reported   Fall history within last six months no   Which of the above functional risks had a recent onset or change? ambulation;transferring   Prior Functional Level Comment Pt IND with all ADLs and mobility. Pt currently using cane for mobilizing. Pt works as  but on a medical leave right now hoping to return asap.   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 debulking and placement of R BRADLEY with cage and cemented liner   Precautions/Limitations fall precautions   Weight-Bearing Status - LUE full weight-bearing   Weight-Bearing Status - RUE full weight-bearing   Weight-Bearing Status - LLE full weight-bearing   Weight-Bearing Status - RLE weight-bearing as tolerated   Cognitive Status Examination   Orientation orientation to person, place  "and time   Level of Consciousness alert   Follows Commands and Answers Questions 100% of the time   Personal Safety and Judgment intact   Memory intact   Pain Assessment   Patient Currently in Pain Yes, see Vital Sign flowsheet   Integumentary/Edema   Integumentary/Edema Comments normal post op swelling   Posture    Posture Not impaired   Range of Motion (ROM)   ROM Comment decreased R hip ROM d/t pain   Strength   Strength Comments not formally tested pt demonstrates less than 3/5 strength grossly in RLE d/t severe pain   Bed Mobility   Bed Mobility Comments modA for supine<>sit transfer'   Transfer Skills   Transfer Comments unable to assess   Gait   Gait Comments unable to assess d/t pain   Balance   Balance Comments normal sitting balance. unabel to assess standing balance   Sensory Examination   Sensory Perception no deficits were identified   General Therapy Interventions   Planned Therapy Interventions balance training;bed mobility training;gait training;progressive activity/exercise;home program guidelines;risk factor education;transfer training;stretching;strengthening;neuromuscular re-education   Clinical Impression   Criteria for Skilled Therapeutic Intervention yes, treatment indicated   PT Diagnosis Impaired functional mobility   Influenced by the following impairments pina, weakness, decreased activity tolerance   Functional limitations due to impairments bed mobility, transfers, gait, stairs   Clinical Presentation Evolving/Changing   Clinical Presentation Rationale Per pts PMHx and current meedical and functional status   Clinical Decision Making (Complexity) Moderate complexity   Therapy Frequency` 2 times/day   Predicted Duration of Therapy Intervention (days/wks) 4 days   Anticipated Discharge Disposition Home with Assist   Risk & Benefits of therapy have been explained Yes   Patient, Family & other staff in agreement with plan of care Yes   [facility] AM-PAC TM \"6 Clicks\"   2016, Trustees " "of [facility], under license to [employer].  All rights reserved.   6 Clicks Short Forms Basic Mobility Inpatient Short Form   [facility] AM-PAC  \"6 Clicks\" V.2 Basic Mobility Inpatient Short Form   1. Turning from your back to your side while in a flat bed without using bedrails? 3 - A Little   2. Moving from lying on your back to sitting on the side of a flat bed without using bedrails? 3 - A Little   3. Moving to and from a bed to a chair (including a wheelchair)? 2 - A Lot   4. Standing up from a chair using your arms (e.g., wheelchair, or bedside chair)? 2 - A Lot   5. To walk in hospital room? 2 - A Lot   6. Climbing 3-5 steps with a railing? 2 - A Lot   Basic Mobility Raw Score (Score out of 24.Lower scores equate to lower levels of function) 14   Total Evaluation Time   Total Evaluation Time (Minutes) 10[SD1.1]        Revision History        User Key Date/Time User Provider Type Action    > SD1.1 4/17/2018 12:00 PM Beba Mantilla, PT Physical Therapist Sign                                                      INTERAGENCY TRANSFER FORM - LAB / IMAGING / EKG / EMG RESULTS   4/16/2018                        8A: [phone]            Unresulted Labs (24h ago through future)    Start       Ordered    04/19/18 0600  Platelet count  (enoxaparin (LOVENOX) (Weight  kg with CrCl greater than 30 mL/min is prechecked))  EVERY THREE DAYS,   Routine     Comments:  Repeat every 3 days while on VTE prophylaxis. If no result is listed, this lab has not been done the past 365 days. LATEST LAB RESULT: Platelet Count (10e9/L)       Date                     Value                 02/05/2018               248              ----------      04/16/18 2121    Unscheduled  Hemoglobin  CONDITIONAL X 2,   Routine     Comments:  Release on POD 1 and POD 2 if the morning Hgb is less than 8.0    04/16/18 2121         Lab Results - 3 Days      Blood culture [[id number]]  Resulted: 04/19/18 1459, Result status: " Preliminary result    Ordering provider: Ke Cardoso MD  04/18/18 2128 Resulting lab: [facility]    Specimen Information    Type Source Collected On   Blood  04/18/18 2238   Comment:  Left Arm          Components       Value Reference Range Flag Lab   Specimen Description Blood Left Arm      Special Requests Aerobic and anaerobic bottles received   75   Culture Micro No growth after 15 hours   225            Blood culture [[id number]]  Resulted: 04/19/18 1459, Result status: Preliminary result    Ordering provider: Ke Cardoso MD  04/18/18 2128 Resulting lab: [facility]    Specimen Information    Type Source Collected On   Blood  04/18/18 2244   Comment:  Left Hand          Components       Value Reference Range Flag Lab   Specimen Description Blood Left Hand      Special Requests Aerobic and anaerobic bottles received   75   Culture Micro No growth after 15 hours   225            Urine Culture Aerobic Bacterial [[id number]]  Resulted: 04/19/18 1013, Result status: Preliminary result    Ordering provider: Tavo Lopes MD  04/18/18 1026 Resulting lab: [facility]    Specimen Information    Type Source Collected On   Midstream Urine Urine clean catch 04/18/18 1500          Components       Value Reference Range Flag Lab   Specimen Description Midstream Urine      Special Requests Specimen received in preservative   75   Culture Micro Culture negative < 24 hours, reincubate   225            Basic metabolic panel [[id number]] (Abnormal)  Resulted: 04/19/18 0958, Result status: Final result    Ordering provider: Tavo Lopes MD  04/19/18 0000 Resulting lab: [facility]    Specimen Information    Type Source Collected On   Blood  04/19/18 0916          Components       Value Reference Range Flag Lab   Sodium 140 133 - 144 mmol/L  13   Potassium 3.6 3.4 - 5.3 mmol/L  13   Chloride 106  94 - 109 mmol/L  13   Carbon Dioxide 25 20 - 32 mmol/L  13   Anion Gap 9 3 - 14 mmol/L  13   Glucose 119 70 - 99 mg/dL H 13   Urea Nitrogen 8 7 - 30 mg/dL  13   Creatinine 0.77 0.66 - 1.25 mg/dL  13   GFR Estimate >90 >60 mL/min/1.7m2  13   Comment:  Non  GFR Calc   GFR Estimate If Black >90 >60 mL/min/1.7m2  13   Comment:  African American GFR Calc   Calcium 8.1 8.5 - 10.1 mg/dL L 13            Magnesium [[id number]]  Resulted: 04/19/18 0958, Result status: Final result    Ordering provider: Tavo Lopes MD  04/19/18 0000 Resulting lab: [facility]    Specimen Information    Type Source Collected On   Blood  04/19/18 0916          Components       Value Reference Range Flag Lab   Magnesium 2.2 1.6 - 2.3 mg/dL  13            CBC with platelets [[id number]] (Abnormal)  Resulted: 04/19/18 0937, Result status: Final result    Ordering provider: Tavo Lopes MD  04/19/18 0000 Resulting lab: [facility]    Specimen Information    Type Source Collected On   Blood  04/19/18 0916          Components       Value Reference Range Flag Lab   WBC 8.4 4.0 - 11.0 10e9/L  13   RBC Count 2.69 4.4 - 5.9 10e12/L L 13   Hemoglobin 8.1 13.3 - 17.7 g/dL L 13   Hematocrit 24.1 40.0 - 53.0 % L 13   MCV 90 78 - 100 fl  13   MCH 30.1 26.5 - 33.0 pg  13   MCHC 33.6 31.5 - 36.5 g/dL  13   RDW 14.9 10.0 - 15.0 %  13   Platelet Count 250 150 - 450 10e9/L  13            Lactic acid whole blood [[id number]]  Resulted: 04/18/18 7217, Result status: Final result    Ordering provider: Ke Cardoso MD  04/18/18 7682 Resulting lab: [facility]    Specimen Information    Type Source Collected On   Blood  04/18/18 2344          Components       Value Reference Range Flag Lab   Lactic Acid 0.8 0.7 - 2.0 mmol/L  13            CBC with platelets differential [[id number]] (Abnormal)  Resulted: 04/18/18 2303, Result status: Final result     Ordering provider: Ke Cardoso MD  04/18/18 2129 Resulting lab: [facility]    Specimen Information    Type Source Collected On   Blood  04/18/18 2238          Components       Value Reference Range Flag Lab   WBC 7.4 4.0 - 11.0 10e9/L  13   RBC Count 2.16 4.4 - 5.9 10e12/L L 13   Hemoglobin 6.7 13.3 - 17.7 g/dL LL 13   Comment:         This result has been called to AVI PAZ by Yolanda ESTRADA on 04 18 2018 at 2302,   and has been read back.      Hematocrit 20.0 40.0 - 53.0 % L 13   MCV 93 78 - 100 fl  13   MCH 31.0 26.5 - 33.0 pg  13   MCHC 33.5 31.5 - 36.5 g/dL  13   RDW 13.6 10.0 - 15.0 %  13   Platelet Count 232 150 - 450 10e9/L  13   Diff Method Automated Method   13   % Neutrophils 73.6 %  13   % Lymphocytes 9.0 %  13   % Monocytes 14.4 %  13   % Eosinophils 1.2 %  13   % Basophils 0.7 %  13   % Immature Granulocytes 1.1 %  13   Nucleated RBCs 0 0 /100  13   Absolute Neutrophil 5.4 1.6 - 8.3 10e9/L  13   Absolute Lymphocytes 0.7 0.8 - 5.3 10e9/L L 13   Absolute Monocytes 1.1 0.0 - 1.3 10e9/L  13   Absolute Eosinophils 0.1 0.0 - 0.7 10e9/L  13   Absolute Basophils 0.1 0.0 - 0.2 10e9/L  13   Abs Immature Granulocytes 0.1 0 - 0.4 10e9/L  13   Absolute Nucleated RBC 0.0   13            Lactic acid whole blood [[id number]]  Resulted: 04/18/18 2253, Result status: Final result    Ordering provider: Ke Cardoso MD  04/18/18 2148 Resulting lab: [facility]    Specimen Information    Type Source Collected On   Blood  04/18/18 2238          Components       Value Reference Range Flag Lab   Lactic Acid 1.2 0.7 - 2.0 mmol/L  153            Lactic acid level STAT for sepsis protocol [[id number]] (Abnormal)  Resulted: 04/18/18 2115, Result status: Final result    Ordering provider: Tavo Lopes MD  04/18/18 2044 Resulting lab: [facility]    Specimen Information    Type Source Collected On   Blood  04/18/18 2104          Components        Value Reference Range Flag Lab   Lactate for Sepsis Protocol 2.8 0.4 - 1.9 mmol/L    Comment:         Critical Value called to and read back by  SMILEY PAZ 8A 4.18.18 2115 AA              Hemoglobin [[id number]] (Abnormal)  Resulted: 04/18/18 1800, Result status: Final result    Ordering provider: Tavo Lopes MD  04/18/18 1200 Resulting lab: [facility]    Specimen Information    Type Source Collected On   Blood  04/18/18 1750          Components       Value Reference Range Flag Lab   Hemoglobin 7.2 13.3 - 17.7 g/dL L 13            UA with Microscopic [[id number]] (Abnormal)  Resulted: 04/18/18 1547, Result status: Final result    Ordering provider: Tavo Lopes MD  04/18/18 1026 Resulting lab: [facility]    Specimen Information    Type Source Collected On   Urine Urine clean catch 04/18/18 1500          Components       Value Reference Range Flag Lab   Color Urine Yellow   13   Appearance Urine Clear   13   Glucose Urine Negative NEG^Negative mg/dL  13   Bilirubin Urine Negative NEG^Negative  13   Ketones Urine Negative NEG^Negative mg/dL  13   Specific Gravity Urine 1.018 1.003 - 1.035  13   Blood Urine Trace NEG^Negative A 13   pH Urine 5.5 5.0 - 7.0 pH  13   Protein Albumin Urine 30 NEG^Negative mg/dL A 13   Urobilinogen mg/dL Normal 0.0 - 2.0 mg/dL  13   Nitrite Urine Negative NEG^Negative  13   Leukocyte Esterase Urine Negative NEG^Negative  13   Source Clean catch urine   13   WBC Urine 3 0 - 5 /HPF  13   RBC Urine 1 0 - 2 /HPF  13   Mucous Urine Present NEG^Negative /LPF A 13            Magnesium [[id number]] (Abnormal)  Resulted: 04/18/18 0741, Result status: Final result    Ordering provider: Tavo Lopes MD  04/18/18 0000 Resulting lab: [facility]    Specimen Information    Type Source Collected On   Blood  04/18/18 0708          Components       Value Reference Range Flag Lab    Magnesium 1.5 1.6 - 2.3 mg/dL L 153            Basic metabolic panel [[id number]] (Abnormal)  Resulted: 04/18/18 0741, Result status: Final result    Ordering provider: Tavo Lopes MD  04/18/18 0000 Resulting lab: [facility]    Specimen Information    Type Source Collected On   Blood  04/18/18 0708          Components       Value Reference Range Flag Lab   Sodium 136 133 - 144 mmol/L  153   Potassium 3.7 3.4 - 5.3 mmol/L  153   Chloride 103 94 - 109 mmol/L  153   Carbon Dioxide 25 20 - 32 mmol/L  153   Anion Gap 8 3 - 14 mmol/L  153   Glucose 136 70 - 99 mg/dL H 153   Urea Nitrogen 7 7 - 30 mg/dL  153   Creatinine 0.72 0.66 - 1.25 mg/dL  153   GFR Estimate >90 >60 mL/min/1.7m2  153   Comment:  Non  GFR Calc   GFR Estimate If Black >90 >60 mL/min/1.7m2  153   Comment:  African American GFR Calc   Calcium 7.9 8.5 - 10.1 mg/dL L 153            CBC with platelets [[id number]] (Abnormal)  Resulted: 04/18/18 0716, Result status: Final result    Ordering provider: Tavo Lopes MD  04/18/18 0000 Resulting lab: [facility]    Specimen Information    Type Source Collected On   Blood  04/18/18 0708          Components       Value Reference Range Flag Lab   WBC 7.0 4.0 - 11.0 10e9/L  13   RBC Count 2.39 4.4 - 5.9 10e12/L L 13   Hemoglobin 7.5 13.3 - 17.7 g/dL L 13   Hematocrit 22.0 40.0 - 53.0 % L 13   MCV 92 78 - 100 fl  13   MCH 31.4 26.5 - 33.0 pg  13   MCHC 34.1 31.5 - 36.5 g/dL  13   RDW 13.6 10.0 - 15.0 %  13   Platelet Count 234 150 - 450 10e9/L  13            Basic metabolic panel [[id number]] (Abnormal)  Resulted: 04/17/18 1847, Result status: Final result    Ordering provider: Tavo Lopes MD  04/17/18 1200 Resulting lab: [facility]    Specimen Information    Type Source Collected On   Blood  04/17/18 1821          Components       Value Reference Range Flag Lab   Sodium 138 133 - 144 mmol/L   13   Potassium 4.0 3.4 - 5.3 mmol/L  13   Chloride 103 94 - 109 mmol/L  13   Carbon Dioxide 25 20 - 32 mmol/L  13   Anion Gap 10 3 - 14 mmol/L  13   Glucose 183 70 - 99 mg/dL H 13   Urea Nitrogen 10 7 - 30 mg/dL  13   Creatinine 0.84 0.66 - 1.25 mg/dL  13   GFR Estimate >90 >60 mL/min/1.7m2  13   Comment:  Non  GFR Calc   GFR Estimate If Black >90 >60 mL/min/1.7m2  13   Comment:  African American GFR Calc   Calcium 8.0 8.5 - 10.1 mg/dL L 13            INR [[id number]] (Abnormal)  Resulted: 04/17/18 1840, Result status: Final result    Ordering provider: Tavo Lopes MD  04/17/18 1200 Resulting lab: [facility]    Specimen Information    Type Source Collected On   Blood  04/17/18 1821          Components       Value Reference Range Flag Lab   INR 1.31 0.86 - 1.14 H 13            CBC with platelets [[id number]] (Abnormal)  Resulted: 04/17/18 1832, Result status: Final result    Ordering provider: Tavo Lopes MD  04/17/18 1200 Resulting lab: [facility]    Specimen Information    Type Source Collected On   Blood  04/17/18 1821          Components       Value Reference Range Flag Lab   WBC 7.9 4.0 - 11.0 10e9/L  13   RBC Count 2.71 4.4 - 5.9 10e12/L L 13   Hemoglobin 8.3 13.3 - 17.7 g/dL L 13   Hematocrit 25.0 40.0 - 53.0 % L 13   MCV 92 78 - 100 fl  13   MCH 30.6 26.5 - 33.0 pg  13   MCHC 33.2 31.5 - 36.5 g/dL  13   RDW 13.6 10.0 - 15.0 %  13   Platelet Count 238 150 - 450 10e9/L  13            Surgical pathology exam [[id number]]  Resulted: 04/17/18 0859, Result status: In process    Ordering provider: Johnnie Ellsworth MD  04/16/18 1656 Resulting lab: [facility]    Specimen Information    Type Source Collected On   Tissue Pelvis, Right 04/16/18 1656            Hemoglobin [[id number]] (Abnormal)  Resulted: 04/17/18 0607, Result status: Final result    Ordering provider: Michael Terrazas MD  04/17/18 0001 Resulting lab: [place]    Specimen Information    Type Source Collected On   Blood  04/17/18 0550          Components       Value Reference Range Flag Lab   Hemoglobin 8.4 13.3 - 17.7 g/dL L 13            Creatinine [[id number]]  Resulted: 04/16/18 2244, Result status: Final result    Ordering provider: Michael Terrazas MD  04/16/18 2121 Resulting lab: [facility]    Specimen Information    Type Source Collected On   Blood  04/16/18 2224          Components       Value Reference Range Flag Lab   Creatinine 0.81 0.66 - 1.25 mg/dL  FrStHsLb   GFR Estimate >90 >60 mL/min/1.7m2  FrStHsLb   Comment:  Non  GFR Calc   GFR Estimate If Black >90 >60 mL/min/1.7m2  FrStHsLb   Comment:   GFR Calc            Platelet count [[id number]]  Resulted: 04/16/18 2230, Result status: Final result    Ordering provider: Michael Terrazas MD  04/16/18 2121 Resulting lab: [facility]    Specimen Information    Type Source Collected On   Blood  04/16/18 2224          Components       Value Reference Range Flag Lab   Platelet Count 236 150 - 450 10e9/L  13            Glucose [[id number]]  Resulted: 04/16/18 1238, Result status: Final result    Ordering provider: Davide Walters DO  04/16/18 1200 Resulting lab: [facility]    Specimen Information    Type Source Collected On   Blood  04/16/18 1215          Components       Value Reference Range Flag Lab   Glucose 97 70 - 99 mg/dL  153            Hemoglobin [[id number]]  Resulted: 04/16/18 1236, Result status: Final result    Ordering provider: Davide Walters DO  04/16/18 1158 Resulting lab: [facility]    Specimen Information    Type Source Collected On   Blood  04/16/18 1215          Components       Value Reference Range Flag Lab   Hemoglobin 13.6 13.3 - 17.7 g/dL  13            Testing Performed By     Lab - Abbreviation Name Director Address Valid Date Range     13 - Unknown [facility] Unknown [address]  [facility] [address] 01/15/15 0916 - Present    14 - [facility] Unknown [address] [place] [address] 05/08/15 1057 - Present    75 - Unknown [facility] Unknown [address] [facility] [address] 01/15/15 1019 - Present    88 - Unknown [facility] Unknown Unknown 10/30/02 0000 - Present    153 - Unknown [facility] Unknown Unknown 04/28/11 1033 - Present    225 - Unknown [facility] Unknown [address] [facility] [address] 12/19/14 0954 - Present               Imaging Results - 3 Days      XR Chest 2 Views [[id number]]  Resulted: 04/19/18 0655, Result status: Final result    Ordering provider: Ke Cardoso MD  04/18/18 2128 Resulted by: Teofilo Conway MD Lee, Mark Theodore, DO    Performed: 04/18/18 2206 - 04/18/18 2214 Resulting lab: [facility] RES[facility]    Narrative:       Exam: XR CHEST 2 VW, 4/18/2018 10:14 PM    Indication: fever;     Comparison: None available    Findings:   PA and lateral views of the chest. The cardiac silhouette and  pulmonary vascular are within normal limits. No focal airspace  opacity, pleural effusion, or pneumothorax. Air-filled loops of bowel  in left upper quadrant. There is mild degenerative changes of thoracic  spine.      Impression:       Impression:  Normal chest x-ray.    I have personally reviewed the examination and initial interpretation  and I agree with the findings.    TEOFILO CONWAY MD      XR Pelvis w Hip Right 1 View [[id number]]  Resulted: 04/16/18 2026, Result status: Final result    Ordering provider: Michael Terrazas MD  04/16/18 1916 Resulted by: Lula Aguiar MD Pontolillo, John L, MD    Performed: 04/16/18 1925 - 04/16/18 2003 Resulting lab: RADIOLOGY RESULTS    Narrative:       XR PELVIS AND HIP RIGHT 1 VIEW  4/16/2018 8:03 PM      HISTORY: Post  operative exam;     COMPARISON: Pelvic MRI 3/22/2018, right hip radiographs 1/16/2018    FINDINGS: AP view of the pelvis and AP and lateral views of the right  hip. Coils from prior hernia repair project over the left hemipelvis.  New surgical changes of right iliac mass resection and right total hip  arthroplasty. Cement filling the resection cavity is seen to extrude  into the right hemipelvis. This does not appear to be within any  vascular structure. No new fracture is identified. Unremarkable bowel  gas pattern. Small amount of postoperative subcutaneous emphysema. The  soft tissues are otherwise unremarkable.      Impression:       IMPRESSION: Surgical changes of right total hip arthroplasty. Cement  is seen extruded within the right hemipelvis. No new fracture.    I have personally reviewed the examination and initial interpretation  and I agree with the findings.    SYLVAIN EMERSON MD      XR Surgery SANJAY L/T 5 Min Fluoro [[id number]]  Resulted: 04/16/18 1710, Result status: Final result    Ordering provider: Johnnie Ellsworth MD  04/16/18 1130 Performed: 04/16/18 1604 - 04/16/18 1707    Resulting lab: [facility]      Narrative:       This exam was marked as non-reportable because it will not be read by a   radiologist or a [place] non-radiologist provider.                Testing Performed By     Lab - Abbreviation Name Director Address Valid Date Range    104 - Rad [facility] RADIOLOGY RESULTS Unknown Unknown 02/16/05 1553 - Present    178 - [facility] Unknown Unknown 07/20/12 1135 - Present            Encounter-Level Documents:     There are no encounter-level documents.      Order-Level Documents:     There are no order-level documents.

## 2018-04-16 NOTE — PROGRESS NOTES
"SPIRITUAL HEALTH SERVICES  SPIRITUAL ASSESSMENT Progress Note  [facility]    REFERRAL SOURCE: Request for a hospital  upon adimssion    Visited with pt Dong before his surgery. He reflected on his \"journey\" with cancer treatment and this surgery for his hip. He said \"I'm hoping to be pain free after I recover from this surgery, and I haven't had no pain in a long time.\"  Pt is [employer] and said that he has lots of support from his community, including his [place] community. His paris  anointed him during his cancer journey.     Provided spiritual and emotional support and shared prayer.     PLAN: Will notify unit  of care provided.     GINO Kumar.  Associate    Pager [phone]    * [facility] remains available 24/7 for emergent requests/referrals, either by having the switchboard page the on-call  or by entering an ASAP/STAT consult in Epic (this will also page the on-call ).*     "

## 2018-04-16 NOTE — IP AVS SNAPSHOT
` ` Patient Information     Patient Name Sex     Dong Joseph ([id number]) Male 1955       Room Bed    [address] [place] [address]      Patient Demographics     Address Phone E-mail Address    [address]  [facility] [place]  [place] [address] [phone] (Home)  [phone] (Work)  [phone] (Mobile) [email or web address]      Patient Ethnicity & Race     Ethnic Group Patient Race    American White      PCP and Center    Primary Care Provider  Phone [place]     Naomi Kuo [phone] [address] [place] [address]        Emergency Contact(s)     Name Relation Home Work Mobile    Mayito Joseph [phone]      Documents on File        Status Date Received Description       Documents for the Patient    Insurance Card  () 05     Face Sheet  () 05     Privacy Notice [facility] Received 05     Face Sheet Received () 09     External Medication Information Consent Accepted () 09     Insurance Card Received () 04/21/10     Face Sheet Received () 04/21/10     Patient ID Received () 12/22/10     Consent for Services - Hospital/Clinic Received () 10/27/10     External Medication Information Consent Accepted () 11     Consent for Services - Hospital/Clinic Received () 11     External Medication Information Consent Accepted () 12     Consent for Services - Hospital/Clinic Received () 12     Insurance Card Received () 12     Insurance Card Received () 12     Consent for Services - Hospital/Clinic Received () 12     Consent for EHR Access  13 Copied from existing Consent for services - C/HOD collected on 2012    [facility] Specified Other       External Medication Information Consent Accepted 13     Consent for Services - Hospital/Clinic Received () 14     Consent for Services/Privacy Notice - Hospital/Clinic  Received () 16     Insurance Card Received 17 [facility]    Care Everywhere Prospective Auth Received 18     Consent for Services/Privacy Notice - Hospital/Clinic Received 18     Patient ID Received 18 exp 2020 [place]    Consent for Services - [facility]       Insurance Card Received 18     Business/Insurance/Care Coordination/Health Form - Patient  18 FMLA 18    HIM CRISTHIAN Authorization  18 [facility]    Business/Insurance/Care Coordination/Health Form - Patient  18 ATTENDING PHYSICIAN STATEMENT 18    Patient Photo   Photo of Patient       Documents for the Encounter    CMS IM for Patient Signature       H/P - History and Physical  18 [facility] PRE-OP EVAL/2018      Admission Information     Attending Provider Admitting Provider Admission Type Admission Date/Time    Johnnie Ellsworth MD Ogilvie, Christian McKay, MD Elective 18  1123    Discharge Date Hospital Service Auth/Cert Status Service Area     Surgery Incomplete [facility]    Unit Room/Bed Admission Status       [facility] [id number]- Admission (Confirmed)       Admission     Complaint    Tumor, Status post hip surgery      Hospital Account     Name Acct ID Class Status Primary Coverage    Dong Joseph [id number] Inpatient Open Imgur - Imgur OPEN ACCESS            Guarantor Account (for Hospital Account #[id number])     Name Relation to Pt Service Area Active? Acct Type    Dong Joseph  FCS Yes Personal/Family    Address Phone          [address]  [place] [address] [phone](H)  [phone](O)              Coverage Information (for Hospital Account #[id number])     F/O Payor/Plan Precert #    Img[employer] OPEN ACCESS     Subscriber Subscriber #    Dong Joseph [id number]    Address Phone    [address]  [place] [address] [phone]

## 2018-04-16 NOTE — ANESTHESIA PREPROCEDURE EVALUATION
Anesthesia Evaluation     . Pt has had prior anesthetic. Type: General    No history of anesthetic complications          ROS/MED HX    ENT/Pulmonary:  - neg pulmonary ROS   (+)tobacco use, Past use light remote smoker packs/day  , . .    Neurologic:  - neg neurologic ROS     Cardiovascular:     (+) Dyslipidemia, ----. Taking blood thinners : . . . :. . Previous cardiac testing date:results:date: results:ECG reviewed date:1/24/18 results:SB at 55. QTc 382. date: results:         (-) CAD   METS/Exercise Tolerance: Comment: Can walk miles, bike until right hip pain.  >4 METS   Hematologic:  - neg hematologic  ROS       Musculoskeletal:   (+) , , other musculoskeletal- right hip CA      GI/Hepatic: Comment: Seldom GERD    (+) GERD Symptomatic,       Renal/Genitourinary:     (+) Other Renal/ Genitourinary, ED      Endo:  - neg endo ROS       Psychiatric:  - neg psychiatric ROS       Infectious Disease:  - neg infectious disease ROS       Malignancy:   (+) Malignancy History of Other  Other CA Active status post 1. Probable large osseous metastasis in the right supra-acetabular  ilium extending into the adjacent soft tissues corresponding with the  lucent lesion seen on the prior CT. Plasmacytoma is also in the differential.         Other:    (+) H/O Chronic Pain,                 Procedure: Procedure(s):  Right Total Hip Arthroplasty And Removal Of Right Pelvis Tumor Posterior - Wound Class: I-Clean   - Wound Class: I-Clean    HPI: Dong Joseph is a 63 year old male with the following history who presents for R hip tumor resection.    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         No current facility-administered medications on file prior to encounter.   Current Outpatient Prescriptions on File Prior to Encounter:  HYDROcodone-acetaminophen (NORCO)  MG per tablet Take 1 tablet by mouth every 6 hours as needed for moderate to severe pain maximum 4 tablet(s) per day   traMADol (ULTRAM) 50 MG tablet Take 1-2 tablets ( mg) by mouth every 6 hours as needed for pain   Acetaminophen (TYLENOL PO) Take 1,000 mg by mouth   atorvastatin (LIPITOR) 20 MG tablet Take 1 tablet (20 mg) by mouth daily (Patient taking differently: Take 20 mg by mouth At Bedtime )   aspirin 81 MG tablet Take 1 tablet by mouth daily.       Social Hx:   Social History   Substance Use Topics     Smoking status: Former Smoker     Packs/day: 0.75     Years: 5.00     Types: Cigarettes     Start date: 6/1/1973     Quit date: 6/1/1978     Smokeless tobacco: Never Used     Alcohol use 1.0 oz/week      Comment: Patient reports one drink bi-weekly       Allergies: No Known Allergies      NPO Status: Per ASA Guidelines    Labs:    Blood Bank:  No results found for: ABO, RH, AS  BMP:  Recent Labs   Lab Test  02/02/18   1144   NA  139   POTASSIUM  4.3   CHLORIDE  103   CO2  31   BUN  21   CR  0.83   GLC  105*   CHANCE  9.9     CBC:   Recent Labs   Lab Test  02/05/18   0905   WBC  7.1   RBC  4.83   HGB  14.6   HCT  44.7   MCV  93   MCH  30.2   MCHC  32.7   RDW  12.2   PLT  248     Coags:  No results for input(s): INR, PTT, FIBR in the last 65518 hours.          Physical Exam  Normal systems: cardiovascular, pulmonary and dental    Airway   Mallampati: I  TM distance: >3 FB  Neck ROM: full    Dental     Cardiovascular   Rhythm and rate:  regular and normal      Pulmonary    breath sounds clear to auscultation                    Anesthesia Plan      History & Physical Review  History and physical reviewed and following examination; no interval change.    ASA Status:  3 .    NPO Status:  > 8 hours    Plan for General and ETT with Propofol and Intravenous induction. Maintenance will be Balanced.    PONV prophylaxis:  Ondansetron (or other 5HT-3) and Dexamethasone or Solumedrol       Postoperative Care  Postoperative pain management:  IV analgesics and Multi-modal analgesia.      Consents  Anesthetic plan, risks, benefits and alternatives discussed with:  Patient.  Use of blood products discussed: No .   .                          .

## 2018-04-16 NOTE — IP AVS SNAPSHOT
MRN:[id number]                      After Visit Summary   4/16/2018    Dong Joseph    MRN: [id number]           Thank you!     Thank you for choosing [facility] for your care. Our goal is always to provide you with excellent care. Hearing back from our patients is one way we can continue to improve our services. Please take a few minutes to complete the written survey that you may receive in the mail after you visit with us. Thank you!        Patient Information     Date Of Birth          1955        Designated Caregiver       Most Recent Value    Caregiver    Will someone help with your care after discharge? yes    Name of designated caregiver Mayito    Phone number of caregiver [phone]    Caregiver address [address] [place]      About your hospital stay     You were admitted on:  April 16, 2018 You last received care in the:  [facility]    You were discharged on:  April 19, 2018        Reason for your hospital stay       Right total hip arthroplasty for pelvis tumor                  Who to Call     For medical emergencies, please call 911.  For non-urgent questions about your medical care, please call your primary care provider or clinic, [phone]  For questions related to your surgery, please call your surgery clinic        Attending Provider     Provider Johnnie Turcios MD Orthopaedic Surgery       Primary Care Provider Office Phone # Fax #    Naomi Kuo PA-C [phone]       When to contact your care team       Contact clinic if you note any of the following:  -Fevers greater than 101.5 chills  -Increased pain, redness, swelling or discharge at the surgical site.   -During regular business hours call Dr Ellsworth office and request to speak with his nurse or the triage nurses. If you see him at [facility] call [phone].  -After hours or on weekends call the hospital  at [phone] and ask to speak with the Orthopaedic  resident on call.                  After Care Instructions     Activity       Your activity upon discharge: activity as tolerated, posterior hip precautions, weightbearing as tolerated.            Diet       Follow this diet upon discharge: Regular            Discharge Instructions       Surgery: Right Total Hip Replacement    If you have any questions contact Dr. Ellsworth at the following numbers: if you see Dr. Ellsworth at [facility] call [phone].  If you see him at [facility] call [phone].      Follow up appointment:   You are scheduled to follow up with Dr. Ellsworth approximately 2 weeks after your surgery.  If you were seen at [facility], your appointment will most likely be there.  If you were seen at the [facility], your appointment will likely be there.         Anticoagulation:   Take Lovenox prescribed for the total of 4 weeks.  This is given to help minimize your risk of blood clot.    Activity:   -Continue to weight bear on your operative leg as tolerated by pain.  As you are more comfortable, you can discontinue use of the walker and transition to a cane.  Once you are comfortable with the cane, you can also wean from the cane and progress to using no assistive devices.  Do not transition until you are comfortable and have good balance.      -Follow the posterior hip precautions you were taught while at the hospital.        Pain Medications:   -Take pain medications as prescribed.  Wean off the the narcotic pain medications (Oxycodone, Dilaudid, etc) as tolerated by pain.  To help with this, take the Tylenol and Celebrex (if prescribed) to minimize the amount of narcotic pain medication you need to take.      -Do not drive while on pain medications or until your leg feels well enough to do so.    Bandage Care and Showering:   -You can shower with the adhesive bandage covering your knee at the time of discharge.    -Remove the adhesive bandage 7 days after your surgery.  This can be  removed at home.  Once removed, it is common to have a few scabs.  Let the scabs fall off on their own - they will do so over the next week or two.  The sutures are resorbable and nothing needs to be removed.    --Once the bandage is removed, you can shower without covering the incision.  Do not soak or bathe the incision in water.  Do not scrub the incision.  Just let the water from the shower run over the incision.    --Contact Dr. Ellsworth or his staff if there is any drainage from the incision or redness.    Leg Swelling and Icing  -Continue icing the hip 5-6 times per day for approximately 20 minutes each time.  This will help with swelling.    -Some swelling in the leg is normal after surgery.  This type of swelling is usually gravity dependent and is improved in the morning after sleeping.  If the swelling is painful in the calf or the swelling is getting worse, contact Dr. Ellsworth's office.  We may recommend presenting to the Emergency Department to obtain an ultrasound of the leg if there is concern for a DVT.      -Elevate the leg if you are sitting as this will help reduce swelling.    Contact clinic if you note any of the following:  -Fevers greater than 101.5 chills  -Increased pain, redness, swelling or discharge at the surgical site.   -During regular business hours call Dr Ellsworth's office and request to speak with his nurse or the triage nurses. If you see him at [facility] call [phone]. If you see him at [facility] call [phone].   -After hours or on weekends call the hospital  at [phone] and ask to speak with the Orthopaedic resident on call.            Discharge Instructions       C with RN - start 4/20.  Check clinically, VS, CBC with plts, review meds - update PMD.  Call primary MD to arrange/discuss follow up  Suppository if 3 days and no BM. .            Wound care and dressings       Instructions to care for your wound at home: May shower with  dressing in place, as it is waterproof.  If water gets underneath the dressing, you must remove it and replace with a dry (e.g. Gauze and tape) dressing.  If current dressing stays intact, you may leave the dressing in place until post-operative day #10.  On post-op day #10, you may remove dressing and shower normally, allowing water to run over the incision.  Do not soak (e.g. bath) or scrub the incision site.  Keep wound clean and dry otherwise.                  Follow-up Appointments     Follow Up and recommended labs and tests       You are scheduled to follow up with Dr. Ellsworth approximately 2 weeks after your surgery.    If you were seen at the [facility], your appointment will likely be there.    Contact clinic if you have any questions about the date or time.                  Your next 10 appointments already scheduled     Apr 24, 2018 12:00 PM CDT   [facility] Visit with Garrett Dejesus MD   [facility]    [address] [place] [address] [phone]            May 02, 2018 12:00 PM CDT   (Arrive by 11:45 AM)   Return Visit with Johnnie Ellsworth MD   [facility] ([facility])    [address] [place] [address] [phone]            Jun 07, 2018 11:00 AM CDT   LAB with NL LAB [facility] ([facility])    [id number] [place] [address] [phone]           Please do not eat 10-12 hours before your appointment if you are coming in fasting for labs on lipids, cholesterol, or glucose (sugar). This does not apply to pregnant women. Water, hot tea and black coffee (with nothing added) are okay. Do not drink other fluids, diet soda or chew gum.            Jun 14, 2018 11:00 AM CDT   Return Visit with Froilan Peres MD   [facility]    [address] [place] [address]  [address]   [phone]              Additional Services     Home Care PT Referral for Hospital Discharge       Home [facility]  Phone [phone]  Fax [phone]    PT to eval and treat    Your provider has ordered home care - physical therapy. If you have not been contacted within 2 days of your discharge please call the department phone number listed on the top of this document.            Home care nursing referral       [facility].  Phone  [phone]  Fax  [phone]      Skilled nurse to eval and treat    Requesting SNV to  assess vital signs and weight, respiratory and cardiac status, pain level and activity tolerance, incision for signs/symptoms of infection, hydration, nutrition and home safety.    Your provider has ordered home care nursing services. If you have not been contacted within 2 days of your discharge please call the inpatient department phone number at [phone] .                  Pending Results     Date and Time Order Name Status Description    4/18/2018 2128 Blood culture Preliminary     4/18/2018 2128 Blood culture Preliminary     4/18/2018 1026 Urine Culture Aerobic Bacterial Preliminary     4/16/2018 1656 Surgical pathology exam In process             Statement of Approval     Ordered          04/19/18 1035  I have reviewed and agree with all the recommendations and orders detailed in this document.  EFFECTIVE NOW     Approved and electronically signed by:  Margot Coley APRN CNP           04/19/18 1421  I have reviewed and agree with all the recommendations and orders detailed in this document.     Approved and electronically signed by:  Tavo Lopes MD             Admission Information     Date & Time Provider Department Dept. Phone    4/16/2018 Johnnie Ellsworth MD [facility] 8A [phone]      Your Vitals Were     Blood Pressure Temperature Respirations Height Weight Pulse Oximetry    123/56 (BP Location: Right arm) 98.6  F (37  C) (Oral) 16  "1.778 m (5' 10\") 71.4 kg (157 lb 6.5 oz) 95%    BMI (Body Mass Index)                   22.59 kg/m2           [facility] Information     [facility] gives you secure access to your electronic health record. If you see a primary care provider, you can also send messages to your care team and make appointments. If you have questions, please call your primary care clinic.  If you do not have a primary care provider, please call [phone] and they will assist you.        Care EveryWhere ID     This is your Care EveryWhere ID. This could be used by other organizations to access your [facility] medical records  [id number]        Equal Access to Services     ROSA ASHFORD : Latanya Cedeno, heather farooq, soumya rain, tejinder marr. So [facility] [phone].    ATENCIÓN: Si habla español, tiene a gonzalez disposición servicios gratuitos de asistencia lingüística. Llame al [phone].    We comply with applicable federal civil rights laws and [place] laws. We do not discriminate on the basis of race, color, national origin, age, disability, sex, sexual orientation, or gender identity.               Review of your medicines      START taking        Dose / Directions    enoxaparin 40 MG/0.4ML injection   Commonly known as:  LOVENOX        Dose:  40 mg   Inject 0.4 mLs (40 mg) Subcutaneous every 24 hours   Quantity:  10 mL   Refills:  0       gabapentin 100 MG capsule   Commonly known as:  NEURONTIN        Dose:  100 mg   Take 1 capsule (100 mg) by mouth 3 times daily   Quantity:  30 capsule   Refills:  0       oxyCODONE IR 5 MG tablet   Commonly known as:  ROXICODONE        Dose:  5-10 mg   Take 1-2 tablets (5-10 mg) by mouth every 3 hours as needed for other (pain control or improvement in physical function. Hold dose for analgesic side effects.)   Quantity:  60 tablet   Refills:  0       senna-docusate 8.6-50 MG per tablet   Commonly known as:  SENOKOT-S;PERICOLACE        Dose: "  1 tablet   Take 1 tablet by mouth 2 times daily   Quantity:  28 tablet   Refills:  0         CONTINUE these medicines which may have CHANGED, or have new prescriptions. If we are uncertain of the size of tablets/capsules you have at home, strength may be listed as something that might have changed.        Dose / Directions    atorvastatin 20 MG tablet   Commonly known as:  LIPITOR   This may have changed:  when to take this   Used for:  Pure hypercholesterolemia, Hyperlipidemia LDL goal <130        Dose:  20 mg   Take 1 tablet (20 mg) by mouth daily   Quantity:  90 tablet   Refills:  3         CONTINUE these medicines which have NOT CHANGED        Dose / Directions    aspirin 81 MG tablet   Used for:  Pure hypercholesterolemia        Dose:  81 mg   Take 1 tablet by mouth daily.   Quantity:  90 tablet   Refills:  3       TYLENOL PO        Dose:  1000 mg   Take 1,000 mg by mouth   Refills:  0         STOP taking     HYDROcodone-acetaminophen  MG per tablet   Commonly known as:  NORCO           traMADol 50 MG tablet   Commonly known as:  ULTRAM                Where to get your medicines      These medications were sent to [facility] [address]  [address] [address]     Phone:  [phone]     enoxaparin 40 MG/0.4ML injection    senna-docusate 8.6-50 MG per tablet         These medications were sent to [facility] [address] [facility] [address] [facility] AT [facility] [address]  [address] [facility] [address]     Phone:  [phone]     gabapentin 100 MG capsule         Some of these will need a paper prescription and others can be bought over the counter. Ask your nurse if you have questions.     Bring a paper prescription for each of these medications     oxyCODONE IR 5 MG tablet                Protect others around you: Learn how to safely use, store and throw away your medicines at www.disposemymeds.org.         Information about OPIOIDS     PRESCRIPTION OPIOIDS: WHAT YOU NEED TO KNOW   You have a prescription for an opioid (narcotic) pain medicine. Opioids can cause addiction. If you have a history of chemical dependency of any type, you are at a higher risk of becoming addicted to opioids. Only take this medicine after all other options have been tried. Take it for as short a time and as few doses as possible.     Do not:    Drive. If you drive while taking these medicines, you could be arrested for driving under the influence (DUI).    Operate heavy machinery    Do any other dangerous activities while taking these medicines.     Drink any alcohol while taking these medicines.      Take with any other medicines that contain acetaminophen. Read all labels carefully. Look for the word  acetaminophen  or  Tylenol.  Ask your pharmacist if you have questions or are unsure.    Store your pills in a secure place, locked if possible. We will not replace any lost or stolen medicine. If you don t finish your medicine, please throw away (dispose) as directed by your pharmacist. The [place] [employer]ution [employer] has more information about safe disposal: https://www.pca.[facility].mn.us/living-green/managing-unwanted-medications    All opioids tend to cause constipation. Drink plenty of water and eat foods that have a lot of fiber, such as fruits, vegetables, prune juice, apple juice and high-fiber cereal. Take a laxative (Miralax, milk of magnesia, Colace, Senna) if you don t move your bowels at least every other day.              Medication List: This is a list of all your medications and when to take them. Check marks below indicate your daily home schedule. Keep this list as a reference.      Medications           Morning Afternoon Evening Bedtime As Needed    aspirin 81 MG tablet   Take 1 tablet by mouth daily.                                atorvastatin 20 MG tablet   Commonly known as:  LIPITOR   Take 1 tablet (20 mg) by  mouth daily   Last time this was given:  20 mg on 4/18/2018 10:19 PM                                enoxaparin 40 MG/0.4ML injection   Commonly known as:  LOVENOX   Inject 0.4 mLs (40 mg) Subcutaneous every 24 hours   Last time this was given:  40 mg on 4/19/2018 12:40 PM                                gabapentin 100 MG capsule   Commonly known as:  NEURONTIN   Take 1 capsule (100 mg) by mouth 3 times daily   Last time this was given:  100 mg on 4/19/2018  1:59 PM                                oxyCODONE IR 5 MG tablet   Commonly known as:  ROXICODONE   Take 1-2 tablets (5-10 mg) by mouth every 3 hours as needed for other (pain control or improvement in physical function. Hold dose for analgesic side effects.)   Last time this was given:  5 mg on 4/19/2018 12:38 PM                                senna-docusate 8.6-50 MG per tablet   Commonly known as:  SENOKOT-S;PERICOLACE   Take 1 tablet by mouth 2 times daily   Last time this was given:  2 tablets on 4/19/2018  8:39 AM                                TYLENOL PO   Take 1,000 mg by mouth   Last time this was given:  1,000 mg on 4/19/2018 12:40 PM

## 2018-04-16 NOTE — IP AVS SNAPSHOT
"    UR 8A: [phone]                                              INTERAGENCY TRANSFER FORM - PHYSICIAN ORDERS   2018                    Hospital Admission Date: 2018  NAGI FOURNIER   : 1955  Sex: Male        Attending Provider: Johnnie Ellsworth MD     Allergies:  No Known Allergies    Infection:  None   Service:  SURGERY    Ht:  1.778 m (5' 10\")   Wt:  71.4 kg (157 lb 6.5 oz)   Admission Wt:  71.4 kg (157 lb 6.5 oz)    BMI:  22.59 kg/m 2   BSA:  1.88 m 2            Patient PCP Information     Provider PCP Type    Naomi Kuo PA-C General      ED Clinical Impression     Diagnosis Description Comment Added By Time Added    Status post hip surgery [Z98.890] Status post hip surgery [Z98.890]  Michael Terrazas MD 2018  7:47 AM      Hospital Problems as of 2018              Priority Class Noted POA    Status post hip surgery Medium  2018 Yes      Non-Hospital Problems as of 2018              Priority Class Noted    Pure hypercholesterolemia Medium  Unknown    Impotence of organic origin Medium  Unknown    HYPERLIPIDEMIA LDL GOAL <130 Medium  10/31/2010    Plasmacytoma of bone (H) Medium  2018      Code Status History     Date Active Date Inactive Code Status Order ID Comments User Context    This patient has a current code status but no historical code status.         Medication Review      START taking        Dose / Directions Comments    enoxaparin 40 MG/0.4ML injection   Commonly known as:  LOVENOX        Dose:  40 mg   Inject 0.4 mLs (40 mg) Subcutaneous every 24 hours   Quantity:  10 mL   Refills:  0        oxyCODONE IR 5 MG tablet   Commonly known as:  ROXICODONE        Dose:  5-10 mg   Take 1-2 tablets (5-10 mg) by mouth every 3 hours as needed for other (pain control or improvement in physical function. Hold dose for analgesic side effects.)   Quantity:  60 tablet   Refills:  0        senna-docusate 8.6-50 MG per tablet   Commonly known as:  " SENOKOT-S;PERICOLACE        Dose:  1 tablet   Take 1 tablet by mouth 2 times daily   Quantity:  28 tablet   Refills:  0          CONTINUE these medications which may have CHANGED, or have new prescriptions. If we are uncertain of the size of tablets/capsules you have at home, strength may be listed as something that might have changed.        Dose / Directions Comments    atorvastatin 20 MG tablet   Commonly known as:  LIPITOR   This may have changed:  when to take this   Used for:  Pure hypercholesterolemia, Hyperlipidemia LDL goal <130        Dose:  20 mg   Take 1 tablet (20 mg) by mouth daily   Quantity:  90 tablet   Refills:  3          CONTINUE these medications which have NOT CHANGED        Dose / Directions Comments    aspirin 81 MG tablet   Used for:  Pure hypercholesterolemia        Dose:  81 mg   Take 1 tablet by mouth daily.   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       Diet       Follow this diet upon discharge: Regular       Discharge Instructions       Surgery: Right Total Hip Replacement    If you have any questions contact Dr. Ellsworth at the following numbers: if you see Dr. Ellsworth at [facility] call [phone].  If you see him at [facility] call [phone].      Follow up appointment:    You are scheduled to follow up with Dr. Ellsworth approximately 2 weeks after your surgery.  If you were seen at [facility], your appointment will most likely be there.  If you were seen at the [facility], your appointment will likely be there.         Anticoagulation:   Take Lovenox prescribed for the total of 4 weeks.  This is given to help minimize your risk of blood clot.    Activity:   -Continue to weight bear on your operative leg as tolerated by pain.  As you are more comfortable, you can discontinue use of the walker and transition to a cane.  Once you are comfortable with the cane, you can also wean from the cane and progress to using no assistive devices.  Do not transition until you are comfortable and have good balance.      -Follow the posterior hip precautions you were taught while at the hospital.        Pain Medications:   -Take pain medications as prescribed.  Wean off the the narcotic pain medications (Oxycodone, Dilaudid, etc) as tolerated by pain.  To help with this, take the Tylenol and Celebrex (if prescribed) to minimize the amount of narcotic pain medication you need to take.      -Do not drive while on pain medications or until your leg feels well enough to do so.    Bandage Care and Showering:   -You can shower with the adhesive bandage covering your knee at the time of discharge.    -Remove the adhesive bandage 7 days after your surgery.  This can be removed at home.  Once removed, it is common to have a few scabs.  Let the scabs fall off on their own - they will do so over the next week or two.  The sutures are resorbable and nothing needs to be removed.    --Once the bandage is removed, you can shower without covering the incision.  Do not soak or bathe the incision in water.  Do not scrub the incision.  Just let the water from the shower run over the incision.    --Contact Dr. Ellsworth or his staff if there is any drainage from the incision or redness.    Leg Swelling and Icing  -Continue  icing the hip 5-6 times per day for approximately 20 minutes each time.  This will help with swelling.    -Some swelling in the leg is normal after surgery.  This type of swelling is usually gravity dependent and is improved in the morning after sleeping.  If the swelling is painful in the calf or the swelling is getting worse, contact Dr. Ellsworth's office.  We may recommend presenting to the Emergency Department to obtain an ultrasound of the leg if there is concern for a DVT.      -Elevate the leg if you are sitting as this will help reduce swelling.    Contact clinic if you note any of the following:  -Fevers greater than 101.5 chills  -Increased pain, redness, swelling or discharge at the surgical site.   -During regular business hours call Dr Ellsworth's office and request to speak with his nurse or the triage nurses. If you see him at [facility] call [phone]. If you see him at [facility] call [phone].   -After hours or on weekends call the hospital  at [phone] and ask to speak with the Orthopaedic resident on call.       Wound care and dressings       Instructions to care for your wound at home: May shower with dressing in place, as it is waterproof.  If water gets underneath the dressing, you must remove it and replace with a dry (e.g. Gauze and tape) dressing.  If current dressing stays intact, you may leave the dressing in place until post-operative day #10.  On post-op day #10, you may remove dressing and shower normally, allowing water to run over the incision.  Do not soak (e.g. bath) or scrub the incision site.  Keep wound clean and dry otherwise.             Referrals     Home Care PT Referral for Hospital Discharge       [facility].  Phone [phone]  Fax [phone]    PT to eval and treat    Your provider has ordered home care - physical therapy. If you have not been contacted within 2 days of your discharge please call the department phone number  listed on the top of this document.             Your next 10 appointments already scheduled     Apr 24, 2018 12:00 PM CDT   Return Visit with Garrett Dejesus MD   [facility]    [id number] [place] [id number] [phone]            May 02, 2018 12:00 PM CDT   (Arrive by 11:45 AM)   Return Visit with Johnnie Ellsworth MD   [facility] ([facility])    [address] [id number] [phone]            Jun 07, 2018 11:00 AM CDT   LAB with [facility] ([facility])    [address] [place] [id number] [phone]           Please do not eat 10-12 hours before your appointment if you are coming in fasting for labs on lipids, cholesterol, or glucose (sugar). This does not apply to pregnant women. Water, hot tea and black coffee (with nothing added) are okay. Do not drink other fluids, diet soda or chew gum.            Jun 14, 2018 11:00 AM CDT   [facility]   [facility]    [address] [place] [id number] [phone]              Follow-Up Appointment Instructions     Future Labs/Procedures    Follow Up and recommended labs and tests     Comments:    You are scheduled to follow up with Dr. Ellsworth approximately 2 weeks after your surgery.    If you were seen at the [facility], your appointment will likely be there.    Contact clinic if you have any questions about the date or time.      Follow-Up Appointment Instructions     Follow Up and recommended labs and tests       You are scheduled to follow up with Dr. Ellsworth approximately 2 weeks after your surgery.    If you were seen at the [facility], your appointment will likely be there.    Contact clinic if you have any questions about the date or time.             Statement  of Approval     Ordered          04/19/18 1035  I have reviewed and agree with all the recommendations and orders detailed in this document.  EFFECTIVE NOW     Approved and electronically signed by:  Margot Coley APRN CNP

## 2018-04-16 NOTE — TELEPHONE ENCOUNTER
Panel Management Review      Patient has the following on his problem list: None      Composite cancer screening  Chart review shows that this patient is due/due soon for the following None  Summary:    Patient is due/failing the following:   Lipids, add chronic pain to problem list, CSA, chronic pain overview and FOLLOW UP    Action needed:   Patient needs office visit for recheck on cholesterol medication. Due 5/2018, please see if willing to schedule.  Patient needs fasting lab only appointment if not doing at appt.    Type of outreach:    None, routed to provider for review.Pt is having surgery today. Will route for call in 1 week.    Questions for provider review:    Please add chronic pain to problem list if appropriate as well as CSA if needed and the chronic pain overview in problem list if adding to problem list.                                                                                                                                    Rashmi Chang CMA ([facility])       Chart routed to Provider .

## 2018-04-16 NOTE — LETTER
Transition Communication Hand-off for Care Transitions to Next Level of Care Provider    Name: Dong Joseph  : 1955  MRN #: [id number]  Primary Care Provider: Naomi Kuo     Primary Clinic: [address] [place] [address]     Reason for Hospitalization:  Tumor  Status post hip surgery  Admit Date/Time: 2018 11:23 AM  Discharge Date: 2018  Payor Source: Payor: [employer] / Plan: [employer] OPEN ACCESS / Product Type: HMO /          Reason for Communication Hand-off Referral: Avoidable readmission within 30 days    Discharge Needs Assessment:  Needs       Most Recent Value    Equipment Currently Used at Home cane, straight    Transportation Available car, family or friend will provide        Follow-up specialty is recommended: Yes    Follow-up plan:  Future Appointments  Date Time Provider Department Center   2018 2:00 PM Celena Cintron, PT [facility] [place]   2018 12:00 PM Garrett Dejesus MD [facility]   2018 12:00 PM Johnnie Ellsworth MD [facility]   2018 11:00 AM [facility] [place]   2018 11:00 AM Froilan Peres MD [facility]       Any outstanding tests or procedures:        Referrals     Future Labs/Procedures    Home Care PT Referral for Hospital Discharge     Comments:    [facility]  Phone [phone]  Fax [phone]    PT to eval and treat    Your provider has ordered home care - physical therapy. If you have not been contacted within 2 days of your discharge please call the department phone number listed on the top of this document.            Socorro Hermosillo RN, BSN  Care Coordinator, 8A  Phone [phone]  Pager [phone]    AVS/Discharge Summary is the source of truth; this is a helpful guide for improved communication of patient story

## 2018-04-16 NOTE — IP AVS SNAPSHOT
UR 8A    [address]    [facility] [address]    Phone:  [phone]                                       After Visit Summary   4/16/2018    Dong Joseph    MRN: [id number]           After Visit Summary Signature Page     I have received my discharge instructions, and my questions have been answered. I have discussed any challenges I see with this plan with the nurse or doctor.    ..........................................................................................................................................  Patient/Patient Representative Signature      ..........................................................................................................................................  Patient Representative Print Name and Relationship to Patient    ..................................................               ................................................  Date                                            Time    ..........................................................................................................................................  Reviewed by Signature/Title    ...................................................              ..............................................  Date                                                            Time

## 2018-04-17 ENCOUNTER — APPOINTMENT (OUTPATIENT)
Dept: PHYSICAL THERAPY | Facility: CLINIC | Age: 63
DRG: 824 | End: 2018-04-17
Attending: ORTHOPAEDIC SURGERY
Payer: COMMERCIAL

## 2018-04-17 ENCOUNTER — APPOINTMENT (OUTPATIENT)
Dept: OCCUPATIONAL THERAPY | Facility: CLINIC | Age: 63
DRG: 824 | End: 2018-04-17
Attending: ORTHOPAEDIC SURGERY
Payer: COMMERCIAL

## 2018-04-17 LAB
ANION GAP SERPL CALCULATED.3IONS-SCNC: 10 MMOL/L (ref 3–14)
BUN SERPL-MCNC: 10 MG/DL (ref 7–30)
CALCIUM SERPL-MCNC: 8 MG/DL (ref 8.5–10.1)
CHLORIDE SERPL-SCNC: 103 MMOL/L (ref 94–109)
CO2 SERPL-SCNC: 25 MMOL/L (ref 20–32)
CREAT SERPL-MCNC: 0.84 MG/DL (ref 0.66–1.25)
ERYTHROCYTE [DISTWIDTH] IN BLOOD BY AUTOMATED COUNT: 13.6 % (ref 10–15)
GFR SERPL CREATININE-BSD FRML MDRD: >90 ML/MIN/1.7M2
GLUCOSE SERPL-MCNC: 183 MG/DL (ref 70–99)
HCT VFR BLD AUTO: 25 % (ref 40–53)
HGB BLD-MCNC: 8.3 G/DL (ref 13.3–17.7)
HGB BLD-MCNC: 8.4 G/DL (ref 13.3–17.7)
INR PPP: 1.31 (ref 0.86–1.14)
MCH RBC QN AUTO: 30.6 PG (ref 26.5–33)
MCHC RBC AUTO-ENTMCNC: 33.2 G/DL (ref 31.5–36.5)
MCV RBC AUTO: 92 FL (ref 78–100)
PLATELET # BLD AUTO: 238 10E9/L (ref 150–450)
POTASSIUM SERPL-SCNC: 4 MMOL/L (ref 3.4–5.3)
RBC # BLD AUTO: 2.71 10E12/L (ref 4.4–5.9)
SODIUM SERPL-SCNC: 138 MMOL/L (ref 133–144)
WBC # BLD AUTO: 7.9 10E9/L (ref 4–11)

## 2018-04-17 PROCEDURE — 85018 HEMOGLOBIN: CPT | Performed by: ORTHOPAEDIC SURGERY

## 2018-04-17 PROCEDURE — 99207 ZZC CONSULT E&M CHANGED TO SUBSEQUENT LEVEL: CPT | Performed by: INTERNAL MEDICINE

## 2018-04-17 PROCEDURE — 40000193 ZZH STATISTIC PT WARD VISIT

## 2018-04-17 PROCEDURE — 36415 COLL VENOUS BLD VENIPUNCTURE: CPT | Performed by: ORTHOPAEDIC SURGERY

## 2018-04-17 PROCEDURE — 12000001 ZZH R&B MED SURG/OB UMMC

## 2018-04-17 PROCEDURE — 99232 SBSQ HOSP IP/OBS MODERATE 35: CPT | Performed by: INTERNAL MEDICINE

## 2018-04-17 PROCEDURE — 25000132 ZZH RX MED GY IP 250 OP 250 PS 637: Performed by: INTERNAL MEDICINE

## 2018-04-17 PROCEDURE — 36415 COLL VENOUS BLD VENIPUNCTURE: CPT | Performed by: INTERNAL MEDICINE

## 2018-04-17 PROCEDURE — 25000128 H RX IP 250 OP 636: Performed by: ORTHOPAEDIC SURGERY

## 2018-04-17 PROCEDURE — 85610 PROTHROMBIN TIME: CPT | Performed by: INTERNAL MEDICINE

## 2018-04-17 PROCEDURE — 97530 THERAPEUTIC ACTIVITIES: CPT | Mod: GP

## 2018-04-17 PROCEDURE — 40000133 ZZH STATISTIC OT WARD VISIT: Performed by: OCCUPATIONAL THERAPIST

## 2018-04-17 PROCEDURE — 25000132 ZZH RX MED GY IP 250 OP 250 PS 637: Performed by: ORTHOPAEDIC SURGERY

## 2018-04-17 PROCEDURE — 80048 BASIC METABOLIC PNL TOTAL CA: CPT | Performed by: INTERNAL MEDICINE

## 2018-04-17 PROCEDURE — 97535 SELF CARE MNGMENT TRAINING: CPT | Mod: GO | Performed by: OCCUPATIONAL THERAPIST

## 2018-04-17 PROCEDURE — 25000128 H RX IP 250 OP 636: Performed by: INTERNAL MEDICINE

## 2018-04-17 PROCEDURE — 97162 PT EVAL MOD COMPLEX 30 MIN: CPT | Mod: GP

## 2018-04-17 PROCEDURE — 85027 COMPLETE CBC AUTOMATED: CPT | Performed by: INTERNAL MEDICINE

## 2018-04-17 PROCEDURE — 97165 OT EVAL LOW COMPLEX 30 MIN: CPT | Mod: GO | Performed by: OCCUPATIONAL THERAPIST

## 2018-04-17 RX ORDER — ACETAMINOPHEN 500 MG
1000 TABLET ORAL EVERY 6 HOURS
Status: DISCONTINUED | OUTPATIENT
Start: 2018-04-17 | End: 2018-04-19 | Stop reason: HOSPADM

## 2018-04-17 RX ORDER — GABAPENTIN 100 MG/1
100 CAPSULE ORAL 3 TIMES DAILY
Status: DISCONTINUED | OUTPATIENT
Start: 2018-04-17 | End: 2018-04-19 | Stop reason: HOSPADM

## 2018-04-17 RX ADMIN — ATORVASTATIN CALCIUM 20 MG: 20 TABLET, FILM COATED ORAL at 21:39

## 2018-04-17 RX ADMIN — OXYCODONE HYDROCHLORIDE 5 MG: 5 TABLET ORAL at 06:32

## 2018-04-17 RX ADMIN — HYDROMORPHONE HYDROCHLORIDE 0.5 MG: 1 INJECTION, SOLUTION INTRAMUSCULAR; INTRAVENOUS; SUBCUTANEOUS at 11:08

## 2018-04-17 RX ADMIN — OXYCODONE HYDROCHLORIDE 10 MG: 5 TABLET ORAL at 14:39

## 2018-04-17 RX ADMIN — GABAPENTIN 100 MG: 100 CAPSULE ORAL at 20:31

## 2018-04-17 RX ADMIN — OXYCODONE HYDROCHLORIDE 5 MG: 5 TABLET ORAL at 00:38

## 2018-04-17 RX ADMIN — CEFAZOLIN 1 G: 1 INJECTION, POWDER, FOR SOLUTION INTRAMUSCULAR; INTRAVENOUS at 00:43

## 2018-04-17 RX ADMIN — CEFAZOLIN 1 G: 1 INJECTION, POWDER, FOR SOLUTION INTRAMUSCULAR; INTRAVENOUS at 08:31

## 2018-04-17 RX ADMIN — ACETAMINOPHEN 1000 MG: 500 TABLET, FILM COATED ORAL at 13:18

## 2018-04-17 RX ADMIN — GABAPENTIN 100 MG: 100 CAPSULE ORAL at 13:18

## 2018-04-17 RX ADMIN — ACETAMINOPHEN 975 MG: 325 TABLET ORAL at 05:25

## 2018-04-17 RX ADMIN — SENNOSIDES AND DOCUSATE SODIUM 1 TABLET: 8.6; 5 TABLET ORAL at 08:30

## 2018-04-17 RX ADMIN — SODIUM CHLORIDE, POTASSIUM CHLORIDE, SODIUM LACTATE AND CALCIUM CHLORIDE: 600; 310; 30; 20 INJECTION, SOLUTION INTRAVENOUS at 13:17

## 2018-04-17 RX ADMIN — ACETAMINOPHEN 1000 MG: 500 TABLET, FILM COATED ORAL at 23:52

## 2018-04-17 RX ADMIN — ENOXAPARIN SODIUM 40 MG: 40 INJECTION SUBCUTANEOUS at 13:18

## 2018-04-17 RX ADMIN — SENNOSIDES AND DOCUSATE SODIUM 2 TABLET: 8.6; 5 TABLET ORAL at 20:31

## 2018-04-17 RX ADMIN — OXYCODONE HYDROCHLORIDE 5 MG: 5 TABLET ORAL at 02:57

## 2018-04-17 RX ADMIN — OXYCODONE HYDROCHLORIDE 5 MG: 5 TABLET ORAL at 09:49

## 2018-04-17 RX ADMIN — OXYCODONE HYDROCHLORIDE 10 MG: 5 TABLET ORAL at 17:31

## 2018-04-17 RX ADMIN — OXYCODONE HYDROCHLORIDE 10 MG: 5 TABLET ORAL at 20:31

## 2018-04-17 RX ADMIN — ACETAMINOPHEN 1000 MG: 500 TABLET, FILM COATED ORAL at 17:31

## 2018-04-17 RX ADMIN — OXYCODONE HYDROCHLORIDE 5 MG: 5 TABLET ORAL at 23:52

## 2018-04-17 ASSESSMENT — ACTIVITIES OF DAILY LIVING (ADL): PREVIOUS_RESPONSIBILITIES: MEAL PREP;HOUSEKEEPING;LAUNDRY;SHOPPING;YARDWORK;MEDICATION MANAGEMENT;FINANCES;DRIVING;WORK

## 2018-04-17 NOTE — PLAN OF CARE
"Problem: Patient Care Overview  Goal: Plan of Care/Patient Progress Review  Discharge Planner OT   Patient plan for discharge:   Current status: pt. Flavio with UB, mod-maxA with LB to complete sup.<>sit EOB with use of overhead trapeze. Pt. Aware of and maintaining post op precautions throughout session. Per pt. \"that went better than this morning.\"  Pt.s VSS throughout o2 at times drop to 80s, though pt. With quick recovery to 90s/100% using controled breathing tech. Pt. Unable to tolerate standing/transfers at time of session.  Barriers to return to prior living situation: pain, post op precautions  Recommendations for discharge: rehab at this time  Rationale for recommendations: to increase safety/indep. With ADLs and functional mobility with in post op precautions.       Entered by: Юлия You 04/17/2018 2:41 PM         "

## 2018-04-17 NOTE — PLAN OF CARE
Problem: Patient Care Overview  Goal: Plan of Care/Patient Progress Review  Discharge Planner PT   Patient plan for discharge: Home  Current status: Eval complete. Pt in significant pain this AM but very eager to participate in PT. Pt transferred supine<>sit with modA. Pt sat EOB with increased pain-pt appeared to be pale, O2 sats decreased to 79% on 1 L of O2 and HR increased to 128. Pt assisted back into bed with O2 increased to 3L to bring O2 sats up. Pt denied feeling dizzy while sitting EOB but reported severe pain and unable to tolerate more mobility. RN and MD aware.  Barriers to return to prior living situation: pain, has not amb or stood yet, requires assist for basic bed mobility  Recommendations for discharge: TCU if pain persists and limits pts tolerance/ safety to mobilize. Pt may be safe to DC home with assist from son if mobility improves  Rationale for recommendations: see above       Entered by: Beba Mantilla 04/17/2018 11:51 AM

## 2018-04-17 NOTE — OR NURSING
Dr Simmons called and approved discharge to floor\  Writer requested to place sign out note asap.  Arterila line left wrist discontinued, catheter intact.

## 2018-04-17 NOTE — ADDENDUM NOTE
Addendum  created 04/16/18 1920 by Pravin Simmons DO    Anesthesia Attestations filed, Anesthesia Intra Blocks edited, Child order released for a procedure order, Sign clinical note

## 2018-04-17 NOTE — ANESTHESIA PROCEDURE NOTES
Arterial Line Procedure Note  Staff:     Anesthesiologist:  TAMI HERRERA  Location: In OR After Induction  Procedure Start/Stop Times:     patient identified and pre-op evaluation      Correct Patient: Yes      Correct Position: Yes      Correct Procedure: Yes    Line Placement:     Procedure:  Arterial Line    Insertion Site:  Radial    Insertion laterality:  Left    Skin Prep: Alcohol      Patient Prep: mask, sterile gloves and hat      Catheter size:  20 gauge, Quick cath    Cath secured with: other (comment)      Cath secured with comment:  Tegaderm    Dressing:  Tegaderm    Complications:  None obvious    Arterial waveform: Yes      IBP within 10% of NIBP: Yes

## 2018-04-17 NOTE — PROGRESS NOTES
04/17/18 1044   Quick Adds   Type of Visit Initial PT Evaluation   Living Environment   Lives With child(bozena), adult   Living Arrangements house   Home Accessibility bed and bath are not on the first floor   Number of Stairs to Enter Home 1   Number of Stairs Within Home 14   Stair Railings at Home inside, present on right side   Transportation Available car;family or friend will provide   Living Environment Comment Pt lives with adult son in split level home. Son able to assist pt   Self-Care   Usual Activity Tolerance good   Current Activity Tolerance fair   Regular Exercise no   Equipment Currently Used at Home cane, straight   Activity/Exercise/Self-Care Comment Pt has been usign cane for the past 3 months d/t hip pain   Functional Level Prior   Ambulation 1-->assistive equipment   Transferring 0-->independent   Toileting 0-->independent   Bathing 0-->independent   Dressing 0-->independent   Eating 0-->independent   Communication 0-->understands/communicates without difficulty   Swallowing 0-->swallows foods/liquids without difficulty   Cognition 0 - no cognition issues reported   Fall history within last six months no   Which of the above functional risks had a recent onset or change? ambulation;transferring   Prior Functional Level Comment Pt IND with all ADLs and mobility. Pt currently using cane for mobilizing. Pt works as  but on a medical leave right now hoping to return asap.   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 debulking and placement of R BRADLEY with cage and cemented liner   Precautions/Limitations fall precautions   Weight-Bearing Status - LUE full weight-bearing   Weight-Bearing Status - RUE full  weight-bearing   Weight-Bearing Status - LLE full weight-bearing   Weight-Bearing Status - RLE weight-bearing as tolerated   Cognitive Status Examination   Orientation orientation to person, place and time   Level of Consciousness alert   Follows Commands and Answers Questions 100% of the time   Personal Safety and Judgment intact   Memory intact   Pain Assessment   Patient Currently in Pain Yes, see Vital Sign flowsheet   Integumentary/Edema   Integumentary/Edema Comments normal post op swelling   Posture    Posture Not impaired   Range of Motion (ROM)   ROM Comment decreased R hip ROM d/t pain   Strength   Strength Comments not formally tested pt demonstrates less than 3/5 strength grossly in RLE d/t severe pain   Bed Mobility   Bed Mobility Comments modA for supine<>sit transfer'   Transfer Skills   Transfer Comments unable to assess   Gait   Gait Comments unable to assess d/t pain   Balance   Balance Comments normal sitting balance. unabel to assess standing balance   Sensory Examination   Sensory Perception no deficits were identified   General Therapy Interventions   Planned Therapy Interventions balance training;bed mobility training;gait training;progressive activity/exercise;home program guidelines;risk factor education;transfer training;stretching;strengthening;neuromuscular re-education   Clinical Impression   Criteria for Skilled Therapeutic Intervention yes, treatment indicated   PT Diagnosis Impaired functional mobility   Influenced by the following impairments pina, weakness, decreased activity tolerance   Functional limitations due to impairments bed mobility, transfers, gait, stairs   Clinical Presentation Evolving/Changing   Clinical Presentation Rationale Per pts PMHx and current meedical and functional status   Clinical Decision Making (Complexity) Moderate complexity   Therapy Frequency` 2 times/day   Predicted Duration of Therapy Intervention (days/wks) 4 days   Anticipated Discharge  "Disposition Home with Assist   Risk & Benefits of therapy have been explained Yes   Patient, Family & other staff in agreement with plan of care Yes   [facility] TM \"6 Clicks\"   2016, Trustees of [facility], under license to [employer].  All rights reserved.   6 Clicks Short Forms Basic Mobility Inpatient Short Form   [facility] AM-PAC  \"6 Clicks\" V.2 Basic Mobility Inpatient Short Form   1. Turning from your back to your side while in a flat bed without using bedrails? 3 - A Little   2. Moving from lying on your back to sitting on the side of a flat bed without using bedrails? 3 - A Little   3. Moving to and from a bed to a chair (including a wheelchair)? 2 - A Lot   4. Standing up from a chair using your arms (e.g., wheelchair, or bedside chair)? 2 - A Lot   5. To walk in hospital room? 2 - A Lot   6. Climbing 3-5 steps with a railing? 2 - A Lot   Basic Mobility Raw Score (Score out of 24.Lower scores equate to lower levels of function) 14   Total Evaluation Time   Total Evaluation Time (Minutes) 10     "

## 2018-04-17 NOTE — PLAN OF CARE
Problem: Patient Care Overview  Goal: Plan of Care/Patient Progress Review  Outcome: Improving  Pt has a good pain control with prescribed analgesics. OOB with physical therapy. + right PP, able to palpate without doppler. Dressing C/D/I. Febrile- incentive spirometer given and encouraged to use. Lungs CTA. Has schedule Tylenol. Bladder scan for 332 ml and voided 50 ml after. Pt educated on bladder order set and instructed to call RN after next void for PVR scan. Plan is to continue to monitor temperature, do PVRs and treat as needed, continue to monitor pain and hip incision.

## 2018-04-17 NOTE — PROGRESS NOTES
Pt arrived to the unit at 2110 via bed. Pt alert and oriented x4. Vss, sats in the 90s on 2 LPM. Pt oriented to room and call light system. Shortly after arrival to the unit,  pt complained of nausea. Prn compazine administered with effect. Pt started on low dose of oxycodone for pain. Will monitor.

## 2018-04-17 NOTE — OR NURSING
PACU to Inpatient Nursing Handoff    Patient Dong Joseph is a 63 year old male who speaks English.   Procedure Procedure(s):  Right Total Hip Arthroplasty And Removal Of Right Pelvic Tumor - Wound Class: I-Clean   - Wound Class: I-Clean   Surgeon(s) Primary: Johnnie Ellsworth MD  Resident - Assisting: Michael Terrazas MD     No Known Allergies    Isolation  [unfilled]     Past Medical History   has a past medical history of Impotence of organic origin (2003); Plasma cell neoplasm (01/25/2018); and Pure hypercholesterolemia (2002).    Anesthesia General   Dermatome Level     Preop Meds acetaminophen (Tylenol) - time given: 1221  gabapentin (Neurontin) - time given: 1221   Nerve block Not applicable   Intraop Meds fentanyl (Sublimaze): 350 mcg total  hydromorphone (Dilaudid): 1 mg total  ondansetron (Zofran): last given at 1835   Local Meds No   Antibiotics cefazolin (Ancef) - last given at 1730     Pain Patient Currently in Pain: yes  Comfort: intolerable  Pain Control: partially effective   PACU meds  acetaminophen (Tylenol): 975 mg (total dose) last given at 2032   fentanyl (Sublimaze): 200 mcg (total dose) last given at 1932   hydromorphone (Dilaudid): 1.5 mg (total dose) last given at 1956    PCA / epidural No   Capnography Respiratory Monitoring (EtCO2): 23 mmHg  Integrated Pulmonary Index (IPI): 5-6   Telemetry ECG Rhythm: Normal sinus rhythm   Inpatient Telemetry Monitor Ordered? No        Labs Glucose Lab Results   Component Value Date    GLC 97 04/16/2018       Hgb Lab Results   Component Value Date    HGB 13.6 04/16/2018       INR No results found for: INR   PACU Imaging Completed     Wound/Incision Incision/Surgical Site 01/25/18 Right Hip (Active)   Number of days:81       Incision/Surgical Site 04/16/18 Lateral;Right Hip (Active)   Incision Assessment UTV 4/16/2018  7:30 PM   Closure MICHAEL 4/16/2018  7:30 PM   Incision Drainage Amount None 4/16/2018  7:30 PM   Incision Care Ice applied 4/16/2018   7:30 PM   Dressing Intervention Clean, dry, intact 4/16/2018  7:30 PM   Number of days:0       Incision/Surgical Site 04/16/18 Lateral;Lower;Right Thigh (Active)   Incision Assessment UT 4/16/2018  7:30 PM   Closure MICHAEL 4/16/2018  7:30 PM   Incision Drainage Amount None 4/16/2018  7:30 PM   Incision Care Ice applied 4/16/2018  7:30 PM   Dressing Intervention Clean, dry, intact 4/16/2018  7:30 PM   Number of days:0       Incision/Surgical Site 04/16/18 Lateral;Upper;Right Hip (Active)   Incision Assessment UT 4/16/2018  7:30 PM   Closure MICHAEL 4/16/2018  7:30 PM   Incision Drainage Amount None 4/16/2018  7:30 PM   Incision Care Ice applied 4/16/2018  7:30 PM   Dressing Intervention Clean, dry, intact 4/16/2018  7:30 PM   Number of days:0      CMS Peripheral Neurovascular WDL: WDL (04/16/18 1940)  All Extremities Temperature: cool (04/16/18 1940)  All Extremities Color: pale (04/16/18 1940)  RLE Sensation: numbness present (improving) (04/16/18 1940)   Equipment ice pack   Other LDA       IV Access Peripheral IV 04/16/18 Right Hand (Active)   Site Assessment [facility] 4/16/2018  7:08 PM   Line Status Saline locked 4/16/2018  7:08 PM   Phlebitis Scale 0-->no symptoms 4/16/2018  7:08 PM   Infiltration Scale 0 4/16/2018 12:55 PM   Number of days:0       Peripheral IV 04/16/18 Left Hand (Active)   Site Assessment [facility] 4/16/2018  7:08 PM   Line Status Infusing 4/16/2018  7:08 PM   Phlebitis Scale 0-->no symptoms 4/16/2018  7:08 PM   Number of days:0       Peripheral IV 04/16/18 Right Hand (Active)   Site Assessment [facility] 4/16/2018  7:08 PM   Line Status Saline locked 4/16/2018  7:08 PM   Phlebitis Scale 0-->no symptoms 4/16/2018  7:08 PM   Number of days:0      Blood Products Albumin EBL 1450 mL   Intake/Output Date 04/16/18 0700 - 04/17/18 0659   Shift 4305-0107 8900-3996 3085-8023 24 Hour Total   I  N  T  A  K  E   I.V.  2500  2500    Colloid  500  500    Shift Total  (mL/kg)  3000  (42.02)  3000  (42.02)   O  U  T  P  U  T    Urine  300  300    Blood  1450  1450    Shift Total  (mL/kg)  1750  (24.51)  1750  (24.51)   Weight (kg) 71.4 71.4 71.4 71.4        Drains / Vu Urethral Catheter Double-lumen;Latex 16 fr (Active)   Tube Description UTV 4/16/2018  7:08 PM   Collection Container Standard;Patent 4/16/2018  7:08 PM   Securement Method Securing device (Describe) 4/16/2018  7:08 PM   Number of days:0      Time of void PreOp Void Prior to Procedure: 1200 (04/16/18 1222)    PostOp      Diapered? No   Bladder Scan     PO    tolerating sips     Vitals    B/P: 109/62  T: 96.7  F (35.9  C) (warm blankets applied)    Temp src: Axillary  P:       Heart Rate: 73 (04/16/18 2000)     R: 10  O2:  SpO2: 100 %    O2 Device: Nasal cannula (04/16/18 2000)    Oxygen Delivery: 4 LPM (04/16/18 2000)         Family/support present no family here   Patient belongings Patient Belongings: cane;clothing;shoes;cell phone/electronics  Disposition of Belongings: Other (see comment) (labeled and secured)   Patient transported on bed   DC meds/scripts (obs/outpt) Not applicable   Inpatient Pain Meds Released? Yes       Special needs/considerations None   Tasks needing completion None       Megan Snowden RN  ASCOM *[phone]

## 2018-04-17 NOTE — CONSULTS
Consult Date:  04/17/2018      MEDICAL CONSULTATION.      HISTORY OF PRESENT ILLNESS:  A pleasant 63-year-old male who underwent right total hip arthroplasty with removal of right pelvic tumor by Dr. Johnnie Ellsworth from Orthopedic Surgery on 4/16/2018.  We were asked to see the patient postoperatively for Internal Medicine consultation to include assistance with perioperative fluid and pain management as well as to assess status and intervene with regard to postop nausea as well as intermittent rigors noted earlier today.      Significant history of right hip pain with radiation into the posterior thigh beginning in October or November 2017.  MRI of the lumbar spine demonstrated degenerative change.  X-ray of the hip and MRI demonstrated a destructive lesion in the right acetabulum extending past the margin of the bone into the adjacent soft tissue.  Subsequent biopsy with documentation of plasma cell neoplasm.  Oncology referral with workup negative for anemia, hypercalcemia with electrophoresis/immunofixation showing monoclonal free kappa light chain, immunoglobulin in the urine with increased kappa free light chain in the serum. Bone marrow biopsy showed no morphologic or immunophenotypic evidence for plasma cell neoplasm.  Bone survey demonstrated right iliac lesion with lucent areas in the frontal region, thought likely to be benign.  Followed by Dr. Peres from Oncology.  Completed 5 weeks of radiation therapy on 03/21, with surgical intervention 04/16 (as above).  Procedure tolerated well under general anesthesia.  Relatively stable hemodynamics.  Estimated blood loss 1450 mL.  Urine output was 300 mL.  Received 1000 mL of normal saline and 1500 mL of lactated Ringer's,  500 mL of 5% albumin.  Last hemoglobin 8.4 grams percent earlier this morning.      Moderate degree of postoperative pain, which was reasonably well controlled while sedentary. Severe pain with attempt at mobilization early this morning and  "more recently within the past 30 minutes.  The patient noted to develop pallor without nausea, diaphoresis or dizziness.  Placed supine on bed with documented, blood pressure 140s over 60s, heart rate in the 90s.  Interval reduction in degree of pain with Dilaudid 0.5 mg IV.  He had only received 5 mg dose of oxycodone in addition to scheduled Tylenol.  Was on Neurontin preoperatively in addition to p.r.n. tramadol and hydrocodone with last dose of tramadol last week. Postop nausea for which the patient did receive Compazine last evening.  Has since tolerated p.o. intake. No other episodes of attempted mobilization.  Lovenox for DVT prophylaxis to begin early this afternoon.  Recurrent episodes of \"shivering\" coming on more recently in conjunction with severe pain with mobilization.  Temperature documented at 100.3 degrees.      No prior issues with anesthesia.  No history of abnormal bleeding tendency or blood clots.  No recollected transfusion.  The patient received what sounds like a Medrol Dosepak in January of this year to help control pain as well as a tapering regimen of steroids over 1 week at completion of radiation therapy.  No ongoing upper respiratory infection or flu-like illness.      With regard to functional cardiac status, the patient since November of last year has been quite limited due to the right hip pain.  Prior to that time, he was able to exercise regularly with walking or ability to go up 2 flights of stairs without chest pain or dyspnea.  No known heart disease.  No prior stress testing.      PAST MEDICAL HISTORY:   1.  Plasmacytoma right ilium (as above).  No clear evidence of other disease activity based on preop evaluation.  Status post radiation therapy.   2.  Hyperlipidemia, on statin.   3.  Organic impotence.      PAST SURGICAL HISTORY:   1.  Pelvic bone biopsy 1/25/2018.   2.  Screening colonoscopy.   3.  Bilateral inguinal hernia repair with recurrent repair on the left side.    " "  Without known heart disease, diabetes, asthma, hypertension, intrinsic renal disease, peptic ulcer disease, hepatitis, gallbladder disease, thyroid disease, seizure, tuberculosis, or anemia.      ALLERGIES:  No known drug allergies.      MEDICATIONS  PRIOR TO ADMISSION:   1.  PRN Tylenol.   2.  Aspirin 81 mg daily.   3.  Lipitor 20 mg daily.   4.  Vicodin p.r.n.   5.  Tramadol p.r.n.      FAMILY HISTORY:  Father status post CVA.  Mother with heart disease.  A brother with heart disease in his 80s.      HABITS:  Three-fourths pack per day smoker for 5 years, quitting in the late 1970s.  Less than 1-2 drinks weekly.      SOCIAL HISTORY:  Single.  Two children.  Works as a .  Resides in [place], [place].      REVIEW OF SYSTEMS:  Denies fever or sweats.  \"Shivering\" as above.  No headache or dizziness.  No visual change.  Without chest discomfort, dyspnea or cough.  Using incentive spirometer.  Nausea as above.  Resolved.  No reflux or abdominal pain, no diarrhea or constipation.  No signs of GI blood loss.  Last bowel movement the morning of 04/16/2018.  Good urinary stream preop.  Vu catheter placed postop, removed this morning without subsequent void.  No other bone or joint pain.  No rash, no focal neurologic complaint.      OBJECTIVE:   GENERAL:  Healthy-appearing adult male in moderate distress on initial confrontation subsequent to the above attempted mobilization.  Pallor, which has since improved.   VITAL SIGNS:  Blood pressure 140s/60s, heart rate 90s.  O2 sat 98% on 2 liters of nasal cannula overnight.  Subsequently reduced to 1 liter.   HEENT:  Extraocular movements full.  No nystagmus.  Pupils equal and reacting symmetrically.  Sclerae nonicteric.  Fundi deferred.  Oropharynx is clear.  Dentition adequate repair.  Mucosal membranes are dry.  Carotid upstroke brisk.  No bruits.  There is no thyromegaly or lymphadenopathy.   SKIN:  No rash (exposed areas).   CHEST:  Clear lung fields without " rales, rhonchi or bronchospasm.   CARDIAC:  Regular without audible gallop or murmur.  No click, no jugular venous distention.   ABDOMEN:  Nondistended, soft, nontender, without palpable organomegaly or mass.  Bowel sounds are present.  No epigastric or ileac bruits.   EXTREMITIES:  Distal lower extremities are well perfused.  There is no cyanosis or clubbing, no edema.  No posterior calf or thigh tenderness/induration to suggest DVT.   GENITALIA AND RECTAL:  Exam deferred.   NEUROLOGIC:  Grossly nonfocal.  Sensory exam not performed.      LABORATORY DATA:  2018 includes complete metabolic profile with normal electrolytes, BUN 21, creatinine 0.83.  Normal liver profile.  Random glucose 105.  White count 7100, hemoglobin 14.6, MCV 93, platelet count 248,000.  Creatinine 0.81, last evening with GFR greater than 90.  Platelet count 236,000.  Hemoglobin earlier today 8.4 grams percent (as above).  EKG 2018 demonstrated sinus bradycardia with heart rate in the 50s.  Normal GA, QRS and QT interval.  No ischemic change.      ASSESSMENT:  Pleasant 63-year-old male admitted with the followin.  Status post plasmacytoma resection right pelvis (iliac region) with right total hip/reconstruction.  Indication plasmocytoma right ilium (as above).  Estimated blood loss 1450.  Inadequate pain control with attempted mobilization.   2.  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  Electrophoresis/immunofixation demonstrated monoclonal free kappa light chain and immunoglobulin in the urine with increased kappa free light chain in the serum.  Bone marrow biopsy no morphologic or immunophenotypic evidence for plasma cell neoplasm.  Bone survey demonstrated right iliac lesion with lucent area in the frontal region, thought likely to be benign.   3.  Status post 5 weeks of radiation therapy for problem number 2. Completed 3/21.    4.  Acute blood loss  anemia.  Adequate presently.    5.  Potential for near or pending vasovagal syncope with attempt at mobilization earlier supported by pallor and contributed to by severe pain.  Stable hemodynamics/rhythm with lying supine.   6.  Episodic shivering with low-grade temperature.  May be on the basis of fever which I suspect is likely post-surgical in origin.  Monitor clinically for now.   7.  Hyperlipidemia, on statin.   8.  Surgeries as above.      PLAN:   1.  Adjust intravenous fluid support.   2.  Review/resume prior to admission meds as appropriate.   3.  Review opiate regimen.  Discussed increase in oxycodone dosing to 10 mg with RN.  Scheduled Extra Strength Tylenol 1000 mg every 6 hours.  Resume p.o. Neurontin.  Continue with p.r.n. IV Dilaudid.   4.  Trend labs with a check on CBC, platelets, BMP, magnesium and INR later today.   5.  Lovenox for DVT prophylaxis.   6.  Bowel medications.   7.  Perioperative antibiotics as per Orthopedics.   8.  Dose of opiates prior to mobilization.   9.  Close clinical monitoring.      Thank you for the consultation.  Will follow along with you.         MAVIS MCCALL MD             D: 2018   T: 2018   MT: DAREK      Name:     NAGI FOURNIER   MRN:      [id number]        Account:       [id number]   :      1955           Consult Date:  2018      Document: [id number]

## 2018-04-17 NOTE — PROGRESS NOTES
04/17/18 1345   Quick Adds   Type of Visit Initial Occupational Therapy Evaluation   Living Environment   Lives With child(bozena), adult   Living Arrangements house   Home Accessibility bed and bath are not on the first floor;tub/shower is not walk in   Number of Stairs to Enter Home 1   Number of Stairs Within Home 14   Stair Railings at Home inside, present on right side   Transportation Available car;family or friend will provide   Living Environment Comment Pt lives with adult son in split level home. Son able to assist pt   Pt. has walk in shower downstairs, and tub/shower upstairs.   Self-Care   Usual Activity Tolerance good   Current Activity Tolerance fair   Regular Exercise no   Equipment Currently Used at Home cane, straight   Activity/Exercise/Self-Care Comment Pt has been usign cane for the past 3 months d/t hip pain   Functional Level Prior   Ambulation 1-->assistive equipment   Transferring 0-->independent   Toileting 0-->independent   Bathing 0-->independent   Dressing 0-->independent   Eating 0-->independent   Communication 0-->understands/communicates without difficulty   Swallowing 0-->swallows foods/liquids without difficulty   Cognition 0 - no cognition issues reported   Fall history within last six months no   Which of the above functional risks had a recent onset or change? ambulation;transferring;toileting;bathing;dressing   Prior Functional Level Comment Pt IND with all ADLs and mobility. Pt currently using cane for mobilizing. Pt works as  but on a medical leave right now hoping to return asap.   General Information   Onset of Illness/Injury or Date of Surgery - Date 04/16/18   Referring Physician Johnnie Ellsworth MD   Additional Occupational Profile Info/Pertinent History of Current Problem 63M with R pelvis plasmacytoma now s/p 4/16 tumor debulking and placement of R BRADLEY with cage and cemented liner   Precautions/Limitations right hip precautions  (posterior hip  precautions)   Weight-Bearing Status - RLE weight-bearing as tolerated   General Info Comments activity orders; up with A   Cognitive Status Examination   Orientation orientation to person, place and time   Cognitive Comment cognition appears baseline; WNL   Visual Perception   Visual Perception No deficits were identified   Pain Assessment   Patient Currently in Pain Yes, see Vital Sign flowsheet  (surgery site )   Range of Motion (ROM)   ROM Quick Adds No deficits were identified   Strength   Manual Muscle Testing Quick Adds No deficits were identified  (general post op weakness)   Transfer Skill: Bed to Chair/Chair to Bed   Level of [place]: Bed to Chair unable to perform   Transfer Skill: Sit to Stand   Level of [place]: Sit/Stand unable to perform   Lower Body Dressing   Level of [place]: Dress Lower Body maximum assist (25% patients effort)   Grooming   Level of [place]: Grooming stand-by assist  (from bed)   Eating/Self Feeding   Level of [place]: Eating independent   Instrumental Activities of Daily Living (IADL)   Previous Responsibilities meal prep;housekeeping;laundry;shopping;yardwork;medication management;finances;driving;work   Activities of Daily Living Analysis   Impairments Contributing to Impaired Activities of Daily Living balance impaired;pain;post surgical precautions   General Therapy Interventions   Planned Therapy Interventions ADL retraining;transfer training;progressive activity/exercise   Clinical Impression   Criteria for Skilled Therapeutic Interventions Met yes, treatment indicated   OT Diagnosis impaired ADLs and functional mobility   Influenced by the following impairments post op pain/precautions   Assessment of Occupational Performance 3-5 Performance Deficits   Identified Performance Deficits dressing, toileting, bathing, home mgmt.   Clinical Decision Making (Complexity) Low complexity   Therapy Frequency daily   Predicted Duration of Therapy Intervention  "(days/wks) ~ 5 days   Anticipated Equipment Needs at Discharge (TBD)   Anticipated Discharge Disposition Home with Assist;Acute Rehabilitation Facility;Transitional Care Facility   Risks and Benefits of Treatment have been explained. Yes   Patient, Family & other staff in agreement with plan of care Yes   [facility] TM \"6 Clicks\"   2016, Trustees of [facility], under license to [employer].  All rights reserved.   6 Clicks Short Forms Daily Activity Inpatient Short Form   [facility]  \"6 Clicks\" Daily Activity Inpatient Short Form   1. Putting on and taking off regular lower body clothing? 2 - A Lot   2. Bathing (including washing, rinsing, drying)? 2 - A Lot   3. Toileting, which includes using toilet, bedpan or urinal? 1 - Total   4. Putting on and taking off regular upper body clothing? 4 - None   5. Taking care of personal grooming such as brushing teeth? 4 - None   6. Eating meals? 4 - None   Daily Activity Raw Score (Score out of 24.Lower scores equate to lower levels of function) 17   Total Evaluation Time   Total Evaluation Time (Minutes) 8     "

## 2018-04-17 NOTE — ANESTHESIA POSTPROCEDURE EVALUATION
Patient: Dong Joseph    Procedure(s):  Right Total Hip Arthroplasty And Removal Of Right Pelvic Tumor - Wound Class: I-Clean   - Wound Class: I-Clean    Diagnosis:Tumor  Diagnosis Additional Information: No value filed.    Anesthesia Type:  General, ETT    Note:  Anesthesia Post Evaluation    Patient location during evaluation: PACU  Patient participation: Able to fully participate in evaluation  Level of consciousness: awake  Pain management: adequate  Airway patency: patent  Cardiovascular status: acceptable  Respiratory status: acceptable  Hydration status: acceptable  PONV: none             Last vitals:  Vitals:    04/16/18 1157   BP: 121/71   Resp: 14   Temp: 36.8  C (98.2  F)   SpO2: 98%         Electronically Signed By: Pravin Simmons DO  April 16, 2018  7:17 PM

## 2018-04-17 NOTE — PLAN OF CARE
Problem: Patient Care Overview  Goal: Individualization & Mutuality  Pt dangled and tolerated well. Pt A&O x's 4. VSS. Afebrile. 02 sats in the 90s on 1 LPM.  Lungs clear. Denies SOB, CP and nausea. Tolerating po fluidt. Bowel sound active in all quadrants. No BM but passing gas. Vu removed at 0630.. Pain well managed with 5 mg of oxycodone. CMS intact, denies N/T. Right hip dressings clean, dry and intact.  PIV patent and SL. Pt slept between care and is ble to make needs known, call light with in reach. Will continue to monitor.

## 2018-04-18 ENCOUNTER — APPOINTMENT (OUTPATIENT)
Dept: PHYSICAL THERAPY | Facility: CLINIC | Age: 63
DRG: 824 | End: 2018-04-18
Attending: ORTHOPAEDIC SURGERY
Payer: COMMERCIAL

## 2018-04-18 ENCOUNTER — APPOINTMENT (OUTPATIENT)
Dept: GENERAL RADIOLOGY | Facility: CLINIC | Age: 63
DRG: 824 | End: 2018-04-18
Attending: INTERNAL MEDICINE
Payer: COMMERCIAL

## 2018-04-18 ENCOUNTER — APPOINTMENT (OUTPATIENT)
Dept: OCCUPATIONAL THERAPY | Facility: CLINIC | Age: 63
DRG: 824 | End: 2018-04-18
Attending: ORTHOPAEDIC SURGERY
Payer: COMMERCIAL

## 2018-04-18 LAB
ABO + RH BLD: NORMAL
ABO + RH BLD: NORMAL
ALBUMIN UR-MCNC: 30 MG/DL
ANION GAP SERPL CALCULATED.3IONS-SCNC: 8 MMOL/L (ref 3–14)
APPEARANCE UR: CLEAR
BASOPHILS # BLD AUTO: 0.1 10E9/L (ref 0–0.2)
BASOPHILS NFR BLD AUTO: 0.7 %
BILIRUB UR QL STRIP: NEGATIVE
BLD GP AB SCN SERPL QL: NORMAL
BLD PROD TYP BPU: NORMAL
BLOOD BANK CMNT PATIENT-IMP: NORMAL
BUN SERPL-MCNC: 7 MG/DL (ref 7–30)
CALCIUM SERPL-MCNC: 7.9 MG/DL (ref 8.5–10.1)
CHLORIDE SERPL-SCNC: 103 MMOL/L (ref 94–109)
CO2 SERPL-SCNC: 25 MMOL/L (ref 20–32)
COLOR UR AUTO: YELLOW
CREAT SERPL-MCNC: 0.72 MG/DL (ref 0.66–1.25)
DIFFERENTIAL METHOD BLD: ABNORMAL
EOSINOPHIL # BLD AUTO: 0.1 10E9/L (ref 0–0.7)
EOSINOPHIL NFR BLD AUTO: 1.2 %
ERYTHROCYTE [DISTWIDTH] IN BLOOD BY AUTOMATED COUNT: 13.6 % (ref 10–15)
ERYTHROCYTE [DISTWIDTH] IN BLOOD BY AUTOMATED COUNT: 13.6 % (ref 10–15)
GFR SERPL CREATININE-BSD FRML MDRD: >90 ML/MIN/1.7M2
GLUCOSE SERPL-MCNC: 136 MG/DL (ref 70–99)
GLUCOSE UR STRIP-MCNC: NEGATIVE MG/DL
HCT VFR BLD AUTO: 20 % (ref 40–53)
HCT VFR BLD AUTO: 22 % (ref 40–53)
HGB BLD-MCNC: 6.7 G/DL (ref 13.3–17.7)
HGB BLD-MCNC: 7.2 G/DL (ref 13.3–17.7)
HGB BLD-MCNC: 7.5 G/DL (ref 13.3–17.7)
HGB UR QL STRIP: ABNORMAL
IMM GRANULOCYTES # BLD: 0.1 10E9/L (ref 0–0.4)
IMM GRANULOCYTES NFR BLD: 1.1 %
KETONES UR STRIP-MCNC: NEGATIVE MG/DL
LACTATE BLD-SCNC: 0.8 MMOL/L (ref 0.7–2)
LACTATE BLD-SCNC: 1.2 MMOL/L (ref 0.7–2)
LACTATE BLD-SCNC: 2.8 MMOL/L (ref 0.4–1.9)
LEUKOCYTE ESTERASE UR QL STRIP: NEGATIVE
LYMPHOCYTES # BLD AUTO: 0.7 10E9/L (ref 0.8–5.3)
LYMPHOCYTES NFR BLD AUTO: 9 %
MAGNESIUM SERPL-MCNC: 1.5 MG/DL (ref 1.6–2.3)
MCH RBC QN AUTO: 31 PG (ref 26.5–33)
MCH RBC QN AUTO: 31.4 PG (ref 26.5–33)
MCHC RBC AUTO-ENTMCNC: 33.5 G/DL (ref 31.5–36.5)
MCHC RBC AUTO-ENTMCNC: 34.1 G/DL (ref 31.5–36.5)
MCV RBC AUTO: 92 FL (ref 78–100)
MCV RBC AUTO: 93 FL (ref 78–100)
MONOCYTES # BLD AUTO: 1.1 10E9/L (ref 0–1.3)
MONOCYTES NFR BLD AUTO: 14.4 %
MUCOUS THREADS #/AREA URNS LPF: PRESENT /LPF
NEUTROPHILS # BLD AUTO: 5.4 10E9/L (ref 1.6–8.3)
NEUTROPHILS NFR BLD AUTO: 73.6 %
NITRATE UR QL: NEGATIVE
NRBC # BLD AUTO: 0 10*3/UL
NRBC BLD AUTO-RTO: 0 /100
NUM BPU REQUESTED: 1
PH UR STRIP: 5.5 PH (ref 5–7)
PLATELET # BLD AUTO: 232 10E9/L (ref 150–450)
PLATELET # BLD AUTO: 234 10E9/L (ref 150–450)
POTASSIUM SERPL-SCNC: 3.7 MMOL/L (ref 3.4–5.3)
RBC # BLD AUTO: 2.16 10E12/L (ref 4.4–5.9)
RBC # BLD AUTO: 2.39 10E12/L (ref 4.4–5.9)
RBC #/AREA URNS AUTO: 1 /HPF (ref 0–2)
SODIUM SERPL-SCNC: 136 MMOL/L (ref 133–144)
SOURCE: ABNORMAL
SP GR UR STRIP: 1.02 (ref 1–1.03)
SPECIMEN EXP DATE BLD: NORMAL
UROBILINOGEN UR STRIP-MCNC: NORMAL MG/DL (ref 0–2)
WBC # BLD AUTO: 7 10E9/L (ref 4–11)
WBC # BLD AUTO: 7.4 10E9/L (ref 4–11)
WBC #/AREA URNS AUTO: 3 /HPF (ref 0–5)

## 2018-04-18 PROCEDURE — 86901 BLOOD TYPING SEROLOGIC RH(D): CPT | Performed by: INTERNAL MEDICINE

## 2018-04-18 PROCEDURE — 36415 COLL VENOUS BLD VENIPUNCTURE: CPT | Performed by: INTERNAL MEDICINE

## 2018-04-18 PROCEDURE — 86850 RBC ANTIBODY SCREEN: CPT | Performed by: INTERNAL MEDICINE

## 2018-04-18 PROCEDURE — 97535 SELF CARE MNGMENT TRAINING: CPT | Mod: GO

## 2018-04-18 PROCEDURE — 86900 BLOOD TYPING SEROLOGIC ABO: CPT | Performed by: INTERNAL MEDICINE

## 2018-04-18 PROCEDURE — 25000132 ZZH RX MED GY IP 250 OP 250 PS 637: Performed by: ORTHOPAEDIC SURGERY

## 2018-04-18 PROCEDURE — 25000132 ZZH RX MED GY IP 250 OP 250 PS 637: Performed by: INTERNAL MEDICINE

## 2018-04-18 PROCEDURE — 87040 BLOOD CULTURE FOR BACTERIA: CPT | Performed by: INTERNAL MEDICINE

## 2018-04-18 PROCEDURE — 97530 THERAPEUTIC ACTIVITIES: CPT | Mod: GP | Performed by: PHYSICAL THERAPIST

## 2018-04-18 PROCEDURE — 85018 HEMOGLOBIN: CPT | Performed by: INTERNAL MEDICINE

## 2018-04-18 PROCEDURE — 12000001 ZZH R&B MED SURG/OB UMMC

## 2018-04-18 PROCEDURE — 81001 URINALYSIS AUTO W/SCOPE: CPT | Performed by: INTERNAL MEDICINE

## 2018-04-18 PROCEDURE — 40000193 ZZH STATISTIC PT WARD VISIT: Performed by: PHYSICAL THERAPIST

## 2018-04-18 PROCEDURE — 97116 GAIT TRAINING THERAPY: CPT | Mod: GP | Performed by: PHYSICAL THERAPIST

## 2018-04-18 PROCEDURE — 99232 SBSQ HOSP IP/OBS MODERATE 35: CPT | Performed by: INTERNAL MEDICINE

## 2018-04-18 PROCEDURE — 83605 ASSAY OF LACTIC ACID: CPT | Performed by: INTERNAL MEDICINE

## 2018-04-18 PROCEDURE — 80048 BASIC METABOLIC PNL TOTAL CA: CPT | Performed by: INTERNAL MEDICINE

## 2018-04-18 PROCEDURE — 25000128 H RX IP 250 OP 636: Performed by: INTERNAL MEDICINE

## 2018-04-18 PROCEDURE — 85027 COMPLETE CBC AUTOMATED: CPT | Performed by: INTERNAL MEDICINE

## 2018-04-18 PROCEDURE — 85025 COMPLETE CBC W/AUTO DIFF WBC: CPT | Performed by: INTERNAL MEDICINE

## 2018-04-18 PROCEDURE — 83735 ASSAY OF MAGNESIUM: CPT | Performed by: INTERNAL MEDICINE

## 2018-04-18 PROCEDURE — 71046 X-RAY EXAM CHEST 2 VIEWS: CPT

## 2018-04-18 PROCEDURE — 25000128 H RX IP 250 OP 636: Performed by: ORTHOPAEDIC SURGERY

## 2018-04-18 PROCEDURE — 87086 URINE CULTURE/COLONY COUNT: CPT | Performed by: INTERNAL MEDICINE

## 2018-04-18 PROCEDURE — 40000133 ZZH STATISTIC OT WARD VISIT

## 2018-04-18 RX ORDER — SODIUM CHLORIDE 9 MG/ML
INJECTION, SOLUTION INTRAVENOUS CONTINUOUS
Status: DISCONTINUED | OUTPATIENT
Start: 2018-04-18 | End: 2018-04-19 | Stop reason: HOSPADM

## 2018-04-18 RX ORDER — MAGNESIUM SULFATE 1 G/100ML
1 INJECTION INTRAVENOUS ONCE
Status: COMPLETED | OUTPATIENT
Start: 2018-04-18 | End: 2018-04-18

## 2018-04-18 RX ORDER — BISACODYL 10 MG
10 SUPPOSITORY, RECTAL RECTAL DAILY PRN
Status: DISCONTINUED | OUTPATIENT
Start: 2018-04-18 | End: 2018-04-19 | Stop reason: HOSPADM

## 2018-04-18 RX ADMIN — OXYCODONE HYDROCHLORIDE 10 MG: 5 TABLET ORAL at 13:01

## 2018-04-18 RX ADMIN — ENOXAPARIN SODIUM 40 MG: 40 INJECTION SUBCUTANEOUS at 13:01

## 2018-04-18 RX ADMIN — OXYCODONE HYDROCHLORIDE 10 MG: 5 TABLET ORAL at 09:36

## 2018-04-18 RX ADMIN — ACETAMINOPHEN 1000 MG: 500 TABLET, FILM COATED ORAL at 06:06

## 2018-04-18 RX ADMIN — ATORVASTATIN CALCIUM 20 MG: 20 TABLET, FILM COATED ORAL at 22:19

## 2018-04-18 RX ADMIN — OXYCODONE HYDROCHLORIDE 5 MG: 5 TABLET ORAL at 22:23

## 2018-04-18 RX ADMIN — ACETAMINOPHEN 1000 MG: 500 TABLET, FILM COATED ORAL at 19:05

## 2018-04-18 RX ADMIN — ACETAMINOPHEN 1000 MG: 500 TABLET, FILM COATED ORAL at 13:01

## 2018-04-18 RX ADMIN — SODIUM CHLORIDE 1000 ML: 9 INJECTION, SOLUTION INTRAVENOUS at 22:19

## 2018-04-18 RX ADMIN — MAGNESIUM SULFATE IN DEXTROSE 1 G: 10 INJECTION, SOLUTION INTRAVENOUS at 09:37

## 2018-04-18 RX ADMIN — OXYCODONE HYDROCHLORIDE 10 MG: 5 TABLET ORAL at 15:57

## 2018-04-18 RX ADMIN — SENNOSIDES AND DOCUSATE SODIUM 2 TABLET: 8.6; 5 TABLET ORAL at 19:06

## 2018-04-18 RX ADMIN — GABAPENTIN 100 MG: 100 CAPSULE ORAL at 19:05

## 2018-04-18 RX ADMIN — SENNOSIDES AND DOCUSATE SODIUM 2 TABLET: 8.6; 5 TABLET ORAL at 09:36

## 2018-04-18 RX ADMIN — OXYCODONE HYDROCHLORIDE 10 MG: 5 TABLET ORAL at 06:06

## 2018-04-18 RX ADMIN — GABAPENTIN 100 MG: 100 CAPSULE ORAL at 14:18

## 2018-04-18 RX ADMIN — GABAPENTIN 100 MG: 100 CAPSULE ORAL at 09:36

## 2018-04-18 RX ADMIN — OXYCODONE HYDROCHLORIDE 10 MG: 5 TABLET ORAL at 19:06

## 2018-04-18 NOTE — PLAN OF CARE
Pt evening hemoglobin recheck results returned at 7.2 - moonlighter was paged with information. Pt presents as asymptomatic no bleeding noted around dressings. Drain is not putting out excessive amounts. Advised by moonlighter to monitor pt and recheck Hbg in AM

## 2018-04-18 NOTE — PROGRESS NOTES
"Ortho Progress Note     Subjective:  Setbacks with both therapy sessions yesterday - first session limited by severe R hip pain, which didn't occur during second. Second session limited by orthostatic lightheadedness.  Stood this AM without any lightheadedness. No lightheadedness at rest. No pelvic pain.  Running elevated temp, though does not feel systemically ill.    Objective:  /63 (BP Location: Right arm)  Temp 101.3  F (38.5  C) (Oral)  Resp 16  Ht 1.778 m (5' 10\")  Wt 71.4 kg (157 lb 6.5 oz)  SpO2 96%  BMI 22.59 kg/m2  Gen: A&Ox3, no acute distress  CV: 2+ dp/pt pulses, capillary refill < 2sec  Resp: breathing equal and non-labored, no wheezing  MSK:     RLE   Dressings C/D/I   Motor: EHL, TA, FHL, GS 5/5   SILT on SP, DP, S, S, and T nerve territories   Circulation: palpable DP and TP, foot warm      Hemoglobin   Date Value Ref Range Status   04/17/2018 8.3 (L) 13.3 - 17.7 g/dL Final   04/17/2018 8.4 (L) 13.3 - 17.7 g/dL Final       Assessment/Plan:  63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  Acute blood loss anemia.      Elevated temps, will continue to monitor. No evidence of wound site infection / PNA at present.    -Weight Bearing Status: WBAT RLE  -Activity: Posterior hip precautions  -DVT PPx: Lovenox x4wk  -Pain control: IV and PO, wean to PO as able  -Dressing: Leave in place until POD 7. May shower with dressings in place as are waterproof  -Diet: Regular    -Disposition: Anticipate home around POD 3 pending PT, pain control.  -Follow up: 2 weeks with Dr Jodee Terrazas MD  Orthopaedic Surgery PGY-4  Pager:  [phone]    "

## 2018-04-18 NOTE — PLAN OF CARE
Problem: Patient Care Overview  Goal: Plan of Care/Patient Progress Review  Discharge Planner PT   Patient plan for discharge: home with son to assist  Current status: walking 200'+ with ww.  No dizziness.  Transfers with supervision. Climbed stairs.  Barriers to return to prior living situation: none  Recommendations for discharge: home with son and home care  Rationale for recommendations: Home safety eval and family education.        Entered by: Celena Cintron 04/18/2018 4:49 PM

## 2018-04-18 NOTE — PLAN OF CARE
Problem: Hip Arthroplasty (Total, Partial) (Adult)  Goal: Signs and Symptoms of Listed Potential Problems Will be Absent, Minimized or Managed (Hip Arthroplasty)  Signs and symptoms of listed potential problems will be absent, minimized or managed by discharge/transition of care (reference Hip Arthroplasty (Total, Partial) (Adult) CPG).           VS:       Pt A/O X 4. Afebrile. VSS. Lungs- clear bilaterally with both       anterior and posterior. IS encouraged. Denies nausea, shortness of breath, and chest pain.     Output:       Bowels- active in all four quadrants. Last BM 4/15 per pt report, pt is passing gas. Voids spontaneously without difficulty in the bathroom.      Activity:       Pt up at bedside, in room, and in hallways with assist of 1 and walker.     Skin:   Incisions to right hip and right leg, otherwise intact.     Pain:       Has pain in the right hip and right thigh and given prn Oxycodone, ICE applied, and is tolerating well.      CMS:       CMS and Neuro's are intact. Denies numbness and tingling in all extremities.      Dressing:       Right hip and right thigh incisional dressings are clean, dry, and intact.      Diet:       Pt is on a regular diet and appetite was good this shift.       LDA:       PIV is patent in the right arm and SL. PIV is patent in the right hand and SL.      Equipment:       PCD's on BLE's. Bilateral heels are elevated off the bed.     Plan:       Pt is able to make needs known and the call light is within the pt's reach. Continue to monitor.       Additional Info:

## 2018-04-18 NOTE — OP NOTE
DATE OF SURGERY: 4/16/2018    PREOPERATIVE DIAGNOSIS: Right acetabular tumor with pathologic fracture    POSTOPERATIVE DIAGNOSIS: Right acetabular tumor with pathologic fracture    PROCEDURE: #1 curettage of right acetabular tumor, #2 right total hip arthroplasty with O arm and navigation guidance    SURGEON: Johnnie Ellsworth MD     ASSISTANT: Maia Mckeon PA-C as an assistant was required to help retract and close the wound, more than 1 assistant was needed.    PATIENT HISTORY: This patient has a history of myeloma.  They have a large lesion in the right acetabulum that is because compromise of that bone and inability to weight-bear.  The patient presents now for total hip understanding the risks of leg length discrepancy fracture bleeding infection pain numbness tingling weakness deep venous thrombosis pulmonary embolism and dislocation requiring additional procedures.    DESCRIPTION OF PROCEDURE: Patient was placed in the lateral position and held with positioners after general anesthesia.  The right leg and hip were washed and sterilely prepped and draped.  We made an incision for posterior approach sharply through the skin divided subcutaneous tissue with cautery and split the muscle fascia.  We placed a fixed retractor.  We also made 2 small incisions of the iliac crest and placed to pins for the reference frame for navigation.  We split the capsule and opened up the capsular leaflets.  We then tagged the piriformis also and dislocated the hip.  The femoral neck was cut and the head removed.  We then used a canal finder and lateralizer and femur and broached up to a size 7.  We anticipated going to a size 8.  Next we did a spin with the O arm.  Next we retracted the femur anteriorly and began to ream up to a size 56.  This allowed us to get a larger head and are all poly-cup.  We placed in a size 56 mm removed fraying and then made a small incision on the posterior lateral thigh and  bluntly dissected down through the muscles.  A navigated drill guide was placed so that we could add screws up into the posterior ilium.  3 screws were placed: two 100 mm and 1 110 mm.  Good to excellent purchase was obtained.  The referring was now stable.  Prior to placing the referring we had curetted out the entire tumor space.  We copiously irrigated this space and then we finished instrumenting the femur up to a size 8 broach exaggerating the patient's version slightly.  We then used a calcar reamer and remove the broach.  Next we cemented and are all poly-cup.  We placed cement above the cage.  We are concerned that some of the cement extravasated into the pelvis underneath the iliacus or into the retroperitoneal space.  He also found that we had a defect in the lateral wall of the pelvis which allowed us to see our screws in the area superior to the cage.  We placed additional cement into that space.  Next we placed our stem and found that a +5 head gave us excellent length and stability with internal rotation past 65  at 90  of flexion.  We copiously irrigated.  We closed the capsule and repaired the piriformis to the tip of the greater trochanter.  Next we closed the gluteus fascia with Vicryl and used Vicryl in the subcutaneous tissue Monocryl in the skin.  All 4 incisions were closed and Steri-Strips were applied followed by sterile dressings.  The patient was turned to the supine position extubated and taken to the recovery room in stable condition.  The estimated blood loss is 1450 mL.  I was present for all critical portions of the procedure.  There were no complications.  The specimen was sent to pathology.    Johnnie Ellsworth MD

## 2018-04-18 NOTE — PLAN OF CARE
Problem: Patient Care Overview  Goal: Plan of Care/Patient Progress Review  VS: Pt A&Ox 4. VSS on room air. Incentive spirometer used while awake.   Output: Urinary retention, monitoring PVRs. Pt LBM 4/15 and is passing gas   Activity: Pt up and stood at edge of bed with out feeling light headed. Independent with bed mobility.    Skin: Right hip and thigh incisions   Pain: Has pain in right leg and it is being controlled with PRN oxycodone 5-10mg. Ice applied.    CMS: CMS and Neuro's are intact. Patient denies numbness and tingling in all extremities. PCD's on BLE.    Dressing: Right hip and thigh incisional dressings remain CDI.    Diet:  Is on regular diet. Denies nausea.    LDA:   PIV is patent in right forearm and LR infusing at 100 mL/hr. PIV 16 gauge in right hand is SL.    Sleep: Normal. Patient requested not to be woken for pain medications.    Plan: Continue to monitor pt

## 2018-04-18 NOTE — PROGRESS NOTES
"[facility] Internal Medicine Progress Note            Interval History:   Record reviewed.  Seen with RN.  Difficulty mobilizing 4/17 due to severe pain and subsequent postural dizziness.  In general feeling better.  Improved, adequate pain control.  Stood at bedside this AM to void without postural dizziness.  Off O2.    No CP, SOB, cough, nausea, reflux.  Mild abd cramping and distention.  Passing flatus with last BM 2 days ago.  CS LN.  Voiding spontaneously since.           Medications:   All medications reviewed today          Physical Exam:   Blood pressure 110/52, temperature 98  F (36.7  C), temperature source Oral, resp. rate 16, height 1.778 m (5' 10\"), weight 71.4 kg (157 lb 6.5 oz), SpO2 93 %.    Intake/Output Summary (Last 24 hours) at 04/18/18 1344  Last data filed at 04/18/18 0607   Gross per 24 hour   Intake              250 ml   Output             1050 ml   Net             -800 ml       General:  Alert.  Appropriate.  No distress.  Off O2.     Heent:      Neck:    Skin:    Chest:  clear    Cardiac:  Reg without gallop, murmur.  No JVD.     Abdomen:  Non distended, soft, non-tender.  BS normal.     Extremities:  Perfused.  No edema, calf, posterior thigh tenderness to suggest DVT.     Neuro:            Data:     Results for orders placed or performed during the hospital encounter of 04/16/18 (from the past 24 hour(s))   Basic metabolic panel   Result Value Ref Range    Sodium 138 133 - 144 mmol/L    Potassium 4.0 3.4 - 5.3 mmol/L    Chloride 103 94 - 109 mmol/L    Carbon Dioxide 25 20 - 32 mmol/L    Anion Gap 10 3 - 14 mmol/L    Glucose 183 (H) 70 - 99 mg/dL    Urea Nitrogen 10 7 - 30 mg/dL    Creatinine 0.84 0.66 - 1.25 mg/dL    GFR Estimate >90 >60 mL/min/1.7m2    GFR Estimate If Black >90 >60 mL/min/1.7m2    Calcium 8.0 (L) 8.5 - 10.1 mg/dL   CBC with platelets   Result Value Ref Range    WBC 7.9 4.0 - 11.0 10e9/L    RBC Count 2.71 (L) 4.4 - 5.9 10e12/L    Hemoglobin 8.3 (L) 13.3 - 17.7 " g/dL    Hematocrit 25.0 (L) 40.0 - 53.0 %    MCV 92 78 - 100 fl    MCH 30.6 26.5 - 33.0 pg    MCHC 33.2 31.5 - 36.5 g/dL    RDW 13.6 10.0 - 15.0 %    Platelet Count 238 150 - 450 10e9/L   INR   Result Value Ref Range    INR 1.31 (H) 0.86 - 1.14   Magnesium   Result Value Ref Range    Magnesium 1.5 (L) 1.6 - 2.3 mg/dL   Basic metabolic panel   Result Value Ref Range    Sodium 136 133 - 144 mmol/L    Potassium 3.7 3.4 - 5.3 mmol/L    Chloride 103 94 - 109 mmol/L    Carbon Dioxide 25 20 - 32 mmol/L    Anion Gap 8 3 - 14 mmol/L    Glucose 136 (H) 70 - 99 mg/dL    Urea Nitrogen 7 7 - 30 mg/dL    Creatinine 0.72 0.66 - 1.25 mg/dL    GFR Estimate >90 >60 mL/min/1.7m2    GFR Estimate If Black >90 >60 mL/min/1.7m2    Calcium 7.9 (L) 8.5 - 10.1 mg/dL   CBC with platelets   Result Value Ref Range    WBC 7.0 4.0 - 11.0 10e9/L    RBC Count 2.39 (L) 4.4 - 5.9 10e12/L    Hemoglobin 7.5 (L) 13.3 - 17.7 g/dL    Hematocrit 22.0 (L) 40.0 - 53.0 %    MCV 92 78 - 100 fl    MCH 31.4 26.5 - 33.0 pg    MCHC 34.1 31.5 - 36.5 g/dL    RDW 13.6 10.0 - 15.0 %    Platelet Count 234 150 - 450 10e9/L                Assessment and Plan:   1.  Status post plasmacytoma resection right pelvis (iliac region) with right total hip/reconstruction.  Indication plasmocytoma right ilium.  Estimated blood loss 1450. Improved, adequate pain control.   2.  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  Electrophoresis/immunofixation demonstrated monoclonal free kappa light chain and immunoglobulin in the urine with increased kappa free light chain in the serum.  Bone marrow biopsy no morphologic or immunophenotypic evidence for plasma cell neoplasm.  Bone survey demonstrated right iliac lesion with lucent area in the frontal region, thought likely to be benign.   3. Status post 5 weeks of radiation therapy for problem number 2. Completed 3/21.    4. Acute blood loss anemia. Progressive 2nd to  soft tissue hemorrhage (on lovenox) and dilution.  Adequate presently.    5. Postural dizziness 2nd to orthostatic hypotension or vasovagal event, improved.  6. Fever likely 2nd to post surgical inflammatory change, hematoma.  Check UA with SC.  7. Urine retention with improved voiding pattern.  8. Hyperlipidemia, on statin.     PLAN:  1)  SL IV.  2)  IS, same opiates, bowel meds, supp prn, lovenox, mobilize as able.  Check PVR.  3)  PM Hgb and AM labs. 4)  Monitor clinically.   Disposition Plan   Expected discharge in 2-3 days to prior living arrangement once pain controlled, mobilizing safely, stable Hgb. .     Entered: Tavo Lopes 04/18/2018, 1:44 PM              Attestation:  I have reviewed today's vital signs, notes, medications, labs and imaging.     Tavo Lopes MD

## 2018-04-18 NOTE — PLAN OF CARE
Problem: Patient Care Overview  Goal: Plan of Care/Patient Progress Review  Outcome: Improving        VS:   Elevated HR (102-105), Febrile 99.7 temp, 97% on RA. Cap[place] IPI 9-10, EtCO2 36. Denies SOB/CP. LS clear.   Output:   Difficulty voiding independently. , .    Activity:   Uses call light appropriately. Up w/ PT during day shift, able to reposition in bed assist of 1.   Skin: WDL- incision sites.   Pain:   Controlled w/ PRN PO 5-10 mg oxycodone + scheduled medication. Ice applied as needed.   Neuro/CMS:   A&Ox4. Intact, no numbness or tingling.    Dressing(s):   Aquacel drsg CDI. No drainage.   Diet:   Regular diet tolerated good.    LDA:   L hand IV removed, R hand SL, forearm IV infusing 100mL/hr    Equipment:   Uses cane.   Plan:   Will continue POC until discharge.   Additional Info:   Encouraged use of IS- Pt was receptive.

## 2018-04-18 NOTE — PLAN OF CARE
Problem: Patient Care Overview  Goal: Plan of Care/Patient Progress Review  Discharge Planner PT   Patient plan for discharge: home with home PT  Current status: Walking in hallway with wheeled walker. No dizziness. Fatigued but able to walk functional distances. Transfers with assist of 1  Barriers to return to prior living situation: stair climbing,  Needs to be nearly independent with all mobility  Recommendations for discharge: TCU vs.  home with home PT  Rationale for recommendations: Significant improvement from yesterday.  Hgb is 7.5. Pt will likely need more assist at home than he is aware of.  Home safety eval and family education needed.        Entered by: Celena Cintron 04/18/2018 1:06 PM

## 2018-04-18 NOTE — PLAN OF CARE
Problem: Patient Care Overview  Goal: Plan of Care/Patient Progress Review  Discharge Planner OT   Patient plan for discharge: home  Current status: pt feeling much better this am compared to yesterday.  Needs CGA for bed mobility to support RLE.  Pt denies dizziness with sitting EOB and standing EOB.  Pt able to stand to brush teeth EOB with SBA.  Pt educated on AE for LB dressing.  Barriers to return to prior living situation: medical status, pain, limited activity tolerance  Recommendations for discharge: Home with son to assist and HH PT  Rationale for recommendations: Pt likely to progress mobility to be safe to d/c home with son to assist with ADL/IADL prn.         Entered by: Shantel Gorman 04/18/2018 5:17 PM

## 2018-04-19 ENCOUNTER — TELEPHONE (OUTPATIENT)
Dept: FAMILY MEDICINE | Facility: OTHER | Age: 63
End: 2018-04-19

## 2018-04-19 ENCOUNTER — APPOINTMENT (OUTPATIENT)
Dept: OCCUPATIONAL THERAPY | Facility: CLINIC | Age: 63
DRG: 824 | End: 2018-04-19
Attending: ORTHOPAEDIC SURGERY
Payer: COMMERCIAL

## 2018-04-19 ENCOUNTER — APPOINTMENT (OUTPATIENT)
Dept: PHYSICAL THERAPY | Facility: CLINIC | Age: 63
DRG: 824 | End: 2018-04-19
Attending: ORTHOPAEDIC SURGERY
Payer: COMMERCIAL

## 2018-04-19 VITALS
TEMPERATURE: 98.6 F | OXYGEN SATURATION: 95 % | WEIGHT: 157.41 LBS | DIASTOLIC BLOOD PRESSURE: 56 MMHG | HEIGHT: 70 IN | RESPIRATION RATE: 16 BRPM | SYSTOLIC BLOOD PRESSURE: 123 MMHG | BODY MASS INDEX: 22.54 KG/M2

## 2018-04-19 LAB
ABO + RH BLD: NORMAL
ABO + RH BLD: NORMAL
ANION GAP SERPL CALCULATED.3IONS-SCNC: 9 MMOL/L (ref 3–14)
BACTERIA SPEC CULT: NO GROWTH
BLD GP AB SCN SERPL QL: NORMAL
BLOOD BANK CMNT PATIENT-IMP: NORMAL
BUN SERPL-MCNC: 8 MG/DL (ref 7–30)
CALCIUM SERPL-MCNC: 8.1 MG/DL (ref 8.5–10.1)
CHLORIDE SERPL-SCNC: 106 MMOL/L (ref 94–109)
CO2 SERPL-SCNC: 25 MMOL/L (ref 20–32)
CREAT SERPL-MCNC: 0.77 MG/DL (ref 0.66–1.25)
ERYTHROCYTE [DISTWIDTH] IN BLOOD BY AUTOMATED COUNT: 14.9 % (ref 10–15)
GFR SERPL CREATININE-BSD FRML MDRD: >90 ML/MIN/1.7M2
GLUCOSE SERPL-MCNC: 119 MG/DL (ref 70–99)
HCT VFR BLD AUTO: 24.1 % (ref 40–53)
HGB BLD-MCNC: 8.1 G/DL (ref 13.3–17.7)
Lab: NORMAL
MAGNESIUM SERPL-MCNC: 2.2 MG/DL (ref 1.6–2.3)
MCH RBC QN AUTO: 30.1 PG (ref 26.5–33)
MCHC RBC AUTO-ENTMCNC: 33.6 G/DL (ref 31.5–36.5)
MCV RBC AUTO: 90 FL (ref 78–100)
PLATELET # BLD AUTO: 250 10E9/L (ref 150–450)
POTASSIUM SERPL-SCNC: 3.6 MMOL/L (ref 3.4–5.3)
RBC # BLD AUTO: 2.69 10E12/L (ref 4.4–5.9)
SODIUM SERPL-SCNC: 140 MMOL/L (ref 133–144)
SPECIMEN EXP DATE BLD: NORMAL
SPECIMEN SOURCE: NORMAL
WBC # BLD AUTO: 8.4 10E9/L (ref 4–11)

## 2018-04-19 PROCEDURE — 97535 SELF CARE MNGMENT TRAINING: CPT | Mod: GO

## 2018-04-19 PROCEDURE — 97116 GAIT TRAINING THERAPY: CPT | Mod: GP | Performed by: PHYSICAL THERAPIST

## 2018-04-19 PROCEDURE — 97110 THERAPEUTIC EXERCISES: CPT | Mod: GP | Performed by: PHYSICAL THERAPIST

## 2018-04-19 PROCEDURE — 97530 THERAPEUTIC ACTIVITIES: CPT | Mod: GO

## 2018-04-19 PROCEDURE — 25000128 H RX IP 250 OP 636: Performed by: ORTHOPAEDIC SURGERY

## 2018-04-19 PROCEDURE — 25000128 H RX IP 250 OP 636: Performed by: INTERNAL MEDICINE

## 2018-04-19 PROCEDURE — 36415 COLL VENOUS BLD VENIPUNCTURE: CPT | Performed by: INTERNAL MEDICINE

## 2018-04-19 PROCEDURE — 85027 COMPLETE CBC AUTOMATED: CPT | Performed by: INTERNAL MEDICINE

## 2018-04-19 PROCEDURE — 25000132 ZZH RX MED GY IP 250 OP 250 PS 637: Performed by: INTERNAL MEDICINE

## 2018-04-19 PROCEDURE — P9016 RBC LEUKOCYTES REDUCED: HCPCS | Performed by: ORTHOPAEDIC SURGERY

## 2018-04-19 PROCEDURE — 85018 HEMOGLOBIN: CPT | Performed by: INTERNAL MEDICINE

## 2018-04-19 PROCEDURE — 83735 ASSAY OF MAGNESIUM: CPT | Performed by: INTERNAL MEDICINE

## 2018-04-19 PROCEDURE — 40000133 ZZH STATISTIC OT WARD VISIT

## 2018-04-19 PROCEDURE — 40000193 ZZH STATISTIC PT WARD VISIT: Performed by: PHYSICAL THERAPIST

## 2018-04-19 PROCEDURE — 25000132 ZZH RX MED GY IP 250 OP 250 PS 637: Performed by: ORTHOPAEDIC SURGERY

## 2018-04-19 PROCEDURE — 80048 BASIC METABOLIC PNL TOTAL CA: CPT | Performed by: INTERNAL MEDICINE

## 2018-04-19 PROCEDURE — 99232 SBSQ HOSP IP/OBS MODERATE 35: CPT | Performed by: INTERNAL MEDICINE

## 2018-04-19 RX ORDER — AMOXICILLIN 250 MG
1 CAPSULE ORAL 2 TIMES DAILY
Qty: 28 TABLET | Refills: 0 | Status: SHIPPED | OUTPATIENT
Start: 2018-04-19 | End: 2018-08-10

## 2018-04-19 RX ORDER — GABAPENTIN 100 MG/1
100 CAPSULE ORAL 3 TIMES DAILY
Qty: 30 CAPSULE | Refills: 0 | Status: SHIPPED | OUTPATIENT
Start: 2018-04-19 | End: 2018-04-24

## 2018-04-19 RX ORDER — OXYCODONE HYDROCHLORIDE 5 MG/1
5-10 TABLET ORAL
Qty: 60 TABLET | Refills: 0 | Status: SHIPPED | OUTPATIENT
Start: 2018-04-19 | End: 2018-04-24

## 2018-04-19 RX ORDER — AMOXICILLIN 250 MG
1 CAPSULE ORAL 2 TIMES DAILY
Qty: 28 TABLET | Refills: 0 | Status: SHIPPED | OUTPATIENT
Start: 2018-04-19 | End: 2018-04-19

## 2018-04-19 RX ORDER — SODIUM CHLORIDE 9 MG/ML
INJECTION, SOLUTION INTRAVENOUS
Status: DISPENSED
Start: 2018-04-19 | End: 2018-04-19

## 2018-04-19 RX ADMIN — GABAPENTIN 100 MG: 100 CAPSULE ORAL at 08:39

## 2018-04-19 RX ADMIN — SENNOSIDES AND DOCUSATE SODIUM 2 TABLET: 8.6; 5 TABLET ORAL at 08:39

## 2018-04-19 RX ADMIN — ENOXAPARIN SODIUM 40 MG: 40 INJECTION SUBCUTANEOUS at 12:40

## 2018-04-19 RX ADMIN — ACETAMINOPHEN 1000 MG: 500 TABLET, FILM COATED ORAL at 01:13

## 2018-04-19 RX ADMIN — ACETAMINOPHEN 1000 MG: 500 TABLET, FILM COATED ORAL at 12:40

## 2018-04-19 RX ADMIN — OXYCODONE HYDROCHLORIDE 5 MG: 5 TABLET ORAL at 08:05

## 2018-04-19 RX ADMIN — SODIUM CHLORIDE: 9 INJECTION, SOLUTION INTRAVENOUS at 01:14

## 2018-04-19 RX ADMIN — OXYCODONE HYDROCHLORIDE 5 MG: 5 TABLET ORAL at 05:01

## 2018-04-19 RX ADMIN — ACETAMINOPHEN 1000 MG: 500 TABLET, FILM COATED ORAL at 06:23

## 2018-04-19 RX ADMIN — GABAPENTIN 100 MG: 100 CAPSULE ORAL at 13:59

## 2018-04-19 RX ADMIN — OXYCODONE HYDROCHLORIDE 5 MG: 5 TABLET ORAL at 01:29

## 2018-04-19 RX ADMIN — OXYCODONE HYDROCHLORIDE 5 MG: 5 TABLET ORAL at 15:39

## 2018-04-19 RX ADMIN — OXYCODONE HYDROCHLORIDE 5 MG: 5 TABLET ORAL at 12:38

## 2018-04-19 NOTE — PLAN OF CARE
Problem: Patient Care Overview  Goal: Plan of Care/Patient Progress Review    VS: VSS   O2: 95 at room air   Output: 300 mL   Last BM: 4/19/2018   Activity: Up to restroom ambulating with WW SBA x1   Skin: CDI, BLE dryness   Pain: Tolerable with 5 mg oxycodone Q3H and 1,000 mg acetaminophen   CMS: WDL ex rt hip weakness   Dressing: CDI; new Aquacel applied to rt hip incision using sterile technique   Diet: Regular-eating well   LDA: Removed PVI on left hand   Equipment: WW and/or cane   Plan: Discharge to home with son today   Additional Info: Pt did teach back of Lovenox adminstration     Pt had BM and ability to void improving. Pt plans to discharge home with son this afternoon.

## 2018-04-19 NOTE — PLAN OF CARE
Problem: Patient Care Overview  Goal: Plan of Care/Patient Progress Review  Outcome: No Change  Alert and oriented throughout the night. Pain in right hip managed with PRN 5mg oxycodone. 1 unit of blood transfusing. Vital signs stable. Room air. CMS intact, denies numbness and tingling. Dressing CDI. Consuming adequate fluids. Awake between cares overnight, very pleasant. Voiding in urinal, PVR of 230 overnight.

## 2018-04-19 NOTE — PLAN OF CARE
Pt returned critical lab value hemoglobin 6.7 moonlighter paged. Unit of blood ordered, recheck hbg 1-2hrs after blood infused.

## 2018-04-19 NOTE — PROGRESS NOTES
"[facility] Internal Medicine Progress Note            Interval History:   Record reviewed.  Seen with RN.  Fever spike last .3.  Lactic acid 2.8.  Normalized with IV fluids.  Prior UA bland.  CXR neg. Blood cultures neg.  Transfused 1 unit.  Feeling well today. Ortho plan to DC home (son en route).   Adequate pain control.  Ambulatory without LH.  No CP, SOB, cough, nausea, reflux, abd pain.    Formed BM this AM.   No dysuria.            Medications:   All medications reviewed today          Physical Exam:   Blood pressure 123/56, temperature 98.6  F (37  C), temperature source Oral, resp. rate 16, height 1.778 m (5' 10\"), weight 71.4 kg (157 lb 6.5 oz), SpO2 95 %.    Intake/Output Summary (Last 24 hours) at 04/18/18 1344  Last data filed at 04/18/18 0607   Gross per 24 hour   Intake              250 ml   Output             1050 ml   Net             -800 ml          General:  Alert.  Appropriate.  No distress.  Off O2.     Heent:      Neck:    Skin:    Chest:  clear    Cardiac:  Reg without gallop, murmur.  No JVD.     Abdomen:  Non distended, soft, non-tender.  BS normal.     Extremities:  Perfused.  No edema, calf, posterior thigh tenderness to suggest DVT.     Neuro:            Data:     Results for orders placed or performed during the hospital encounter of 04/16/18 (from the past 24 hour(s))   Hemoglobin   Result Value Ref Range    Hemoglobin 7.2 (L) 13.3 - 17.7 g/dL   Lactic acid level STAT for sepsis protocol   Result Value Ref Range    Lactate for Sepsis Protocol 2.8 (HH) 0.4 - 1.9 mmol/L   XR Chest 2 Views    Narrative    Exam: XR CHEST 2 VW, 4/18/2018 10:14 PM    Indication: fever;     Comparison: None available    Findings:   PA and lateral views of the chest. The cardiac silhouette and  pulmonary vascular are within normal limits. No focal airspace  opacity, pleural effusion, or pneumothorax. Air-filled loops of bowel  in left upper quadrant. There is mild degenerative changes of " thoracic  spine.      Impression    Impression:  Normal chest x-ray.    I have personally reviewed the examination and initial interpretation  and I agree with the findings.    GHAZAL MORRELL MD   Blood culture   Result Value Ref Range    Specimen Description Blood Left Arm     Special Requests Aerobic and anaerobic bottles received     Culture Micro No growth after 15 hours    CBC with platelets differential   Result Value Ref Range    WBC 7.4 4.0 - 11.0 10e9/L    RBC Count 2.16 (L) 4.4 - 5.9 10e12/L    Hemoglobin 6.7 (LL) 13.3 - 17.7 g/dL    Hematocrit 20.0 (L) 40.0 - 53.0 %    MCV 93 78 - 100 fl    MCH 31.0 26.5 - 33.0 pg    MCHC 33.5 31.5 - 36.5 g/dL    RDW 13.6 10.0 - 15.0 %    Platelet Count 232 150 - 450 10e9/L    Diff Method Automated Method     % Neutrophils 73.6 %    % Lymphocytes 9.0 %    % Monocytes 14.4 %    % Eosinophils 1.2 %    % Basophils 0.7 %    % Immature Granulocytes 1.1 %    Nucleated RBCs 0 0 /100    Absolute Neutrophil 5.4 1.6 - 8.3 10e9/L    Absolute Lymphocytes 0.7 (L) 0.8 - 5.3 10e9/L    Absolute Monocytes 1.1 0.0 - 1.3 10e9/L    Absolute Eosinophils 0.1 0.0 - 0.7 10e9/L    Absolute Basophils 0.1 0.0 - 0.2 10e9/L    Abs Immature Granulocytes 0.1 0 - 0.4 10e9/L    Absolute Nucleated RBC 0.0    Lactic acid whole blood   Result Value Ref Range    Lactic Acid 1.2 0.7 - 2.0 mmol/L   Blood culture   Result Value Ref Range    Specimen Description Blood Left Hand     Special Requests Aerobic and anaerobic bottles received     Culture Micro No growth after 15 hours    Lactic acid whole blood   Result Value Ref Range    Lactic Acid 0.8 0.7 - 2.0 mmol/L   ABO/Rh type and screen   Result Value Ref Range    ABO AB     RH(D) Pos     Antibody Screen Neg     Test Valid Only At          [facility]    Specimen Expires 04/21/2018    Basic metabolic panel   Result Value Ref Range    Sodium 140 133 - 144 mmol/L    Potassium 3.6 3.4 - 5.3 mmol/L    Chloride 106 94  - 109 mmol/L    Carbon Dioxide 25 20 - 32 mmol/L    Anion Gap 9 3 - 14 mmol/L    Glucose 119 (H) 70 - 99 mg/dL    Urea Nitrogen 8 7 - 30 mg/dL    Creatinine 0.77 0.66 - 1.25 mg/dL    GFR Estimate >90 >60 mL/min/1.7m2    GFR Estimate If Black >90 >60 mL/min/1.7m2    Calcium 8.1 (L) 8.5 - 10.1 mg/dL   Magnesium   Result Value Ref Range    Magnesium 2.2 1.6 - 2.3 mg/dL   CBC with platelets   Result Value Ref Range    WBC 8.4 4.0 - 11.0 10e9/L    RBC Count 2.69 (L) 4.4 - 5.9 10e12/L    Hemoglobin 8.1 (L) 13.3 - 17.7 g/dL    Hematocrit 24.1 (L) 40.0 - 53.0 %    MCV 90 78 - 100 fl    MCH 30.1 26.5 - 33.0 pg    MCHC 33.6 31.5 - 36.5 g/dL    RDW 14.9 10.0 - 15.0 %    Platelet Count 250 150 - 450 10e9/L                Assessment and Plan:   1.  Status post plasmacytoma resection right pelvis (iliac region) with right total hip/reconstruction.  Indication plasmocytoma right ilium.  Estimated blood loss 1450. Improved, adequate pain control. Mobilizing well.   2.  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 Electrophoresis/immunofixation demonstrated monoclonal free kappa light chain and immunoglobulin in the urine with increased kappa free light chain in the serum.  Bone marrow biopsy no morphologic or immunophenotypic evidence for plasma cell neoplasm.  Bone survey demonstrated right iliac lesion with lucent area in the frontal region, thought likely to be benign.   3. Status post 5 weeks of radiation therapy for problem number 2. Completed 3/21.    4. Acute blood loss anemia. Transfused.  Adequate.   5. Postural dizziness 2nd to orthostatic hypotension or vasovagal event, resolved.   6. Fever likely 2nd to post surgical inflammatory change, hematoma.  No bacterial source identified.  Elevated lactic acid c/w decrease volume.  Normalized with fluids.   7. Urine retention with improved, adequate voiding pattern.  8. Hyperlipidemia, on statin.     PLAN:   1)  Clinically appears stable for DC with close out pt follow up.  2)  Meds,orders reviewed.  Continue neurontin 100 mg TID.  3) C with RN - start 4/20. Check clinically, VS, CBC with plts, review meds - update PMD. Call primary MD to arrange/discuss follow up Suppository if 3 days and no BM. .  Disposition Plan   Expected discharge today.     Entered: Tavo Lopes 04/19/2018, 4:07 PM              Attestation:  I have reviewed today's vital signs, notes, medications, labs and imaging.     Tavo Lopes MD

## 2018-04-19 NOTE — PLAN OF CARE
Problem: Hip Arthroplasty (Total, Partial) (Adult)  Goal: Signs and Symptoms of Listed Potential Problems Will be Absent, Minimized or Managed (Hip Arthroplasty)  Signs and symptoms of listed potential problems will be absent, minimized or managed by discharge/transition of care (reference Hip Arthroplasty (Total, Partial) (Adult) CPG).   Pt. discharged at 1615 to home. Pt. was accompanied by son, and left with personal belongings. Prior to discharge, PIV was removed. Pt. received complete discharge paperwork and medications as filled by discharge pharmacy. Pt. was given times of last dose for all discharge medications in writing on discharge medication sheets.  Discharge teaching included medications, Lovenox injection, pain management, activity restrictions, dressing changes, and signs and symptoms of infection. Pt. Given times to follow. Pt. had no further questions at the time of discharge and no unmet needs were identified.

## 2018-04-19 NOTE — PLAN OF CARE
Problem: Hip Arthroplasty (Total, Partial) (Adult)  Goal: Signs and Symptoms of Listed Potential Problems Will be Absent, Minimized or Managed (Hip Arthroplasty)  Signs and symptoms of listed potential problems will be absent, minimized or managed by discharge/transition of care (reference Hip Arthroplasty (Total, Partial) (Adult) CPG).   Outcome: Declining    VS: Elevated Temp - scheduled Tylenol - BP stable - Tachycardic    O2: Stable in mid to upper 90's on room air   Output: Voids adequate amounts without issue - negligible PVR   Last BM: 4.16.18 per pt report accepted Senna - declined Suppository.    Activity: Up with assist of 1/standby WBAT. Ambulated in room and to bathroom. Up in chair for meal   Skin: Intact ex incisions   Pain: Managed with PRN oxycodone 5-10 every 3hrs. Pt self weaned down to 5mg this evening   CMS: Intact denies numbness and tingling   Dressin Aquacel CDI   Diet: Regular, appetite is good - tolerates oral intake   LDA: 1) PIV is infusing Bolus at 500ml/hr NS. 2) PIV is saline locked   Equipment: Walker - Hip pillow, pt declines use.    Plan: Continue to monitor pt - has triggered Sepsis protocol.    Additional Info: Triggered Sepsis protocol - Elevated temp, Tylenol administered 1L/hr Bolus NS 0.9. Then maintenance fluids 125ml/hr. Recheck labs post bolus.

## 2018-04-19 NOTE — PLAN OF CARE
Problem: Patient Care Overview  Goal: Plan of Care/Patient Progress Review  Discharge Planner PT   Patient plan for discharge: home with family  Current status: Walking with wheeled walker for 250', transfers near independent.  Wants to go home today. Good pain control. NO dizziness with activity.  Barriers to return to prior living situation: medical status only,  Pt performing well with activity  Recommendations for discharge: home with home PT  Rationale for recommendations: home safety assessment and family education., PT to progress strengthening program.        Entered by: Celena Cintron 04/19/2018 9:09 AM

## 2018-04-19 NOTE — PROGRESS NOTES
[facility], [place]    Sepsis Evaluation Progress Note    Date of Service: 04/18/2018    I was called to see Dong Joseph due to abnormal vital signs triggering the Sepsis SIRS screening alert. He is not known to have an infection.     Physical Exam    Vital Signs:  Temp: 102.2  F (39  C) Temp src: Oral BP: 104/51   Heart Rate: 117 Resp: 14 SpO2: 94 % O2 Device: None (Room air)      Lab:No results found for: LACT    The patient is at baseline mental status. He feels just mildly warm, but no fever, chills or sweats.  No coughing, sore throat, abd pain, N/V, diarrhea or dysuria.  No HA or neck pain. No calf pain or calf swelling. No SOB or CP.     The rest of the physical exam is significant for tachycardia and mild discomfort in R hip area. No erythema on the R hip.  No significant pain with R hip rotation or flexion. No neck discomfort. Chest/abd exam wnl. No calf pain or swelling.     Assessment and Plan    The SIRS and exam findings are likely due to post-op imflamatroy change and possible hematoma, there is no sign of sepsis at this time. Will investigate further with BC, CXR, CBC and will administer IVF. Recheck lactate and hgb at midnight. Will hold off on further abx at this time as no evidence of infection and follows labs. Discussed with attending Dr. Lopes.     Disposition: The patient will remain on the current unit. We will continue to monitor this patient closely.    Ke Cardoso MD

## 2018-04-19 NOTE — PROGRESS NOTES
"Ortho Progress Note     Subjective:  Did well with PT yesterday. Wants to go home today. Got unit blood overnight for Hgb 6.7, asymptomatic. No systemic feelings of fever/illness despite elevated temp overnight.     Objective:  /51  Temp 100.6  F (38.1  C) (Oral)  Resp 15  Ht 1.778 m (5' 10\")  Wt 71.4 kg (157 lb 6.5 oz)  SpO2 95%  BMI 22.59 kg/m2  Gen: A&Ox3, no acute distress  CV: 2+ dp/pt pulses, capillary refill < 2sec  Resp: breathing equal and non-labored, no wheezing  MSK:     RLE   Dressings C/D/I   Motor: EHL, TA, FHL, GS 5/5   SILT on SP, DP, S, S, and T nerve territories   Circulation: palpable DP and TP, foot warm      Hemoglobin   Date Value Ref Range Status   04/18/2018 6.7 (LL) 13.3 - 17.7 g/dL Final     Comment:     This result has been called to AVI PAZ by Yolanda ESTRADA on 04 18 2018 at 2302,   and has been read back.      04/18/2018 7.2 (L) 13.3 - 17.7 g/dL Final       Assessment/Plan:  63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  Acute blood loss anemia.      Elevated temps, No evidence of wound site infection / PNA at present.  Do not believe infection present.    -Weight Bearing Status: WBAT RLE  -Activity: Posterior hip precautions  -DVT PPx: Lovenox x4wk  -Pain control: IV and PO, wean to PO as able  -Dressing: Leave in place until POD 7. May shower with dressings in place as are waterproof  -Diet: Regular    -Disposition: Anticipate home today pending PT clearance   -Follow up: 2 weeks with Dr Jodee Terrazas MD  Orthopaedic Surgery PGY-4  Pager:  [phone]    "

## 2018-04-19 NOTE — PLAN OF CARE
Problem: Patient Care Overview  Goal: Plan of Care/Patient Progress Review  Discharge Planner OT   Patient plan for discharge: home today  Current status: Supine<>sit with SBA. Patient ambulated in hallway using FWW with supervision. Educated on safe tub transfer techniques within precautions, patient completed step in method initially with CGA, progressing to SBA. Patient completed toilet transfer using BSC as overlay with mod I. Educated on where to obtain DME, patient plans to obtain commode elsewhere and plans to purchase hip replacement kit online.   Barriers to return to prior living situation: none  Recommendations for discharge: home with assist from son  Rationale for recommendations: patient plans to dc home today with assist from son and use of DME.        Entered by: ASHLEIGH KO HELD 04/19/2018 9:12 AM     Occupational Therapy Discharge Summary    Reason for therapy discharge:    Discharged to home.    Progress towards therapy goal(s). See goals on Care Plan in [facility] electronic health record for goal details.  Most goals met- CGA-SBA for tub transfer    Therapy recommendation(s):    No further therapy is recommended.   Home with assist and use of DME.

## 2018-04-19 NOTE — PLAN OF CARE
Problem: Patient Care Overview  Goal: Plan of Care/Patient Progress Review  Physical Therapy Discharge Summary    Reason for therapy discharge:    Discharged to home with home therapy.    Progress towards therapy goal(s). See goals on Care Plan in [facility] electronic health record for goal details.  Goals met    Therapy recommendation(s):    Continued therapy is recommended.  Rationale/Recommendations:  Home safety eval.  Family education as pt did not have caregivers at the hospital.  Ther. ex. to continue to address strengthening and posture and reinforce hip precautions. .

## 2018-04-19 NOTE — PLAN OF CARE
Pt VSS triggered sepsis protocol. Lactic acid results returned 2.8. Moonlighter paged. Ordered bolus 1L/2hrs then maintenance fluids. Continue to monitor. Recheck labs post bolus.

## 2018-04-19 NOTE — PROGRESS NOTES
Care Coordinator - Discharge Planning    Admission Date/Time:  4/16/2018  Attending MD:  Johnnie Ellsworth*     Data  Date of initial CC assessment:  4/19/2018  Chart reviewed, discussed with interdisciplinary team.   Patient was admitted for:   1. Status post hip surgery         Assessment   Concerns with insurance coverage for discharge needs: None.  Current Living Situation: Patient lives alone.  Support System: Involved  Services Involved: Home Care  Transportation at Discharge: Family or friend will provide  Transportation to Medical Appointments:    - Name of caregiver: amadou Edmond  Barriers to Discharge: None      Coordination of Care and Referrals: Provided patient/family with options for Home Care. Met with patient at bedside to discuss discharge planning. A referral was sent to [facility] for physical therapy. No further discharge concerns at this time. All therapy orders completed. CC to follow as needed.    [facility]  Phone [phone]  Fax [phone]      Plan  Anticipated Discharge Date:  4/19/2018  Anticipated Discharge Plan:  Home with home care    CTS Handoff completed:  YES    Socorro Hermosillo RN, BSN  Care Coordinator, 8A  Phone [phone]  Pager [phone]

## 2018-04-19 NOTE — PROGRESS NOTES
Pt A&Ox4. Temp has improved, last 98.1. HR 99. /51. O2 95% on RA.   Most recent LA 0.8.  Pt has order for 1 unit PRBCs.  Checked in w/ lab, about 30-40 min until type & screen is complete.

## 2018-04-19 NOTE — TELEPHONE ENCOUNTER
Luz Marina from [facility] will be taking on this patient and they would like to go over some health information with Naomi when she has a moment.

## 2018-04-20 LAB
BLD PROD TYP BPU: NORMAL
BLD UNIT ID BPU: 0
BLOOD PRODUCT CODE: NORMAL
BPU ID: NORMAL
TRANSFUSION STATUS PATIENT QL: NORMAL
TRANSFUSION STATUS PATIENT QL: NORMAL

## 2018-04-20 NOTE — DISCHARGE SUMMARY
ORTHOPAEDIC DISCHARGE SUMMARY     Date of Admission: 4/16/2018  Date of Discharge: 4/19/2018  4:30 PM  Disposition: Home  Staff Physician: No att. providers found  Primary Care Provider: Naomi Kuo DIAGNOSIS:  Right iliac plasmacytoma    PROCEDURES: Procedure(s):  EXCISE TUMOR right pelvis with right total hip arthroplasty on 4/16/2018    BRIEF HISTORY:  This was a planned admission after the above elective procedure.    HOSPITAL COURSE:    Surgery was uncomplicated. Dong Joseph has done well post-operatively. Medicine was consulted post operatively to aid in management of medical comorbidities. See final recommendations below. The patient received routine nursing cares and is medically stable. Vital signs are stable. The patient is tolerating a regular diet without GI distress/nausea or vomiting. Voiding spontaneously. All PT/OT goals have been met for safe mobility. Pain is now controlled on oral medications which will be available on discharge. Stool softeners have been used while taking pain medications to help prevent constipation. Dong Joseph is deemed medically safe to discharge.     Antibiotics:  Ancef given periop and 24 hours postop.  PT Progress:  Has met PT/OT goals for safe mobility.   Pain Meds:  Weaned off all IV pain meds by discharge.  Inpatient Events: No significant events or complications.     Discharge orders and instructions as below.    FOLLOWUP:    Future Appointments  Date Time Provider Department Center   4/24/2018 12:00 PM Garrett Dejesus MD [facility]   5/2/2018 12:00 PM Johnnie Ellsworth MD [facility]   6/7/2018 11:00 AM [facility]   6/14/2018 11:00 AM Froilan Peres MD [facility]       Appointments on [facility]. Call [phone] if you haven't heard regarding these appointments within 7 days of discharge.    PLANNED DISCHARGE ORDERS:     DVT Prophylaxis: Lovenox x4wk         Discharge Medication List as of 4/19/2018  3:11 PM      START taking these medications    Details   gabapentin (NEURONTIN) 100 MG capsule Take 1 capsule (100 mg) by mouth 3 times daily, Disp-30 capsule, R-0, E-Prescribe      oxyCODONE IR (ROXICODONE) 5 MG tablet Take 1-2 tablets (5-10 mg) by mouth every 3 hours as needed for other (pain control or improvement in physical function. Hold dose for analgesic side effects.), Disp-60 tablet, R-0, Local Print         CONTINUE these medications which have CHANGED    Details   enoxaparin (LOVENOX) 40 MG/0.4ML injection Inject 0.4 mLs (40 mg) Subcutaneous every 24 hours, Disp-10 mL, R-0, E-Prescribe      senna-docusate (SENOKOT-S;PERICOLACE) 8.6-50 MG per tablet Take 1 tablet by mouth 2 times daily, Disp-28 tablet, R-0, E-Prescribe         CONTINUE these medications which have NOT CHANGED    Details   Acetaminophen (TYLENOL PO) Take 1,000 mg by mouth, Historical      aspirin 81 MG tablet Take 1 tablet by mouth daily., 81 mg, Oral, DAILY Starting 5/23/2012, Until Discontinued, Disp-90 tablet, R-3, Historical      atorvastatin (LIPITOR) 20 MG tablet Take 1 tablet (20 mg) by mouth daily, Disp-90 tablet, R-3, E-Prescribe         STOP taking these medications       HYDROcodone-acetaminophen (NORCO)  MG per tablet Comments:   Reason for Stopping:         traMADol (ULTRAM) 50 MG tablet Comments:   Reason for Stopping:                 Discharge Procedure Orders  Home Care PT Referral for Hospital Discharge   Referral Type: Home Health Therapies & Aides     Home care nursing referral   Referral Type: Home Health Therapies & Aides     Reason for your hospital stay   Order Comments: Right total hip arthroplasty for pelvis tumor     Follow Up and recommended labs and tests   Order Comments: You are scheduled to follow up with Dr. Ellsworth approximately 2 weeks after your surgery.    If you were seen at the [facility] at [facility], your appointment will likely be there.    Contact clinic  if you have any questions about the date or time.     Activity   Order Comments: Your activity upon discharge: activity as tolerated, posterior hip precautions, weightbearing as tolerated.   Order Specific Question Answer Comments   Is discharge order? Yes      When to contact your care team   Order Comments: Contact clinic if you note any of the following:  -Fevers greater than 101.5 chills  -Increased pain, redness, swelling or discharge at the surgical site.   -During regular business hours call Dr Ellsworth office and request to speak with his nurse or the triage nurses. If you see him at [facility] call [phone].  -After hours or on weekends call the hospital  at [phone] and ask to speak with the Orthopaedic resident on call.     Wound care and dressings   Order Comments: Instructions to care for your wound at home: May shower with dressing in place, as it is waterproof.  If water gets underneath the dressing, you must remove it and replace with a dry (e.g. Gauze and tape) dressing.  If current dressing stays intact, you may leave the dressing in place until post-operative day #10.  On post-op day #10, you may remove dressing and shower normally, allowing water to run over the incision.  Do not soak (e.g. bath) or scrub the incision site.  Keep wound clean and dry otherwise.     Discharge Instructions   Order Comments: Surgery: Right Total Hip Replacement    If you have any questions contact Dr. Ellsworth at the following numbers: if you see Dr. Ellsworth at [facility] call [phone].  If you see him at [facility] call [phone].      Follow up appointment:   You are scheduled to follow up with Dr. Ellsworth approximately 2 weeks after your surgery.  If you were seen at [facility], your appointment will most likely be there.  If you were seen at the [facility], your appointment will likely be there.         Anticoagulation:   Take Lovenox prescribed for the total of 4 weeks.  This  is given to help minimize your risk of blood clot.    Activity:   -Continue to weight bear on your operative leg as tolerated by pain.  As you are more comfortable, you can discontinue use of the walker and transition to a cane.  Once you are comfortable with the cane, you can also wean from the cane and progress to using no assistive devices.  Do not transition until you are comfortable and have good balance.      -Follow the posterior hip precautions you were taught while at the hospital.        Pain Medications:   -Take pain medications as prescribed.  Wean off the the narcotic pain medications (Oxycodone, Dilaudid, etc) as tolerated by pain.  To help with this, take the Tylenol and Celebrex (if prescribed) to minimize the amount of narcotic pain medication you need to take.      -Do not drive while on pain medications or until your leg feels well enough to do so.    Bandage Care and Showering:   -You can shower with the adhesive bandage covering your knee at the time of discharge.    -Remove the adhesive bandage 7 days after your surgery.  This can be removed at home.  Once removed, it is common to have a few scabs.  Let the scabs fall off on their own - they will do so over the next week or two.  The sutures are resorbable and nothing needs to be removed.    --Once the bandage is removed, you can shower without covering the incision.  Do not soak or bathe the incision in water.  Do not scrub the incision.  Just let the water from the shower run over the incision.    --Contact Dr. Ellsworth or his staff if there is any drainage from the incision or redness.    Leg Swelling and Icing  -Continue icing the hip 5-6 times per day for approximately 20 minutes each time.  This will help with swelling.    -Some swelling in the leg is normal after surgery.  This type of swelling is usually gravity dependent and is improved in the morning after sleeping.  If the swelling is painful in the calf or the swelling is getting  worse, contact Dr. Ellsworth's office.  We may recommend presenting to the Emergency Department to obtain an ultrasound of the leg if there is concern for a DVT.      -Elevate the leg if you are sitting as this will help reduce swelling.    Contact clinic if you note any of the following:  -Fevers greater than 101.5 chills  -Increased pain, redness, swelling or discharge at the surgical site.   -During regular business hours call Dr Ellsworth's office and request to speak with his nurse or the triage nurses. If you see him at [facility] call [phone]. If you see him at [facility] call [phone].   -After hours or on weekends call the hospital  at [phone] and ask to speak with the Orthopaedic resident on call.     Discharge Instructions   Order Comments: [facility] with RN - start 4/20.  Check clinically, VS, CBC with plts, review meds - update PMD.  Call primary MD to arrange/discuss follow up  Suppository if 3 days and no BM. .     Diet   Order Comments: Follow this diet upon discharge: Regular   Order Specific Question Answer Comments   Is discharge order? Yes            Michael Terrazas MD  Orthopaedic Surgery PGY-4  Pager:  [phone]

## 2018-04-20 NOTE — TELEPHONE ENCOUNTER
Spoke with Luz Marina.  They were confirming that NP is PCP and will follow patient with home care.  They have Natasha and Brock as co-signing providers.    No further follow up is needed for them at this time.  Jose Rafael Pickens, RN, BSN

## 2018-04-23 ENCOUNTER — TELEPHONE (OUTPATIENT)
Dept: RADIATION ONCOLOGY | Facility: CLINIC | Age: 63
End: 2018-04-23

## 2018-04-23 NOTE — TELEPHONE ENCOUNTER
Spoke with patient about follow up date, time, and location with Dr Dejesus. Patient verbalized understanding     Aj LOCKHART

## 2018-04-23 NOTE — PROGRESS NOTES
RADIATION ONCOLOGY FOLLOW-UP NOTE    Date of Visit: 2018  Patient Name: Dong Joseph  MRN: [id number]  : 1955    DIAGNOSIS: solitary plasmacytoma of bone, R acetabulum    TREATMENT PLAN: 45 Gy    INTERVAL SINCE COMPLETION OF THERAPY: 1 month since 3/20/18    NARRATIVE: He returns for follow up. Dr. Ellsworth performed a R BRADLEY/reconstruction and excision of R pelvic tumor on 18, due to significant bone loss and pain. He tolerated the procedure well. 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     Today he states he is recovering well from his operation; his pain is improved compared to before, now rated 3/10. He was taking oxycodone prn, but prefers tramadol. He remains in a wheelchair for ambulation. He denies any local symptoms, and is going to do PT. He will see Dr. Ellsworth next week. He is in good spirits.    PAST MEDICAL/SURGICAL HISTORY:   Past Medical History:   Diagnosis Date     Impotence of organic origin 2003     Plasma cell neoplasm 2018    S/P Needle biopsy of right pelvis bone tumor     Pure hypercholesterolemia     statin started circa       Past Surgical History:   Procedure Laterality Date     BIOPSY BONE PELVIS Right 2018    Procedure: BIOPSY BONE PELVIS;  Needle Biopsy Right Pelvis;  Surgeon: Johnnie Ellsworth MD;  Location: [facility] OR     COLONOSCOPY  2010    COLONOSCOPY performed by DONNA WHITEHEAD at [facility]     EXCISE TUMOR PELVIS POSTERIOR Right 2018    Procedure: EXCISE TUMOR PELVIS POSTERIOR;;  Surgeon: Johnnie Ellsworth MD;  Location: [facility] OR     HERNIA REPAIR, INGUINAL RT/LT Bilateral -    Inguinal     HERNIA REPAIR, INGUINAL RT/LT  2006    Recurrent left inguinal hernia.     OPTICAL TRACKING SYSTEM RESECT TUMOR, ARTHROPLASTY HIP Right 2018    Procedure: OPTICAL TRACKING SYSTEM RESECT TUMOR, ARTHROPLASTY HIP;  Right Total Hip Arthroplasty And Removal Of Right Pelvic Tumor;   "Surgeon: Johnnie Ellsworth MD;  Location: [facility] OR       ALLERGIES:  No Known Allergies    MEDICATIONS:   Current Outpatient Prescriptions   Medication Sig Dispense Refill     Acetaminophen (TYLENOL PO) Take 1,000 mg by mouth       aspirin 81 MG tablet Take 1 tablet by mouth daily. 90 tablet 3     atorvastatin (LIPITOR) 20 MG tablet Take 1 tablet (20 mg) by mouth daily (Patient taking differently: Take 20 mg by mouth At Bedtime ) 90 tablet 3     enoxaparin (LOVENOX) 40 MG/0.4ML injection Inject 40 mg Subcutaneous       traMADol (ULTRAM) 50 MG tablet Take 1 tablet (50 mg) by mouth every 6 hours as needed for moderate to severe pain 30 tablet 0     gabapentin (NEURONTIN) 100 MG capsule Take 1 capsule (100 mg) by mouth 3 times daily (Patient not taking: Reported on 4/24/2018) 30 capsule 0     oxyCODONE IR (ROXICODONE) 5 MG tablet Take 1-2 tablets (5-10 mg) by mouth every 3 hours as needed for other (pain control or improvement in physical function. Hold dose for analgesic side effects.) (Patient not taking: Reported on 4/24/2018) 60 tablet 0     senna-docusate (SENOKOT-S;PERICOLACE) 8.6-50 MG per tablet Take 1 tablet by mouth 2 times daily (Patient not taking: Reported on 4/24/2018) 28 tablet 0       REVIEW OF SYSTEMS: A 10-point review of systems was obtained. Pertinent findings are noted in the HPI and are otherwise unremarkable.     PHYSICAL EXAM:  VITALS: /72 (BP Location: Left arm, Patient Position: Sitting, Cuff Size: Adult Large)  Pulse 100  Resp 14  Ht 5' 10\"  SpO2 100%  GEN: appears well, in no acute distress  HEENT: normocephalic and atraumatic, EOMI, anicteric sclerae  CV: no LE edema, no JVD  RESP: normal respiration on room air, no stridor  ABDOMEN: soft, NT, ND  SKIN: normal color and turgor  MSK: s/p R BRADLEY  GAIT AND STATION: in wheelchair  NEURO: CN II-XII grossly intact, no focal neurologic deficit  PSYCH: appropriate mood, affect, and judgment    All pertinent laboratory, imaging, and " pathology findings have been reviewed.     IMPRESSION/RECOMMENDATION:  64 yo M with solitary plasmacytoma of R pelvis s/p radiation therapy, and now orthopedic reconstruction and BRADLEY for significant bone loss from tumor. He is doing better s/p the operation. Refill on tramadol given; he prefers this to oxycodone. The pathology did note amyloid features, and he was recommended to have workup to rule out systemic amyloidosis; I will inform Dr. Peres of this. He is doing well with no significant radiation toxicity and no evidence of disease. He will follow up with Dr. Ellsworth next week and Dr. Peres in June. He may follow up with us as needed, and was instructed to call our clinic with any questions or concerns.    Garrett Dejesus M.D.  Attending Physician  Radiation Oncology  Clinic phone #: [phone]  Pager #: [phone]

## 2018-04-24 ENCOUNTER — OFFICE VISIT (OUTPATIENT)
Dept: RADIATION ONCOLOGY | Facility: CLINIC | Age: 63
End: 2018-04-24
Payer: COMMERCIAL

## 2018-04-24 VITALS
OXYGEN SATURATION: 100 % | RESPIRATION RATE: 14 BRPM | SYSTOLIC BLOOD PRESSURE: 109 MMHG | DIASTOLIC BLOOD PRESSURE: 72 MMHG | HEART RATE: 100 BPM | HEIGHT: 70 IN

## 2018-04-24 DIAGNOSIS — C90.30 PLASMACYTOMA OF BONE (H): Primary | ICD-10-CM

## 2018-04-24 LAB
BACTERIA SPEC CULT: NO GROWTH
BACTERIA SPEC CULT: NO GROWTH
Lab: NORMAL
Lab: NORMAL
SPECIMEN SOURCE: NORMAL
SPECIMEN SOURCE: NORMAL

## 2018-04-24 PROCEDURE — 99024 POSTOP FOLLOW-UP VISIT: CPT | Performed by: RADIOLOGY

## 2018-04-24 RX ORDER — TRAMADOL HYDROCHLORIDE 50 MG/1
TABLET ORAL
Refills: 0 | COMMUNITY
Start: 2018-02-26 | End: 2018-04-24

## 2018-04-24 RX ORDER — TRAMADOL HYDROCHLORIDE 50 MG/1
50 TABLET ORAL EVERY 6 HOURS PRN
Qty: 30 TABLET | Refills: 0 | Status: SHIPPED | OUTPATIENT
Start: 2018-04-24 | End: 2018-08-10

## 2018-04-24 ASSESSMENT — PAIN SCALES - GENERAL: PAINLEVEL: MILD PAIN (3)

## 2018-04-24 NOTE — NURSING NOTE
"FOLLOW-UP VISIT    Patient Name: Dong Joseph      : 1955     Age: 63 year old        ______________________________________________________________________________     Chief Complaint   Patient presents with     RECHECK     1 month follow up with Dr Dejesus     /72 (BP Location: Left arm, Patient Position: Sitting, Cuff Size: Adult Large)  Pulse 100  Resp 14  Ht 5' 10\"  SpO2 100%     Date Radiation Completed: 3/20/18    Pain  Current history of pain associated with this visit:   Intensity: 3/10  Current: aching  Location: right hip  Treatment: tramadol     Labs  Other Labs: No    Imaging  None          Other Appointments:     MD Name: Dr Carty Appointment Date: 18   MD Name:Dr Peres Appointment Date:18   MD Name: Appointment Date:   Other Appointment Notes:     Residual Radiation side effect: patient reports 3/10 pain in right hip. Patient reports taking tramadol with relief. Patient denies nausea and vomiting. Patient reports one bowel movement daily with some being loose. Patient denies any urinary issues.      Additional Instructions:     Nurse face-to-face time: Level 2:  5 min face to face time    Aj LOCKHART        "

## 2018-04-24 NOTE — MR AVS SNAPSHOT
After Visit Summary   4/24/2018    Dong Joseph    MRN: [id number]           Patient Information     Date Of Birth          1955        Visit Information        Provider Department      4/24/2018 12:00 PM Garrett Dejesus MD [facility]        Today's Diagnoses     Plasmacytoma of bone (H)    -  1       Follow-ups after your visit        Your next 10 appointments already scheduled     May 02, 2018 12:00 PM CDT   (Arrive by 11:45 AM)   Return Visit with Johnnie Ellsworth MD   [facility] ([facility])    [address] [id number] [phone]            Jun 07, 2018 11:00 AM CDT   LAB with NL LAB [facility] ([facility])    [address] [place] [address] [phone]           Please do not eat 10-12 hours before your appointment if you are coming in fasting for labs on lipids, cholesterol, or glucose (sugar). This does not apply to pregnant women. Water, hot tea and black coffee (with nothing added) are okay. Do not drink other fluids, diet soda or chew gum.            Jun 14, 2018 11:00 AM CDT   [facility]    [address] [place] [address] [phone]              Who to contact     If you have questions or need follow up information about today's clinic visit or your schedule please contact [facility] directly at [phone].  Normal or non-critical lab and imaging results will be communicated to you by MyChart, letter or phone within 4 business days after the clinic has received the results. If you do not hear from us within 7 days, please contact the clinic through MyChart or phone. If you have a critical or abnormal lab result, we will notify you by phone as soon as possible.  Submit refill requests through MyChart or call  "your pharmacy and they will forward the refill request to us. Please allow 3 business days for your refill to be completed.          Additional Information About Your Visit        TalkSessionhart Information     Picklive gives you secure access to your electronic health record. If you see a primary care provider, you can also send messages to your care team and make appointments. If you have questions, please call your primary care clinic.  If you do not have a primary care provider, please call [phone] and they will assist you.      Picklive is an electronic gateway that provides easy, online access to your medical records. With Picklive, you can request a clinic appointment, read your test results, renew a prescription or communicate with your care team.     To access your existing account, please contact your [facility] or call [phone] for assistance.        Care EveryWhere ID     This is your Care EveryWhere ID. This could be used by other organizations to access your [place] medical records  [id number]        Your Vitals Were     Pulse Respirations Height Pulse Oximetry          100 14 5' 10\" 100%         Blood Pressure from Last 3 Encounters:   04/24/18 109/72   04/19/18 123/56   04/11/18 134/70    Weight from Last 3 Encounters:   04/16/18 157 lb 6.5 oz   04/11/18 159 lb   03/30/18 154 lb              Today, you had the following     No orders found for display         Today's Medication Changes          These changes are accurate as of 4/24/18 12:50 PM.  If you have any questions, ask your nurse or doctor.               These medicines have changed or have updated prescriptions.        Dose/Directions    atorvastatin 20 MG tablet   Commonly known as:  LIPITOR   This may have changed:  when to take this   Used for:  Pure hypercholesterolemia, Hyperlipidemia LDL goal <130        Dose:  20 mg   Take 1 tablet (20 mg) by mouth daily   Quantity:  90 tablet   Refills:  3       " traMADol 50 MG tablet   Commonly known as:  ULTRAM   This may have changed:  See the new instructions.   Used for:  Plasmacytoma of bone (H)   Changed by:  Garrett Dejesus MD        Dose:  50 mg   Take 1 tablet (50 mg) by mouth every 6 hours as needed for moderate to severe pain   Quantity:  30 tablet   Refills:  0            Where to get your medicines      Some of these will need a paper prescription and others can be bought over the counter.  Ask your nurse if you have questions.     Bring a paper prescription for each of these medications     traMADol 50 MG tablet               Information about OPIOIDS     PRESCRIPTION OPIOIDS: WHAT YOU NEED TO KNOW   You have a prescription for an opioid (narcotic) pain medicine. Opioids can cause addiction. If you have a history of chemical dependency of any type, you are at a higher risk of becoming addicted to opioids. Only take this medicine after all other options have been tried. Take it for as short a time and as few doses as possible.     Do not:    Drive. If you drive while taking these medicines, you could be arrested for driving under the influence (DUI).    Operate heavy machinery    Do any other dangerous activities while taking these medicines.     Drink any alcohol while taking these medicines.      Take with any other medicines that contain acetaminophen. Read all labels carefully. Look for the word  acetaminophen  or  Tylenol.  Ask your pharmacist if you have questions or are unsure.    Store your pills in a secure place, locked if possible. We will not replace any lost or stolen medicine. If you don t finish your medicine, please throw away (dispose) as directed by your pharmacist. The [place] Pollution Control Agency has more information about safe disposal: https://www.pca.[facility].[place].us/living-green/managing-unwanted-medications    All opioids tend to cause constipation. Drink plenty of water and eat foods that have a lot of fiber, such as fruits,  vegetables, prune juice, apple juice and high-fiber cereal. Take a laxative (Miralax, milk of magnesia, Colace, Senna) if you don t move your bowels at least every other day.          Primary Care Provider Office Phone # Fax #    Naomi Kuo PA-C [phone] [address] [place] [address]        Equal Access to Services     [facility] : Hadii aad ku hadasho Soomaali, waaxda luqadaha, qaybta kaalmada adeegyada, waxay idiin hayaan adeeg kharash la'aan ah. So [facility] [phone].    ATENCIÓN: Si habla español, tiene a gonzalez disposición servicios gratuitos de asistencia lingüística. Llame al [phone].    We comply with applicable federal civil rights laws and [place] laws. We do not discriminate on the basis of race, color, national origin, age, disability, sex, sexual orientation, or gender identity.            Thank you!     Thank you for choosing [facility]  for your care. Our goal is always to provide you with excellent care. Hearing back from our patients is one way we can continue to improve our services. Please take a few minutes to complete the written survey that you may receive in the mail after your visit with us. Thank you!             Your Updated Medication List - Protect others around you: Learn how to safely use, store and throw away your medicines at www.disposemymeds.org.          This list is accurate as of 4/24/18 12:50 PM.  Always use your most recent med list.                   Brand Name Dispense Instructions for use Diagnosis    aspirin 81 MG tablet     90 tablet    Take 1 tablet by mouth daily.    Pure hypercholesterolemia       atorvastatin 20 MG tablet    LIPITOR    90 tablet    Take 1 tablet (20 mg) by mouth daily    Pure hypercholesterolemia, Hyperlipidemia LDL goal <130       enoxaparin 40 MG/0.4ML injection    LOVENOX     Inject 40 mg Subcutaneous        senna-docusate 8.6-50 MG per tablet    SENOKOT-S;PERICOLACE    28 tablet    Take 1  tablet by mouth 2 times daily    Status post hip surgery       traMADol 50 MG tablet    ULTRAM    30 tablet    Take 1 tablet (50 mg) by mouth every 6 hours as needed for moderate to severe pain    Plasmacytoma of bone (H)       TYLENOL PO      Take 1,000 mg by mouth

## 2018-04-24 NOTE — LETTER
2018        RE: Dong Joseph  [address]  [facility] [address]        RADIATION ONCOLOGY FOLLOW-UP NOTE    Date of Visit: 2018  Patient Name: Dong Joseph  MRN: [id number]  : 1955    DIAGNOSIS: solitary plasmacytoma of bone, R acetabulum    TREATMENT PLAN: 45 Gy    INTERVAL SINCE COMPLETION OF THERAPY: 1 month since 3/20/18    NARRATIVE: He returns for follow up. Dr. Ellsworth performed a R BRADLEY/reconstruction and excision of R pelvic tumor on 18, due to significant bone loss and pain. He tolerated the procedure well. 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     Today he states he is recovering well from his operation; his pain is improved compared to before, now rated 3/10. He was taking oxycodone prn, but prefers tramadol. He remains in a wheelchair for ambulation. He denies any local symptoms, and is going to do PT. He will see Dr. Ellsworth next week. He is in good spirits.    PAST MEDICAL/SURGICAL HISTORY:   Past Medical History:   Diagnosis Date     Impotence of organic origin 2003     Plasma cell neoplasm 2018    S/P Needle biopsy of right pelvis bone tumor     Pure hypercholesterolemia     statin started circa       Past Surgical History:   Procedure Laterality Date     BIOPSY BONE PELVIS Right 2018    Procedure: BIOPSY BONE PELVIS;  Needle Biopsy Right Pelvis;  Surgeon: Johnnie Ellsworth MD;  Location: [facility]     COLONOSCOPY  2010    COLONOSCOPY performed by DONNA WHITEHEAD at  GI     EXCISE TUMOR PELVIS POSTERIOR Right 2018    Procedure: EXCISE TUMOR PELVIS POSTERIOR;;  Surgeon: Johnnie Ellsworth MD;  Location: [facility] OR     HERNIA REPAIR, INGUINAL RT/LT Bilateral -    Inguinal     HERNIA REPAIR, INGUINAL RT/LT  2006    Recurrent left inguinal hernia.     OPTICAL TRACKING SYSTEM RESECT TUMOR, ARTHROPLASTY HIP Right 2018    Procedure: OPTICAL TRACKING SYSTEM RESECT TUMOR,  "ARTHROPLASTY HIP;  Right Total Hip Arthroplasty And Removal Of Right Pelvic Tumor;  Surgeon: Johnnie Ellsworth MD;  Location: [facility] OR       ALLERGIES:  No Known Allergies    MEDICATIONS:   Current Outpatient Prescriptions   Medication Sig Dispense Refill     Acetaminophen (TYLENOL PO) Take 1,000 mg by mouth       aspirin 81 MG tablet Take 1 tablet by mouth daily. 90 tablet 3     atorvastatin (LIPITOR) 20 MG tablet Take 1 tablet (20 mg) by mouth daily (Patient taking differently: Take 20 mg by mouth At Bedtime ) 90 tablet 3     enoxaparin (LOVENOX) 40 MG/0.4ML injection Inject 40 mg Subcutaneous       traMADol (ULTRAM) 50 MG tablet Take 1 tablet (50 mg) by mouth every 6 hours as needed for moderate to severe pain 30 tablet 0     gabapentin (NEURONTIN) 100 MG capsule Take 1 capsule (100 mg) by mouth 3 times daily (Patient not taking: Reported on 4/24/2018) 30 capsule 0     oxyCODONE IR (ROXICODONE) 5 MG tablet Take 1-2 tablets (5-10 mg) by mouth every 3 hours as needed for other (pain control or improvement in physical function. Hold dose for analgesic side effects.) (Patient not taking: Reported on 4/24/2018) 60 tablet 0     senna-docusate (SENOKOT-S;PERICOLACE) 8.6-50 MG per tablet Take 1 tablet by mouth 2 times daily (Patient not taking: Reported on 4/24/2018) 28 tablet 0       REVIEW OF SYSTEMS: A 10-point review of systems was obtained. Pertinent findings are noted in the HPI and are otherwise unremarkable.     PHYSICAL EXAM:  VITALS: /72 (BP Location: Left arm, Patient Position: Sitting, Cuff Size: Adult Large)  Pulse 100  Resp 14  Ht 5' 10\"  SpO2 100%  GEN: appears well, in no acute distress  HEENT: normocephalic and atraumatic, EOMI, anicteric sclerae  CV: no LE edema, no JVD  RESP: normal respiration on room air, no stridor  ABDOMEN: soft, NT, ND  SKIN: normal color and turgor  MSK: s/p R BRADLEY  GAIT AND STATION: in wheelchair  NEURO: CN II-XII grossly intact, no focal neurologic deficit  PSYCH: " appropriate mood, affect, and judgment    All pertinent laboratory, imaging, and pathology findings have been reviewed.     IMPRESSION/RECOMMENDATION:  62 yo M with solitary plasmacytoma of R pelvis s/p radiation therapy, and now orthopedic reconstruction and BRADLEY for significant bone loss from tumor. He is doing better s/p the operation. Refill on tramadol given; he prefers this to oxycodone. The pathology did note amyloid features, and he was recommended to have workup to rule out systemic amyloidosis; I will inform Dr. Peres of this. He is doing well with no significant radiation toxicity and no evidence of disease. He will follow up with Dr. Ellsworth next week and Dr. Peres in June. He may follow up with us as needed, and was instructed to call our clinic with any questions or concerns.    Garrett Dejesus M.D.  Attending Physician  Radiation Oncology  Clinic phone #: [phone]  Pager #: [phone]        Sincerely,        Garrett Dejesus MD

## 2018-04-24 NOTE — Clinical Note
Kieran Peres, not sure if you saw the path report on Mr. Joseph showing features of amyloidoma. It was recommended he have workup for systemic amyloidosis. What do you think? Thanks Garrett

## 2018-04-25 ENCOUNTER — TELEPHONE (OUTPATIENT)
Dept: FAMILY MEDICINE | Facility: OTHER | Age: 63
End: 2018-04-25

## 2018-04-25 NOTE — TELEPHONE ENCOUNTER
Reason for Call:  Form, our goal is to have forms completed with 72 hours, however, some forms may require a visit or additional information.    Type of letter, form or note:  medical    Who is the form from?: Home care    Where did the form come from: form was faxed in    What clinic location was the form placed at?: [facility] - [phone]    Where the form was placed: 's Box    What number is listed as a contact on the form?: [phone]       Additional comments: Please complete and fax Fax [phone]    Call taken on 4/25/2018 at 4:16 PM by Beba Pickett

## 2018-04-26 ENCOUNTER — MEDICAL CORRESPONDENCE (OUTPATIENT)
Dept: HEALTH INFORMATION MANAGEMENT | Facility: CLINIC | Age: 63
End: 2018-04-26

## 2018-04-26 ENCOUNTER — TELEPHONE (OUTPATIENT)
Dept: FAMILY MEDICINE | Facility: OTHER | Age: 63
End: 2018-04-26

## 2018-04-26 NOTE — TELEPHONE ENCOUNTER
Reason for Call:  Form, our goal is to have forms completed with 72 hours, however, some forms may require a visit or additional information.    Type of letter, form or note:  medical    Who is the form from?: [facility] (if other please explain)    Where did the form come from: form was faxed in    What clinic location was the form placed at?: [facility] - [phone]    Where the form was placed: 's Box    What number is listed as a contact on the form?: [phone]       Additional comments: n/a    Call taken on 4/26/2018 at 11:24 AM by Myriam Lynn

## 2018-04-30 NOTE — TELEPHONE ENCOUNTER
Form faxed and sent to scanning. Copy in provider's faxed file.    Rashmi Chang CMA ([facility])

## 2018-04-30 NOTE — TELEPHONE ENCOUNTER
Form completed.  Please fax this has already been cosigned by Dr. Gutiérrez,  and close encounter.  Naomi Kuo PA-C

## 2018-05-01 ENCOUNTER — TELEPHONE (OUTPATIENT)
Dept: FAMILY MEDICINE | Facility: OTHER | Age: 63
End: 2018-05-01

## 2018-05-01 NOTE — TELEPHONE ENCOUNTER
Reason for Call:  Form, our goal is to have forms completed with 72 hours, however, some forms may require a visit or additional information.    Type of letter, form or note:  medical    Who is the form from?: Home care    Where did the form come from: form was faxed in    What clinic location was the form placed at?: [facility] - [phone]    Where the form was placed: Dr's Box    What number is listed as a contact on the form?: [phone]       Additional comments:  Fax [phone]    Call taken on 5/1/2018 at 8:25 AM by Maia Zhao

## 2018-05-01 NOTE — PROGRESS NOTES
SUBJECTIVE:   Dong Joseph is a 63 year old male who presents to clinic today for the following health issues:    The 10-year ASCVD risk score ([place]ren MAX Jr, et al., 2013) is: 6%    Values used to calculate the score:      Age: 63 years      Sex: Male      Is Non- : No      Diabetic: No      Tobacco smoker: No      Systolic Blood Pressure: 109 mmHg      Is BP treated: No      HDL Cholesterol: 44 mg/dL      Total Cholesterol: 149 mg/dL  Patient is eligible for use of low-dose aspirin for primary prevention of heart attack and stroke.  Provider has discussed aspirin with patient and our decision was:     Prescribe:  Daily low-dose aspirin recommended for primary prevention, patient agrees with plan.        HPI  Hyperlipidemia Follow-Up      Rate your low fat/cholesterol diet?: good    Taking statin?  Yes, no muscle aches from statin    Other lipid medications/supplements?:  Niacin, without side effects, Fish oil/Omega 3, without side effects    Problem list and histories reviewed & adjusted, as indicated.  Additional history: He continues to do well after his hip reconstruction.  His pain is much better, now he just has resolving surgical pain soreness and stiffness.  He is already completed radiation and there is no plan for further cancer treatment at this time        BP Readings from Last 3 Encounters:   05/14/18 106/68   04/24/18 109/72   04/19/18 123/56    Wt Readings from Last 3 Encounters:   05/14/18 154 lb 4.8 oz (70 kg)   05/02/18 156 lb 9.6 oz (71 kg)   04/16/18 157 lb 6.5 oz (71.4 kg)                  Labs reviewed in EPIC    ROS:  CONSTITUTIONAL: NEGATIVE for fever, chills, change in weight  ENT/MOUTH: NEGATIVE for ear, mouth and throat problems  RESP: NEGATIVE for significant cough or SOB  CV: NEGATIVE for chest pain, palpitations or peripheral edema  GI: NEGATIVE for nausea, abdominal pain, heartburn, or change in bowel habits  MUSCULOSKELETAL: Recovering nicely from his hip  reconstruction    OBJECTIVE:     /68 (BP Location: Left arm, Patient Position: Chair, Cuff Size: Adult Regular)  Pulse 98  Temp 97.5  F (36.4  C) (Oral)  Resp 16  Wt 154 lb 4.8 oz (70 kg)  BMI 22.14 kg/m2  Body mass index is 22.14 kg/(m^2).  GENERAL: healthy, alert and no distress  GENERAL: Use of cane to help walk walks very stiffly with his right lower extremity  NECK: no adenopathy, no asymmetry, masses, or scars and thyroid normal to palpation  RESP: lungs clear to auscultation - no rales, rhonchi or wheezes  CV: regular rate and rhythm, normal S1 S2, no S3 or S4, no murmur, click or rub, no peripheral edema and peripheral pulses strong  ABDOMEN: soft, nontender, no hepatosplenomegaly, no masses and bowel sounds normal  MS: no gross musculoskeletal defects noted, no edema  PSYCH: mentation appears normal, affect normal/bright    Diagnostic Test Results:  Results for orders placed or performed in visit on 05/14/18 (from the past 24 hour(s))   CBC with platelets differential   Result Value Ref Range    WBC 5.3 4.0 - 11.0 10e9/L    RBC Count 3.98 (L) 4.4 - 5.9 10e12/L    Hemoglobin 11.9 (L) 13.3 - 17.7 g/dL    Hematocrit 37.1 (L) 40.0 - 53.0 %    MCV 93 78 - 100 fl    MCH 29.9 26.5 - 33.0 pg    MCHC 32.1 31.5 - 36.5 g/dL    RDW 14.4 10.0 - 15.0 %    Platelet Count 293 150 - 450 10e9/L    Diff Method Automated Method     % Neutrophils 71.9 %    % Lymphocytes 12.7 %    % Monocytes 10.0 %    % Eosinophils 4.5 %    % Basophils 0.9 %    Absolute Neutrophil 3.8 1.6 - 8.3 10e9/L    Absolute Lymphocytes 0.7 (L) 0.8 - 5.3 10e9/L    Absolute Monocytes 0.5 0.0 - 1.3 10e9/L    Absolute Eosinophils 0.2 0.0 - 0.7 10e9/L    Absolute Basophils 0.1 0.0 - 0.2 10e9/L       ASSESSMENT/PLAN:             1. Hyperlipidemia LDL goal <130  Doing well, will titrate dosage once I see his results  - Lipid panel reflex to direct LDL Fasting  - atorvastatin (LIPITOR) 20 MG tablet; Take 1 tablet (20 mg) by mouth At Bedtime  Dispense:  90 tablet; Refill: 3    2. Pure hypercholesterolemia    - atorvastatin (LIPITOR) 20 MG tablet; Take 1 tablet (20 mg) by mouth At Bedtime  Dispense: 90 tablet; Refill: 3    3. Screening for human immunodeficiency virus without presence of risk factors    - **HIV Antigen Antibody Combo FUTURE 2mo    4. Plasmacytoma of bone (H)  Being followed by specialty  - CBC with platelets differential  - Comprehensive metabolic panel  - Kappa and lambda light chain  - Protein electrophoresis  - Protein Immunofixation Serum  - BNP-N terminal pro  - Troponin I    5. Amyloidosis, unspecified type (H)    - BNP-N terminal pro  - Troponin I    This chart documentation was completed in part with Dragon voice recognition software.  Documentation is reviewed after completion, however, some words and grammatical errors may remain.  DEMI Salazar PA-C  [facility]

## 2018-05-01 NOTE — TELEPHONE ENCOUNTER
Summary:     Patient is due/failing the following:   Lipids, add chronic pain to problem list, CSA, chronic pain overview and FOLLOW UP     Action needed:   Patient needs office visit for recheck on cholesterol medication. Due 5/2018, please see if willing to schedule.  Patient needs fasting lab only appointment if not doing at appt.     Type of outreach:    Left message for pt to return call.    Rashmi Chang CMA ([facility])

## 2018-05-02 ENCOUNTER — OFFICE VISIT (OUTPATIENT)
Dept: ORTHOPEDICS | Facility: CLINIC | Age: 63
End: 2018-05-02
Payer: COMMERCIAL

## 2018-05-02 VITALS — BODY MASS INDEX: 22.42 KG/M2 | WEIGHT: 156.6 LBS | HEIGHT: 70 IN

## 2018-05-02 DIAGNOSIS — C90.00 MULTIPLE MYELOMA NOT HAVING ACHIEVED REMISSION (H): Primary | ICD-10-CM

## 2018-05-02 ASSESSMENT — ENCOUNTER SYMPTOMS
MYALGIAS: 1
NECK PAIN: 0
MUSCLE WEAKNESS: 1
STIFFNESS: 1
JOINT SWELLING: 1
BACK PAIN: 0
MUSCLE CRAMPS: 0
ARTHRALGIAS: 1

## 2018-05-02 NOTE — LETTER
5/2/2018       RE: Dong Joseph  [address]  [place] [address]     Dear Colleague,    Thank you for referring your patient, Dong Joseph, to the [facility] at [facility]. Please see a copy of my visit note below.    This 63-year-old man is about to 2 weeks out from her right pelvis reconstruction total hip arthroplasty for myeloma.  He is doing well overall using a walker and fairly comfortable.  We discussed return to work and I examined his incisions all of which are clean dry and intact without any erythema.  He will continue to mind his hip precautions and return to see me in 3 months with an x-ray of his pelvis.  When he defines what restrictions he needs to return to work then he will let us know and we can write a letter to that effect.  I have answered all of his questions.    Again, thank you for allowing me to participate in the care of your patient.      Sincerely,    Johnnie Ellsworth MD

## 2018-05-02 NOTE — NURSING NOTE
"Reason For Visit:   Chief Complaint   Patient presents with     Surgical Followup     S/P Right Total Hip Arthroplasty And Removal Of Right Pelvic Tumor. DOS: 04/16/2018.       Pain Assessment  Patient Currently in Pain: No               HEIGHT: 5' 10\", WEIGHT: 156 lbs 9.6 oz, BMI: Body mass index is 22.47 kg/(m^2).      Current Outpatient Prescriptions   Medication Sig Dispense Refill     Acetaminophen (TYLENOL PO) Take 1,000 mg by mouth       aspirin 81 MG tablet Take 1 tablet by mouth daily. 90 tablet 3     atorvastatin (LIPITOR) 20 MG tablet Take 1 tablet (20 mg) by mouth daily (Patient taking differently: Take 20 mg by mouth At Bedtime ) 90 tablet 3     enoxaparin (LOVENOX) 40 MG/0.4ML injection Inject 40 mg Subcutaneous       traMADol (ULTRAM) 50 MG tablet Take 1 tablet (50 mg) by mouth every 6 hours as needed for moderate to severe pain 30 tablet 0     senna-docusate (SENOKOT-S;PERICOLACE) 8.6-50 MG per tablet Take 1 tablet by mouth 2 times daily (Patient not taking: Reported on 4/24/2018) 28 tablet 0        No Known Allergies            "

## 2018-05-02 NOTE — MR AVS SNAPSHOT
After Visit Summary   5/2/2018    Dong Joseph    MRN: [id number]           Patient Information     Date Of Birth          1955        Visit Information        Provider Department      5/2/2018 12:00 PM Johnnie Ellsworth MD [facility]        Today's Diagnoses     Multiple myeloma not having achieved remission (H)    -  1       Follow-ups after your visit        Your next 10 appointments already scheduled     May 14, 2018  2:45 PM CDT   Office Visit with Naomi Kuo PA-C   [facility]    [address] [address] [phone]           Bring a current list of meds and any records pertaining to this visit. For Physicals, please bring immunization records and any forms needing to be filled out. Please arrive 10 minutes early to complete paperwork.            Jun 07, 2018 11:00 AM CDT   LAB with NL LAB C   [facility]    [address] [address] [phone]           Please do not eat 10-12 hours before your appointment if you are coming in fasting for labs on lipids, cholesterol, or glucose (sugar). This does not apply to pregnant women. Water, hot tea and black coffee (with nothing added) are okay. Do not drink other fluids, diet soda or chew gum.            Jun 14, 2018 11:00 AM CDT   Return Visit with Froilan Peres MD   [facility]    [address] [place] [id number] [phone]              Who to contact     Please call your clinic at [phone] to:    Ask questions about your health    Make or cancel appointments    Discuss your medicines    Learn about your test results    Speak to your doctor            Additional Information About Your Visit        MyChart Information     [employer]Herot gives you secure access to your electronic health record. If you see a primary care  "provider, you can also send messages to your care team and make appointments. If you have questions, please call your primary care clinic.  If you do not have a primary care provider, please call [phone] and they will assist you.      Cloudadmin is an electronic gateway that provides easy, online access to your medical records. With Cloud[employer], you can request a clinic appointment, read your test results, renew a prescription or communicate with your care team.     To access your existing account, please contact your [facility] or call [phone] for assistance.        Care EveryWhere ID     This is your Care EveryWhere ID. This could be used by other organizations to access your [facility] medical records  [id number]        Your Vitals Were     Height BMI (Body Mass Index)                1.778 m (5' 10\") 22.47 kg/m2           Blood Pressure from Last 3 Encounters:   04/24/18 109/72   04/19/18 123/56   04/11/18 134/70    Weight from Last 3 Encounters:   05/02/18 71 kg (156 lb 9.6 oz)   04/16/18 71.4 kg (157 lb 6.5 oz)   04/11/18 72.1 kg (159 lb)              Today, you had the following     No orders found for display         Today's Medication Changes          These changes are accurate as of 5/2/18 12:27 PM.  If you have any questions, ask your nurse or doctor.               These medicines have changed or have updated prescriptions.        Dose/Directions    atorvastatin 20 MG tablet   Commonly known as:  LIPITOR   This may have changed:  when to take this   Used for:  Pure hypercholesterolemia, Hyperlipidemia LDL goal <130        Dose:  20 mg   Take 1 tablet (20 mg) by mouth daily   Quantity:  90 tablet   Refills:  3                Primary Care Provider Office Phone # Fax #    Naomi Kuo PA-C [phone] [address] [place] [address]        Equal Access to Services     ROSA ASHFORD AH: Latanya Cedeno, heather farooq, soumya wheeler " tejinder rainpedro stapletonaan ah. So [facility] [phone].    ATENCIÓN: Si shalala tulio, tiene a gonzalez disposición servicios gratuitos de asistencia lingüística. Naima al [phone].    We comply with applicable federal civil rights laws and [place] laws. We do not discriminate on the basis of race, color, national origin, age, disability, sex, sexual orientation, or gender identity.            Thank you!     Thank you for choosing [facility]  for your care. Our goal is always to provide you with excellent care. Hearing back from our patients is one way we can continue to improve our services. Please take a few minutes to complete the written survey that you may receive in the mail after your visit with us. Thank you!             Your Updated Medication List - Protect others around you: Learn how to safely use, store and throw away your medicines at www.disposemymeds.org.          This list is accurate as of 5/2/18 12:27 PM.  Always use your most recent med list.                   Brand Name Dispense Instructions for use Diagnosis    aspirin 81 MG tablet     90 tablet    Take 1 tablet by mouth daily.    Pure hypercholesterolemia       atorvastatin 20 MG tablet    LIPITOR    90 tablet    Take 1 tablet (20 mg) by mouth daily    Pure hypercholesterolemia, Hyperlipidemia LDL goal <130       enoxaparin 40 MG/0.4ML injection    LOVENOX     Inject 40 mg Subcutaneous        senna-docusate 8.6-50 MG per tablet    SENOKOT-S;PERICOLACE    28 tablet    Take 1 tablet by mouth 2 times daily    Status post hip surgery       traMADol 50 MG tablet    ULTRAM    30 tablet    Take 1 tablet (50 mg) by mouth every 6 hours as needed for moderate to severe pain    Plasmacytoma of bone (H)       TYLENOL PO      Take 1,000 mg by mouth

## 2018-05-02 NOTE — PROGRESS NOTES
This 63-year-old man is about to 2 weeks out from her right pelvis reconstruction total hip arthroplasty for myeloma.  He is doing well overall using a walker and fairly comfortable.  We discussed return to work and I examined his incisions all of which are clean dry and intact without any erythema.  He will continue to mind his hip precautions and return to see me in 3 months with an x-ray of his pelvis.  When he defines what restrictions he needs to return to work then he will let us know and we can write a letter to that effect.  I have answered all of his questions.

## 2018-05-07 ENCOUNTER — TELEPHONE (OUTPATIENT)
Dept: FAMILY MEDICINE | Facility: OTHER | Age: 63
End: 2018-05-07

## 2018-05-07 DIAGNOSIS — C90.30 PLASMACYTOMA OF BONE (H): ICD-10-CM

## 2018-05-07 DIAGNOSIS — Z98.890 STATUS POST HIP SURGERY: Primary | ICD-10-CM

## 2018-05-07 NOTE — TELEPHONE ENCOUNTER
Reason for Call:  Form, our goal is to have forms completed with 72 hours, however, some forms may require a visit or additional information.    Type of letter, form or note:  medical    Who is the form from?: [facility] (if other please explain)    Where did the form come from: form was faxed in    What clinic location was the form placed at?: [facility] - [phone]    Where the form was placed: 's Box    What number is listed as a contact on the form?: [phone]       Additional comments: n/a    Call taken on 5/7/2018 at 1:31 PM by Myriam Lynn

## 2018-05-08 ENCOUNTER — TELEPHONE (OUTPATIENT)
Dept: FAMILY MEDICINE | Facility: OTHER | Age: 63
End: 2018-05-08

## 2018-05-08 NOTE — TELEPHONE ENCOUNTER
Reason for Call:  Form, our goal is to have forms completed with 72 hours, however, some forms may require a visit or additional information.    Type of letter, form or note:  medical    Who is the form from?: Home care    Where did the form come from: form was faxed in    What clinic location was the form placed at?: [facility] - [phone]    Where the form was placed: Dr's Box    What number is listed as a contact on the form?: Fax [phone]       Additional comments: Please complete and fax back    Call taken on 5/8/2018 at 2:23 PM by Beba Pickett

## 2018-05-10 ENCOUNTER — MEDICAL CORRESPONDENCE (OUTPATIENT)
Dept: HEALTH INFORMATION MANAGEMENT | Facility: CLINIC | Age: 63
End: 2018-05-10

## 2018-05-10 ENCOUNTER — TELEPHONE (OUTPATIENT)
Dept: ONCOLOGY | Facility: CLINIC | Age: 63
End: 2018-05-10

## 2018-05-10 DIAGNOSIS — E85.9 AMYLOIDOSIS, UNSPECIFIED TYPE (H): ICD-10-CM

## 2018-05-10 DIAGNOSIS — C90.30 PLASMACYTOMA OF BONE (H): Primary | ICD-10-CM

## 2018-05-10 NOTE — TELEPHONE ENCOUNTER
Form faxed and sent to scanning. Copy in provider's faxed file.    Rashmi Chang CMA ([facility])

## 2018-05-10 NOTE — TELEPHONE ENCOUNTER
Form faxed and sent to scanning. Copy in provider's faxed file.    Rashmi Chang CMA ([facility])

## 2018-05-10 NOTE — TELEPHONE ENCOUNTER
Left message on machine to call back.    We need to move his lab apt from 6/7/2018 to next week (May 14th week)  And then we need to move his apt with Dr. Peres from June 14th to 5/23 or 5/24/2018.    Ramila/CHANTELLE

## 2018-05-11 DIAGNOSIS — C90.30 PLASMACYTOMA OF BONE (H): Primary | ICD-10-CM

## 2018-05-14 ENCOUNTER — OFFICE VISIT (OUTPATIENT)
Dept: FAMILY MEDICINE | Facility: OTHER | Age: 63
End: 2018-05-14
Payer: COMMERCIAL

## 2018-05-14 ENCOUNTER — TELEPHONE (OUTPATIENT)
Dept: FAMILY MEDICINE | Facility: OTHER | Age: 63
End: 2018-05-14

## 2018-05-14 VITALS
WEIGHT: 154.3 LBS | SYSTOLIC BLOOD PRESSURE: 106 MMHG | DIASTOLIC BLOOD PRESSURE: 68 MMHG | HEART RATE: 98 BPM | RESPIRATION RATE: 16 BRPM | TEMPERATURE: 97.5 F | BODY MASS INDEX: 22.14 KG/M2

## 2018-05-14 DIAGNOSIS — E78.00 PURE HYPERCHOLESTEROLEMIA: ICD-10-CM

## 2018-05-14 DIAGNOSIS — Z11.4 SCREENING FOR HUMAN IMMUNODEFICIENCY VIRUS WITHOUT PRESENCE OF RISK FACTORS: ICD-10-CM

## 2018-05-14 DIAGNOSIS — C90.30 PLASMACYTOMA OF BONE (H): ICD-10-CM

## 2018-05-14 DIAGNOSIS — E85.9 AMYLOIDOSIS, UNSPECIFIED TYPE (H): ICD-10-CM

## 2018-05-14 DIAGNOSIS — E78.5 HYPERLIPIDEMIA LDL GOAL <130: Primary | ICD-10-CM

## 2018-05-14 LAB
ALBUMIN SERPL-MCNC: 4.2 G/DL (ref 3.4–5)
ALP SERPL-CCNC: 95 U/L (ref 40–150)
ALT SERPL W P-5'-P-CCNC: 58 U/L (ref 0–70)
ANION GAP SERPL CALCULATED.3IONS-SCNC: 9 MMOL/L (ref 3–14)
AST SERPL W P-5'-P-CCNC: 20 U/L (ref 0–45)
BASOPHILS # BLD AUTO: 0.1 10E9/L (ref 0–0.2)
BASOPHILS NFR BLD AUTO: 0.9 %
BILIRUB SERPL-MCNC: 0.4 MG/DL (ref 0.2–1.3)
BUN SERPL-MCNC: 18 MG/DL (ref 7–30)
CALCIUM SERPL-MCNC: 9.6 MG/DL (ref 8.5–10.1)
CHLORIDE SERPL-SCNC: 104 MMOL/L (ref 94–109)
CHOLEST SERPL-MCNC: 166 MG/DL
CO2 SERPL-SCNC: 27 MMOL/L (ref 20–32)
CREAT SERPL-MCNC: 0.83 MG/DL (ref 0.66–1.25)
DIFFERENTIAL METHOD BLD: ABNORMAL
EOSINOPHIL # BLD AUTO: 0.2 10E9/L (ref 0–0.7)
EOSINOPHIL NFR BLD AUTO: 4.5 %
ERYTHROCYTE [DISTWIDTH] IN BLOOD BY AUTOMATED COUNT: 14.4 % (ref 10–15)
GFR SERPL CREATININE-BSD FRML MDRD: >90 ML/MIN/1.7M2
GLUCOSE SERPL-MCNC: 88 MG/DL (ref 70–99)
HCT VFR BLD AUTO: 37.1 % (ref 40–53)
HDLC SERPL-MCNC: 37 MG/DL
HGB BLD-MCNC: 11.9 G/DL (ref 13.3–17.7)
LDLC SERPL CALC-MCNC: 98 MG/DL
LYMPHOCYTES # BLD AUTO: 0.7 10E9/L (ref 0.8–5.3)
LYMPHOCYTES NFR BLD AUTO: 12.7 %
MCH RBC QN AUTO: 29.9 PG (ref 26.5–33)
MCHC RBC AUTO-ENTMCNC: 32.1 G/DL (ref 31.5–36.5)
MCV RBC AUTO: 93 FL (ref 78–100)
MONOCYTES # BLD AUTO: 0.5 10E9/L (ref 0–1.3)
MONOCYTES NFR BLD AUTO: 10 %
NEUTROPHILS # BLD AUTO: 3.8 10E9/L (ref 1.6–8.3)
NEUTROPHILS NFR BLD AUTO: 71.9 %
NONHDLC SERPL-MCNC: 129 MG/DL
NT-PROBNP SERPL-MCNC: 36 PG/ML (ref 0–125)
PLATELET # BLD AUTO: 293 10E9/L (ref 150–450)
POTASSIUM SERPL-SCNC: 3.8 MMOL/L (ref 3.4–5.3)
PROT SERPL-MCNC: 8.4 G/DL (ref 6.8–8.8)
RBC # BLD AUTO: 3.98 10E12/L (ref 4.4–5.9)
SODIUM SERPL-SCNC: 140 MMOL/L (ref 133–144)
TRIGL SERPL-MCNC: 157 MG/DL
TROPONIN I SERPL-MCNC: <0.015 UG/L (ref 0–0.04)
WBC # BLD AUTO: 5.3 10E9/L (ref 4–11)

## 2018-05-14 PROCEDURE — 36415 COLL VENOUS BLD VENIPUNCTURE: CPT | Performed by: PHYSICIAN ASSISTANT

## 2018-05-14 PROCEDURE — 84165 PROTEIN E-PHORESIS SERUM: CPT | Performed by: PHYSICIAN ASSISTANT

## 2018-05-14 PROCEDURE — 83883 ASSAY NEPHELOMETRY NOT SPEC: CPT | Performed by: PHYSICIAN ASSISTANT

## 2018-05-14 PROCEDURE — 83883 ASSAY NEPHELOMETRY NOT SPEC: CPT | Mod: 59 | Performed by: PHYSICIAN ASSISTANT

## 2018-05-14 PROCEDURE — 87389 HIV-1 AG W/HIV-1&-2 AB AG IA: CPT | Performed by: PHYSICIAN ASSISTANT

## 2018-05-14 PROCEDURE — 82784 ASSAY IGA/IGD/IGG/IGM EACH: CPT | Performed by: PHYSICIAN ASSISTANT

## 2018-05-14 PROCEDURE — 80053 COMPREHEN METABOLIC PANEL: CPT | Performed by: PHYSICIAN ASSISTANT

## 2018-05-14 PROCEDURE — 82784 ASSAY IGA/IGD/IGG/IGM EACH: CPT | Mod: 91 | Performed by: PHYSICIAN ASSISTANT

## 2018-05-14 PROCEDURE — 85025 COMPLETE CBC W/AUTO DIFF WBC: CPT | Performed by: PHYSICIAN ASSISTANT

## 2018-05-14 PROCEDURE — 84484 ASSAY OF TROPONIN QUANT: CPT | Performed by: PHYSICIAN ASSISTANT

## 2018-05-14 PROCEDURE — 83880 ASSAY OF NATRIURETIC PEPTIDE: CPT | Performed by: PHYSICIAN ASSISTANT

## 2018-05-14 PROCEDURE — 99213 OFFICE O/P EST LOW 20 MIN: CPT | Performed by: PHYSICIAN ASSISTANT

## 2018-05-14 PROCEDURE — 86334 IMMUNOFIX E-PHORESIS SERUM: CPT | Performed by: PHYSICIAN ASSISTANT

## 2018-05-14 PROCEDURE — 80061 LIPID PANEL: CPT | Performed by: PHYSICIAN ASSISTANT

## 2018-05-14 PROCEDURE — 00000402 ZZHCL STATISTIC TOTAL PROTEIN: Performed by: PHYSICIAN ASSISTANT

## 2018-05-14 RX ORDER — ATORVASTATIN CALCIUM 20 MG/1
20 TABLET, FILM COATED ORAL AT BEDTIME
Qty: 90 TABLET | Refills: 3 | Status: SHIPPED | OUTPATIENT
Start: 2018-05-14 | End: 2019-05-01

## 2018-05-14 NOTE — TELEPHONE ENCOUNTER
Reason for Call:  Form, our goal is to have forms completed with 72 hours, however, some forms may require a visit or additional information.    Type of letter, form or note:  medical    Who is the form from?: Home care    Where did the form come from: form was faxed in    What clinic location was the form placed at?: [facility] - [phone]    Where the form was placed: Dr's Box    What number is listed as a contact on the form?: fax [phone]       Additional comments: please complete and fax    Call taken on 5/14/2018 at 1:07 PM by Beba Pickett

## 2018-05-14 NOTE — TELEPHONE ENCOUNTER
Reason for Call:  Form, our goal is to have forms completed with 72 hours, however, some forms may require a visit or additional information.    Type of letter, form or note:  [facility]    Who is the form from?: [facility] (if other please explain)    Where did the form come from: form was faxed in    What clinic location was the form placed at?: [facility] - [phone]    Where the form was placed: 's Box    What number is listed as a contact on the form?: [phone]       Additional comments: none    Call taken on 5/14/2018 at 3:31 PM by Kesha Wyman

## 2018-05-14 NOTE — TELEPHONE ENCOUNTER
Just refaxed the updated version. Skilled nursing portion was missing.  Do need a signature for skilled nursing discharge summary.  Did resend a new home care discharge summary and wrote in comment area  Can send with actual sn discharge summary.  Call transferred to Rashmi Lea, [facility]

## 2018-05-14 NOTE — MR AVS SNAPSHOT
After Visit Summary   5/14/2018    Dong Joseph    MRN: [id number]           Patient Information     Date Of Birth          1955        Visit Information        Provider Department      5/14/2018 2:45 PM Naomi Kuo PA-C [facility]        Today's Diagnoses     Hyperlipidemia LDL goal <130    -  1    Pure hypercholesterolemia        Screening for human immunodeficiency virus without presence of risk factors           Follow-ups after your visit        Your next 10 appointments already scheduled     May 14, 2018  3:30 PM CDT   LAB with NL LAB [facility]   [facility] ([facility])    [address] [id number] [phone]           Please do not eat 10-12 hours before your appointment if you are coming in fasting for labs on lipids, cholesterol, or glucose (sugar). This does not apply to pregnant women. Water, hot tea and black coffee (with nothing added) are okay. Do not drink other fluids, diet soda or chew gum.            May 15, 2018  2:15 PM CDT   Ortho Eval with Radha Urena, JAMARI   [facility] ([facility])    [address] [address] [phone]            May 24, 2018  1:00 PM CDT   [facility] Visit with [facility]   [facility]    [address] [place] [id number] [phone]            Aug 01, 2018 12:00 PM CDT   (Arrive by 11:45 AM)   Return Visit with Johnnie Ellsworth MD   [facility] ([facility])    [address] [place] [id number] [phone]              Who to contact     If you have questions or need follow up information about today's clinic visit or your schedule please contact [facility] directly at [phone].  Normal or non-critical lab and imaging results will be communicated to you by MyChart, letter or  phone within 4 business days after the clinic has received the results. If you do not hear from us within 7 days, please contact the clinic through [employer] or phone. If you have a critical or abnormal lab result, we will notify you by phone as soon as possible.  Submit refill requests through [employer] or call your pharmacy and they will forward the refill request to us. Please allow 3 business days for your refill to be completed.          Additional Information About Your Visit        [employer] Information     [employer] gives you secure access to your electronic health record. If you see a primary care provider, you can also send messages to your care team and make appointments. If you have questions, please call your primary care clinic.  If you do not have a primary care provider, please call [phone] and they will assist you.        Care EveryWhere ID     This is your Care EveryWhere ID. This could be used by other organizations to access your [place] medical records  [id number]        Your Vitals Were     Pulse Temperature Respirations BMI (Body Mass Index)          98 97.5  F (36.4  C) (Oral) 16 22.14 kg/m2         Blood Pressure from Last 3 Encounters:   05/14/18 106/68   04/24/18 109/72   04/19/18 123/56    Weight from Last 3 Encounters:   05/14/18 154 lb 4.8 oz (70 kg)   05/02/18 156 lb 9.6 oz (71 kg)   04/16/18 157 lb 6.5 oz (71.4 kg)              We Performed the Following     **HIV Antigen Antibody Combo FUTURE 2mo     Lipid panel reflex to direct LDL Fasting          Where to get your medicines      These medications were sent to [facility] - [place], [place] - [address]  [address], [place] [address]     Phone:  [phone]     atorvastatin 20 MG tablet          Primary Care Provider Office Phone # Fax #    Naomi Kuo PA-C [phone] [address] [place] [address]        Equal Access to Services     ROSA ASHFORD AH:  Hadii aad ku hadsonyo Sotuanali, waaxda luqadaha, qaybta kaalmada koffi, tejinder ayse dameonaiden ramirezdov socorropedro lamiltonaiden justa. So [facility] [phone].    ATENCIÓN: Si pepper antunez, tiene a gonzalez disposición servicios gratuitos de asistencia lingüística. Llame al [phone].    We comply with applicable federal civil rights laws and [place] laws. We do not discriminate on the basis of race, color, national origin, age, disability, sex, sexual orientation, or gender identity.            Thank you!     Thank you for choosing [facility]  for your care. Our goal is always to provide you with excellent care. Hearing back from our patients is one way we can continue to improve our services. Please take a few minutes to complete the written survey that you may receive in the mail after your visit with us. Thank you!             Your Updated Medication List - Protect others around you: Learn how to safely use, store and throw away your medicines at www.disposemymeds.org.          This list is accurate as of 5/14/18  3:08 PM.  Always use your most recent med list.                   Brand Name Dispense Instructions for use Diagnosis    aspirin 81 MG tablet     90 tablet    Take 1 tablet by mouth daily.    Pure hypercholesterolemia       atorvastatin 20 MG tablet    LIPITOR    90 tablet    Take 1 tablet (20 mg) by mouth At Bedtime    Pure hypercholesterolemia, Hyperlipidemia LDL goal <130       enoxaparin 40 MG/0.4ML injection    LOVENOX     Inject 40 mg Subcutaneous        senna-docusate 8.6-50 MG per tablet    SENOKOT-S;PERICOLACE    28 tablet    Take 1 tablet by mouth 2 times daily    Status post hip surgery       traMADol 50 MG tablet    ULTRAM    30 tablet    Take 1 tablet (50 mg) by mouth every 6 hours as needed for moderate to severe pain    Plasmacytoma of bone (H)       TYLENOL PO      Take 1,000 mg by mouth

## 2018-05-14 NOTE — TELEPHONE ENCOUNTER
Form faxed and sent to scanning. Copy in provider's faxed file.  Rashmi Chang CMA ([facility])

## 2018-05-14 NOTE — TELEPHONE ENCOUNTER
Spoke with Daniel from [facility], he attempted to reach the records dept but they were not answering. LM with Daniel for them to return call to clinic. When call is returned please ask about form that was faxed into clinic. One from last week is for PT and SN and one we go today is for SN only. Does this need to be refaxed or new form signed.    Rashmi Chang CMA ([facility])

## 2018-05-14 NOTE — TELEPHONE ENCOUNTER
Stated that the form from last week was for the PT discharge section only given to the pt. This form is for the skilled nursing discharge along with the form mentioned below. She is also sending over a form that was not completed during his home visits.     Rashmi Chang CMA ([facility])

## 2018-05-15 ENCOUNTER — TELEPHONE (OUTPATIENT)
Dept: ORTHOPEDICS | Facility: CLINIC | Age: 63
End: 2018-05-15

## 2018-05-15 ENCOUNTER — HOSPITAL ENCOUNTER (OUTPATIENT)
Dept: PHYSICAL THERAPY | Facility: OTHER | Age: 63
Setting detail: THERAPIES SERIES
End: 2018-05-15
Attending: PHYSICIAN ASSISTANT
Payer: COMMERCIAL

## 2018-05-15 LAB
ALBUMIN SERPL ELPH-MCNC: 4.5 G/DL (ref 3.7–5.1)
ALPHA1 GLOB SERPL ELPH-MCNC: 0.4 G/DL (ref 0.2–0.4)
ALPHA2 GLOB SERPL ELPH-MCNC: 1 G/DL (ref 0.5–0.9)
B-GLOBULIN SERPL ELPH-MCNC: 1 G/DL (ref 0.6–1)
GAMMA GLOB SERPL ELPH-MCNC: 1.1 G/DL (ref 0.7–1.6)
HIV 1+2 AB+HIV1 P24 AG SERPL QL IA: NONREACTIVE
IGA SERPL-MCNC: 155 MG/DL (ref 70–380)
IGG SERPL-MCNC: 1010 MG/DL (ref 695–1620)
IGM SERPL-MCNC: 64 MG/DL (ref 60–265)
KAPPA LC UR-MCNC: 0.72 MG/DL (ref 0.33–1.94)
KAPPA LC/LAMBDA SER: 0.79 {RATIO} (ref 0.26–1.65)
LAMBDA LC SERPL-MCNC: 0.91 MG/DL (ref 0.57–2.63)
M PROTEIN SERPL ELPH-MCNC: 0 G/DL
PROT PATTERN SERPL ELPH-IMP: ABNORMAL
PROT PATTERN SERPL IFE-IMP: NORMAL

## 2018-05-15 PROCEDURE — 40000718 ZZHC STATISTIC PT DEPARTMENT ORTHO VISIT: Performed by: PHYSICAL THERAPIST

## 2018-05-15 PROCEDURE — 97162 PT EVAL MOD COMPLEX 30 MIN: CPT | Mod: GP | Performed by: PHYSICAL THERAPIST

## 2018-05-15 PROCEDURE — 97110 THERAPEUTIC EXERCISES: CPT | Mod: GP | Performed by: PHYSICAL THERAPIST

## 2018-05-15 NOTE — TELEPHONE ENCOUNTER
RN called and spoke with Dong.  He is going to PT today and it is a [place] facility.  They will be able to see the orders.  He has no other questions at this time.  Johnnie Ellsworth MD Chapman-Shultz, [place], RN                       Previous Messages       ----- Message -----      From: Saima Diane      Sent: 5/7/2018   4:50 PM        To: Johnnie Ellsworth MD, *   Subject: requsting orders                                 Hello!     Patient just called me to schedule for OP PT since he was discharged from home care, however there is not an order for him in the system.   If appropriate can you please enter in that order for him so he can get started asap?     Thank you,   Saima   Rehab scheduling

## 2018-05-17 DIAGNOSIS — C90.30 PLASMACYTOMA OF BONE (H): ICD-10-CM

## 2018-05-17 DIAGNOSIS — E85.9 AMYLOIDOSIS, UNSPECIFIED TYPE (H): ICD-10-CM

## 2018-05-17 LAB
COLLECT DURATION TIME UR: 24 H
SPECIMEN VOL UR: 725 ML

## 2018-05-17 PROCEDURE — 84166 PROTEIN E-PHORESIS/URINE/CSF: CPT | Performed by: INTERNAL MEDICINE

## 2018-05-17 PROCEDURE — 86335 IMMUNFIX E-PHORSIS/URINE/CSF: CPT | Performed by: INTERNAL MEDICINE

## 2018-05-17 PROCEDURE — 81050 URINALYSIS VOLUME MEASURE: CPT | Performed by: INTERNAL MEDICINE

## 2018-05-17 NOTE — PROGRESS NOTES
05/15/18 1415   General Information   Type of Visit Initial OP Ortho PT Evaluation   Start of Care Date 05/15/18   Referring Physician Dr. Ellsworth   Patient/Family Goals Statement To get back on feet without an assistive device. Get back to work as soon as possible.    Orders Evaluate and Treat   Date of Order 05/11/18   Insurance Type [employer]   Insurance Comments/Visits Authorized No restrictions   Medical Diagnosis Right total hip arthroplastiy and tumor removal of the right hip, plasmacytoma of the bone.    Surgical/Medical history reviewed Yes  (DOS 4/16/2018)   Precautions/Limitations other (see comments)  (hip precautions)   Weight-Bearing Status - LUE full weight-bearing   Weight-Bearing Status - RUE full weight-bearing   Weight-Bearing Status - LLE full weight-bearing   Weight-Bearing Status - RLE full weight-bearing   Body Part(s)   Body Part(s) Hip   Presentation and Etiology   Pertinent history of current problem (include personal factors and/or comorbidities that impact the POC) In the middle to the end of the last year he started noticing pain in the right hip, upper leg, buttocks. So he saw a chiropractor for about a month, initially it was getting better. Then it started getting worse so he saw Dr. Velasquez who ordered an MRI of the spine which was negative. Had a hip xray which showed something, so ordered an MRI of the hip which showed a large mass. Started with radiation for the cancer which seemed to help with the hip pain for awhile. Then the hip pain worsened so he had diagnostics showing the hip was pretty much gone from the cancer. So he had surgery for the total hip reconstruction. He was hospitalized from Monday to Thursday then sent home and received home care therapy 2-3 x a week for 2 weeks. He is continuing to do the exercises 2 x a day.    Impairments A. Pain;D. Decreased ROM;E. Decreased flexibility;F. Decreased strength and endurance;G. Impaired balance;H. Impaired gait    Functional Limitations perform activities of daily living;perform required work activities;perform desired leisure / sports activities   Symptom Location Right groin pain.   How/Where did it occur Other  (Right total hip arthroplasty with tumor removal)   Onset date of current episode/exacerbation 04/16/18   Chronicity New   Pain rating (0-10 point scale) Other   Pain rating comment 2/10 3/10 at worst.   Pain quality C. Aching   Frequency of pain/symptoms B. Intermittent   Pain/symptoms are: Other   Pain symptoms comment Gets worse as the day progresses.   Pain/symptoms exacerbated by B. Walking;G. Certain positions;K. Home tasks   Pain/symptoms eased by H. Cold;I. OTC medication(s)   Progression of symptoms since onset: Improved   Current / Previous Interventions   Diagnostic Tests: MRI;CT scan;X-ray   MRI Results Results   MRI results See chart   CT Results Results   CT results See chart   Prior Level of Function   Prior Level of Function-Mobility Very active, does not typically use an assistive device   Prior Level of Function-ADLs Independent, son helps with the laundry.   Current Level of Function   Current Community Support Family/friend caregiver   Patient role/employment history A. Employed   Employment Comments [employer]+   Living environment [place]   Home/community accessibility Split level, 2 steps to enter and then 7-8 going up/down. Drives self in the community.    Current equipment-Gait/Locomotion Standard cane;Walker   Current equipment-ADL Shower/tub chair;Raised Toilet Seat;Reacher;Sock aide   Fall Risk Screen   Fall screen completed by PT   Have you fallen 2 or more times in the past year? No   Have you fallen and had an injury in the past year? No   Is patient a fall risk? No   Functional Scales   Functional Scales Other   Hip Objective Findings   Side (if bilateral, select both right and left) Right   Observation Pt. is walking with a cane on the left, walks with a very  stiff gait through the right hip, leans to the left.   Integumentary  Healing scar per pt. not observed at this time.    Posture Neutral sitting and standing posture.    Gait/Locomotion Ambulating with a cane, leans to the left, decreased stride length and step length.    Right Hip Flexion PROM Flexed to 90 degrees   Right Hip Abduction PROM 40 degrees   Right Hip Flexion Strength 5/5   Right Hip Abduction Strength 4/5   Right Hip Extension Strength 4/5   Right Hip IR Strength 5/5   Right Hip ER Strength 5/5   Right Knee Flexion Strength 5/5   Right Knee Extension Strength 5/5   Planned Therapy Interventions   Planned Therapy Interventions gait training;manual therapy;neuromuscular re-education;ROM;strengthening;balance training   Planned Modality Interventions   Planned Modality Interventions Comments Will use modalities as needed for symptom control such as ice/e-stim   Clinical Impression   Criteria for Skilled Therapeutic Interventions Met yes, treatment indicated   PT Diagnosis Right hip weakness, limited ROM, balance and dysfunctional gait following a BRADLEY and pelvic plasmacytoma removal.    Influenced by the following impairments Pain, weakness, deconditioned.   Functional limitations due to impairments Walking without an AD, lifting, standing, rolling over in bed.    Clinical Presentation Evolving/Changing   Clinical Presentation Rationale Clinical judgement, comorbidities affecting outcome.    Clinical Decision Making (Complexity) Moderate complexity   Therapy Frequency 2 times/Week  (Decreasing frequency as able.)   Predicted Duration of Therapy Intervention (days/wks) 8 weeks   Risk & Benefits of therapy have been explained Yes   Patient, Family & other staff in agreement with plan of care Yes   Education Assessment   Preferred Learning Style Listening;Reading;Demonstration   Barriers to Learning No barriers   ORTHO GOALS   PT Ortho Eval Goals 1;2;3   Ortho Goal 1   Goal Identifier Gait   Goal Description  Dong will ambulate safely without the use of an assistive device.   Target Date 06/14/18   Ortho Goal 2   Goal Identifier Strength   Goal Description Dong will demonstrate 5/5 strength about the right lower extremity allowing him to return to more intense activities.   Target Date 07/14/18   Ortho Goal 3   Goal Identifier Function   Goal Description Dong will improve his LEFS by 30% indicating improved overall function   Target Date 08/13/18   Total Evaluation Time   Total Evaluation Time 30

## 2018-05-18 ENCOUNTER — HOSPITAL ENCOUNTER (OUTPATIENT)
Dept: CARDIOLOGY | Facility: CLINIC | Age: 63
Discharge: HOME | End: 2018-05-18
Attending: INTERNAL MEDICINE | Admitting: INTERNAL MEDICINE
Payer: COMMERCIAL

## 2018-05-18 DIAGNOSIS — E85.9 AMYLOIDOSIS, UNSPECIFIED TYPE (H): ICD-10-CM

## 2018-05-18 DIAGNOSIS — C90.30 PLASMACYTOMA OF BONE (H): ICD-10-CM

## 2018-05-18 LAB
PROT ELPH PNL UR ELPH: NORMAL
PROT PATTERN UR ELPH-IMP: NORMAL

## 2018-05-18 PROCEDURE — 93306 TTE W/DOPPLER COMPLETE: CPT | Mod: 26 | Performed by: INTERNAL MEDICINE

## 2018-05-18 PROCEDURE — 93306 TTE W/DOPPLER COMPLETE: CPT

## 2018-05-22 ENCOUNTER — HOSPITAL ENCOUNTER (OUTPATIENT)
Dept: PHYSICAL THERAPY | Facility: OTHER | Age: 63
Setting detail: THERAPIES SERIES
End: 2018-05-22
Attending: PHYSICIAN ASSISTANT
Payer: COMMERCIAL

## 2018-05-22 PROCEDURE — 97110 THERAPEUTIC EXERCISES: CPT | Mod: GP | Performed by: PHYSICAL THERAPIST

## 2018-05-22 PROCEDURE — 40000718 ZZHC STATISTIC PT DEPARTMENT ORTHO VISIT: Performed by: PHYSICAL THERAPIST

## 2018-05-22 PROCEDURE — 97530 THERAPEUTIC ACTIVITIES: CPT | Mod: GP | Performed by: PHYSICAL THERAPIST

## 2018-05-24 ENCOUNTER — ONCOLOGY VISIT (OUTPATIENT)
Dept: ONCOLOGY | Facility: CLINIC | Age: 63
End: 2018-05-24
Payer: COMMERCIAL

## 2018-05-24 ENCOUNTER — HOSPITAL ENCOUNTER (OUTPATIENT)
Dept: PHYSICAL THERAPY | Facility: OTHER | Age: 63
Setting detail: THERAPIES SERIES
End: 2018-05-24
Attending: PHYSICIAN ASSISTANT
Payer: COMMERCIAL

## 2018-05-24 VITALS
TEMPERATURE: 97.4 F | HEIGHT: 70 IN | WEIGHT: 158.7 LBS | SYSTOLIC BLOOD PRESSURE: 132 MMHG | BODY MASS INDEX: 22.72 KG/M2 | DIASTOLIC BLOOD PRESSURE: 64 MMHG | RESPIRATION RATE: 16 BRPM | OXYGEN SATURATION: 100 % | HEART RATE: 60 BPM

## 2018-05-24 DIAGNOSIS — C90.30 PLASMACYTOMA OF BONE (H): Primary | ICD-10-CM

## 2018-05-24 PROCEDURE — 40000718 ZZHC STATISTIC PT DEPARTMENT ORTHO VISIT: Performed by: PHYSICAL THERAPIST

## 2018-05-24 PROCEDURE — 97110 THERAPEUTIC EXERCISES: CPT | Mod: GP | Performed by: PHYSICAL THERAPIST

## 2018-05-24 PROCEDURE — 97530 THERAPEUTIC ACTIVITIES: CPT | Mod: GP | Performed by: PHYSICAL THERAPIST

## 2018-05-24 PROCEDURE — 99214 OFFICE O/P EST MOD 30 MIN: CPT | Performed by: INTERNAL MEDICINE

## 2018-05-24 RX ORDER — OXYCODONE HYDROCHLORIDE 5 MG/1
TABLET ORAL
COMMUNITY
Start: 2018-04-19 | End: 2018-08-10

## 2018-05-24 ASSESSMENT — PAIN SCALES - GENERAL: PAINLEVEL: MILD PAIN (2)

## 2018-05-24 NOTE — PATIENT INSTRUCTIONS
Please follow up with Dr. Peres in 4 months.  Please schedule labs 1 week prior to follow up appointment.    Lab Date/Time:    Follow Up Date/Time:     If you have any questions or concerns please feel free to call.    If you need to reschedule please call:  Clinic or Lab Appointment - [phone]  Infusion - [phone]  Imaging - [phone]    Thu SCHULTZ [facility]  Oncology/Hematology at [facility]  [phone]

## 2018-05-24 NOTE — MR AVS SNAPSHOT
After Visit Summary   5/24/2018    Dong Joseph    MRN: [id number]           Patient Information     Date Of Birth          1955        Visit Information        Provider Department      5/24/2018 1:00 PM Froilan Peres MD [facility]        Today's Diagnoses     Plasmacytoma of bone (H)    -  1      Care Instructions    Please follow up with Dr. Peres in 4 months.  Please schedule labs 1 week prior to follow up appointment.    Lab Date/Time:    Follow Up Date/Time:     If you have any questions or concerns please feel free to call.    If you need to reschedule please call:  Clinic or Lab Appointment - [phone]  Infusion - [phone]  Imaging - [phone]    Thu SCHULTZ CMA  [facility]  Oncology/Hematology at [facility]  [phone]            Follow-ups after your visit        Your next 10 appointments already scheduled     May 24, 2018  3:30 PM CDT   Ortho Treatment with Radha M Blenknoah, PT   [facility] ([facility])    [address] [address]   [phone]            May 29, 2018  2:45 PM CDT   Ortho Treatment with Radha M Blenkush, PT   [facility] ([facility])    [address] [address]   [phone]            May 31, 2018  2:00 PM CDT   Ortho Treatment with Radha M Blenkush, PT   [facility] ([facility])    [address] [address]   [phone]            Aug 01, 2018 12:00 PM CDT   (Arrive by 11:45 AM)   Return Visit with Johnnie Ellsworth MD   [facility] ([facility])    [address]  4th Floor  [facility] [id number] [phone]              Future tests that were ordered for you today     Open Future Orders        Priority Expected Expires Ordered    CBC with platelets differential Routine  5/24/2019 5/24/2018    Comprehensive  "metabolic panel Routine  5/24/2019 5/24/2018    Protein electrophoresis Routine  5/24/2019 5/24/2018    Protein Immunofixation Serum Routine  5/24/2019 5/24/2018    Marrero and lambda light chain Routine  5/24/2019 5/24/2018            Who to contact     If you have questions or need follow up information about today's clinic visit or your schedule please contact [facility] directly at [phone].  Normal or non-critical lab and imaging results will be communicated to you by [employer], letter or phone within 4 business days after the clinic has received the results. If you do not hear from us within 7 days, please contact the clinic through [employer] or phone. If you have a critical or abnormal lab result, we will notify you by phone as soon as possible.  Submit refill requests through [employer] or call your pharmacy and they will forward the refill request to us. Please allow 3 business days for your refill to be completed.          Additional Information About Your Visit        [employer] Information     [employer] gives you secure access to your electronic health record. If you see a primary care provider, you can also send messages to your care team and make appointments. If you have questions, please call your primary care clinic.  If you do not have a primary care provider, please call [phone] and they will assist you.        Care EveryWhere ID     This is your Care EveryWhere ID. This could be used by other organizations to access your [facility] medical records  [id number]        Your Vitals Were     Pulse Temperature Respirations Height Pulse Oximetry BMI (Body Mass Index)    60 97.4  F (36.3  C) (Temporal) 16 1.778 m (5' 10\") 100% 22.77 kg/m2       Blood Pressure from Last 3 Encounters:   05/24/18 132/64   05/14/18 106/68   04/24/18 109/72    Weight from Last 3 Encounters:   05/24/18 72 kg (158 lb 11.2 oz)   05/14/18 70 kg (154 lb 4.8 oz)   05/02/18 71 kg (156 lb 9.6 oz)              We Performed " the Following     Protein Electrophoresis 24 Hr Urine - UP       Information about OPIOIDS     PRESCRIPTION OPIOIDS: WHAT YOU NEED TO KNOW   You have a prescription for an opioid (narcotic) pain medicine. Opioids can cause addiction. If you have a history of chemical dependency of any type, you are at a higher risk of becoming addicted to opioids. Only take this medicine after all other options have been tried. Take it for as short a time and as few doses as possible.     Do not:    Drive. If you drive while taking these medicines, you could be arrested for driving under the influence (DUI).    Operate heavy machinery    Do any other dangerous activities while taking these medicines.     Drink any alcohol while taking these medicines.      Take with any other medicines that contain acetaminophen. Read all labels carefully. Look for the word  acetaminophen  or  Tylenol.  Ask your pharmacist if you have questions or are unsure.    Store your pills in a secure place, locked if possible. We will not replace any lost or stolen medicine. If you don t finish your medicine, please throw away (dispose) as directed by your pharmacist. The [place] Pollution Control Agency has more information about safe disposal: https://www.pca.[facility].[place].us/living-green/managing-unwanted-medications    All opioids tend to cause constipation. Drink plenty of water and eat foods that have a lot of fiber, such as fruits, vegetables, prune juice, apple juice and high-fiber cereal. Take a laxative (Miralax, milk of magnesia, Colace, Senna) if you don t move your bowels at least every other day.          Primary Care Provider Office Phone # Fax #    Naomi Kuo PA-C [phone] [address] [place] [address]        Equal Access to Services     [facility]: Hadii emily lindo Somurray, waaxda luqadaha, qaybta katejinder cordova . [facility] [phone].    ATENCIÓN: Veronica pabon  español, tiene a gonzalez disposición servicios gratuitos de asistencia lingüística. Naima dunham [phone].    We comply with applicable federal civil rights laws and [place] laws. We do not discriminate on the basis of race, color, national origin, age, disability, sex, sexual orientation, or gender identity.            Thank you!     Thank you for choosing [facility]  for your care. Our goal is always to provide you with excellent care. Hearing back from our patients is one way we can continue to improve our services. Please take a few minutes to complete the written survey that you may receive in the mail after your visit with us. Thank you!             Your Updated Medication List - Protect others around you: Learn how to safely use, store and throw away your medicines at www.disposemymeds.org.          This list is accurate as of 5/24/18  1:28 PM.  Always use your most recent med list.                   Brand Name Dispense Instructions for use Diagnosis    aspirin 81 MG tablet     90 tablet    Take 1 tablet by mouth daily.    Pure hypercholesterolemia       atorvastatin 20 MG tablet    LIPITOR    90 tablet    Take 1 tablet (20 mg) by mouth At Bedtime    Pure hypercholesterolemia, Hyperlipidemia LDL goal <130       oxyCODONE IR 5 MG tablet    ROXICODONE          senna-docusate 8.6-50 MG per tablet    SENOKOT-S;PERICOLACE    28 tablet    Take 1 tablet by mouth 2 times daily    Status post hip surgery       traMADol 50 MG tablet    ULTRAM    30 tablet    Take 1 tablet (50 mg) by mouth every 6 hours as needed for moderate to severe pain    Plasmacytoma of bone (H)       TYLENOL PO      Take 1,000 mg by mouth

## 2018-05-24 NOTE — NURSING NOTE
"Oncology Rooming Note    May 24, 2018 1:07 PM   Dong Joseph is a 63 year old male who presents for:    Chief Complaint   Patient presents with     Oncology Clinic Visit     3 month follow up-Solitary plasmacytoma of the bone     Results     lab work from 5/14/18 and 5/17/18     Initial Vitals: /64 (BP Location: Right arm, Patient Position: Chair, Cuff Size: Adult Regular)  Pulse 60  Temp 97.4  F (36.3  C) (Temporal)  Resp 16  Ht 1.778 m (5' 10\")  Wt 72 kg (158 lb 11.2 oz)  SpO2 100%  BMI 22.77 kg/m2 Estimated body mass index is 22.77 kg/(m^2) as calculated from the following:    Height as of this encounter: 1.778 m (5' 10\").    Weight as of this encounter: 72 kg (158 lb 11.2 oz). Body surface area is 1.89 meters squared.  Mild Pain (2) Comment: Data Unavailable   No LMP for male patient.  Allergies reviewed: Yes  Medications reviewed: Yes    Medications: Medication refills not needed today.  Pharmacy name entered into EPIC:    [facility] [place] - [address] [facility] [address]  [facility] [place] - [address] [facility] - [place], [place] - [address] [place] [address] DR[facility] STORE [facility] - [place], [place] - [address] [facility] [address] [facility] [address]  & MAIN    Nausea, Vomiting: No  Fever/Chills:  No  Mouth Sores: No  Diarrhea/Constipation: No  Urination: No Concerns  Skin/Excessive dryness: No Concerns  Cracking, Peeling: No  Unusual Bruising/Bleeding: No  Respiratory/SOB: No Concerns  Neuropathy/Numbness&Tingling-Hands/Feet: No  Cognitive Disturbance-Memory: No  Sleep Concerns: No Concerns  Night Sweats:  No  Fatigue/Weakness: No  Light Headed or Dizzy: No  Appetite:  No Concerns  Able to drink 6 to 8 glasses of fluid: No Concerns  Sexuality: No Concerns      Clinical concerns: None Dr. Peres was notified.    8 minutes for nursing intake (face to face time)     Thu SCHULTZ CMA"

## 2018-05-24 NOTE — NURSING NOTE
DISCHARGE PLAN:  Next appointments: See patient instruction section  Departure Mode: Ambulatory  Accompanied by: Self  5 minutes for nursing discharge (face to face time)     Dong Joseph is here today for 3 month follow up.  Writing nurse seen patient after Medical Oncology appointment to address questions/concerns/coordinate care. Patient ambulated by nurse to  to schedule follow up and/or lab appointments. Follow up in 4 months with labs 1 week prior. See patient instructions and Oncologist's Progress note for further details. Questions and concerns addressed to patient's satisfaction. Patient verbalized and demonstrated understanding of plan.  Contact information provided and patient is encouraged to call with any that arise in the interim of care.    Thu SCHULTZ [facility]    Oncology/Hematology at [facility]  [phone]  5/24/2018, 1:38 PM

## 2018-05-24 NOTE — Clinical Note
5/24/2018         RE: Dong Joseph  [address]  [facility] [address]        Dear Colleague,    Thank you for referring your patient, Dong Joseph, to the [facility]. Please see a copy of my visit note below.      FOLLOW-UP VISIT NOTE    PATIENT NAME: Dong Joseph MRN # [id number]  DATE OF VISIT: May 24, 2018 YOB: 1955    REFERRING PROVIDER: No referring provider defined for this encounter.    CANCER TYPE:Solitary plasmacytoma of bone    ONCOLOGY HISTORY:  62-year-old male who developed right hip pain with radiation into posterior thigh starting 6 months ago and was progressively getting worse. MRI of the lumbar spine showed degenerative changes throughout, no disc hernniation and moderate foraminal stenosis at multiple levels. X ray hip showed a lucent area in the right supra-acetabular region measuring 5.2 cm in maximum diameter without any definite fracture. MRI hip on 01/18/18 showed a destructive lesion in the right acetabulum extending past the margin of the bone into adjacent soft tissues. Met with orthopedic surgery and underwent biopsy of the pelvic lesion which came back positive for plasma cell neoplasm.     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 Bone marrow biopsy shows no morphologic or immunophenotypic evidence for plasma cell neoplasm. Bone survey showed right Iliac lesion with lucent areas in frontal region- likely benign.    - RT completed in 03/2018. However he continued to have significant pain in the hip and underwent curettage of the right acetabular tumor along with right total hip arthroplasty  04/16/18.       SUBJECTIVE     Patient presents for 3 month follow-up with lab discretion. Right hip pain is significantly improved after surgery he is only using Tylenol as needed for pain control. Denies  any other complaints. Denies nausea/vomiting, bone pain, abdominal pain, weight loss, fevers chills, dyspnea, or extremity edema, urinary symptoms or any other issues. Good Appetite and energy levels.      PAST MEDICAL HISTORY     Past Medical History:   Diagnosis Date     Impotence of organic origin 2003     Plasma cell neoplasm 01/25/2018    S/P Needle biopsy of right pelvis bone tumor     Pure hypercholesterolemia 2002    statin started circa 2002         CURRENT OUTPATIENT MEDICATIONS     Current Outpatient Prescriptions   Medication Sig Dispense Refill     Acetaminophen (TYLENOL PO) Take 1,000 mg by mouth       aspirin 81 MG tablet Take 1 tablet by mouth daily. 90 tablet 3     atorvastatin (LIPITOR) 20 MG tablet Take 1 tablet (20 mg) by mouth At Bedtime 90 tablet 3     senna-docusate (SENOKOT-S;PERICOLACE) 8.6-50 MG per tablet Take 1 tablet by mouth 2 times daily 28 tablet 0     traMADol (ULTRAM) 50 MG tablet Take 1 tablet (50 mg) by mouth every 6 hours as needed for moderate to severe pain 30 tablet 0     oxyCODONE IR (ROXICODONE) 5 MG tablet           ALLERGIES   No Known Allergies     REVIEW OF SYSTEMS   As above in the HPI, o/w complete 12-point ROS was negative.     PHYSICAL EXAM   B/P: 136/79, T: 97.8, P: 105, R: 16  SpO2 Readings from Last 4 Encounters:   05/24/18 100%   04/24/18 100%   04/19/18 95%   02/26/18 99%     Wt Readings from Last 3 Encounters:   05/24/18 72 kg (158 lb 11.2 oz)   05/14/18 70 kg (154 lb 4.8 oz)   05/02/18 71 kg (156 lb 9.6 oz)     GEN: NAD  EYES:PERRLA  Mouth/ENT: Oropharynx is clear.  NECK: no cervical or supraclavicular lymphadenopathy  LUNGS: clear bilaterally  CV: regular, no murmurs, rubs, or gallops  ABDOMEN: soft, non-tender, non-distended, normal bowel sounds, no hepatosplenomegaly by percussion or palpation  EXT: warm, well perfused, no edema  NEURO: alert  SKIN: no rashes     LABORATORY AND IMAGING STUDIES     Lab Results   Component Value Date    WBC 5.3 05/14/2018      Lab Results   Component Value Date    RBC 3.98 05/14/2018     Lab Results   Component Value Date    HGB 11.9 05/14/2018     Lab Results   Component Value Date    HCT 37.1 05/14/2018     No components found for: MCT  Lab Results   Component Value Date    MCV 93 05/14/2018     Lab Results   Component Value Date    MCH 29.9 05/14/2018     Lab Results   Component Value Date    MCHC 32.1 05/14/2018     Lab Results   Component Value Date    RDW 14.4 05/14/2018     Lab Results   Component Value Date     05/14/2018     Recent Labs   Lab Test  05/14/18   1513  04/19/18   0916   NA  140  140   POTASSIUM  3.8  3.6   CHLORIDE  104  106   CO2  27  25   ANIONGAP  9  9   GLC  88  119*   BUN  18  8   CR  0.83  0.77   CHANCE  9.6  8.1*     Component      Latest Ref Rng & Units 5/14/2018   Albumin Fraction      3.7 - 5.1 g/dL 4.5   Alpha 1 Fraction      0.2 - 0.4 g/dL 0.4   Alpha 2 Fraction      0.5 - 0.9 g/dL 1.0 (H)   Beta Fraction      0.6 - 1.0 g/dL 1.0   Gamma Fraction      0.7 - 1.6 g/dL 1.1   Monoclonal Peak      0.0 g/dL 0.0   ELP Interpretation:       Essentially normal electrophoretic pattern. No monoclonal protein seen. Pathologic . . .   Immunofixation ELP       No monoclonal protein seen on immunofixation.  Pathological significance requires clinical . . .   IGG      695 - 1620 mg/dL 1010   IGA      70 - 380 mg/dL 155   IGM      60 - 265 mg/dL 64   Pence Free Lt Chain      0.33 - 1.94 mg/dL 0.72   Lambda Free Lt Chain      0.57 - 2.63 mg/dL 0.91   Kappa Lambda Ratio      0.26 - 1.65 0.79   N-Terminal Pro Bnp      0 - 125 pg/mL 36   Troponin I ES      0.000 - 0.045 ug/L <0.015     Component      Latest Ref Rng & Units 5/17/2018   ELP Interpretation Urine       Only trace albumin and trace globulins. No obvious monoclonal protein seen. Pathologic . . .   Immunofix ELP Urine       No monoclonal protein seen on immunofixation.  Pathological significance requires clinical . . .       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 There   are also numerous  neoplastic plasma cells and multinucleated giant cells   and granulomatous reaction to the   amyloid. The tumor has mixed features of amyloidoma and plasmacytoma.   The plasma cell neoplasm appears to secrete Kappa light-chain only ,   probably in a small amount. Prior serum   protein electrophoresis showed no monoclonal protein and negative   immunofixation, but there is elevated Kappa   / Lambda free chain ratio. Urine revealed monoclonal protein of Kappa   light chain  type, without associated   heavy immunoglobulin chain.   The neoplastic plasma cells show characteristic punctate staining for   Kappa light chain in a Golgi-like   pattern, frequently associated with a non-secretory or light-chain only   secretory plasma cell neoplasms.  They   are positive for CD56 .   The classification of this plasma cell neoplasm requires correlation with   other clinical and pathologic   findings, the recent random bone marrow biopsy ([id number]) showed no   evidence for plasma cell neoplasm, but   the patient has other radiolucent lesions on bone scan (in the skull) .   Prior to surgery the patient had no   anemia and creatinine level was normal, there was no hypercalcemia. We   would recommend follow-up to rule out   systemic amyloidosis.   I have personally reviewed all specimens and/or slides, including the   listed special stains, and used them   with my medical judgement to determine or confirm the final diagnosis.       ASSESSMENT AND PLAN     63 year-old male with a large heterogenous right acetabular mass biopsy of which came back positive as plasma cell neoplasm. Further workup negative  for anemia, hypercalcemia with electrophoresis/immunofixation showing monoclonal free kappa light chain immunoglobulin in urine with increased kappa free light chain in serum. Bone marrow biopsy shows no morphologic or immunophenotypic evidence for plasma cell neoplasm. Bone survey negative for any additional lytic lesions.      - Solitary plasmacytoma of the bone.   S/p RT completed in 03/2018.   However he continued to have significant pain in the hip and underwent curettage of the right acetabular tumor along with right total hip arthroplasty  04/16/18.   Surgical pathology showed palpable cell neoplasm with features of cytoma as well as amyloid deposits.   Requested pathology to check for  direct examination of the amyloid using spectrometry-based proteomic analysis differentiate primary(AL) versus secondary(AA)  amyloidosis. Case was sent to [facility] and results are pending currently.    Repeat protein electrophoresis as well as a free light chain assay negative for evidence of monoclonal protein and free light chains along with normal blood counts and renal function/calcium levels. Echocardiogram as well as a troponin/proBNP 4 within normal limits with no evidence of amyloid changes    Clinically doing very well with no active complaints.   Will monitor with labs including CBC, BMP, SPEP, UPEP, free light chains every 4 months for the first year and annually thereafter.     RT clinic in 4 months for follow up with labs 1 week prior      The patient is ready to learn, no apparent learning barriers were identified, Diagnosis and treatment plans were explained to the patient. The patient expressed understanding of the content. The patient questions were answered to his satisfaction.     Chart documentation with Dragon Voice recognition Software. Although reviewed after completion, some words and grammatical errors may remain.  Froilan Peres MD  Attending Physician   Hematology/Medical Oncology    Again,  thank you for allowing me to participate in the care of your patient.        Sincerely,        Froilan Peres MD

## 2018-05-24 NOTE — PROGRESS NOTES
FOLLOW-UP VISIT NOTE    PATIENT NAME: Dong Joseph MRN # [id number]  DATE OF VISIT: May 24, 2018 YOB: 1955    REFERRING PROVIDER: No referring provider defined for this encounter.    CANCER TYPE:Solitary plasmacytoma of bone    ONCOLOGY HISTORY:  62-year-old male who developed right hip pain with radiation into posterior thigh starting 6 months ago and was progressively getting worse. MRI of the lumbar spine showed degenerative changes throughout, no disc hernniation and moderate foraminal stenosis at multiple levels. X ray hip showed a lucent area in the right supra-acetabular region measuring 5.2 cm in maximum diameter without any definite fracture. MRI hip on 01/18/18 showed a destructive lesion in the right acetabulum extending past the margin of the bone into adjacent soft tissues. Met with orthopedic surgery and underwent biopsy of the pelvic lesion which came back positive for plasma cell neoplasm.     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 Bone marrow biopsy shows no morphologic or immunophenotypic evidence for plasma cell neoplasm. Bone survey showed right Iliac lesion with lucent areas in frontal region- likely benign.    - RT completed in 03/2018. However he continued to have significant pain in the hip and underwent curettage of the right acetabular tumor along with right total hip arthroplasty  04/16/18.       SUBJECTIVE     Patient presents for 3 month follow-up with lab discretion. Right hip pain is significantly improved after surgery he is only using Tylenol as needed for pain control. Denies any other complaints. Denies nausea/vomiting, bone pain, abdominal pain, weight loss, fevers chills, dyspnea, or extremity edema, urinary symptoms or any other issues. Good Appetite and energy levels.      PAST MEDICAL HISTORY     Past Medical  History:   Diagnosis Date     Impotence of organic origin 2003     Plasma cell neoplasm 01/25/2018    S/P Needle biopsy of right pelvis bone tumor     Pure hypercholesterolemia 2002    statin started circa 2002         CURRENT OUTPATIENT MEDICATIONS     Current Outpatient Prescriptions   Medication Sig Dispense Refill     Acetaminophen (TYLENOL PO) Take 1,000 mg by mouth       aspirin 81 MG tablet Take 1 tablet by mouth daily. 90 tablet 3     atorvastatin (LIPITOR) 20 MG tablet Take 1 tablet (20 mg) by mouth At Bedtime 90 tablet 3     senna-docusate (SENOKOT-S;PERICOLACE) 8.6-50 MG per tablet Take 1 tablet by mouth 2 times daily 28 tablet 0     traMADol (ULTRAM) 50 MG tablet Take 1 tablet (50 mg) by mouth every 6 hours as needed for moderate to severe pain 30 tablet 0     oxyCODONE IR (ROXICODONE) 5 MG tablet           ALLERGIES   No Known Allergies     REVIEW OF SYSTEMS   As above in the HPI, o/w complete 12-point ROS was negative.     PHYSICAL EXAM   B/P: 136/79, T: 97.8, P: 105, R: 16  SpO2 Readings from Last 4 Encounters:   05/24/18 100%   04/24/18 100%   04/19/18 95%   02/26/18 99%     Wt Readings from Last 3 Encounters:   05/24/18 72 kg (158 lb 11.2 oz)   05/14/18 70 kg (154 lb 4.8 oz)   05/02/18 71 kg (156 lb 9.6 oz)     GEN: NAD  EYES:PERRLA  Mouth/ENT: Oropharynx is clear.  NECK: no cervical or supraclavicular lymphadenopathy  LUNGS: clear bilaterally  CV: regular, no murmurs, rubs, or gallops  ABDOMEN: soft, non-tender, non-distended, normal bowel sounds, no hepatosplenomegaly by percussion or palpation  EXT: warm, well perfused, no edema  NEURO: alert  SKIN: no rashes     LABORATORY AND IMAGING STUDIES     Lab Results   Component Value Date    WBC 5.3 05/14/2018     Lab Results   Component Value Date    RBC 3.98 05/14/2018     Lab Results   Component Value Date    HGB 11.9 05/14/2018     Lab Results   Component Value Date    HCT 37.1 05/14/2018     No components found for: MCT  Lab Results   Component  Value Date    MCV 93 05/14/2018     Lab Results   Component Value Date    MCH 29.9 05/14/2018     Lab Results   Component Value Date    MCHC 32.1 05/14/2018     Lab Results   Component Value Date    RDW 14.4 05/14/2018     Lab Results   Component Value Date     05/14/2018     Recent Labs   Lab Test  05/14/18   1513  04/19/18   0916   NA  140  140   POTASSIUM  3.8  3.6   CHLORIDE  104  106   CO2  27  25   ANIONGAP  9  9   GLC  88  119*   BUN  18  8   CR  0.83  0.77   CHANCE  9.6  8.1*     Component      Latest Ref Rng & Units 5/14/2018   Albumin Fraction      3.7 - 5.1 g/dL 4.5   Alpha 1 Fraction      0.2 - 0.4 g/dL 0.4   Alpha 2 Fraction      0.5 - 0.9 g/dL 1.0 (H)   Beta Fraction      0.6 - 1.0 g/dL 1.0   Gamma Fraction      0.7 - 1.6 g/dL 1.1   Monoclonal Peak      0.0 g/dL 0.0   ELP Interpretation:       Essentially normal electrophoretic pattern. No monoclonal protein seen. Pathologic . . .   Immunofixation ELP       No monoclonal protein seen on immunofixation.  Pathological significance requires clinical . . .   IGG      695 - 1620 mg/dL 1010   IGA      70 - 380 mg/dL 155   IGM      60 - 265 mg/dL 64   [place] Free Lt Chain      0.33 - 1.94 mg/dL 0.72   Lambda Free Lt Chain      0.57 - 2.63 mg/dL 0.91   Kappa Lambda Ratio      0.26 - 1.65 0.79   N-Terminal Pro Bnp      0 - 125 pg/mL 36   Troponin I ES      0.000 - 0.045 ug/L <0.015     Component      Latest Ref Rng & Units 5/17/2018   ELP Interpretation Urine       Only trace albumin and trace globulins. No obvious monoclonal protein seen. Pathologic . . .   Immunofix ELP Urine       No monoclonal protein seen on immunofixation.  Pathological significance requires clinical . . .       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 There   are also numerous  neoplastic plasma cells and multinucleated giant cells   and granulomatous reaction to the   amyloid. The tumor has mixed features of amyloidoma and plasmacytoma.   The plasma cell neoplasm appears to secrete Kappa light-chain only ,   probably in a small amount. Prior serum   protein electrophoresis showed no monoclonal protein and negative   immunofixation, but there is elevated Kappa   / Lambda free chain ratio. Urine revealed monoclonal protein of Kappa   light chain  type, without associated   heavy immunoglobulin chain.   The neoplastic plasma cells show characteristic punctate staining for   Kappa light chain in a Golgi-like   pattern, frequently associated with a non-secretory or light-chain only   secretory plasma cell neoplasms.  They   are positive for CD56 .   The classification of this plasma cell neoplasm requires correlation with   other clinical and pathologic   findings, the recent random bone marrow biopsy ([id number]) showed no   evidence for plasma cell neoplasm, but   the patient has other radiolucent lesions on bone scan (in the skull) .   Prior to surgery the patient had no   anemia and creatinine level was normal, there was no hypercalcemia. We   would recommend follow-up to rule out   systemic amyloidosis.   I have personally reviewed all specimens and/or slides, including the   listed special stains, and used them   with my medical judgement to determine or confirm the final diagnosis.       ASSESSMENT AND PLAN     63 year-old male with a large heterogenous right acetabular mass biopsy of which came back positive as plasma cell neoplasm. Further workup negative for anemia, hypercalcemia with electrophoresis/immunofixation showing monoclonal free kappa light chain immunoglobulin in urine with increased kappa free light chain in serum. Bone marrow biopsy shows no morphologic or immunophenotypic evidence  for plasma cell neoplasm. Bone survey negative for any additional lytic lesions.      - Solitary plasmacytoma of the bone.   S/p RT completed in 03/2018.   However he continued to have significant pain in the hip and underwent curettage of the right acetabular tumor along with right total hip arthroplasty  04/16/18.   Surgical pathology showed palpable cell neoplasm with features of cytoma as well as amyloid deposits.   Requested pathology to check for  direct examination of the amyloid using spectrometry-based proteomic analysis differentiate primary(AL) versus secondary(AA)  amyloidosis. Case was sent to [facility] and results are pending currently.    Repeat protein electrophoresis as well as a free light chain assay negative for evidence of monoclonal protein and free light chains along with normal blood counts and renal function/calcium levels. Echocardiogram as well as a troponin/proBNP 4 within normal limits with no evidence of amyloid changes    Clinically doing very well with no active complaints.   Will monitor with labs including CBC, BMP, SPEP, UPEP, free light chains every 4 months for the first year and annually thereafter.     RT clinic in 4 months for follow up with labs 1 week prior      The patient is ready to learn, no apparent learning barriers were identified, Diagnosis and treatment plans were explained to the patient. The patient expressed understanding of the content. The patient questions were answered to his satisfaction.     Chart documentation with Dragon Voice recognition Software. Although reviewed after completion, some words and grammatical errors may remain.  Froilan Peres MD  Attending Physician   Hematology/Medical Oncology

## 2018-05-29 ENCOUNTER — HOSPITAL ENCOUNTER (OUTPATIENT)
Dept: PHYSICAL THERAPY | Facility: OTHER | Age: 63
Setting detail: THERAPIES SERIES
End: 2018-05-29
Attending: PHYSICIAN ASSISTANT
Payer: COMMERCIAL

## 2018-05-29 PROCEDURE — 40000718 ZZHC STATISTIC PT DEPARTMENT ORTHO VISIT: Performed by: PHYSICAL THERAPIST

## 2018-05-29 PROCEDURE — 97530 THERAPEUTIC ACTIVITIES: CPT | Mod: GP | Performed by: PHYSICAL THERAPIST

## 2018-05-29 PROCEDURE — 97110 THERAPEUTIC EXERCISES: CPT | Mod: GP | Performed by: PHYSICAL THERAPIST

## 2018-05-31 ENCOUNTER — HOSPITAL ENCOUNTER (OUTPATIENT)
Dept: PHYSICAL THERAPY | Facility: OTHER | Age: 63
Setting detail: THERAPIES SERIES
End: 2018-05-31
Attending: PHYSICIAN ASSISTANT
Payer: COMMERCIAL

## 2018-05-31 PROCEDURE — 40000718 ZZHC STATISTIC PT DEPARTMENT ORTHO VISIT: Performed by: PHYSICAL THERAPIST

## 2018-05-31 PROCEDURE — 97110 THERAPEUTIC EXERCISES: CPT | Mod: GP | Performed by: PHYSICAL THERAPIST

## 2018-06-04 NOTE — ADDENDUM NOTE
Encounter addended by: Radha Urena, PT on: 6/4/2018  2:17 PM<BR>     Actions taken: Flowsheet data copied forward, Flowsheet accepted

## 2018-06-05 ENCOUNTER — HOSPITAL ENCOUNTER (OUTPATIENT)
Dept: PHYSICAL THERAPY | Facility: OTHER | Age: 63
Setting detail: THERAPIES SERIES
End: 2018-06-05
Attending: PHYSICIAN ASSISTANT
Payer: COMMERCIAL

## 2018-06-05 PROCEDURE — 97530 THERAPEUTIC ACTIVITIES: CPT | Mod: GP | Performed by: PHYSICAL THERAPIST

## 2018-06-05 PROCEDURE — 97110 THERAPEUTIC EXERCISES: CPT | Mod: GP | Performed by: PHYSICAL THERAPIST

## 2018-06-05 PROCEDURE — 40000718 ZZHC STATISTIC PT DEPARTMENT ORTHO VISIT: Performed by: PHYSICAL THERAPIST

## 2018-06-07 ENCOUNTER — HOSPITAL ENCOUNTER (OUTPATIENT)
Dept: PHYSICAL THERAPY | Facility: OTHER | Age: 63
Setting detail: THERAPIES SERIES
End: 2018-06-07
Attending: PHYSICIAN ASSISTANT
Payer: COMMERCIAL

## 2018-06-07 PROCEDURE — 40000718 ZZHC STATISTIC PT DEPARTMENT ORTHO VISIT: Performed by: PHYSICAL THERAPIST

## 2018-06-07 PROCEDURE — 97530 THERAPEUTIC ACTIVITIES: CPT | Mod: GP | Performed by: PHYSICAL THERAPIST

## 2018-06-07 PROCEDURE — 97110 THERAPEUTIC EXERCISES: CPT | Mod: GP | Performed by: PHYSICAL THERAPIST

## 2018-06-08 LAB — COPATH REPORT: NORMAL

## 2018-06-11 ENCOUNTER — HOSPITAL ENCOUNTER (OUTPATIENT)
Dept: PHYSICAL THERAPY | Facility: OTHER | Age: 63
Setting detail: THERAPIES SERIES
End: 2018-06-11
Attending: PHYSICIAN ASSISTANT
Payer: COMMERCIAL

## 2018-06-11 PROCEDURE — 97110 THERAPEUTIC EXERCISES: CPT | Mod: GP | Performed by: PHYSICAL THERAPIST

## 2018-06-11 PROCEDURE — 40000718 ZZHC STATISTIC PT DEPARTMENT ORTHO VISIT: Performed by: PHYSICAL THERAPIST

## 2018-06-11 PROCEDURE — 97530 THERAPEUTIC ACTIVITIES: CPT | Mod: GP | Performed by: PHYSICAL THERAPIST

## 2018-06-13 ENCOUNTER — TELEPHONE (OUTPATIENT)
Dept: ORTHOPEDICS | Facility: CLINIC | Age: 63
End: 2018-06-13

## 2018-06-13 NOTE — LETTER
[facility]  [address]  [address] [facility] [address]        June 13, 2018    Regarding:  Dong Joseph  [id number]  [address] [place] [id number]              To Whom It May Concern;      This is to inform you that Dong Joseph is under my medical care.  He is able to return to work without restrictions on  06-18-18.    If you have any further questions or concerns, please call  [phone].    Sincerely,        Johnnie Ellsworth MD

## 2018-06-13 NOTE — TELEPHONE ENCOUNTER
Dong is calling and states that he is ready to return to work.  He would like to return on 06-18-18.  Note will be faxed to: [phone]  Attn: Daily Lutz

## 2018-06-14 ENCOUNTER — TELEPHONE (OUTPATIENT)
Dept: ORTHOPEDICS | Facility: CLINIC | Age: 63
End: 2018-06-14

## 2018-06-14 ENCOUNTER — HOSPITAL ENCOUNTER (OUTPATIENT)
Dept: PHYSICAL THERAPY | Facility: OTHER | Age: 63
Setting detail: THERAPIES SERIES
End: 2018-06-14
Attending: PHYSICIAN ASSISTANT
Payer: COMMERCIAL

## 2018-06-14 PROCEDURE — 97110 THERAPEUTIC EXERCISES: CPT | Mod: GP | Performed by: PHYSICAL THERAPIST

## 2018-06-14 PROCEDURE — 97530 THERAPEUTIC ACTIVITIES: CPT | Mod: GP | Performed by: PHYSICAL THERAPIST

## 2018-06-14 PROCEDURE — 40000718 ZZHC STATISTIC PT DEPARTMENT ORTHO VISIT: Performed by: PHYSICAL THERAPIST

## 2018-06-14 NOTE — TELEPHONE ENCOUNTER
Dong paged me and I returned his page.  He states that his employer did not receive the return to work note yesterday.  RN verified the fax number and refaxed it now.  Dong is to call if they did not receive it.  He has no other questions or concerns.     Dong is calling and states that he is ready to return to work.  He would like to return on 06-18-18.  Note will be faxed to: [phone]  Attn: Daily Lutz

## 2018-06-19 ENCOUNTER — HOSPITAL ENCOUNTER (OUTPATIENT)
Dept: PHYSICAL THERAPY | Facility: OTHER | Age: 63
Setting detail: THERAPIES SERIES
End: 2018-06-19
Attending: PHYSICIAN ASSISTANT
Payer: COMMERCIAL

## 2018-06-19 PROCEDURE — 40000718 ZZHC STATISTIC PT DEPARTMENT ORTHO VISIT: Performed by: PHYSICAL THERAPIST

## 2018-06-19 PROCEDURE — 97110 THERAPEUTIC EXERCISES: CPT | Mod: GP | Performed by: PHYSICAL THERAPIST

## 2018-06-19 PROCEDURE — 97530 THERAPEUTIC ACTIVITIES: CPT | Mod: GP | Performed by: PHYSICAL THERAPIST

## 2018-06-19 NOTE — ADDENDUM NOTE
Encounter addended by: Radha Urena, PT on: 6/19/2018  7:29 AM<BR>     Actions taken: Flowsheet data copied forward, Flowsheet accepted, Episode edited

## 2018-06-21 ENCOUNTER — HOSPITAL ENCOUNTER (OUTPATIENT)
Dept: PHYSICAL THERAPY | Facility: OTHER | Age: 63
Setting detail: THERAPIES SERIES
End: 2018-06-21
Attending: PHYSICIAN ASSISTANT
Payer: COMMERCIAL

## 2018-06-21 PROCEDURE — 97530 THERAPEUTIC ACTIVITIES: CPT | Mod: GP | Performed by: PHYSICAL THERAPIST

## 2018-06-21 PROCEDURE — 97110 THERAPEUTIC EXERCISES: CPT | Mod: GP | Performed by: PHYSICAL THERAPIST

## 2018-06-21 PROCEDURE — 40000718 ZZHC STATISTIC PT DEPARTMENT ORTHO VISIT: Performed by: PHYSICAL THERAPIST

## 2018-06-25 ENCOUNTER — HOSPITAL ENCOUNTER (OUTPATIENT)
Dept: PHYSICAL THERAPY | Facility: OTHER | Age: 63
Setting detail: THERAPIES SERIES
End: 2018-06-25
Attending: PHYSICIAN ASSISTANT
Payer: COMMERCIAL

## 2018-06-25 PROCEDURE — 40000718 ZZHC STATISTIC PT DEPARTMENT ORTHO VISIT: Performed by: PHYSICAL THERAPIST

## 2018-06-25 PROCEDURE — 97110 THERAPEUTIC EXERCISES: CPT | Mod: GP | Performed by: PHYSICAL THERAPIST

## 2018-06-27 ENCOUNTER — HOSPITAL ENCOUNTER (OUTPATIENT)
Dept: PHYSICAL THERAPY | Facility: OTHER | Age: 63
Setting detail: THERAPIES SERIES
End: 2018-06-27
Attending: PHYSICIAN ASSISTANT
Payer: COMMERCIAL

## 2018-06-27 PROCEDURE — 97530 THERAPEUTIC ACTIVITIES: CPT | Mod: GP | Performed by: PHYSICAL THERAPIST

## 2018-06-27 PROCEDURE — 97110 THERAPEUTIC EXERCISES: CPT | Mod: GP | Performed by: PHYSICAL THERAPIST

## 2018-06-27 PROCEDURE — 40000718 ZZHC STATISTIC PT DEPARTMENT ORTHO VISIT: Performed by: PHYSICAL THERAPIST

## 2018-07-02 ENCOUNTER — HOSPITAL ENCOUNTER (OUTPATIENT)
Dept: PHYSICAL THERAPY | Facility: OTHER | Age: 63
Setting detail: THERAPIES SERIES
End: 2018-07-02
Attending: PHYSICIAN ASSISTANT
Payer: COMMERCIAL

## 2018-07-02 PROCEDURE — 97112 NEUROMUSCULAR REEDUCATION: CPT | Mod: GP | Performed by: PHYSICAL THERAPIST

## 2018-07-02 PROCEDURE — 97110 THERAPEUTIC EXERCISES: CPT | Mod: GP | Performed by: PHYSICAL THERAPIST

## 2018-07-02 PROCEDURE — 40000718 ZZHC STATISTIC PT DEPARTMENT ORTHO VISIT: Performed by: PHYSICAL THERAPIST

## 2018-07-06 ENCOUNTER — HOSPITAL ENCOUNTER (OUTPATIENT)
Dept: PHYSICAL THERAPY | Facility: OTHER | Age: 63
Setting detail: THERAPIES SERIES
End: 2018-07-06
Attending: PHYSICIAN ASSISTANT
Payer: COMMERCIAL

## 2018-07-06 PROCEDURE — 97110 THERAPEUTIC EXERCISES: CPT | Mod: GP | Performed by: PHYSICAL THERAPIST

## 2018-07-06 PROCEDURE — 40000718 ZZHC STATISTIC PT DEPARTMENT ORTHO VISIT: Performed by: PHYSICAL THERAPIST

## 2018-07-06 PROCEDURE — 97112 NEUROMUSCULAR REEDUCATION: CPT | Mod: GP | Performed by: PHYSICAL THERAPIST

## 2018-07-11 ENCOUNTER — TELEPHONE (OUTPATIENT)
Dept: FAMILY MEDICINE | Facility: OTHER | Age: 63
End: 2018-07-11

## 2018-07-11 ENCOUNTER — HOSPITAL ENCOUNTER (OUTPATIENT)
Dept: PHYSICAL THERAPY | Facility: OTHER | Age: 63
Setting detail: THERAPIES SERIES
End: 2018-07-11
Attending: PHYSICIAN ASSISTANT
Payer: COMMERCIAL

## 2018-07-11 PROCEDURE — 40000718 ZZHC STATISTIC PT DEPARTMENT ORTHO VISIT: Performed by: PHYSICAL THERAPIST

## 2018-07-11 PROCEDURE — 97112 NEUROMUSCULAR REEDUCATION: CPT | Mod: GP | Performed by: PHYSICAL THERAPIST

## 2018-07-11 PROCEDURE — 97140 MANUAL THERAPY 1/> REGIONS: CPT | Mod: GP | Performed by: PHYSICAL THERAPIST

## 2018-07-11 PROCEDURE — 97110 THERAPEUTIC EXERCISES: CPT | Mod: GP | Performed by: PHYSICAL THERAPIST

## 2018-07-11 NOTE — PROGRESS NOTES
"Outpatient Physical Therapy Progress Note     Patient: Dong Joseph  : 1955    Beginning/End Dates of Reporting Period:  5/15/2018 to 2018  Pt. Has been seen for 16 visits    Referring Provider: TROY Salazar    Therapy Diagnosis: S/P BRADLEY with plasmacytoma removal      Client Self Report:      Objective Measurements:  Objective Measure: Leg length discrepancy (LLD)  Details: Estimated LLD of L LE being ~.75\" higher than R LE in standing. Assessed gait with heel-lift in shoe. No change noted in lateral trunk lean.     Objective Measure: Strength  Details: R hip ER: 3+/5, R hip IR: 4-, R hip abduction: 5/5 (slight weakness noted at initial portion of the range/test, stronger in mid-range, AJIT hip extension: 4+, AJIT hip extension with gluts isolated: 4-, AJIT knee extension: 5, AJIT knee flexion: 5, L ER and IR: 4+     Objective Measure: Active range of motion  Details: R hip extension: 10 degrees, L hip extension: 15 degrees    Objective Measure: Gait analysis  Details: Lateral trunk lean to R with stance on the R. Slight circumduction of R LE during swing, limited knee flexion of R LE during swing phase, decreased heel strike during initial contact (contact with floor made through mid-foot)     Treatment: Gait, Neuromuscular re-ed, Therapeutic exercise, balance, proprioception training    Goals:  Goal Identifier Gait   Goal Description Dong will ambulate safely without the use of an assistive device.   Target Date 18   Date Met  18   Progress:     Goal Identifier Strength   Goal Description Dong will demonstrate 5/5 strength about the right lower extremity allowing him to return to more intense activities.   Target Date 18   Date Met  18   Progress:     Goal Identifier Function   Goal Description Dong will improve his LEFS by 30% indicating improved overall function   Target Date 18   Date Met      Progress:     Goal Identifier  Gait   Goal Description  Pt. Will be able " to consistently heel-strike while ambulating.   Target Date  7/25/2018   Date Met      Progress:       Progress Toward Goals:   Improvement noted in strength and function. Will start working towards improving gait mechanics.           Plan:  Continue therapy per current plan of care.    Discharge:  No

## 2018-07-11 NOTE — TELEPHONE ENCOUNTER
Reason for Call:  Form, our goal is to have forms completed with 72 hours, however, some forms may require a visit or additional information.    Type of letter, form or note:  medical    Who is the form from?: [facility] (if other please explain)    Where did the form come from: form was faxed in    What clinic location was the form placed at?: [facility] - [phone]    Where the form was placed: 's Box    What number is listed as a contact on the form?: [phone]       Additional comments: n/a    Call taken on 7/11/2018 at 9:49 AM by Myriam Lynn

## 2018-07-16 ENCOUNTER — HOSPITAL ENCOUNTER (OUTPATIENT)
Dept: PHYSICAL THERAPY | Facility: OTHER | Age: 63
Setting detail: THERAPIES SERIES
End: 2018-07-16
Attending: PHYSICIAN ASSISTANT
Payer: COMMERCIAL

## 2018-07-16 PROCEDURE — 40000718 ZZHC STATISTIC PT DEPARTMENT ORTHO VISIT: Performed by: PHYSICAL THERAPIST

## 2018-07-16 PROCEDURE — 97110 THERAPEUTIC EXERCISES: CPT | Mod: GP | Performed by: PHYSICAL THERAPIST

## 2018-07-16 PROCEDURE — 97530 THERAPEUTIC ACTIVITIES: CPT | Mod: GP | Performed by: PHYSICAL THERAPIST

## 2018-07-19 NOTE — ADDENDUM NOTE
Encounter addended by: Radha Urena, PT on: 7/19/2018  4:27 PM<BR>     Actions taken: Flowsheet data copied forward, Flowsheet accepted

## 2018-07-23 ENCOUNTER — HOSPITAL ENCOUNTER (OUTPATIENT)
Dept: PHYSICAL THERAPY | Facility: OTHER | Age: 63
Setting detail: THERAPIES SERIES
End: 2018-07-23
Attending: PHYSICIAN ASSISTANT
Payer: COMMERCIAL

## 2018-07-23 PROCEDURE — 97140 MANUAL THERAPY 1/> REGIONS: CPT | Mod: GP | Performed by: PHYSICAL THERAPIST

## 2018-07-23 PROCEDURE — 40000718 ZZHC STATISTIC PT DEPARTMENT ORTHO VISIT: Performed by: PHYSICAL THERAPIST

## 2018-07-23 PROCEDURE — 97110 THERAPEUTIC EXERCISES: CPT | Mod: GP | Performed by: PHYSICAL THERAPIST

## 2018-07-30 ENCOUNTER — HOSPITAL ENCOUNTER (OUTPATIENT)
Dept: PHYSICAL THERAPY | Facility: OTHER | Age: 63
Setting detail: THERAPIES SERIES
End: 2018-07-30
Attending: PHYSICIAN ASSISTANT
Payer: COMMERCIAL

## 2018-07-30 DIAGNOSIS — C90.30 PLASMACYTOMA OF BONE (H): Primary | ICD-10-CM

## 2018-07-30 PROCEDURE — 40000718 ZZHC STATISTIC PT DEPARTMENT ORTHO VISIT: Performed by: PHYSICAL THERAPIST

## 2018-07-30 PROCEDURE — 97110 THERAPEUTIC EXERCISES: CPT | Mod: GP | Performed by: PHYSICAL THERAPIST

## 2018-07-30 PROCEDURE — 97530 THERAPEUTIC ACTIVITIES: CPT | Mod: GP | Performed by: PHYSICAL THERAPIST

## 2018-08-01 ENCOUNTER — OFFICE VISIT (OUTPATIENT)
Dept: ORTHOPEDICS | Facility: CLINIC | Age: 63
End: 2018-08-01
Payer: COMMERCIAL

## 2018-08-01 ENCOUNTER — RADIANT APPOINTMENT (OUTPATIENT)
Dept: GENERAL RADIOLOGY | Facility: CLINIC | Age: 63
End: 2018-08-01
Attending: ORTHOPAEDIC SURGERY
Payer: COMMERCIAL

## 2018-08-01 VITALS — WEIGHT: 156 LBS | BODY MASS INDEX: 22.33 KG/M2 | HEIGHT: 70 IN

## 2018-08-01 DIAGNOSIS — C90.30 PLASMACYTOMA OF BONE (H): Primary | ICD-10-CM

## 2018-08-01 DIAGNOSIS — Z96.641 H/O TOTAL HIP ARTHROPLASTY, RIGHT: ICD-10-CM

## 2018-08-01 DIAGNOSIS — C90.30 PLASMACYTOMA OF BONE (H): ICD-10-CM

## 2018-08-01 ASSESSMENT — ENCOUNTER SYMPTOMS
HALLUCINATIONS: 0
WEIGHT LOSS: 0
FEVER: 0
DYSURIA: 0
BLOATING: 0
NIGHT SWEATS: 0
DIARRHEA: 0
BLOOD IN STOOL: 0
ALTERED TEMPERATURE REGULATION: 0
RECTAL PAIN: 0
DECREASED APPETITE: 0
INCREASED ENERGY: 0
FLANK PAIN: 0
ABDOMINAL PAIN: 1
CONSTIPATION: 0
HEMATURIA: 0
CHILLS: 1
WEIGHT GAIN: 0
POLYPHAGIA: 0
BACK PAIN: 1
NECK PAIN: 0
MUSCLE WEAKNESS: 0
JOINT SWELLING: 0
FATIGUE: 0
MUSCLE CRAMPS: 0
STIFFNESS: 0
HEARTBURN: 0
POLYDIPSIA: 0
NAUSEA: 0
VOMITING: 0
JAUNDICE: 0
DIFFICULTY URINATING: 0
BOWEL INCONTINENCE: 0
ARTHRALGIAS: 0
MYALGIAS: 1

## 2018-08-01 NOTE — PROGRESS NOTES
"Outpatient Physical Therapy Progress Note     Patient: Dong Joseph  : 1955    Beginning/End Dates of Reporting Period:  2018 to 2018  Dong has been seen for a total of 3 visits this reporting period, 19 visits overall.      Referring Provider: Dr. Valerio    Therapy Diagnosis: S/P right BRADLEY and myloma removal with weakness, decreased ROM and function.      Client Self Report: Dong reports that he just has one area of tightness in the right hip. He feels that is what is stopping him from walking without a limp. He feels stronger all the time and feels like he has come a long way. Continues to feel tight making it difficult for him to put a sock and shoe on the right side.     Objective Measurements:  Objective Measure: Leg length discrepancy (LLD)  Details: Estimated LLD of L LE being ~.75\" higher than R LE in standing. Assessed gait with heel-lift in shoe. No change noted in lateral trunk lean.     Objective Measure: Strength  Details: R hip ER: 4+ 5-/5, R hip IR: 4+, R hip abduction: 5/5 (slight weakness noted at initial portion of the range/test, stronger in mid-range, AJIT hip extension: 4+5-/5, AJIT hip extension with gluts isolated: 4-, AJIT knee extension: 5, AJIT knee flexion: 5, L ER and IR: 4+     Objective Measure: Active range of motion  Details: R hip extension: 10 degrees, L hip extension: 15 degrees    Objective Measure: Gait analysis  Details: Lateral trunk lean to R with stance on the R. Slight circumduction of R LE during swing, limited knee flexion of R LE during swing phase, decreased heel strike during initial contact (contact with floor made through mid-foot)    Objective Measure: SLS  Details: > 30 seconds bilaterally        Goals:  Goal Identifier Gait   Goal Description Dong will ambulate safely without the use of an assistive device.   Target Date 18   Date Met  18   Progress:     Goal Identifier Strength   Goal Description Dong will demonstrate 5/5 strength about " the right lower extremity allowing him to return to more intense activities.   Target Date 07/14/18   Date Met  06/21/18   Progress:     Goal Identifier Function   Goal Description Dong will improve his LEFS by 30% indicating improved overall function   Target Date 08/13/18   Date Met      Progress:     Progress Toward Goals:   Progress this reporting period: Making excellent progress.     Plan:  Continue therapy per current plan of care.    Discharge:  No    Thank you for this referral.    Radha Urena P.T.

## 2018-08-01 NOTE — MR AVS SNAPSHOT
After Visit Summary   8/1/2018    Dong Joseph    MRN: [id number]           Patient Information     Date Of Birth          1955        Visit Information        Provider Department      8/1/2018 12:00 PM Johnnie Ellsworth MD [facility]        Today's Diagnoses     Plasmacytoma of bone (H)    -  1    H/O total hip arthroplasty, right           Follow-ups after your visit        Your next 10 appointments already scheduled     Aug 08, 2018 11:15 AM CDT   [facility]th[facility] with Radharosario Urena, PT   [facility]    [address] [facility] [place] [address]   [phone]            Aug 13, 2018 11:15 AM CDT   Ortho Treatment with Radharosario Urena, PT   [facility]    [address] [facility] [place] [place] [facility] [address]   [phone]            Sep 13, 2018  1:30 PM CDT   LAB with [facility] C   [facility]    [address] [facility] [place] [address]   [phone]           Please do not eat 10-12 hours before your appointment if you are coming in fasting for labs on lipids, cholesterol, or glucose (sugar). This does not apply to pregnant women. Water, hot tea and black coffee (with nothing added) are okay. Do not drink other fluids, diet soda or chew gum.            Sep 20, 2018 12:30 PM CDT   [facility]    [address] [place] [id number]   [phone]            Nov 07, 2018 12:00 PM CST   (Arrive by 11:45 AM)   Post-Op with Johnnie Ellsworth MD   [facility] ([facility])    [address] [place] [id number] [phone]              Who to contact     Please call your clinic at [phone] to:    Ask questions about your health    Make or cancel appointments    Discuss your  "medicines    Learn about your test results    Speak to your doctor            Additional Information About Your Visit        MyChart Information     [facility] [facility] [facility] gives you secure access to your electronic health record. If you see a primary care provider, you can also send messages to your care team and make appointments. If you have questions, please call your primary care clinic.  If you do not have a primary care provider, please call [phone] and they will assist you.      [facility] [facility] [facility] is an electronic gateway that provides easy, online access to your medical records. With [facility] [facility] [facility], you can request a clinic appointment, read your test results, renew a prescription or communicate with your care team.     To access your existing account, please contact your [facility] or call [phone] for assistance.        Care EveryWhere ID     This is your Care EveryWhere ID. This could be used by other organizations to access your [facility] medical records  [id number]        Your Vitals Were     Height BMI (Body Mass Index)                1.778 m (5' 10\") 22.38 kg/m2           Blood Pressure from Last 3 Encounters:   05/24/18 132/64   05/14/18 106/68   04/24/18 109/72    Weight from Last 3 Encounters:   08/01/18 70.8 kg (156 lb)   05/24/18 72 kg (158 lb 11.2 oz)   05/14/18 70 kg (154 lb 4.8 oz)              Today, you had the following     No orders found for display       Primary Care Provider Office Phone # Fax #    Naomi Kuo PA-C [phone] [address] [place] [address]        Equal Access to Services     ROSA ASHFORD : Hadii aad ku hadasho Soomaali, waaxda luqadaha, qaybta kaalmada adeegyada, tejinder camacho . So [facility] [phone].    ATENCIÓN: Si habla español, tiene a gonzalez disposición servicios gratuitos de asistencia lingüística. Llame al [phone].    We comply with applicable federal civil rights laws and [place] laws. We do not " discriminate on the basis of race, color, national origin, age, disability, sex, sexual orientation, or gender identity.            Thank you!     Thank you for choosing [facility]  for your care. Our goal is always to provide you with excellent care. Hearing back from our patients is one way we can continue to improve our services. Please take a few minutes to complete the written survey that you may receive in the mail after your visit with us. Thank you!             Your Updated Medication List - Protect others around you: Learn how to safely use, store and throw away your medicines at www.disposemymeds.org.          This list is accurate as of 8/1/18  4:08 PM.  Always use your most recent med list.                   Brand Name Dispense Instructions for use Diagnosis    aspirin 81 MG tablet     90 tablet    Take 1 tablet by mouth daily.    Pure hypercholesterolemia       atorvastatin 20 MG tablet    LIPITOR    90 tablet    Take 1 tablet (20 mg) by mouth At Bedtime    Pure hypercholesterolemia, Hyperlipidemia LDL goal <130       oxyCODONE IR 5 MG tablet    ROXICODONE          senna-docusate 8.6-50 MG per tablet    SENOKOT-S;PERICOLACE    28 tablet    Take 1 tablet by mouth 2 times daily    Status post hip surgery       traMADol 50 MG tablet    ULTRAM    30 tablet    Take 1 tablet (50 mg) by mouth every 6 hours as needed for moderate to severe pain    Plasmacytoma of bone (H)       TYLENOL PO      Take 1,000 mg by mouth

## 2018-08-01 NOTE — PROGRESS NOTES
This 63-year-old man is 3-1/2 months out from a right total hip arthroplasty done in the setting of a large destructive lesion of the acetabulum.  He is able to walk without a crutch or cane.  His pain is very well controlled.  He does have some anterior pelvic discomfort at times.  He also has some midline discomfort from the umbilicus to the sternum.  I am not sure the etiology of these symptoms.    On examination he is alert oriented has a normal mood and affect and is in no acute distress.  He can walk without a cane at a good pace without a significant limp.  His incisions are well-healed.  He has no edema erythema or adenopathy in his right leg.    I reviewed his x-rays which show the hip implant to be in excellent position.    This man will continue weightbearing as tolerated.  We reviewed activity and hip precautions.  He is doing quite well for a total hip replacement let alone one involving myeloma.  He will return to see me in 3 months to make sure that his abdominal symptoms have improved.

## 2018-08-01 NOTE — NURSING NOTE
"Reason For Visit:   Chief Complaint   Patient presents with     RECHECK     04-16-18, Right total hip. with pelvis x-ray       Primary MD: Naomi Kuo    Ht 1.778 m (5' 10\")  Wt 70.8 kg (156 lb)  BMI 22.38 kg/m2       [facility] [place] [place] - [address]  [facility] [place] - [address] PHARMACY - [facility] [address] [place] [address] DRUG STORE [address] [place] [address] [address] AT [facility] OF  & MAIN    No Known Allergies    Current Outpatient Prescriptions   Medication     Acetaminophen (TYLENOL PO)     aspirin 81 MG tablet     atorvastatin (LIPITOR) 20 MG tablet     traMADol (ULTRAM) 50 MG tablet     oxyCODONE IR (ROXICODONE) 5 MG tablet     senna-docusate (SENOKOT-S;PERICOLACE) 8.6-50 MG per tablet     No current facility-administered medications for this visit.        Pain Assessment  Patient Currently in Pain: Yes  0-10 Pain Scale: 1  Primary Pain Location: Hip  Pain Orientation: Left, Right  Pain Descriptors: Tightness, Tender  Alleviating Factors: Rest, Other (comment), Exercise, Stretching (PT)  Aggravating Factors: Walking, Bending (for long periods)        Manav PAN, MINDA    "

## 2018-08-01 NOTE — LETTER
8/1/2018       RE: Dong Joseph  [id number]  [facility] [address]     Dear Colleague,    Thank you for referring your patient, Dong Joseph, to the [facility] at [facility]. Please see a copy of my visit note below.    This 63-year-old man is 3-1/2 months out from a right total hip arthroplasty done in the setting of a large destructive lesion of the acetabulum.  He is able to walk without a crutch or cane.  His pain is very well controlled.  He does have some anterior pelvic discomfort at times.  He also has some midline discomfort from the umbilicus to the sternum.  I am not sure the etiology of these symptoms.    On examination he is alert oriented has a normal mood and affect and is in no acute distress.  He can walk without a cane at a good pace without a significant limp.  His incisions are well-healed.  He has no edema erythema or adenopathy in his right leg.    I reviewed his x-rays which show the hip implant to be in excellent position.    This man will continue weightbearing as tolerated.  We reviewed activity and hip precautions.  He is doing quite well for a total hip replacement let alone one involving myeloma.  He will return to see me in 3 months to make sure that his abdominal symptoms have improved.    Again, thank you for allowing me to participate in the care of your patient.      Sincerely,    Johnnie Ellsworth MD

## 2018-08-08 ENCOUNTER — HOSPITAL ENCOUNTER (OUTPATIENT)
Dept: PHYSICAL THERAPY | Facility: OTHER | Age: 63
Setting detail: THERAPIES SERIES
End: 2018-08-08
Attending: PHYSICIAN ASSISTANT
Payer: COMMERCIAL

## 2018-08-08 PROCEDURE — 40000718 ZZHC STATISTIC PT DEPARTMENT ORTHO VISIT: Performed by: PHYSICAL THERAPIST

## 2018-08-08 PROCEDURE — 97110 THERAPEUTIC EXERCISES: CPT | Mod: GP | Performed by: PHYSICAL THERAPIST

## 2018-08-08 PROCEDURE — 97112 NEUROMUSCULAR REEDUCATION: CPT | Mod: GP | Performed by: PHYSICAL THERAPIST

## 2018-08-09 NOTE — PROGRESS NOTES
SUBJECTIVE:   Dong Joseph is a 63 year old male who presents to clinic today for the following health issues:    The 10-year ASCVD risk score (Bangor WINTER Jr, et al., 2013) is: 10.3%    Values used to calculate the score:      Age: 63 years      Sex: Male      Is Non- : No      Diabetic: No      Tobacco smoker: No      Systolic Blood Pressure: 132 mmHg      Is BP treated: No      HDL Cholesterol: 37 mg/dL      Total Cholesterol: 166 mg/dL  Patient is eligible for use of low-dose aspirin for primary prevention of heart attack and stroke.  Provider has discussed aspirin with patient and our decision was:     Prescribe:  Daily low-dose aspirin recommended for primary prevention, patient agrees with plan.        HPI  ABDOMINAL   Pt was diagnosed with cancer this year, the treatment destroyed his hip and pt had a full hip replacement. Pt started noticing these symptoms after surgery around April      Onset: April     Description:   Character: Sharp and pressure   Location: across the lower abdomen, sometimes right up the milddle and sometimes across the top of abdomen   Radiation: None    Intensity: moderate    Progression of Symptoms:  worsening    Accompanying Signs & Symptoms:  Fever/Chills?: no   Gas/Bloating: no   Nausea: no   Vomitting: no   Diarrhea?: no   Constipation:no   Dysuria or Hematuria: no but does feel pain in lower abdomen sometimes with urination    History:   Trauma: no   Previous similar pain: no    Previous tests done: none    Precipitating factors:   Does the pain change with:     Food: no      BM: no     Urination: no     Therapies Tried and outcome:              After surgery he had been taking tylenol, tramadol, and oxycodone of which he is no longer taking.   He states that his symptoms are worse if he is sitting or standing states that when he lays flat he will have pressure along lower pelvis. Is getting up multiple times during the night to urinate. denies urgency or  inability to completely empty bladder. Denies visible blood in urine. Denies nausea or vomiting. A couple nights nights he felt chilled. Denies belching, burping or cough no indication of acid reflux.   He has gallbladder and appendix in place.   BM's have not changed usually daily and loose. He denies symptoms of urinary. Drinks one cup of coffee daily, one pop 1-2 times per week.     Problem list and histories reviewed & adjusted, as indicated.    Additional history: as documented    Patient Active Problem List   Diagnosis     Pure hypercholesterolemia     Impotence of organic origin     HYPERLIPIDEMIA LDL GOAL <130     Plasmacytoma of bone (H)     Status post hip surgery     Past Surgical History:   Procedure Laterality Date     BIOPSY BONE PELVIS Right 1/25/2018    Procedure: BIOPSY BONE PELVIS;  Needle Biopsy Right Pelvis;  Surgeon: Johnnie Ellsworth MD;  [facility]     COLONOSCOPY  12/22/2010    COLONOSCOPY performed by DON[facility] at [facility]     EXCISE TUMOR PELVIS POSTERIOR Right 4/16/2018    Procedure: EXCISE TUMOR PELVIS POSTERIOR;;  Surgeon: Johnnie Ellsworth MD;  Location: [facility] OR     HERNIA REPAIR, INGUINAL RT/LT Bilateral 1979-80    Inguinal     HERNIA REPAIR, INGUINAL RT/LT  03/30/2006    Recurrent left inguinal hernia.     OPTICAL TRACKING SYSTEM RESECT TUMOR, ARTHROPLASTY HIP Right 4/16/2018    Procedure: OPTICAL TRACKING SYSTEM RESECT TUMOR, ARTHROPLASTY HIP;  Right Total Hip Arthroplasty And Removal Of Right Pelvic Tumor;  Surgeon: Johnnie Ellsworth MD;  Location: [facility] OR       Social History   Substance Use Topics     Smoking status: Former Smoker     Packs/day: 0.75     Years: 5.00     Types: Cigarettes     Start date: 6/1/1973     Quit date: 6/1/1978     Smokeless tobacco: Never Used     Alcohol use 1.0 oz/week      Comment: Patient reports one drink bi-weekly     Family History   Problem Relation Age of Onset     C.A.D. Mother      age 70s     Coronary Artery Disease  "Mother      Cerebrovascular Disease Father      at 68 yo     Hypertension Father      Lung Cancer Brother      [place]     Coronary Artery Disease Brother      Cardiac Sudden Death Brother      Hyperlipidemia Brother      Mental Illness Son      Asthma Son      Depression Son          Current Outpatient Prescriptions   Medication Sig Dispense Refill     Acetaminophen (TYLENOL PO) Take 1,000 mg by mouth       aspirin 81 MG tablet Take 1 tablet by mouth daily. 90 tablet 3     atorvastatin (LIPITOR) 20 MG tablet Take 1 tablet (20 mg) by mouth At Bedtime 90 tablet 3     No Known Allergies  BP Readings from Last 3 Encounters:   08/10/18 118/70   05/24/18 132/64   05/14/18 106/68    Wt Readings from Last 3 Encounters:   08/10/18 150 lb (68 kg)   08/01/18 156 lb (70.8 kg)   05/24/18 158 lb 11.2 oz (72 kg)                  Labs reviewed in EPIC    ROS:  Constitutional, HEENT, cardiovascular, pulmonary, gi and gu systems are negative, except as otherwise noted.    OBJECTIVE:     /70 (BP Location: Right arm, Patient Position: Chair, Cuff Size: Adult Regular)  Pulse 78  Temp 97.7  F (36.5  C) (Oral)  Resp 16  Ht 5' 10\" (1.778 m)  Wt 150 lb (68 kg)  BMI 21.52 kg/m2  Body mass index is 21.52 kg/(m^2).  GENERAL: healthy, alert and no distress  EYES: Eyes grossly normal to inspection, PERRL and conjunctivae and sclerae normal  HENT: ear canals and TM's normal, nose and mouth without ulcers or lesions  NECK: no adenopathy, no asymmetry, masses, or scars and thyroid normal to palpation  RESP: lungs clear to auscultation - no rales, rhonchi or wheezes  CV: regular rate and rhythm, normal S1 S2, no S3 or S4, no murmur, click or rub, no peripheral edema and peripheral pulses strong  ABDOMEN: tenderness epigastric, RUQ, LUQ, suprapubic, RLQ and LLQ, no organomegaly or masses, bowel sounds normal, no palpable or pulsatile masses, no bruits heard, no palpable renal abnormalities  and no scars, striae, dilated veins, " rashes, or lesions  Rectal exam: rectal normal, no masses, prostate normal in size without masses or nodules.  MS: Lumber/Thoracic Spine Exam:   Non-tender to palpation  Range of Motion:  left thoracic rotation  painful, right thoracic rotation  painful, lumbar flexion  painful  Strength:  full  Special tests:  negative straight leg raises  SKIN: no suspicious lesions or rashes  NEURO: Normal strength and tone, mentation intact and speech normal  BACK: no CVA tenderness, no paralumbar tenderness  PSYCH: mentation appears normal, affect normal/bright  LYMPH: no cervical, supraclavicular, axillary, or inguinal adenopathy        ASSESSMENT/PLAN:     1. Pelvic pain in male  Will check ua and kub to rule out renal stone as he is having to get up multiple time during the night to void. Prostate exam negative will however check PSA. Xray for stones.   - PSA, screen  - *UA reflex to Microscopic and Culture ([facility] (except [place] and [place])  - XR Abdomen 2 Views; Future    2. Abdominal pain, generalized  Discussed possibility that pain is referred from thoracic spine and recommend massage therapy. Also concerned as he has history of plasmacytoma of bone with tumor removal from hip he states that this was not multiple myloma and he is following onco for this however may increase risk of vertebral fracture if multiple myloma. Last CBC negative will not repeat this .   - PSA, screen  - *UA reflex to Microscopic and Culture ([facility] (except [place] and [place])  - XR Abdomen 2 Views; Future    3. Acute bilateral thoracic back pain    - MASSAGE THERAPY REFERRAL    Patient Instructions   I will call you with your lab and xray results. Recommend massage therapy for thoracic region.     Thank you        KASIA [facility]

## 2018-08-10 ENCOUNTER — RADIANT APPOINTMENT (OUTPATIENT)
Dept: GENERAL RADIOLOGY | Facility: OTHER | Age: 63
End: 2018-08-10
Attending: NURSE PRACTITIONER
Payer: COMMERCIAL

## 2018-08-10 ENCOUNTER — OFFICE VISIT (OUTPATIENT)
Dept: FAMILY MEDICINE | Facility: OTHER | Age: 63
End: 2018-08-10
Payer: COMMERCIAL

## 2018-08-10 ENCOUNTER — TELEPHONE (OUTPATIENT)
Dept: FAMILY MEDICINE | Facility: OTHER | Age: 63
End: 2018-08-10

## 2018-08-10 VITALS
BODY MASS INDEX: 21.47 KG/M2 | SYSTOLIC BLOOD PRESSURE: 118 MMHG | DIASTOLIC BLOOD PRESSURE: 70 MMHG | HEART RATE: 78 BPM | RESPIRATION RATE: 16 BRPM | TEMPERATURE: 97.7 F | HEIGHT: 70 IN | WEIGHT: 150 LBS

## 2018-08-10 DIAGNOSIS — R10.2 PELVIC PAIN IN MALE: Primary | ICD-10-CM

## 2018-08-10 DIAGNOSIS — M54.6 ACUTE BILATERAL THORACIC BACK PAIN: ICD-10-CM

## 2018-08-10 DIAGNOSIS — R10.84 ABDOMINAL PAIN, GENERALIZED: ICD-10-CM

## 2018-08-10 DIAGNOSIS — R10.2 PELVIC PAIN IN MALE: ICD-10-CM

## 2018-08-10 LAB
ALBUMIN UR-MCNC: NEGATIVE MG/DL
APPEARANCE UR: CLEAR
BILIRUB UR QL STRIP: NEGATIVE
COLOR UR AUTO: YELLOW
GLUCOSE UR STRIP-MCNC: NEGATIVE MG/DL
HGB UR QL STRIP: NEGATIVE
KETONES UR STRIP-MCNC: NEGATIVE MG/DL
LEUKOCYTE ESTERASE UR QL STRIP: NEGATIVE
NITRATE UR QL: NEGATIVE
PH UR STRIP: 7 PH (ref 5–7)
PSA SERPL-ACNC: 1.7 UG/L (ref 0–4)
SOURCE: NORMAL
SP GR UR STRIP: 1.01 (ref 1–1.03)
UROBILINOGEN UR STRIP-ACNC: 0.2 EU/DL (ref 0.2–1)

## 2018-08-10 PROCEDURE — 74019 RADEX ABDOMEN 2 VIEWS: CPT | Mod: FY

## 2018-08-10 PROCEDURE — 81003 URINALYSIS AUTO W/O SCOPE: CPT | Performed by: NURSE PRACTITIONER

## 2018-08-10 PROCEDURE — G0103 PSA SCREENING: HCPCS | Performed by: NURSE PRACTITIONER

## 2018-08-10 PROCEDURE — 99214 OFFICE O/P EST MOD 30 MIN: CPT | Performed by: NURSE PRACTITIONER

## 2018-08-10 NOTE — MR AVS SNAPSHOT
After Visit Summary   8/10/2018    Dong Joseph    MRN: [id number]           Patient Information     Date Of Birth          1955        Visit Information        Provider Department      8/10/2018 11:20 AM Allison Daniel APRN [facility]        Today's Diagnoses     Pelvic pain in male    -  1    Abdominal pain, generalized        Acute bilateral thoracic back pain          Care Instructions    I will call you with your lab and xray results. Recommend massage therapy for thoracic region.     Thank you            Follow-ups after your visit        Additional Services     MASSAGE THERAPY REFERRAL       Please be aware that coverage of these services is subject to the terms and limitations of your health insurance plan.  Call member services at your health plan with any benefit or coverage questions.      Please bring the following to your appointment:    >>   Any x-rays, CTs or MRIs which have been performed.  Contact the facility where they were done to arrange for  prior to your scheduled appointment.    >>   List of current medications   >>   This referral request   >>   Any documents/labs given to you for this referral                  Your next 10 appointments already scheduled     Aug 13, 2018 11:15 AM CDT   [facility]    [address] [facility] [address]   [phone]            Sep 13, 2018  1:30 PM CDT   LAB with NL LAB [facility] ([facility])    [address] [facility] [address]   [phone]           Please do not eat 10-12 hours before your appointment if you are coming in fasting for labs on lipids, cholesterol, or glucose (sugar). This does not apply to pregnant women. Water, hot tea and black coffee (with nothing added) are okay. Do not drink other fluids, diet soda or chew gum.            Sep 20,  2018 12:30 PM CDT   Return Visit with Froilan Peres MD   [facility]    [address] [facility] [address] [phone]            Nov 07, 2018 12:00 PM CST   (Arrive by 11:45 AM)   Post-Op with Johnnie Ellsworth MD   [facility] ([facility])    [address] [facility] [id number] [phone]              Future tests that were ordered for you today     Open Future Orders        Priority Expected Expires Ordered    XR Abdomen 2 Views Routine 8/10/2018 8/10/2019 8/10/2018            Who to contact     If you have questions or need follow up information about today's clinic visit or your schedule please contact [facility] directly at [phone].  Normal or non-critical lab and imaging results will be communicated to you by MyChart, letter or phone within 4 business days after the clinic has received the results. If you do not hear from us within 7 days, please contact the clinic through [employer] or phone. If you have a critical or abnormal lab result, we will notify you by phone as soon as possible.  Submit refill requests through [employer] or call your pharmacy and they will forward the refill request to us. Please allow 3 business days for your refill to be completed.          Additional Information About Your Visit        MyChart Information     [employer] gives you secure access to your electronic health record. If you see a primary care provider, you can also send messages to your care team and make appointments. If you have questions, please call your primary care clinic.  If you do not have a primary care provider, please call [phone] and they will assist you.        Care EveryWhere ID     This is your Care EveryWhere ID. This could be used by other organizations to access your [place] medical records  [id number]        Your Vitals Were     Pulse Temperature  "Respirations Height BMI (Body Mass Index)       78 97.7  F (36.5  C) (Oral) 16 5' 10\" (1.778 m) 21.52 kg/m2        Blood Pressure from Last 3 Encounters:   08/10/18 118/70   05/24/18 132/64   05/14/18 106/68    Weight from Last 3 Encounters:   08/10/18 150 lb (68 kg)   08/01/18 156 lb (70.8 kg)   05/24/18 158 lb 11.2 oz (72 kg)              We Performed the Following     *UA reflex to Microscopic and Culture ([facility]] (except [place] and [place])     MASSAGE THERAPY REFERRAL     PSA, screen        Primary Care Provider Office Phone # Fax #    Naomi Kuo PA-C [phone] [address] [place] [address]        Equal Access to Services     [facility]: Hadii emily payne hadasho Soomaali, waaxda luqadaha, qaybta kaalmada adedovyaarnel, tejinder camacho . So [facility] [phone].    ATENCIÓN: Si habla español, tiene a gonzalez disposición servicios gratuitos de asistencia lingüística. Naima al [phone].    We comply with applicable federal civil rights laws and [place] laws. We do not discriminate on the basis of race, color, national origin, age, disability, sex, sexual orientation, or gender identity.            Thank you!     Thank you for choosing [facility]  for your care. Our goal is always to provide you with excellent care. Hearing back from our patients is one way we can continue to improve our services. Please take a few minutes to complete the written survey that you may receive in the mail after your visit with us. Thank you!             Your Updated Medication List - Protect others around you: Learn how to safely use, store and throw away your medicines at www.disposemymeds.org.          This list is accurate as of 8/10/18 12:10 PM.  Always use your most recent med list.                   Brand Name Dispense Instructions for use Diagnosis    aspirin 81 MG tablet     90 tablet    Take 1 tablet by mouth daily.    Pure " hypercholesterolemia       atorvastatin 20 MG tablet    LIPITOR    90 tablet    Take 1 tablet (20 mg) by mouth At Bedtime    Pure hypercholesterolemia, Hyperlipidemia LDL goal <130       TYLENOL PO      Take 1,000 mg by mouth

## 2018-08-10 NOTE — TELEPHONE ENCOUNTER
----- Message from KASIA Walker CNP sent at 8/10/2018  2:27 PM CDT -----  Please advise Dong Joseph,  1955, that his lab results were normal urine and negative xray no indication of renal stone or bowel abnormality.  lab results were normal prostate antigen no indication of inflammation of prostate  [phone] (home) [phone] (work)  Thank you  Allison Daniel CNP

## 2018-08-10 NOTE — LETTER
[facility]  [address] [place] [address]  [place] [id number] [phone]        August 14, 2018    Dong Joseph                                                                                                                     [address]  [facility] [address]            Dear Dong,    This letter is to inform you that your lab results were normal and negative. Normal prostate antigen, no indication of inflammation of prostate. X-ray shows no indication of renal stone or bowel abnormality.    Please call us at [phone] if you have further questions.    Sincerely,       [facility]

## 2018-08-10 NOTE — PROGRESS NOTES
Please advise Dong Joseph,  1955, that his lab results were normal urine and negative xray no indication of renal stone or bowel abnormality. Prostate is pending will call when result is final  [phone] (home) [phone] (work)  Thank you  Allison Daniel CNP

## 2018-08-10 NOTE — PATIENT INSTRUCTIONS
I will call you with your lab and xray results. Recommend massage therapy for thoracic region.     Thank you

## 2018-08-13 ENCOUNTER — HOSPITAL ENCOUNTER (OUTPATIENT)
Dept: PHYSICAL THERAPY | Facility: OTHER | Age: 63
Setting detail: THERAPIES SERIES
End: 2018-08-13
Attending: PHYSICIAN ASSISTANT
Payer: COMMERCIAL

## 2018-08-13 PROCEDURE — 97110 THERAPEUTIC EXERCISES: CPT | Mod: GP | Performed by: PHYSICAL THERAPIST

## 2018-08-13 PROCEDURE — 97530 THERAPEUTIC ACTIVITIES: CPT | Mod: GP | Performed by: PHYSICAL THERAPIST

## 2018-08-13 PROCEDURE — 40000718 ZZHC STATISTIC PT DEPARTMENT ORTHO VISIT: Performed by: PHYSICAL THERAPIST

## 2018-08-13 NOTE — PROGRESS NOTES
Please advise Dong Joseph,  1955, that his lab results were normal prostate antigen no indication of inflammation of prostate   [phone] (home) [phone] (work)  Thank you  Allison Daniel CNP

## 2018-08-13 NOTE — TELEPHONE ENCOUNTER
[employer] message has not been read, attempted to contact patient, no answer/no voicemail, try again later  Brittney Mosquera RT (R)

## 2018-08-23 ENCOUNTER — HOSPITAL ENCOUNTER (OUTPATIENT)
Dept: PHYSICAL THERAPY | Facility: OTHER | Age: 63
Setting detail: THERAPIES SERIES
End: 2018-08-23
Attending: PHYSICIAN ASSISTANT
Payer: COMMERCIAL

## 2018-08-23 PROCEDURE — 97530 THERAPEUTIC ACTIVITIES: CPT | Mod: GP | Performed by: PHYSICAL THERAPIST

## 2018-08-23 PROCEDURE — 97110 THERAPEUTIC EXERCISES: CPT | Mod: GP | Performed by: PHYSICAL THERAPIST

## 2018-08-23 PROCEDURE — 40000718 ZZHC STATISTIC PT DEPARTMENT ORTHO VISIT: Performed by: PHYSICAL THERAPIST

## 2018-08-29 ENCOUNTER — HOSPITAL ENCOUNTER (OUTPATIENT)
Dept: PHYSICAL THERAPY | Facility: OTHER | Age: 63
Setting detail: THERAPIES SERIES
End: 2018-08-29
Attending: PHYSICIAN ASSISTANT
Payer: COMMERCIAL

## 2018-08-29 PROCEDURE — 40000718 ZZHC STATISTIC PT DEPARTMENT ORTHO VISIT: Performed by: PHYSICAL THERAPIST

## 2018-08-29 PROCEDURE — 97530 THERAPEUTIC ACTIVITIES: CPT | Mod: GP | Performed by: PHYSICAL THERAPIST

## 2018-08-29 PROCEDURE — 97110 THERAPEUTIC EXERCISES: CPT | Mod: GP | Performed by: PHYSICAL THERAPIST

## 2018-09-05 ENCOUNTER — HOSPITAL ENCOUNTER (OUTPATIENT)
Dept: PHYSICAL THERAPY | Facility: OTHER | Age: 63
Setting detail: THERAPIES SERIES
End: 2018-09-05
Attending: PHYSICIAN ASSISTANT
Payer: COMMERCIAL

## 2018-09-05 PROCEDURE — 97110 THERAPEUTIC EXERCISES: CPT | Mod: GP | Performed by: PHYSICAL THERAPIST

## 2018-09-05 PROCEDURE — 40000718 ZZHC STATISTIC PT DEPARTMENT ORTHO VISIT: Performed by: PHYSICAL THERAPIST

## 2018-09-05 PROCEDURE — 97530 THERAPEUTIC ACTIVITIES: CPT | Mod: GP | Performed by: PHYSICAL THERAPIST

## 2018-09-10 ENCOUNTER — HOSPITAL ENCOUNTER (OUTPATIENT)
Dept: PHYSICAL THERAPY | Facility: OTHER | Age: 63
Setting detail: THERAPIES SERIES
End: 2018-09-10
Attending: PHYSICIAN ASSISTANT
Payer: COMMERCIAL

## 2018-09-10 PROCEDURE — 97530 THERAPEUTIC ACTIVITIES: CPT | Mod: GP | Performed by: PHYSICAL THERAPIST

## 2018-09-10 PROCEDURE — 40000718 ZZHC STATISTIC PT DEPARTMENT ORTHO VISIT: Performed by: PHYSICAL THERAPIST

## 2018-09-10 PROCEDURE — 97110 THERAPEUTIC EXERCISES: CPT | Mod: GP | Performed by: PHYSICAL THERAPIST

## 2018-09-13 DIAGNOSIS — C90.30 PLASMACYTOMA OF BONE (H): ICD-10-CM

## 2018-09-13 LAB
ALBUMIN SERPL-MCNC: 3.8 G/DL (ref 3.4–5)
ALP SERPL-CCNC: 75 U/L (ref 40–150)
ALT SERPL W P-5'-P-CCNC: 20 U/L (ref 0–70)
ANION GAP SERPL CALCULATED.3IONS-SCNC: 7 MMOL/L (ref 3–14)
AST SERPL W P-5'-P-CCNC: 11 U/L (ref 0–45)
BASOPHILS # BLD AUTO: 0 10E9/L (ref 0–0.2)
BASOPHILS NFR BLD AUTO: 1 %
BILIRUB SERPL-MCNC: 0.6 MG/DL (ref 0.2–1.3)
BUN SERPL-MCNC: 17 MG/DL (ref 7–30)
CALCIUM SERPL-MCNC: 8.9 MG/DL (ref 8.5–10.1)
CHLORIDE SERPL-SCNC: 105 MMOL/L (ref 94–109)
CO2 SERPL-SCNC: 29 MMOL/L (ref 20–32)
CREAT SERPL-MCNC: 0.78 MG/DL (ref 0.66–1.25)
DIFFERENTIAL METHOD BLD: ABNORMAL
EOSINOPHIL # BLD AUTO: 0.2 10E9/L (ref 0–0.7)
EOSINOPHIL NFR BLD AUTO: 3.9 %
ERYTHROCYTE [DISTWIDTH] IN BLOOD BY AUTOMATED COUNT: 14.1 % (ref 10–15)
GFR SERPL CREATININE-BSD FRML MDRD: >90 ML/MIN/1.7M2
GLUCOSE SERPL-MCNC: 64 MG/DL (ref 70–99)
HCT VFR BLD AUTO: 40.3 % (ref 40–53)
HGB BLD-MCNC: 13.1 G/DL (ref 13.3–17.7)
LYMPHOCYTES # BLD AUTO: 0.6 10E9/L (ref 0.8–5.3)
LYMPHOCYTES NFR BLD AUTO: 13.7 %
MCH RBC QN AUTO: 29.2 PG (ref 26.5–33)
MCHC RBC AUTO-ENTMCNC: 32.5 G/DL (ref 31.5–36.5)
MCV RBC AUTO: 90 FL (ref 78–100)
MONOCYTES # BLD AUTO: 0.4 10E9/L (ref 0–1.3)
MONOCYTES NFR BLD AUTO: 10.2 %
NEUTROPHILS # BLD AUTO: 2.9 10E9/L (ref 1.6–8.3)
NEUTROPHILS NFR BLD AUTO: 71.2 %
PLATELET # BLD AUTO: 242 10E9/L (ref 150–450)
POTASSIUM SERPL-SCNC: 4.2 MMOL/L (ref 3.4–5.3)
PROT SERPL-MCNC: 7.4 G/DL (ref 6.8–8.8)
RBC # BLD AUTO: 4.48 10E12/L (ref 4.4–5.9)
SODIUM SERPL-SCNC: 141 MMOL/L (ref 133–144)
WBC # BLD AUTO: 4.1 10E9/L (ref 4–11)

## 2018-09-13 PROCEDURE — 83883 ASSAY NEPHELOMETRY NOT SPEC: CPT | Performed by: INTERNAL MEDICINE

## 2018-09-13 PROCEDURE — 85025 COMPLETE CBC W/AUTO DIFF WBC: CPT | Performed by: INTERNAL MEDICINE

## 2018-09-13 PROCEDURE — 84165 PROTEIN E-PHORESIS SERUM: CPT | Performed by: INTERNAL MEDICINE

## 2018-09-13 PROCEDURE — 36415 COLL VENOUS BLD VENIPUNCTURE: CPT | Performed by: INTERNAL MEDICINE

## 2018-09-13 PROCEDURE — 86334 IMMUNOFIX E-PHORESIS SERUM: CPT | Performed by: INTERNAL MEDICINE

## 2018-09-13 PROCEDURE — 82784 ASSAY IGA/IGD/IGG/IGM EACH: CPT | Performed by: INTERNAL MEDICINE

## 2018-09-13 PROCEDURE — 80053 COMPREHEN METABOLIC PANEL: CPT | Performed by: INTERNAL MEDICINE

## 2018-09-14 LAB
ALBUMIN SERPL ELPH-MCNC: 4.1 G/DL (ref 3.7–5.1)
ALPHA1 GLOB SERPL ELPH-MCNC: 0.3 G/DL (ref 0.2–0.4)
ALPHA2 GLOB SERPL ELPH-MCNC: 0.7 G/DL (ref 0.5–0.9)
B-GLOBULIN SERPL ELPH-MCNC: 0.7 G/DL (ref 0.6–1)
GAMMA GLOB SERPL ELPH-MCNC: 1.1 G/DL (ref 0.7–1.6)
IGA SERPL-MCNC: 132 MG/DL (ref 70–380)
IGG SERPL-MCNC: 1120 MG/DL (ref 695–1620)
IGM SERPL-MCNC: 65 MG/DL (ref 60–265)
KAPPA LC UR-MCNC: 1 MG/DL (ref 0.33–1.94)
KAPPA LC/LAMBDA SER: 1.08 {RATIO} (ref 0.26–1.65)
LAMBDA LC SERPL-MCNC: 0.93 MG/DL (ref 0.57–2.63)
M PROTEIN SERPL ELPH-MCNC: 0 G/DL
PROT PATTERN SERPL ELPH-IMP: NORMAL
PROT PATTERN SERPL IFE-IMP: NORMAL

## 2018-09-19 ENCOUNTER — HOSPITAL ENCOUNTER (OUTPATIENT)
Dept: PHYSICAL THERAPY | Facility: OTHER | Age: 63
Setting detail: THERAPIES SERIES
End: 2018-09-19
Attending: PHYSICIAN ASSISTANT
Payer: COMMERCIAL

## 2018-09-19 PROCEDURE — 97110 THERAPEUTIC EXERCISES: CPT | Mod: GP | Performed by: PHYSICAL THERAPIST

## 2018-09-19 PROCEDURE — 40000718 ZZHC STATISTIC PT DEPARTMENT ORTHO VISIT: Performed by: PHYSICAL THERAPIST

## 2018-09-19 PROCEDURE — 97530 THERAPEUTIC ACTIVITIES: CPT | Mod: GP | Performed by: PHYSICAL THERAPIST

## 2018-09-20 ENCOUNTER — ONCOLOGY VISIT (OUTPATIENT)
Dept: ONCOLOGY | Facility: CLINIC | Age: 63
End: 2018-09-20
Payer: COMMERCIAL

## 2018-09-20 VITALS
DIASTOLIC BLOOD PRESSURE: 66 MMHG | BODY MASS INDEX: 21.73 KG/M2 | RESPIRATION RATE: 18 BRPM | SYSTOLIC BLOOD PRESSURE: 102 MMHG | WEIGHT: 151.8 LBS | HEART RATE: 70 BPM | OXYGEN SATURATION: 99 % | TEMPERATURE: 98 F | HEIGHT: 70 IN

## 2018-09-20 DIAGNOSIS — E85.81 AL AMYLOIDOSIS (H): ICD-10-CM

## 2018-09-20 DIAGNOSIS — C90.30 PLASMACYTOMA OF BONE (H): Primary | ICD-10-CM

## 2018-09-20 PROCEDURE — 99213 OFFICE O/P EST LOW 20 MIN: CPT | Performed by: INTERNAL MEDICINE

## 2018-09-20 ASSESSMENT — PAIN SCALES - GENERAL: PAINLEVEL: NO PAIN (0)

## 2018-09-20 NOTE — MR AVS SNAPSHOT
After Visit Summary   9/20/2018    Dong Joseph    MRN: [id number]           Patient Information     Date Of Birth          1955        Visit Information        Provider Department      9/20/2018 12:30 PM Froilan Peres MD [facility]        Today's Diagnoses     Plasmacytoma of bone (H)    -  1    AL amyloidosis          Care Instructions      Please follow up with Dr. Peres in 4 months.  Please schedule labs week prior to follow up appointment.    Lab Date/Time:    Follow Up Date/Time:     If you have any questions or concerns please feel free to call.    If you need to reschedule please call:  Clinic or Lab Appointment - [phone]  Infusion - [phone]  Imaging - [phone]    Ravin Gandhi RN, BSN, OCN  [facility]   Oncology/Hematology Care Coordinator RN  [facility]  [phone]              Follow-ups after your visit        Your next 10 appointments already scheduled     Sep 26, 2018 10:30 AM CDT   Ortho Treatment with Radha Urena PT   [facility] ([facility]    [address] [place] [address] [phone]            Nov 07, 2018 12:00 PM CST   (Arrive by 11:45 AM)   Post-Op with Johnnie Ellsworth MD   [facility] ([facility] [place] [facility])    [address] [place] [address] [phone]              Who to contact     If you have questions or need follow up information about today's clinic visit or your schedule please contact [facility] directly at [phone].  Normal or non-critical lab and imaging results will be communicated to you by MyChart, letter or phone within 4 business days after the clinic has received the results. If you do not hear from us within 7 days, please contact the clinic through MyChart or phone. If you have a critical or abnormal lab result, we will notify you by phone as soon as  "possible.  Submit refill requests through [employer] or call your pharmacy and they will forward the refill request to us. Please allow 3 business days for your refill to be completed.          Additional Information About Your Visit        [employer] Information     [employer] gives you secure access to your electronic health record. If you see a primary care provider, you can also send messages to your care team and make appointments. If you have questions, please call your primary care clinic.  If you do not have a primary care provider, please call [phone] and they will assist you.        Care EveryWhere ID     This is your Care EveryWhere ID. This could be used by other organizations to access your [place] medical records  [id number]        Your Vitals Were     Pulse Temperature Respirations Height Pulse Oximetry BMI (Body Mass Index)    70 98  F (36.7  C) (Temporal) 18 1.778 m (5' 10\") 99% 21.78 kg/m2       Blood Pressure from Last 3 Encounters:   09/20/18 102/66   08/10/18 118/70   05/24/18 132/64    Weight from Last 3 Encounters:   09/20/18 68.9 kg (151 lb 12.8 oz)   08/10/18 68 kg (150 lb)   08/01/18 70.8 kg (156 lb)              Today, you had the following     No orders found for display       Primary Care Provider Office Phone # Fax #    Naomi Kuo PA-C [phone] [address] [place] [address]        Equal Access to Services     [facility]: Hadii emily ku hadasho Somurray, waaxda luqadaha, qaybta kaalmada reeceyada, tejinder marr. So [facility] [phone].    ATENCIÓN: Si habla español, tiene a gonzalez disposición servicios gratuitos de asistencia lingüística. Llame al [phone].    We comply with applicable federal civil rights laws and [place] laws. We do not discriminate on the basis of race, color, national origin, age, disability, sex, sexual orientation, or gender identity.            Thank you!     Thank you for choosing [facility] " [place]  for your care. Our goal is always to provide you with excellent care. Hearing back from our patients is one way we can continue to improve our services. Please take a few minutes to complete the written survey that you may receive in the mail after your visit with us. Thank you!             Your Updated Medication List - Protect others around you: Learn how to safely use, store and throw away your medicines at www.disposemymeds.org.          This list is accurate as of 9/20/18 12:40 PM.  Always use your most recent med list.                   Brand Name Dispense Instructions for use Diagnosis    aspirin 81 MG tablet     90 tablet    Take 1 tablet by mouth daily.    Pure hypercholesterolemia       atorvastatin 20 MG tablet    LIPITOR    90 tablet    Take 1 tablet (20 mg) by mouth At Bedtime    Pure hypercholesterolemia, Hyperlipidemia LDL goal <130       TYLENOL PO      Take 1,000 mg by mouth

## 2018-09-20 NOTE — PROGRESS NOTES
FOLLOW-UP VISIT NOTE    PATIENT NAME: Dong Joseph MRN # [id number]  DATE OF VISIT: Sep 20, 2018 YOB: 1955    REFERRING PROVIDER: No referring provider defined for this encounter.    CANCER TYPE:Solitary plasmacytoma of bone    ONCOLOGY HISTORY:  62-year-old male who developed right hip pain with radiation into posterior thigh starting 6 months ago and was progressively getting worse. MRI of the lumbar spine showed degenerative changes throughout, no disc hernniation and moderate foraminal stenosis at multiple levels. X ray hip showed a lucent area in the right supra-acetabular region measuring 5.2 cm in maximum diameter without any definite fracture. MRI hip on 01/18/18 showed a destructive lesion in the right acetabulum extending past the margin of the bone into adjacent soft tissues. Met with orthopedic surgery and underwent biopsy of the pelvic lesion which came back positive for plasma cell neoplasm.     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 Bone marrow biopsy shows no morphologic or immunophenotypic evidence for plasma cell neoplasm. Bone survey showed right Iliac lesion with lucent areas in frontal region- likely benign.    - RT completed in 03/2018. However he continued to have significant pain in the hip and underwent curettage of the right acetabular tumor along with right total hip arthroplasty  04/16/18. Surgical pathology showed plasma cell neoplasm as well as AL amyloid. No evidence of sytemic involvement on work up and plan is with continued observation.       SUBJECTIVE     Patient presents for 4  month follow-up with labs. Doing well overall with rectal complaints. Denies bone pain, fatigue, potential weight loss, nausea, vomiting, diarrhea, abdominal pain or any other complaints. Good appetite and energy levels.      PAST MEDICAL  HISTORY     Past Medical History:   Diagnosis Date     Impotence of organic origin 2003     Plasma cell neoplasm 01/25/2018    S/P Needle biopsy of right pelvis bone tumor     Pure hypercholesterolemia 2002    statin started circa 2002         CURRENT OUTPATIENT MEDICATIONS     Current Outpatient Prescriptions   Medication Sig Dispense Refill     Acetaminophen (TYLENOL PO) Take 1,000 mg by mouth       aspirin 81 MG tablet Take 1 tablet by mouth daily. 90 tablet 3     atorvastatin (LIPITOR) 20 MG tablet Take 1 tablet (20 mg) by mouth At Bedtime 90 tablet 3        ALLERGIES   No Known Allergies     REVIEW OF SYSTEMS   As above in the HPI, o/w complete 12-point ROS was negative.     PHYSICAL EXAM   B/P: 136/79, T: 97.8, P: 105, R: 16  SpO2 Readings from Last 4 Encounters:   09/20/18 99%   05/24/18 100%   04/24/18 100%   04/19/18 95%     Wt Readings from Last 3 Encounters:   09/20/18 68.9 kg (151 lb 12.8 oz)   08/10/18 68 kg (150 lb)   08/01/18 70.8 kg (156 lb)     GEN: NAD  EYES:PERRLA  Mouth/ENT: Oropharynx is clear.  NECK: no cervical or supraclavicular lymphadenopathy  LUNGS: clear bilaterally  CV: regular, no murmurs, rubs, or gallops  ABDOMEN: soft, non-tender, non-distended, normal bowel sounds, no hepatosplenomegaly by percussion or palpation  EXT: warm, well perfused, no edema  NEURO: alert  SKIN: no rashes     LABORATORY AND IMAGING STUDIES     Lab Results   Component Value Date    WBC 4.1 09/13/2018     Lab Results   Component Value Date    RBC 4.48 09/13/2018     Lab Results   Component Value Date    HGB 13.1 09/13/2018     Lab Results   Component Value Date    HCT 40.3 09/13/2018     No components found for: MCT  Lab Results   Component Value Date    MCV 90 09/13/2018     Lab Results   Component Value Date    MCH 29.2 09/13/2018     Lab Results   Component Value Date    MCHC 32.5 09/13/2018     Lab Results   Component Value Date    RDW 14.1 09/13/2018     Lab Results   Component Value Date      09/13/2018     Recent Labs   Lab Test  09/13/18   1332  05/14/18   1513   NA  141  140   POTASSIUM  4.2  3.8   CHLORIDE  105  104   CO2  29  27   ANIONGAP  7  9   GLC  64*  88   BUN  17  18   CR  0.78  0.83   CHANCE  8.9  9.6        Component      Latest Ref Rng & Units 9/13/2018   Albumin Fraction      3.7 - 5.1 g/dL 4.1   Alpha 1 Fraction      0.2 - 0.4 g/dL 0.3   Alpha 2 Fraction      0.5 - 0.9 g/dL 0.7   Beta Fraction      0.6 - 1.0 g/dL 0.7   Gamma Fraction      0.7 - 1.6 g/dL 1.1   Monoclonal Peak      0.0 g/dL 0.0   ELP Interpretation:       Essentially normal electrophoretic pattern. No monoclonal protein seen. Pathologic . . .   Immunofixation ELP       No monoclonal protein seen on immunofixation.  Pathological significance requires clinical . . .   IGG      695 - 1620 mg/dL 1120   IGA      70 - 380 mg/dL 132   IGM      60 - 265 mg/dL 65   [place] Free Lt Chain      0.33 - 1.94 mg/dL 1.00   Lambda Free Lt Chain      0.57 - 2.63 mg/dL 0.93   Kappa Lambda Ratio      0.26 - 1.65 1.08         ASSESSMENT AND PLAN     63 year-old male with a large heterogenous right acetabular mass biopsy of which came back positive as plasma cell neoplasm. Further workup negative for anemia, hypercalcemia with electrophoresis/immunofixation showing monoclonal free kappa light chain immunoglobulin in urine with increased kappa free light chain in serum. Bone marrow biopsy shows no morphologic or immunophenotypic evidence for plasma cell neoplasm. Bone survey negative for any additional lytic lesions.      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   S/p RT completed in 03/2018. Followed by curettage of the right acetabular tumor along with right total hip arthroplasty  04/16/18.Surgical pathology showed plasma cell neoplasm as well as AL amyloid. No evidence of systemic involvement on work up and plan is with continued observation.     Repeat protein electrophoresis as well as a free light chain assay negative  last week negative for evidence of monoclonal protein and free light chains along with normal blood counts and renal function/calcium levels.     Clinically doing very well with no active complaints.     Will monitor with labs including CBC, BMP, SPEP, UPEP, free light chains every 4 months for the first year and annually thereafter.     RT clinic in 4 months for follow up with labs 1 week prior      The patient is ready to learn, no apparent learning barriers were identified, Diagnosis and treatment plans were explained to the patient. The patient expressed understanding of the content. The patient questions were answered to his satisfaction.     Chart documentation with Dragon Voice recognition Software. Although reviewed after completion, some words and grammatical errors may remain.  Froilan Peres MD  Attending Physician   Hematology/Medical Oncology

## 2018-09-20 NOTE — Clinical Note
"    9/20/2018         RE: Dong Joseph  [address]  [facility] [address]        Dear Colleague,    Thank you for referring your patient, Dong Joseph, to the [facility]. Please see a copy of my visit note below.    Oncology Rooming Note    September 20, 2018 12:25 PM   Dong Joseph is a 63 year old male who presents for:    Chief Complaint   Patient presents with     Oncology Clinic Visit     4 month follow up-Solitary plasmacytoma of the bone     Results     labs completd 9/13/8     Initial Vitals: /66 (BP Location: Right arm, Patient Position: Sitting, Cuff Size: Adult Large)  Pulse 70  Temp 98  F (36.7  C) (Temporal)  Resp 18  Ht 1.778 m (5' 10\")  Wt 68.9 kg (151 lb 12.8 oz)  SpO2 99%  BMI 21.78 kg/m2 Estimated body mass index is 21.78 kg/(m^2) as calculated from the following:    Height as of this encounter: 1.778 m (5' 10\").    Weight as of this encounter: 68.9 kg (151 lb 12.8 oz). Body surface area is 1.84 meters squared.  No Pain (0) Comment: Data Unavailable   No LMP for male patient.  Allergies reviewed: Yes  Medications reviewed: Yes    Medications: Medication refills not needed today.  Pharmacy name entered into [email or web address]:      [facility] [address] [place] [address] [facility] [address] AT [facility] OF  & MAIN      10 minutes for nursing intake (face to face time)     Esmer Bravo CMA                    Oncology Symptom Checklist    Unusual Bruising/Bleeding: No  Skin issues or swelling: No Concerns  Fever/Chills:  No  Nausea/Vomiting: No  Hard or Loose stools: No  SOB/Respiratory(Cough): No Concerns  Mouth Sores: No  Able to drink 6 to 8 glasses of fluid in a day: No Concerns  Weight loss:  No Concerns  Weakness: Yes (Please explain): Has not fallen  Fatigue: No  Light Headed or Dizzy: No  Cognitive Disturbance-Memory: No  Neuropathy/Numbness&Tingling-Hands/Feet: No  Night Sweats:  No  Sleep Concerns: No Concerns    Clinical concerns: See above. Concerns " reviewed with Dr. Ja Bravo, [facility]              FOLLOW-UP VISIT NOTE    PATIENT NAME: Dong Joseph MRN # [id number]  DATE OF VISIT: Sep 20, 2018 YOB: 1955    REFERRING PROVIDER: No referring provider defined for this encounter.    CANCER TYPE:Solitary plasmacytoma of bone    ONCOLOGY HISTORY:  62-year-old male who developed right hip pain with radiation into posterior thigh starting 6 months ago and was progressively getting worse. MRI of the lumbar spine showed degenerative changes throughout, no disc hernniation and moderate foraminal stenosis at multiple levels. X ray hip showed a lucent area in the right supra-acetabular region measuring 5.2 cm in maximum diameter without any definite fracture. MRI hip on 01/18/18 showed a destructive lesion in the right acetabulum extending past the margin of the bone into adjacent soft tissues. Met with orthopedic surgery and underwent biopsy of the pelvic lesion which came back positive for plasma cell neoplasm.     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 Bone marrow biopsy shows no morphologic or immunophenotypic evidence for plasma cell neoplasm. Bone survey showed right Iliac lesion with lucent areas in frontal region- likely benign.    - RT completed in 03/2018. However he continued to have significant pain in the hip and underwent curettage of the right acetabular tumor along with right total hip arthroplasty  04/16/18. Surgical pathology showed plasma cell neoplasm as well as AL amyloid. No evidence of sytemic involvement on work up and plan is with continued observation.       SUBJECTIVE     Patient presents for 4  month follow-up with labs. Doing well overall with rectal complaints. Denies bone pain, fatigue, potential weight loss, nausea, vomiting, diarrhea, abdominal pain or any other  complaints. Good appetite and energy levels.      PAST MEDICAL HISTORY     Past Medical History:   Diagnosis Date     Impotence of organic origin 2003     Plasma cell neoplasm 01/25/2018    S/P Needle biopsy of right pelvis bone tumor     Pure hypercholesterolemia 2002    statin started circa 2002         CURRENT OUTPATIENT MEDICATIONS     Current Outpatient Prescriptions   Medication Sig Dispense Refill     Acetaminophen (TYLENOL PO) Take 1,000 mg by mouth       aspirin 81 MG tablet Take 1 tablet by mouth daily. 90 tablet 3     atorvastatin (LIPITOR) 20 MG tablet Take 1 tablet (20 mg) by mouth At Bedtime 90 tablet 3        ALLERGIES   No Known Allergies     REVIEW OF SYSTEMS   As above in the HPI, o/w complete 12-point ROS was negative.     PHYSICAL EXAM   B/P: 136/79, T: 97.8, P: 105, R: 16  SpO2 Readings from Last 4 Encounters:   09/20/18 99%   05/24/18 100%   04/24/18 100%   04/19/18 95%     Wt Readings from Last 3 Encounters:   09/20/18 68.9 kg (151 lb 12.8 oz)   08/10/18 68 kg (150 lb)   08/01/18 70.8 kg (156 lb)     GEN: NAD  EYES:PERRLA  Mouth/ENT: Oropharynx is clear.  NECK: no cervical or supraclavicular lymphadenopathy  LUNGS: clear bilaterally  CV: regular, no murmurs, rubs, or gallops  ABDOMEN: soft, non-tender, non-distended, normal bowel sounds, no hepatosplenomegaly by percussion or palpation  EXT: warm, well perfused, no edema  NEURO: alert  SKIN: no rashes     LABORATORY AND IMAGING STUDIES     Lab Results   Component Value Date    WBC 4.1 09/13/2018     Lab Results   Component Value Date    RBC 4.48 09/13/2018     Lab Results   Component Value Date    HGB 13.1 09/13/2018     Lab Results   Component Value Date    HCT 40.3 09/13/2018     No components found for: MCT  Lab Results   Component Value Date    MCV 90 09/13/2018     Lab Results   Component Value Date    MCH 29.2 09/13/2018     Lab Results   Component Value Date    MCHC 32.5 09/13/2018     Lab Results   Component Value Date    RDW 14.1  09/13/2018     Lab Results   Component Value Date     09/13/2018     Recent Labs   Lab Test  09/13/18   1332  05/14/18   1513   NA  141  140   POTASSIUM  4.2  3.8   CHLORIDE  105  104   CO2  29  27   ANIONGAP  7  9   GLC  64*  88   BUN  17  18   CR  0.78  0.83   CHANCE  8.9  9.6        Component      Latest Ref Rng & Units 9/13/2018   Albumin Fraction      3.7 - 5.1 g/dL 4.1   Alpha 1 Fraction      0.2 - 0.4 g/dL 0.3   Alpha 2 Fraction      0.5 - 0.9 g/dL 0.7   Beta Fraction      0.6 - 1.0 g/dL 0.7   Gamma Fraction      0.7 - 1.6 g/dL 1.1   Monoclonal Peak      0.0 g/dL 0.0   ELP Interpretation:       Essentially normal electrophoretic pattern. No monoclonal protein seen. Pathologic . . .   Immunofixation ELP       No monoclonal protein seen on immunofixation.  Pathological significance requires clinical . . .   IGG      695 - 1620 mg/dL 1120   IGA      70 - 380 mg/dL 132   IGM      60 - 265 mg/dL 65   [place] Free Lt Chain      0.33 - 1.94 mg/dL 1.00   Lambda Free Lt Chain      0.57 - 2.63 mg/dL 0.93   Kappa Lambda Ratio      0.26 - 1.65 1.08         ASSESSMENT AND PLAN     63 year-old male with a large heterogenous right acetabular mass biopsy of which came back positive as plasma cell neoplasm. Further workup negative for anemia, hypercalcemia with electrophoresis/immunofixation showing monoclonal free kappa light chain immunoglobulin in urine with increased kappa free light chain in serum. Bone marrow biopsy shows no morphologic or immunophenotypic evidence for plasma cell neoplasm. Bone survey negative for any additional lytic lesions.      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   S/p RT completed in 03/2018. Followed by curettage of the right acetabular tumor along with right total hip arthroplasty  04/16/18.Surgical pathology showed plasma cell neoplasm as well as AL amyloid. No evidence of systemic involvement on work up and plan is with continued observation.     Repeat protein  electrophoresis as well as a free light chain assay negative last week negative for evidence of monoclonal protein and free light chains along with normal blood counts and renal function/calcium levels.     Clinically doing very well with no active complaints.     Will monitor with labs including CBC, BMP, SPEP, UPEP, free light chains every 4 months for the first year and annually thereafter.     RT clinic in 4 months for follow up with labs 1 week prior      The patient is ready to learn, no apparent learning barriers were identified, Diagnosis and treatment plans were explained to the patient. The patient expressed understanding of the content. The patient questions were answered to his satisfaction.     Chart documentation with Dragon Voice recognition Software. Although reviewed after completion, some words and grammatical errors may remain.  Froilan Peres MD  Attending Physician   Hematology/Medical Oncology    Again, thank you for allowing me to participate in the care of your patient.        Sincerely,        Froilan Peres MD

## 2018-09-20 NOTE — PROGRESS NOTES
Oncology Symptom Checklist    Unusual Bruising/Bleeding: No  Skin issues or swelling: No Concerns  Fever/Chills:  No  Nausea/Vomiting: No  Hard or Loose stools: No  SOB/Respiratory(Cough): No Concerns  Mouth Sores: No  Able to drink 6 to 8 glasses of fluid in a day: No Concerns  Weight loss:  No Concerns  Weakness: Yes (Please explain): Has not fallen  Fatigue: No  Light Headed or Dizzy: No  Cognitive Disturbance-Memory: No  Neuropathy/Numbness&Tingling-Hands/Feet: No  Night Sweats:  No  Sleep Concerns: No Concerns    Clinical concerns: See above. Concerns reviewed with Dr. Ja Bravo, [facility]

## 2018-09-20 NOTE — NURSING NOTE
DISCHARGE PLAN:  Next appointments: See patient instruction section  Departure Mode: Ambulatory  Accompanied by: self  3 minutes for nursing discharge (face to face time)     Dong ANDERSON Bazine is here today for follow up.  Writing nurse seen patient after Medical Oncology appointment to address questions/concerns/coordinate care. Patient will follow up again in 4 months with labs week prior.  Patient ambulated by nurse to  to schedule follow up and/or lab appointments.  Imaging scheduled if ordered.  See patient instructions and Oncologist's Progress note for further details. Questions and concerns addressed to patient's satisfaction. Patient verbalized and demonstrated understanding of plan.  Contact information provided and patient is encouraged to call with any that arise in the interim of care.    Ravin Gandhi, RN, BSN, OCN   Oncology Care Coordinator RN  [facility]  [phone]  9/20/2018, 12:54 PM

## 2018-09-20 NOTE — PATIENT INSTRUCTIONS
Please follow up with Dr. Peres in 4 months.  Please schedule labs week prior to follow up appointment.    Lab Date/Time:    Follow Up Date/Time:     If you have any questions or concerns please feel free to call.    If you need to reschedule please call:  Clinic or Lab Appointment - [phone]  Infusion - [phone]  Imaging - [phone]    Ravin Gandhi RN, BSN, OCN  [facility]   Oncology/Hematology Care Coordinator RN  [facility]  [phone]

## 2018-09-20 NOTE — PROGRESS NOTES
"Oncology Rooming Note    September 20, 2018 12:25 PM   Dong Joseph is a 63 year old male who presents for:    Chief Complaint   Patient presents with     Oncology Clinic Visit     4 month follow up-Solitary plasmacytoma of the bone     Results     labs completd 9/13/8     Initial Vitals: /66 (BP Location: Right arm, Patient Position: Sitting, Cuff Size: Adult Large)  Pulse 70  Temp 98  F (36.7  C) (Temporal)  Resp 18  Ht 1.778 m (5' 10\")  Wt 68.9 kg (151 lb 12.8 oz)  SpO2 99%  BMI 21.78 kg/m2 Estimated body mass index is 21.78 kg/(m^2) as calculated from the following:    Height as of this encounter: 1.778 m (5' 10\").    Weight as of this encounter: 68.9 kg (151 lb 12.8 oz). Body surface area is 1.84 meters squared.  No Pain (0) Comment: Data Unavailable   No LMP for male patient.  Allergies reviewed: Yes  Medications reviewed: Yes    Medications: Medication refills not needed today.  Pharmacy name entered into Pro[employer]:      [facility] [address] [place] [address] [address] AT [facility] OF  & MAIN      10 minutes for nursing intake (face to face time)     Esmer Barvo CMA                  "

## 2018-09-26 ENCOUNTER — HOSPITAL ENCOUNTER (OUTPATIENT)
Dept: PHYSICAL THERAPY | Facility: OTHER | Age: 63
Setting detail: THERAPIES SERIES
End: 2018-09-26
Attending: PHYSICIAN ASSISTANT
Payer: COMMERCIAL

## 2018-09-26 PROCEDURE — 40000718 ZZHC STATISTIC PT DEPARTMENT ORTHO VISIT: Performed by: PHYSICAL THERAPIST

## 2018-09-26 PROCEDURE — 97530 THERAPEUTIC ACTIVITIES: CPT | Mod: GP | Performed by: PHYSICAL THERAPIST

## 2018-09-26 PROCEDURE — 97110 THERAPEUTIC EXERCISES: CPT | Mod: GP | Performed by: PHYSICAL THERAPIST

## 2018-11-05 DIAGNOSIS — C90.30 PLASMACYTOMA OF BONE (H): Primary | ICD-10-CM

## 2018-11-07 ENCOUNTER — RADIANT APPOINTMENT (OUTPATIENT)
Dept: GENERAL RADIOLOGY | Facility: CLINIC | Age: 63
End: 2018-11-07
Attending: ORTHOPAEDIC SURGERY
Payer: COMMERCIAL

## 2018-11-07 ENCOUNTER — OFFICE VISIT (OUTPATIENT)
Dept: ORTHOPEDICS | Facility: CLINIC | Age: 63
End: 2018-11-07
Payer: COMMERCIAL

## 2018-11-07 VITALS — BODY MASS INDEX: 21.62 KG/M2 | HEIGHT: 70 IN | WEIGHT: 151 LBS

## 2018-11-07 DIAGNOSIS — Z96.641 H/O TOTAL HIP ARTHROPLASTY, RIGHT: ICD-10-CM

## 2018-11-07 DIAGNOSIS — C90.30 PLASMACYTOMA OF BONE (H): ICD-10-CM

## 2018-11-07 DIAGNOSIS — C90.30 PLASMACYTOMA OF BONE (H): Primary | ICD-10-CM

## 2018-11-07 ASSESSMENT — ENCOUNTER SYMPTOMS
MUSCLE CRAMPS: 1
BACK PAIN: 1
POOR WOUND HEALING: 0
STIFFNESS: 1
SKIN CHANGES: 0
JOINT SWELLING: 0
ARTHRALGIAS: 0
MUSCLE WEAKNESS: 1
NECK PAIN: 0
NAIL CHANGES: 0
MYALGIAS: 1

## 2018-11-07 NOTE — MR AVS SNAPSHOT
After Visit Summary   11/7/2018    Dong Joseph    MRN: [id number]           Patient Information     Date Of Birth          1955        Visit Information        Provider Department      11/7/2018 12:00 PM Johnnie Ellsworth MD [facility]        Today's Diagnoses     Plasmacytoma of bone (H)    -  1    H/O total hip arthroplasty, right           Follow-ups after your visit        Your next 10 appointments already scheduled     Jan 17, 2019  1:30 PM CST   LAB with [facility] ([facility])    [address] [facility] [id number] [phone]           Please do not eat 10-12 hours before your appointment if you are coming in fasting for labs on lipids, cholesterol, or glucose (sugar). This does not apply to pregnant women. Water, hot tea and black coffee (with nothing added) are okay. Do not drink other fluids, diet soda or chew gum.            Jan 24, 2019 11:30 AM CST   Return Visit with Froilan Peres MD   [facility]    [address] [place] [id number] [phone]              Who to contact     Please call your clinic at [phone] to:    Ask questions about your health    Make or cancel appointments    Discuss your medicines    Learn about your test results    Speak to your doctor            Additional Information About Your Visit        MyChart Information     [employer] gives you secure access to your electronic health record. If you see a primary care provider, you can also send messages to your care team and make appointments. If you have questions, please call your primary care clinic.  If you do not have a primary care provider, please call [phone] and they will assist you.      [employer] is an electronic gateway that provides easy, online access to your medical records. With [employer], you can request a clinic appointment, read your test results, renew a  "prescription or communicate with your care team.     To access your existing account, please contact your [facility] or call [phone] for assistance.        Care EveryWhere ID     This is your Care EveryWhere ID. This could be used by other organizations to access your [place] medical records  [id number]        Your Vitals Were     Height BMI (Body Mass Index)                1.778 m (5' 10\") 21.67 kg/m2           Blood Pressure from Last 3 Encounters:   09/20/18 102/66   08/10/18 118/70   05/24/18 132/64    Weight from Last 3 Encounters:   11/07/18 68.5 kg (151 lb)   09/20/18 68.9 kg (151 lb 12.8 oz)   08/10/18 68 kg (150 lb)              Today, you had the following     No orders found for display       Primary Care Provider Office Phone # Fax #    Naomi Kuo PA-C [phone] [address] [place] [address]        Equal Access to Services     STEPHAN [facility] HospitalALICIA : Hadii aad ku hadasho Soomaali, waaxda luqadaha, qaybta kaalmada adeegyada, waxay idiin haysakina camacho . So [facility] [phone].    ATENCIÓN: Si habla español, tiene a gonzalez disposición servicios gratuitos de asistencia lingüística. Llame al [phone].    We comply with applicable federal civil rights laws and [place] laws. We do not discriminate on the basis of race, color, national origin, age, disability, sex, sexual orientation, or gender identity.            Thank you!     Thank you for choosing [facility]  for your care. Our goal is always to provide you with excellent care. Hearing back from our patients is one way we can continue to improve our services. Please take a few minutes to complete the written survey that you may receive in the mail after your visit with us. Thank you!             Your Updated Medication List - Protect others around you: Learn how to safely use, store and throw away your medicines at www.disposemymeds.org.          This list is " accurate as of 11/7/18 12:10 PM.  Always use your most recent med list.                   Brand Name Dispense Instructions for use Diagnosis    aspirin 81 MG tablet     90 tablet    Take 1 tablet by mouth daily.    Pure hypercholesterolemia       atorvastatin 20 MG tablet    LIPITOR    90 tablet    Take 1 tablet (20 mg) by mouth At Bedtime    Pure hypercholesterolemia, Hyperlipidemia LDL goal <130       TYLENOL PO      Take 1,000 mg by mouth

## 2018-11-07 NOTE — LETTER
11/7/2018       RE: Dong Joseph  [id number]  [place] [address]     Dear Colleague,    Thank you for referring your patient, Dong Joseph, [facility] Please see a copy of my visit note below.    This 63-year-old man is 7 months out from a right hip reconstruction.  He had extensive bone loss from myeloma and now has a total hip.  He has no complaints today and has little if any pain.  I reviewed his patient survey information in the EMR.    On examination he is alert oriented has a normal mood and affect and is in no acute distress.  Respirations are regular and unlabored eyes are nonicteric with equal pupils.  In his right lower extremity he does not have a distal edema.  He walks with a slight limp favoring the right side but can walk at a good pace without an assistive device.    X-rays show that his implant has not subsided and that there are no new lytic lesions in the pelvis.  Everything is stable right now.    This patient is doing extremely well 7 months out from a right pelvis reconstruction with a right total hip.  I would like to see him again in 6 months.  I have answered all his questions.    Again, thank you for allowing me to participate in the care of your patient.      Sincerely,    Johnnie Ellsworth MD

## 2018-11-07 NOTE — PROGRESS NOTES
This 63-year-old man is 7 months out from a right hip reconstruction.  He had extensive bone loss from myeloma and now has a total hip.  He has no complaints today and has little if any pain.  I reviewed his patient survey information in the EMR.    On examination he is alert oriented has a normal mood and affect and is in no acute distress.  Respirations are regular and unlabored eyes are nonicteric with equal pupils.  In his right lower extremity he does not have a distal edema.  He walks with a slight limp favoring the right side but can walk at a good pace without an assistive device.    X-rays show that his implant has not subsided and that there are no new lytic lesions in the pelvis.  Everything is stable right now.    This patient is doing extremely well 7 months out from a right pelvis reconstruction with a right total hip.  I would like to see him again in 6 months.  I have answered all his questions.

## 2018-12-13 NOTE — ADDENDUM NOTE
Encounter addended by: Radha Urena, PT on: 12/13/2018 3:44 PM   Actions taken: Episode resolved, Sign clinical note

## 2019-01-17 DIAGNOSIS — C90.30 PLASMACYTOMA OF BONE (H): ICD-10-CM

## 2019-01-17 DIAGNOSIS — E85.81 AL AMYLOIDOSIS (H): ICD-10-CM

## 2019-01-17 LAB
ANION GAP SERPL CALCULATED.3IONS-SCNC: 5 MMOL/L (ref 3–14)
BUN SERPL-MCNC: 17 MG/DL (ref 7–30)
CALCIUM SERPL-MCNC: 9.1 MG/DL (ref 8.5–10.1)
CHLORIDE SERPL-SCNC: 105 MMOL/L (ref 94–109)
CO2 SERPL-SCNC: 31 MMOL/L (ref 20–32)
CREAT SERPL-MCNC: 0.84 MG/DL (ref 0.66–1.25)
ERYTHROCYTE [DISTWIDTH] IN BLOOD BY AUTOMATED COUNT: 13.3 % (ref 10–15)
GFR SERPL CREATININE-BSD FRML MDRD: >90 ML/MIN/{1.73_M2}
GLUCOSE SERPL-MCNC: 89 MG/DL (ref 70–99)
HCT VFR BLD AUTO: 43 % (ref 40–53)
HGB BLD-MCNC: 14.4 G/DL (ref 13.3–17.7)
MCH RBC QN AUTO: 30.4 PG (ref 26.5–33)
MCHC RBC AUTO-ENTMCNC: 33.5 G/DL (ref 31.5–36.5)
MCV RBC AUTO: 91 FL (ref 78–100)
PLATELET # BLD AUTO: 224 10E9/L (ref 150–450)
POTASSIUM SERPL-SCNC: 4.3 MMOL/L (ref 3.4–5.3)
RBC # BLD AUTO: 4.74 10E12/L (ref 4.4–5.9)
SODIUM SERPL-SCNC: 141 MMOL/L (ref 133–144)
WBC # BLD AUTO: 7 10E9/L (ref 4–11)

## 2019-01-17 PROCEDURE — 36415 COLL VENOUS BLD VENIPUNCTURE: CPT | Performed by: INTERNAL MEDICINE

## 2019-01-17 PROCEDURE — 86334 IMMUNOFIX E-PHORESIS SERUM: CPT | Performed by: INTERNAL MEDICINE

## 2019-01-17 PROCEDURE — 85027 COMPLETE CBC AUTOMATED: CPT | Performed by: INTERNAL MEDICINE

## 2019-01-17 PROCEDURE — 00000402 ZZHCL STATISTIC TOTAL PROTEIN: Performed by: INTERNAL MEDICINE

## 2019-01-17 PROCEDURE — 80048 BASIC METABOLIC PNL TOTAL CA: CPT | Performed by: INTERNAL MEDICINE

## 2019-01-17 PROCEDURE — 84166 PROTEIN E-PHORESIS/URINE/CSF: CPT | Performed by: INTERNAL MEDICINE

## 2019-01-17 PROCEDURE — 82784 ASSAY IGA/IGD/IGG/IGM EACH: CPT | Performed by: INTERNAL MEDICINE

## 2019-01-17 PROCEDURE — 83883 ASSAY NEPHELOMETRY NOT SPEC: CPT | Performed by: INTERNAL MEDICINE

## 2019-01-17 PROCEDURE — 84165 PROTEIN E-PHORESIS SERUM: CPT | Performed by: INTERNAL MEDICINE

## 2019-01-18 LAB
ALBUMIN SERPL ELPH-MCNC: 4.5 G/DL (ref 3.7–5.1)
ALPHA1 GLOB SERPL ELPH-MCNC: 0.3 G/DL (ref 0.2–0.4)
ALPHA2 GLOB SERPL ELPH-MCNC: 0.7 G/DL (ref 0.5–0.9)
B-GLOBULIN SERPL ELPH-MCNC: 0.8 G/DL (ref 0.6–1)
GAMMA GLOB SERPL ELPH-MCNC: 1.2 G/DL (ref 0.7–1.6)
IGA SERPL-MCNC: 153 MG/DL (ref 70–380)
IGG SERPL-MCNC: 1180 MG/DL (ref 695–1620)
IGM SERPL-MCNC: 71 MG/DL (ref 60–265)
KAPPA LC UR-MCNC: 1.03 MG/DL (ref 0.33–1.94)
KAPPA LC/LAMBDA SER: 0.87 {RATIO} (ref 0.26–1.65)
LAMBDA LC SERPL-MCNC: 1.19 MG/DL (ref 0.57–2.63)
M PROTEIN SERPL ELPH-MCNC: 0 G/DL
PROT PATTERN SERPL ELPH-IMP: NORMAL
PROT PATTERN SERPL IFE-IMP: NORMAL
PROT PATTERN UR ELPH-IMP: NORMAL

## 2019-01-30 ENCOUNTER — ONCOLOGY VISIT (OUTPATIENT)
Dept: ONCOLOGY | Facility: CLINIC | Age: 64
End: 2019-01-30
Payer: COMMERCIAL

## 2019-01-30 VITALS
RESPIRATION RATE: 18 BRPM | DIASTOLIC BLOOD PRESSURE: 76 MMHG | OXYGEN SATURATION: 96 % | WEIGHT: 151 LBS | BODY MASS INDEX: 21.62 KG/M2 | TEMPERATURE: 97 F | HEIGHT: 70 IN | HEART RATE: 68 BPM | SYSTOLIC BLOOD PRESSURE: 104 MMHG

## 2019-01-30 DIAGNOSIS — C90.30 PLASMACYTOMA OF BONE (H): Primary | ICD-10-CM

## 2019-01-30 PROCEDURE — 99213 OFFICE O/P EST LOW 20 MIN: CPT | Performed by: INTERNAL MEDICINE

## 2019-01-30 ASSESSMENT — PAIN SCALES - GENERAL: PAINLEVEL: NO PAIN (0)

## 2019-01-30 ASSESSMENT — MIFFLIN-ST. JEOR: SCORE: 1496.18

## 2019-01-30 NOTE — PROGRESS NOTES
FOLLOW-UP VISIT NOTE    PATIENT NAME: Dong Joseph MRN # [id number]  DATE OF VISIT: Jan 30, 2019 YOB: 1955    REFERRING PROVIDER: No referring provider defined for this encounter.    CANCER TYPE:Solitary plasmacytoma of bone    ONCOLOGY HISTORY:  62-year-old male who developed right hip pain with radiation into posterior thigh starting 6 months ago and was progressively getting worse. MRI of the lumbar spine showed degenerative changes throughout, no disc hernniation and moderate foraminal stenosis at multiple levels. X ray hip showed a lucent area in the right supra-acetabular region measuring 5.2 cm in maximum diameter without any definite fracture. MRI hip on 01/18/18 showed a destructive lesion in the right acetabulum extending past the margin of the bone into adjacent soft tissues. Met with orthopedic surgery and underwent biopsy of the pelvic lesion which came back positive for plasma cell neoplasm.     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 Bone marrow biopsy shows no morphologic or immunophenotypic evidence for plasma cell neoplasm. Bone survey showed right Iliac lesion with lucent areas in frontal region- likely benign.    - RT completed in 03/2018. However he continued to have significant pain in the hip and underwent curettage of the right acetabular tumor along with right total hip arthroplasty  04/16/18. Surgical pathology showed plasma cell neoplasm as well as AL amyloid. No evidence of sytemic involvement on work up and plan is with continued observation.       SUBJECTIVE     Patient presents for 4  month follow-up with labs. Doing well overall with rectal complaints. Denies bone pain, fatigue, potential weight loss, nausea, vomiting, diarrhea, abdominal pain or any other complaints. Good appetite and energy levels.      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 90 tablet 3     atorvastatin (LIPITOR) 20 MG tablet Take 1 tablet (20 mg) by mouth At Bedtime 90 tablet 3        ALLERGIES   No Known Allergies     REVIEW OF SYSTEMS   As above in the HPI, o/w complete 12-point ROS was negative.     PHYSICAL EXAM   B/P: 136/79, T: 97.8, P: 105, R: 16  SpO2 Readings from Last 4 Encounters:   09/20/18 99%   05/24/18 100%   04/24/18 100%   04/19/18 95%     Wt Readings from Last 3 Encounters:   01/30/19 68.5 kg (151 lb)   11/07/18 68.5 kg (151 lb)   09/20/18 68.9 kg (151 lb 12.8 oz)     GEN: NAD  Mouth/ENT: Oropharynx is clear.  NECK: no lymphadenopathy  LUNGS: clear   CV: regular  ABDOMEN: soft, non-tender, non-distended  EXT: warm, well perfused, no edema  NEURO: alert  SKIN: no rashes     LABORATORY AND IMAGING STUDIES     Lab Results   Component Value Date    WBC 7.0 01/17/2019     Lab Results   Component Value Date    RBC 4.74 01/17/2019     Lab Results   Component Value Date    HGB 14.4 01/17/2019     Lab Results   Component Value Date    HCT 43.0 01/17/2019     No components found for: MCT  Lab Results   Component Value Date    MCV 91 01/17/2019     Lab Results   Component Value Date    MCH 30.4 01/17/2019     Lab Results   Component Value Date    MCHC 33.5 01/17/2019     Lab Results   Component Value Date    RDW 13.3 01/17/2019     Lab Results   Component Value Date     01/17/2019     Recent Labs   Lab Test 01/17/19  1333 09/13/18  1332    141   POTASSIUM 4.3 4.2   CHLORIDE 105 105   CO2 31 29   ANIONGAP 5 7   GLC 89 64*    BUN 17 17   CR 0.84 0.78   CHANCE 9.1 8.9     Component      Latest Ref Rng & Units 1/17/2019   Albumin Fraction      3.7 - 5.1 g/dL 4.5   Alpha 1 Fraction      0.2 - 0.4 g/dL 0.3   Alpha 2 Fraction      0.5 - 0.9 g/dL 0.7   Beta Fraction      0.6 - 1.0 g/dL 0.8   Gamma Fraction      0.7 - 1.6 g/dL 1.2   Monoclonal Peak      0.0 g/dL 0.0   ELP Interpretation:       Essentially normal electrophoretic pattern. No monoclonal protein seen. Pathologic . . .   Immunofixation ELP       No monoclonal protein seen on immunofixation.  Pathological significance requires clinical . . .   IGG      695 - 1,620 mg/dL 1,180   IGA      70 - 380 mg/dL 153   IGM      60 - 265 mg/dL 71   [place] Free Lt Chain      0.33 - 1.94 mg/dL 1.03   Lambda Free Lt Chain      0.57 - 2.63 mg/dL 1.19   Kappa Lambda Ratio      0.26 - 1.65 0.87   ELP Interpretation Urine       Only trace albumin and trace globulins. No obvious monoclonal protein seen. Pathological . . .         ASSESSMENT AND PLAN     63 year-old male with a large heterogenous right acetabular mass biopsy of which came back positive as plasma cell neoplasm. Further workup negative for anemia, hypercalcemia with electrophoresis/immunofixation showing monoclonal free kappa light chain immunoglobulin in urine with increased kappa free light chain in serum. Bone marrow biopsy shows no morphologic or immunophenotypic evidence for plasma cell neoplasm. Bone survey negative for any additional lytic lesions.      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   S/p RT completed in 03/2018. Followed by curettage of the right acetabular tumor along with right total hip arthroplasty  04/16/18.Surgical pathology showed plasma cell neoplasm as well as AL amyloid. No evidence of systemic involvement on work up and plan is with continued observation.     Repeat protein electrophoresis as well as a free light chain assay negative last week negative for evidence of monoclonal protein and  free light chains along with normal blood counts and renal function/calcium levels.     Clinically doing very well with no active complaints.     Will monitor with labs including CBC, BMP, SPEP, free light chains      RT clinic in 6 months for follow up with labs 1 week prior      The patient is ready to learn, no apparent learning barriers were identified, Diagnosis and treatment plans were explained to the patient. The patient expressed understanding of the content. The patient questions were answered to his satisfaction.     Chart documentation with Dragon Voice recognition Software. Although reviewed after completion, some words and grammatical errors may remain.  Froilan Peres MD  Attending Physician   Hematology/Medical Oncology

## 2019-01-30 NOTE — PROGRESS NOTES
"Oncology Rooming Note    January 30, 2019 10:54 AM   Dong Joseph is a 63 year old male who presents for:    Chief Complaint   Patient presents with     Chemotherapy     plasmacytoma of bone     Results     labs done on 01/17/19     Initial Vitals: /76   Pulse 68   Temp 97  F (36.1  C) (Temporal)   Resp 18   Ht 1.778 m (5' 10\")   Wt 68.5 kg (151 lb)   SpO2 96%   BMI 21.67 kg/m   Estimated body mass index is 21.67 kg/m  as calculated from the following:    Height as of this encounter: 1.778 m (5' 10\").    Weight as of this encounter: 68.5 kg (151 lb). Body surface area is 1.84 meters squared.  No Pain (0) Comment: Data Unavailable   No LMP for male patient.  Allergies reviewed: No  Medications reviewed: Yes    Medications: Medication refills not needed today.  Pharmacy name entered into EPIC:    [facility] - [place], [place] - [address] [address]  [facility] [place] [address] [facility] [address] [facility] - [place], [place] - [address] [place] [address] DRUG STORE [facility] - [place], [place] - [address] AT [facility] OF  & MAIN      5 minutes for nursing intake (face to face time)     Stan DC CMA                 "

## 2019-01-30 NOTE — Clinical Note
"    1/30/2019         RE: Dong Joseph  [address]  [place] [address]        Dear Colleague,    Thank you for referring your patient, Dong Joseph, to the [facility]. Please see a copy of my visit note below.    Oncology Rooming Note    January 30, 2019 10:54 AM   Dong Joseph is a 63 year old male who presents for:    Chief Complaint   Patient presents with     Chemotherapy     plasmacytoma of bone     Results     labs done on 01/17/19     Initial Vitals: /76   Pulse 68   Temp 97  F (36.1  C) (Temporal)   Resp 18   Ht 1.778 m (5' 10\")   Wt 68.5 kg (151 lb)   SpO2 96%   BMI 21.67 kg/m    Estimated body mass index is 21.67 kg/m  as calculated from the following:    Height as of this encounter: 1.778 m (5' 10\").    Weight as of this encounter: 68.5 kg (151 lb). Body surface area is 1.84 meters squared.  No Pain (0) Comment: Data Unavailable   No LMP for male patient.  Allergies reviewed: No  Medications reviewed: Yes    Medications: Medication refills not needed today.  Pharmacy name entered into EPIC:    [facility] - [place], [place] - [address] [facility] [address]  [facility] [place] [address] [address] [facility] - [place], [place] [address]  [facility]UG STORE [address] [place] [address] [address] AT [facility] OF  & MAIN      5 minutes for nursing intake (face to face time)     Stan DC CMA                     FOLLOW-UP VISIT NOTE    PATIENT NAME: Dong Joseph MRN # [id number]  DATE OF VISIT: Jan 30, 2019 YOB: 1955    REFERRING PROVIDER: No referring provider defined for this encounter.    CANCER TYPE:Solitary plasmacytoma of bone    ONCOLOGY HISTORY:  62-year-old male who developed right hip pain with radiation into posterior thigh starting 6 months ago and was progressively getting worse. MRI of the lumbar spine showed degenerative changes throughout, no disc hernniation and " moderate foraminal stenosis at multiple levels. X ray hip showed a lucent area in the right supra-acetabular region measuring 5.2 cm in maximum diameter without any definite fracture. MRI hip on 01/18/18 showed a destructive lesion in the right acetabulum extending past the margin of the bone into adjacent soft tissues. Met with orthopedic surgery and underwent biopsy of the pelvic lesion which came back positive for plasma cell neoplasm.     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 Bone marrow biopsy shows no morphologic or immunophenotypic evidence for plasma cell neoplasm. Bone survey showed right Iliac lesion with lucent areas in frontal region- likely benign.    - RT completed in 03/2018. However he continued to have significant pain in the hip and underwent curettage of the right acetabular tumor along with right total hip arthroplasty  04/16/18. Surgical pathology showed plasma cell neoplasm as well as AL amyloid. No evidence of sytemic involvement on work up and plan is with continued observation.       SUBJECTIVE     Patient presents for 4  month follow-up with labs. Doing well overall with rectal complaints. Denies bone pain, fatigue, potential weight loss, nausea, vomiting, diarrhea, abdominal pain or any other complaints. Good appetite and energy levels.      PAST MEDICAL HISTORY     Past Medical History:   Diagnosis Date     Impotence of organic origin 2003     Plasma cell neoplasm 01/25/2018    S/P Needle biopsy of right pelvis bone tumor     Pure hypercholesterolemia 2002    statin started circa 2002         CURRENT OUTPATIENT MEDICATIONS     Current Outpatient Medications   Medication Sig Dispense Refill     Acetaminophen (TYLENOL PO) Take 1,000 mg by mouth       aspirin 81 MG tablet Take 1 tablet by mouth daily. 90 tablet 3     atorvastatin (LIPITOR)  20 MG tablet Take 1 tablet (20 mg) by mouth At Bedtime 90 tablet 3        ALLERGIES   No Known Allergies     REVIEW OF SYSTEMS   As above in the HPI, o/w complete 12-point ROS was negative.     PHYSICAL EXAM   B/P: 136/79, T: 97.8, P: 105, R: 16  SpO2 Readings from Last 4 Encounters:   09/20/18 99%   05/24/18 100%   04/24/18 100%   04/19/18 95%     Wt Readings from Last 3 Encounters:   01/30/19 68.5 kg (151 lb)   11/07/18 68.5 kg (151 lb)   09/20/18 68.9 kg (151 lb 12.8 oz)     GEN: NAD  Mouth/ENT: Oropharynx is clear.  NECK: no lymphadenopathy  LUNGS: clear   CV: regular  ABDOMEN: soft, non-tender, non-distended  EXT: warm, well perfused, no edema  NEURO: alert  SKIN: no rashes     LABORATORY AND IMAGING STUDIES     Lab Results   Component Value Date    WBC 7.0 01/17/2019     Lab Results   Component Value Date    RBC 4.74 01/17/2019     Lab Results   Component Value Date    HGB 14.4 01/17/2019     Lab Results   Component Value Date    HCT 43.0 01/17/2019     No components found for: MCT  Lab Results   Component Value Date    MCV 91 01/17/2019     Lab Results   Component Value Date    MCH 30.4 01/17/2019     Lab Results   Component Value Date    MCHC 33.5 01/17/2019     Lab Results   Component Value Date    RDW 13.3 01/17/2019     Lab Results   Component Value Date     01/17/2019     Recent Labs   Lab Test 01/17/19  1333 09/13/18  1332    141   POTASSIUM 4.3 4.2   CHLORIDE 105 105   CO2 31 29   ANIONGAP 5 7   GLC 89 64*   BUN 17 17   CR 0.84 0.78   CHANCE 9.1 8.9     Component      Latest Ref Rng & Units 1/17/2019   Albumin Fraction      3.7 - 5.1 g/dL 4.5   Alpha 1 Fraction      0.2 - 0.4 g/dL 0.3   Alpha 2 Fraction      0.5 - 0.9 g/dL 0.7   Beta Fraction      0.6 - 1.0 g/dL 0.8   Gamma Fraction      0.7 - 1.6 g/dL 1.2   Monoclonal Peak      0.0 g/dL 0.0   ELP Interpretation:       Essentially normal electrophoretic pattern. No monoclonal protein seen. Pathologic . . .   Immunofixation ELP       No  monoclonal protein seen on immunofixation.  Pathological significance requires clinical . . .   IGG      695 - 1,620 mg/dL 1,180   IGA      70 - 380 mg/dL 153   IGM      60 - 265 mg/dL 71   [place] Free Lt Chain      0.33 - 1.94 mg/dL 1.03   Lambda Free Lt Chain      0.57 - 2.63 mg/dL 1.19   Kappa Lambda Ratio      0.26 - 1.65 0.87   ELP Interpretation Urine       Only trace albumin and trace globulins. No obvious monoclonal protein seen. Pathological . . .         ASSESSMENT AND PLAN     63 year-old male with a large heterogenous right acetabular mass biopsy of which came back positive as plasma cell neoplasm. Further workup negative for anemia, hypercalcemia with electrophoresis/immunofixation showing monoclonal free kappa light chain immunoglobulin in urine with increased kappa free light chain in serum. Bone marrow biopsy shows no morphologic or immunophenotypic evidence for plasma cell neoplasm. Bone survey negative for any additional lytic lesions.      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   S/p RT completed in 03/2018. Followed by curettage of the right acetabular tumor along with right total hip arthroplasty  04/16/18.Surgical pathology showed plasma cell neoplasm as well as AL amyloid. No evidence of systemic involvement on work up and plan is with continued observation.     Repeat protein electrophoresis as well as a free light chain assay negative last week negative for evidence of monoclonal protein and free light chains along with normal blood counts and renal function/calcium levels.     Clinically doing very well with no active complaints.     Will monitor with labs including CBC, BMP, SPEP, free light chains      RT clinic in 6 months for follow up with labs 1 week prior      The patient is ready to learn, no apparent learning barriers were identified, Diagnosis and treatment plans were explained to the patient. The patient expressed understanding of the content. The patient  questions were answered to his satisfaction.     Chart documentation with Dragon Voice recognition Software. Although reviewed after completion, some words and grammatical errors may remain.  Froilan Peres MD  Attending Physician   Hematology/Medical Oncology    Again, thank you for allowing me to participate in the care of your patient.        Sincerely,        Froilan Peres MD

## 2019-01-30 NOTE — PATIENT INSTRUCTIONS
Please follow up with Dr. Peres in 6 months.  Please schedule labs 1 week prior to follow up appointment.    Lab Date/Time:    Office visit follow up with Dr. Peres Date/Time:     If you have any questions or concerns please feel free to call.    If you need to reschedule please call:  Clinic or Lab Appointment - [phone]  Infusion - [phone]  Imaging - [phone]    Ravin Gandhi RN, BSN, OCN  [facility]   Oncology/Hematology Care Coordinator RN  [facility]  [phone]

## 2019-01-30 NOTE — NURSING NOTE
DISCHARGE PLAN:  Next appointments: See patient instruction section  Departure Mode: Ambulatory  Accompanied by: self  3 minutes for nursing discharge (face to face time)     Dong Joseph is here today for Hematology follow up.  Writing nurse seen patient after Medical Oncology appointment to address questions/concerns/coordinate care. Patient to follow up in 6 months with labs week prior.  Patient ambulated by nurse to  to schedule follow up and/or lab appointments.  Imaging scheduled if ordered.  See patient instructions and Oncologist's Progress note for further details. Questions and concerns addressed to patient's satisfaction. Patient verbalized and demonstrated understanding of plan.  Contact information provided and patient is encouraged to call with any that arise in the interim of care.    Ravin Gandhi, RN, BSN, OCN   Oncology Care Coordinator RN  [facility]  [phone]  1/30/2019, 11:13 AM

## 2019-04-30 NOTE — PROGRESS NOTES
SUBJECTIVE:   Dong Joseph is a 64 year old male who presents to clinic today for the following health issues:      History of Present Illness     Hyperlipidemia:     Low fat/chol diet rating::  Good    Taking Statins::  YES    Lipid Medications or Supplements::  Fish oil/Omega 3, without side effects. and Niacin, without side effects.    Diet:  Low fat/cholesterol  Frequency of exercise:  6-7 days/week  Duration of exercise:  45-60 minutes  Taking medications regularly:  Yes  Medication side effects:  None  Additional concerns today:  No    He has no additional concerns today.  He is tolerating medications well.  He has a history of multiple myeloma that is not in remission at this time and amyloidosis.  He is following with oncology and at this time they are observing.    Additional history: as documented    Reviewed and updated as needed this visit by clinical staff         Reviewed and updated as needed this visit by Provider         Patient Active Problem List   Diagnosis     Pure hypercholesterolemia     Impotence of organic origin     HYPERLIPIDEMIA LDL GOAL <130     Plasmacytoma of bone (H)     Status post hip surgery     Pelvic pain in male     Abdominal pain, generalized     Past Surgical History:   Procedure Laterality Date     BIOPSY BONE PELVIS Right 1/25/2018    Procedure: BIOPSY BONE PELVIS;  Needle Biopsy Right Pelvis;  Surgeon: Johnnie Ellsworth MD;  Location: [facility]     COLONOSCOPY  12/22/2010    COLONOSCOPY performed by DONNA WHITEHEAD at [facility]     EXCISE TUMOR PELVIS POSTERIOR Right 4/16/2018    Procedure: EXCISE TUMOR PELVIS POSTERIOR;;  Surgeon: Johnnie Ellsworth MD;  Location: [facility] OR     HERNIA REPAIR, INGUINAL RT/LT Bilateral 1979-80    Inguinal     HERNIA REPAIR, INGUINAL RT/LT  03/30/2006    Recurrent left inguinal hernia.     OPTICAL TRACKING SYSTEM RESECT TUMOR, ARTHROPLASTY HIP Right 4/16/2018    Procedure: OPTICAL TRACKING SYSTEM RESECT TUMOR, ARTHROPLASTY HIP;  Right  "Total Hip Arthroplasty And Removal Of Right Pelvic Tumor;  Surgeon: Johnnie Ellsworth MD;  Location: [facility] OR       Social History     Tobacco Use     Smoking status: Former Smoker     Packs/day: 0.75     Years: 5.00     Pack years: 3.75     Types: Cigarettes     Start date: 1973     Last attempt to quit: 1978     Years since quittin.9     Smokeless tobacco: Never Used   Substance Use Topics     Alcohol use: Yes     Alcohol/week: 1.0 oz     Comment: Patient reports one drink bi-weekly     Family History   Problem Relation Age of Onset     C.A.D. Mother         age 70s     Coronary Artery Disease Mother      Cerebrovascular Disease Father         at 68 yo     Hypertension Father      Lung Cancer Brother         [place]     Coronary Artery Disease Brother      Cardiac Sudden Death Brother      Hyperlipidemia Brother      Mental Illness Son      Asthma Son      Depression Son          Current Outpatient Medications   Medication Sig Dispense Refill     Acetaminophen (TYLENOL PO) Take 1,000 mg by mouth       aspirin 81 MG tablet Take 1 tablet by mouth daily. 90 tablet 3     atorvastatin (LIPITOR) 20 MG tablet Take 1 tablet (20 mg) by mouth At Bedtime 90 tablet 3     No Known Allergies  BP Readings from Last 3 Encounters:   19 110/64   19 104/76   18 102/66    Wt Readings from Last 3 Encounters:   19 67.6 kg (149 lb)   19 68.5 kg (151 lb)   18 68.5 kg (151 lb)                  Labs reviewed in EPIC    ROS:  Constitutional, HEENT, cardiovascular, pulmonary, gi and gu systems are negative, except as otherwise noted.    OBJECTIVE:     /64   Pulse 52   Temp 96.9  F (36.1  C) (Temporal)   Resp 18   Ht 1.76 m (5' 9.29\")   Wt 67.6 kg (149 lb)   SpO2 100%   BMI 21.82 kg/m    Body mass index is 21.82 kg/m .  GENERAL: healthy, alert and no distress  RESP: lungs clear to auscultation - no rales, rhonchi or wheezes  CV: regular rate and rhythm, normal S1 S2, " no S3 or S4, no murmur, click or rub  SKIN: no suspicious lesions or rashes  NEURO: Normal strength and tone, mentation intact and speech normal  PSYCH: mentation appears normal, affect normal/bright    Diagnostic Test Results:  No results found for this or any previous visit (from the past 24 hour(s)).    ASSESSMENT/PLAN:     1. Hyperlipidemia LDL goal <130  Recheck lipid panel today.  Will adjust dose of atorvastatin if needed based on results.  - atorvastatin (LIPITOR) 20 MG tablet; Take 1 tablet (20 mg) by mouth At Bedtime  Dispense: 90 tablet; Refill: 3  - Lipid panel reflex to direct LDL Fasting  - atorvastatin (LIPITOR) 20 MG tablet; Take 1 tablet (20 mg) by mouth At Bedtime  Dispense: 90 tablet; Refill: 3    3. Multiple myeloma not having achieved remission (H)  4. Amyloidosis, unspecified type (H)  Follows with oncology.  They are currently observing for progression.  He is scheduled to return to oncology in July of this year for recheck.    KASIA [facility]

## 2019-05-01 ENCOUNTER — OFFICE VISIT (OUTPATIENT)
Dept: FAMILY MEDICINE | Facility: OTHER | Age: 64
End: 2019-05-01
Payer: COMMERCIAL

## 2019-05-01 VITALS
WEIGHT: 149 LBS | DIASTOLIC BLOOD PRESSURE: 64 MMHG | BODY MASS INDEX: 22.07 KG/M2 | OXYGEN SATURATION: 100 % | HEIGHT: 69 IN | HEART RATE: 52 BPM | SYSTOLIC BLOOD PRESSURE: 110 MMHG | TEMPERATURE: 96.9 F | RESPIRATION RATE: 18 BRPM

## 2019-05-01 DIAGNOSIS — E78.5 HYPERLIPIDEMIA LDL GOAL <130: Primary | ICD-10-CM

## 2019-05-01 DIAGNOSIS — E85.9 AMYLOIDOSIS, UNSPECIFIED TYPE (H): ICD-10-CM

## 2019-05-01 DIAGNOSIS — C90.00 MULTIPLE MYELOMA NOT HAVING ACHIEVED REMISSION (H): ICD-10-CM

## 2019-05-01 LAB
CHOLEST SERPL-MCNC: 106 MG/DL
HDLC SERPL-MCNC: 38 MG/DL
LDLC SERPL CALC-MCNC: 54 MG/DL
NONHDLC SERPL-MCNC: 68 MG/DL
TRIGL SERPL-MCNC: 69 MG/DL

## 2019-05-01 PROCEDURE — 36415 COLL VENOUS BLD VENIPUNCTURE: CPT | Performed by: STUDENT IN AN ORGANIZED HEALTH CARE EDUCATION/TRAINING PROGRAM

## 2019-05-01 PROCEDURE — 80061 LIPID PANEL: CPT | Performed by: STUDENT IN AN ORGANIZED HEALTH CARE EDUCATION/TRAINING PROGRAM

## 2019-05-01 PROCEDURE — 99213 OFFICE O/P EST LOW 20 MIN: CPT | Performed by: STUDENT IN AN ORGANIZED HEALTH CARE EDUCATION/TRAINING PROGRAM

## 2019-05-01 RX ORDER — ATORVASTATIN CALCIUM 20 MG/1
20 TABLET, FILM COATED ORAL AT BEDTIME
Qty: 90 TABLET | Refills: 3 | Status: SHIPPED | OUTPATIENT
Start: 2019-05-01 | End: 2020-04-24

## 2019-05-01 ASSESSMENT — MIFFLIN-ST. JEOR: SCORE: 1470.85

## 2019-05-07 DIAGNOSIS — Z96.641 H/O TOTAL HIP ARTHROPLASTY, RIGHT: Primary | ICD-10-CM

## 2019-05-08 ENCOUNTER — ANCILLARY PROCEDURE (OUTPATIENT)
Dept: GENERAL RADIOLOGY | Facility: CLINIC | Age: 64
End: 2019-05-08
Attending: ORTHOPAEDIC SURGERY
Payer: COMMERCIAL

## 2019-05-08 ENCOUNTER — OFFICE VISIT (OUTPATIENT)
Dept: ORTHOPEDICS | Facility: CLINIC | Age: 64
End: 2019-05-08
Payer: COMMERCIAL

## 2019-05-08 VITALS — HEIGHT: 70 IN | BODY MASS INDEX: 21.47 KG/M2 | WEIGHT: 150 LBS

## 2019-05-08 DIAGNOSIS — Z96.641 H/O TOTAL HIP ARTHROPLASTY, RIGHT: ICD-10-CM

## 2019-05-08 DIAGNOSIS — C90.30 PLASMACYTOMA OF BONE (H): ICD-10-CM

## 2019-05-08 DIAGNOSIS — Z96.641 H/O TOTAL HIP ARTHROPLASTY, RIGHT: Primary | ICD-10-CM

## 2019-05-08 ASSESSMENT — PAIN SCALES - GENERAL: PAINLEVEL: NO PAIN (0)

## 2019-05-08 ASSESSMENT — MIFFLIN-ST. JEOR: SCORE: 1486.65

## 2019-05-08 NOTE — LETTER
"5/8/2019       RE: Dong Joseph  [id number]  [facility] [address]     Dear Colleague,    Thank you for referring your patient, Dong Joseph, to [facility]. Please see a copy of my visit note below.    Reason For Visit:   Chief Complaint   Patient presents with     Right Hip - Follow Up     Hip Pain     right hip follow up post op 4-16-18       Ht 1.778 m (5' 10\")   Wt 68 kg (150 lb)   BMI 21.52 kg/m            Ricky Ruelas CMA    Answers for HPI/ROS submitted by the patient on 5/8/2019   General Symptoms: No  Skin Symptoms: No  HENT Symptoms: No  EYE SYMPTOMS: No  HEART SYMPTOMS: No  LUNG SYMPTOMS: No  INTESTINAL SYMPTOMS: No  URINARY SYMPTOMS: No  REPRODUCTIVE SYMPTOMS: No  SKELETAL SYMPTOMS: No  BLOOD SYMPTOMS: No  NERVOUS SYSTEM SYMPTOMS: No  MENTAL HEALTH SYMPTOMS: No      Dong is a 64-year-old male who is now 13 months status post hip arthroplasty and curettage and cement grafting of a right acetabular tumor secondary to multiple myeloma.  Overall he is doing very well.  He is not having any issues with the hip.  Did have a fall onto his left hip approximately 1 month ago but has had no residual pain from this.     Right lower extremity:  Hip flexion to 90 tolerates 10 degrees of internal and external rotation.   -Sens: SILT dp/sp/s/s/t  -Motor: 5/5 EHL/FHL/TA/GSc  -Vasc: 2+ pulses, wwp, brisk cap refill    AP and Judet view of the pelvis shows acetabular tumor filled with cement with fixation unchanged in position.  There is no progression of myeloma in this area.  The total hip arthroplasties in good position and unchanged compared to prior films.      13 months status post total hip arthroplasty and curettage instrumentation with fixation of the right acetabular tumor secondary to multiple myeloma.  He is doing well.  All of his questions were answered today. He should return to see us in 1 year.    Was seen and " discussed with Dr. Ellsworth who agrees with this plan    Warren Tabor MD   PGY-4 Orthopaedic Surgery   [phone]        I was present with the resident during the history and exam.  I discussed the case with the resident and agree with the findings as documented in the assessment and plan.    Again, thank you for allowing me to participate in the care of your patient.      Sincerely,    Johnnie Ellsworth MD

## 2019-05-08 NOTE — PROGRESS NOTES
"Reason For Visit:   Chief Complaint   Patient presents with     Right Hip - Follow Up     Hip Pain     right hip follow up post op 4-16-18       Ht 1.778 m (5' 10\")   Wt 68 kg (150 lb)   BMI 21.52 kg/m           Ricky Ruelas CMA    Answers for HPI/ROS submitted by the patient on 5/8/2019   General Symptoms: No  Skin Symptoms: No  HENT Symptoms: No  EYE SYMPTOMS: No  HEART SYMPTOMS: No  LUNG SYMPTOMS: No  INTESTINAL SYMPTOMS: No  URINARY SYMPTOMS: No  REPRODUCTIVE SYMPTOMS: No  SKELETAL SYMPTOMS: No  BLOOD SYMPTOMS: No  NERVOUS SYSTEM SYMPTOMS: No  MENTAL HEALTH SYMPTOMS: No    "

## 2019-06-16 NOTE — PROGRESS NOTES
Attempted to flush picc lumen with alteplase, unable to give medication, md notified   Dong is a 64-year-old male who is now 13 months status post hip arthroplasty and curettage and cement grafting of a right acetabular tumor secondary to multiple myeloma.  Overall he is doing very well.  He is not having any issues with the hip.  Did have a fall onto his left hip approximately 1 month ago but has had no residual pain from this.     Right lower extremity:  Hip flexion to 90 tolerates 10 degrees of internal and external rotation.   -Sens: SILT dp/sp/s/s/t  -Motor: 5/5 EHL/FHL/TA/GSc  -Vasc: 2+ pulses, wwp, brisk cap refill    AP and Judet view of the pelvis shows acetabular tumor filled with cement with fixation unchanged in position.  There is no progression of myeloma in this area.  The total hip arthroplasties in good position and unchanged compared to prior films.      13 months status post total hip arthroplasty and curettage instrumentation with fixation of the right acetabular tumor secondary to multiple myeloma.  He is doing well.  All of his questions were answered today. He should return to see us in 1 year.    Was seen and discussed with Dr. Ellsworth who agrees with this plan    Warren Tabor MD   PGY-4 Orthopaedic Surgery   [phone]    Answers for HPI/ROS submitted by the patient on 5/8/2019   General Symptoms: No  Skin Symptoms: No  HENT Symptoms: No  EYE SYMPTOMS: No  HEART SYMPTOMS: No  LUNG SYMPTOMS: No  INTESTINAL SYMPTOMS: No  URINARY SYMPTOMS: No  REPRODUCTIVE SYMPTOMS: No  SKELETAL SYMPTOMS: No  BLOOD SYMPTOMS: No  NERVOUS SYSTEM SYMPTOMS: No  MENTAL HEALTH SYMPTOMS: No

## 2019-07-24 DIAGNOSIS — E85.81 AL AMYLOIDOSIS (H): ICD-10-CM

## 2019-07-24 DIAGNOSIS — C90.30 PLASMACYTOMA OF BONE (H): ICD-10-CM

## 2019-07-24 LAB
ANION GAP SERPL CALCULATED.3IONS-SCNC: 5 MMOL/L (ref 3–14)
BUN SERPL-MCNC: 20 MG/DL (ref 7–30)
CALCIUM SERPL-MCNC: 9.3 MG/DL (ref 8.5–10.1)
CHLORIDE SERPL-SCNC: 105 MMOL/L (ref 94–109)
CO2 SERPL-SCNC: 30 MMOL/L (ref 20–32)
CREAT SERPL-MCNC: 0.92 MG/DL (ref 0.66–1.25)
ERYTHROCYTE [DISTWIDTH] IN BLOOD BY AUTOMATED COUNT: 13 % (ref 10–15)
GFR SERPL CREATININE-BSD FRML MDRD: 89 ML/MIN/{1.73_M2}
GLUCOSE SERPL-MCNC: 107 MG/DL (ref 70–99)
HCT VFR BLD AUTO: 42.2 % (ref 40–53)
HGB BLD-MCNC: 14.3 G/DL (ref 13.3–17.7)
MCH RBC QN AUTO: 31.4 PG (ref 26.5–33)
MCHC RBC AUTO-ENTMCNC: 33.9 G/DL (ref 31.5–36.5)
MCV RBC AUTO: 93 FL (ref 78–100)
PLATELET # BLD AUTO: 231 10E9/L (ref 150–450)
POTASSIUM SERPL-SCNC: 4.5 MMOL/L (ref 3.4–5.3)
RBC # BLD AUTO: 4.56 10E12/L (ref 4.4–5.9)
SODIUM SERPL-SCNC: 140 MMOL/L (ref 133–144)
WBC # BLD AUTO: 5.2 10E9/L (ref 4–11)

## 2019-07-24 PROCEDURE — 83883 ASSAY NEPHELOMETRY NOT SPEC: CPT | Mod: 90 | Performed by: INTERNAL MEDICINE

## 2019-07-24 PROCEDURE — 85027 COMPLETE CBC AUTOMATED: CPT | Performed by: INTERNAL MEDICINE

## 2019-07-24 PROCEDURE — 84165 PROTEIN E-PHORESIS SERUM: CPT | Performed by: INTERNAL MEDICINE

## 2019-07-24 PROCEDURE — 36415 COLL VENOUS BLD VENIPUNCTURE: CPT | Performed by: INTERNAL MEDICINE

## 2019-07-24 PROCEDURE — 80048 BASIC METABOLIC PNL TOTAL CA: CPT | Performed by: INTERNAL MEDICINE

## 2019-07-24 PROCEDURE — 00000402 ZZHCL STATISTIC TOTAL PROTEIN: Performed by: INTERNAL MEDICINE

## 2019-07-24 PROCEDURE — 84166 PROTEIN E-PHORESIS/URINE/CSF: CPT | Performed by: INTERNAL MEDICINE

## 2019-07-24 PROCEDURE — 99000 SPECIMEN HANDLING OFFICE-LAB: CPT | Performed by: INTERNAL MEDICINE

## 2019-07-25 LAB
ALBUMIN SERPL ELPH-MCNC: 4.3 G/DL (ref 3.7–5.1)
ALPHA1 GLOB SERPL ELPH-MCNC: 0.3 G/DL (ref 0.2–0.4)
ALPHA2 GLOB SERPL ELPH-MCNC: 0.7 G/DL (ref 0.5–0.9)
B-GLOBULIN SERPL ELPH-MCNC: 0.7 G/DL (ref 0.6–1)
GAMMA GLOB SERPL ELPH-MCNC: 1.2 G/DL (ref 0.7–1.6)
M PROTEIN SERPL ELPH-MCNC: 0 G/DL
PROT PATTERN SERPL ELPH-IMP: NORMAL
PROT PATTERN UR ELPH-IMP: NORMAL

## 2019-07-27 LAB
KAPPA LC FREE SER-MCNC: 1.7 MG/DL (ref 0.33–1.94)
KAPPA LC FREE/LAMBDA FREE SER NEPH: 0.83 {RATIO} (ref 0.26–1.65)
LAMBDA LC FREE SERPL-MCNC: 2.04 MG/DL (ref 0.57–2.63)

## 2019-08-07 ENCOUNTER — ONCOLOGY VISIT (OUTPATIENT)
Dept: ONCOLOGY | Facility: CLINIC | Age: 64
End: 2019-08-07
Payer: COMMERCIAL

## 2019-08-07 VITALS
DIASTOLIC BLOOD PRESSURE: 60 MMHG | BODY MASS INDEX: 21.22 KG/M2 | RESPIRATION RATE: 18 BRPM | SYSTOLIC BLOOD PRESSURE: 112 MMHG | OXYGEN SATURATION: 95 % | HEIGHT: 70 IN | HEART RATE: 62 BPM | WEIGHT: 148.2 LBS | TEMPERATURE: 98.4 F

## 2019-08-07 DIAGNOSIS — C90.30 PLASMACYTOMA OF BONE (H): Primary | ICD-10-CM

## 2019-08-07 PROCEDURE — 99213 OFFICE O/P EST LOW 20 MIN: CPT | Performed by: INTERNAL MEDICINE

## 2019-08-07 ASSESSMENT — MIFFLIN-ST. JEOR: SCORE: 1478.48

## 2019-08-07 NOTE — PATIENT INSTRUCTIONS
Please follow up with [facility] in 1 year with labs prior.      Lab Date/Time:   8/11/2020 at 1:00    Office visit follow up with Dr. Perez Date/Time:    8/11/2020 at 1:45     If you have any questions or concerns please feel free to call.    If you need to reschedule please call:  Clinic or Lab Appointment - [phone]  Infusion - [phone]  Imaging - [phone]    Ravin Gandhi RN, BSN, OCN  [facility]   Oncology/Hematology Care Coordinator RN  [facility]  [phone]

## 2019-08-07 NOTE — PROGRESS NOTES
FOLLOW-UP VISIT NOTE    PATIENT NAME: Dong Joseph MRN # [id number]  DATE OF VISIT: Aug 7, 2019 YOB: 1955    REFERRING PROVIDER: No referring provider defined for this encounter.    CANCER TYPE:Solitary plasmacytoma of bone    ONCOLOGY HISTORY:  62-year-old male who developed right hip pain with radiation into posterior thigh starting 6 months ago and was progressively getting worse. MRI of the lumbar spine showed degenerative changes throughout, no disc hernniation and moderate foraminal stenosis at multiple levels. X ray hip showed a lucent area in the right supra-acetabular region measuring 5.2 cm in maximum diameter without any definite fracture. MRI hip on 01/18/18 showed a destructive lesion in the right acetabulum extending past the margin of the bone into adjacent soft tissues. Met with orthopedic surgery and underwent biopsy of the pelvic lesion which came back positive for plasma cell neoplasm.     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 Bone marrow biopsy shows no morphologic or immunophenotypic evidence for plasma cell neoplasm. Bone survey showed right Iliac lesion with lucent areas in frontal region- likely benign.    - RT completed in 03/2018. However he continued to have significant pain in the hip and underwent curettage of the right acetabular tumor along with right total hip arthroplasty  04/16/18. Surgical pathology showed plasma cell neoplasm as well as AL amyloid. No evidence of sytemic involvement on work up and plan is with continued observation.       SUBJECTIVE     Patient presents for 6  month follow-up with labs. Doing well overall with no complaints. Denies bone pain, fatigue, potential weight loss, nausea, vomiting, diarrhea, abdominal pain or any other complaints. Good appetite and energy levels.      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 90 tablet 3     atorvastatin (LIPITOR) 20 MG tablet Take 1 tablet (20 mg) by mouth At Bedtime 90 tablet 3        ALLERGIES   No Known Allergies     REVIEW OF SYSTEMS   As above in the HPI, o/w complete 12-point ROS was negative.     PHYSICAL EXAM   B/P: 136/79, T: 97.8, P: 105, R: 16  SpO2 Readings from Last 4 Encounters:   09/20/18 99%   05/24/18 100%   04/24/18 100%   04/19/18 95%     Wt Readings from Last 3 Encounters:   08/07/19 67.2 kg (148 lb 3.2 oz)   05/08/19 68 kg (150 lb)   05/01/19 67.6 kg (149 lb)     GEN: NAD  Mouth/ENT: Oropharynx is clear.  NECK: no lymphadenopathy  LUNGS: clear   CV: regular  ABDOMEN: soft, non-tender, non-distended  EXT: warm, well perfused, no edema  NEURO: alert  SKIN: no rashes     LABORATORY AND IMAGING STUDIES     Component      Latest Ref Rng & Units 7/24/2019   Sodium      133 - 144 mmol/L 140   Potassium      3.4 - 5.3 mmol/L 4.5   Chloride      94 - 109 mmol/L 105   Carbon Dioxide      20 - 32 mmol/L 30   Anion Gap      3 - 14 mmol/L 5   Glucose      70 - 99 mg/dL 107 (H)   Urea Nitrogen      7 - 30 mg/dL 20   Creatinine      0.66 - 1.25 mg/dL 0.92   GFR Estimate      >60 mL/min/1.73:m2 89   GFR Estimate If Black      >60 mL/min/1.73:m2 >90   Calcium      8.5 - 10.1 mg/dL 9.3   WBC      4.0 - 11.0 10e9/L 5.2   RBC Count      4.4 - 5.9 10e12/L 4.56   Hemoglobin      13.3 - 17.7 g/dL 14.3   Hematocrit      40.0 - 53.0 % 42.2   MCV      78 - 100 fl 93   MCH      26.5 - 33.0 pg 31.4   MCHC      31.5 - 36.5 g/dL 33.9   RDW      10.0  - 15.0 % 13.0   Platelet Count      150 - 450 10e9/L 231   Albumin Fraction      3.7 - 5.1 g/dL 4.3   Alpha 1 Fraction      0.2 - 0.4 g/dL 0.3   Alpha 2 Fraction      0.5 - 0.9 g/dL 0.7   Beta Fraction      0.6 - 1.0 g/dL 0.7   Gamma Fraction      0.7 - 1.6 g/dL 1.2   Monoclonal Peak      0.0 g/dL 0.0   ELP Interpretation:       Essentially normal electrophoretic pattern.  No monoclonal protein seen. Pathologic . . .   [place] Free Light Chains      0.33 - 1.94 mg/dL 1.70   LAMBDA FREE LT CHAINS      0.57 - 2.63 mg/dL 2.04   KAPPA/LAMBDA RATIO      0.26 - 1.65 0.83   ELP Interpretation Urine       Only trace albumin and trace globulins. No obvious monoclonal protein seen. We recommend a . . .        ASSESSMENT AND PLAN     63 year-old male with a large heterogenous right acetabular mass biopsy of which came back positive as plasma cell neoplasm. Further workup negative for anemia, hypercalcemia with electrophoresis/immunofixation showing monoclonal free kappa light chain immunoglobulin in urine with increased kappa free light chain in serum. Bone marrow biopsy shows no morphologic or immunophenotypic evidence for plasma cell neoplasm. Bone survey negative for any additional lytic lesions.      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   S/p RT completed in 03/2018. Followed by curettage of the right acetabular tumor along with right total hip arthroplasty  04/16/18.Surgical pathology showed plasma cell neoplasm as well as AL amyloid. No evidence of systemic involvement on work up and plan is with continued observation.     Repeat protein electrophoresis as well as a free light chain assay negative last week negative for evidence of monoclonal protein and free light chains along with normal blood counts and renal function/calcium levels.     Clinically doing very well with no active complaints.     Will monitor with labs including CBC, BMP, SPEP, free light chains      RT clinic in 12 months for  follow up with labs 1 week prior      The patient is ready to learn, no apparent learning barriers were identified, Diagnosis and treatment plans were explained to the patient. The patient expressed understanding of the content. The patient questions were answered to his satisfaction.     Chart documentation with Dragon Voice recognition Software. Although reviewed after completion, some words and grammatical errors may remain.  Froilan Peres MD  Attending Physician   Hematology/Medical Oncology

## 2019-08-07 NOTE — NURSING NOTE
DISCHARGE PLAN:  Next appointments: See patient instruction section  Departure Mode: Ambulatory  Accompanied by: self  5 minutes for nursing discharge (face to face time)     Dong Joseph is here today for Hematology follow up.  Writing nurse seen patient after Medical Oncology appointment to address questions/concerns/coordinate care. Patient to follow up ([facility]) in 1 year with labs week prior.  Appointments scheduled.  See patient instructions and Oncologist's Progress note for further details. Questions and concerns addressed to patient's satisfaction. Patient verbalized and demonstrated understanding of plan.  Contact information provided and patient is encouraged to call with any that arise in the interim of care.    Ravin Gandhi, RN, BSN, OCN   Oncology Care Coordinator RN  [facility]  [phone]  8/7/2019, 2:05 PM

## 2019-08-07 NOTE — Clinical Note
8/7/2019         RE: Dong Joseph  [id number]  [facility] [address]        Dear Colleague,    Thank you for referring your patient, Dong Joseph, to the [facility]. Please see a copy of my visit note below.      FOLLOW-UP VISIT NOTE    PATIENT NAME: Dong Joseph MRN # [id number]  DATE OF VISIT: Aug 7, 2019 YOB: 1955    REFERRING PROVIDER: No referring provider defined for this encounter.    CANCER TYPE:Solitary plasmacytoma of bone    ONCOLOGY HISTORY:  62-year-old male who developed right hip pain with radiation into posterior thigh starting 6 months ago and was progressively getting worse. MRI of the lumbar spine showed degenerative changes throughout, no disc hernniation and moderate foraminal stenosis at multiple levels. X ray hip showed a lucent area in the right supra-acetabular region measuring 5.2 cm in maximum diameter without any definite fracture. MRI hip on 01/18/18 showed a destructive lesion in the right acetabulum extending past the margin of the bone into adjacent soft tissues. Met with orthopedic surgery and underwent biopsy of the pelvic lesion which came back positive for plasma cell neoplasm.     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 Bone marrow biopsy shows no morphologic or immunophenotypic evidence for plasma cell neoplasm. Bone survey showed right Iliac lesion with lucent areas in frontal region- likely benign.    - RT completed in 03/2018. However he continued to have significant pain in the hip and underwent curettage of the right acetabular tumor along with right total hip arthroplasty  04/16/18. Surgical pathology showed plasma cell neoplasm as well as AL amyloid. No evidence of sytemic involvement on work up and plan is with continued observation.       SUBJECTIVE     Patient presents for  6  month follow-up with labs. Doing well overall with no complaints. Denies bone pain, fatigue, potential weight loss, nausea, vomiting, diarrhea, abdominal pain or any other complaints. Good appetite and energy levels.      PAST MEDICAL HISTORY     Past Medical History:   Diagnosis Date     Impotence of organic origin 2003     Plasma cell neoplasm 01/25/2018    S/P Needle biopsy of right pelvis bone tumor     Pure hypercholesterolemia 2002    statin started circa 2002         CURRENT OUTPATIENT MEDICATIONS     Current Outpatient Medications   Medication Sig Dispense Refill     Acetaminophen (TYLENOL PO) Take 1,000 mg by mouth       aspirin 81 MG tablet Take 1 tablet by mouth daily. 90 tablet 3     atorvastatin (LIPITOR) 20 MG tablet Take 1 tablet (20 mg) by mouth At Bedtime 90 tablet 3        ALLERGIES   No Known Allergies     REVIEW OF SYSTEMS   As above in the HPI, o/w complete 12-point ROS was negative.     PHYSICAL EXAM   B/P: 136/79, T: 97.8, P: 105, R: 16  SpO2 Readings from Last 4 Encounters:   09/20/18 99%   05/24/18 100%   04/24/18 100%   04/19/18 95%     Wt Readings from Last 3 Encounters:   08/07/19 67.2 kg (148 lb 3.2 oz)   05/08/19 68 kg (150 lb)   05/01/19 67.6 kg (149 lb)     GEN: NAD  Mouth/ENT: Oropharynx is clear.  NECK: no lymphadenopathy  LUNGS: clear   CV: regular  ABDOMEN: soft, non-tender, non-distended  EXT: warm, well perfused, no edema  NEURO: alert  SKIN: no rashes     LABORATORY AND IMAGING STUDIES     Component      Latest Ref Rng & Units 7/24/2019   Sodium      133 - 144 mmol/L 140   Potassium      3.4 - 5.3 mmol/L 4.5   Chloride      94 - 109 mmol/L 105   Carbon Dioxide      20 - 32 mmol/L 30   Anion Gap      3 - 14 mmol/L 5   Glucose      70 - 99 mg/dL 107 (H)   Urea Nitrogen      7 - 30 mg/dL 20   Creatinine      0.66 - 1.25 mg/dL 0.92   GFR Estimate      >60 mL/min/1.73:m2 89   GFR Estimate If Black      >60 mL/min/1.73:m2 >90   Calcium      8.5 - 10.1 mg/dL 9.3   WBC      4.0 -  11.0 10e9/L 5.2   RBC Count      4.4 - 5.9 10e12/L 4.56   Hemoglobin      13.3 - 17.7 g/dL 14.3   Hematocrit      40.0 - 53.0 % 42.2   MCV      78 - 100 fl 93   MCH      26.5 - 33.0 pg 31.4   MCHC      31.5 - 36.5 g/dL 33.9   RDW      10.0 - 15.0 % 13.0   Platelet Count      150 - 450 10e9/L 231   Albumin Fraction      3.7 - 5.1 g/dL 4.3   Alpha 1 Fraction      0.2 - 0.4 g/dL 0.3   Alpha 2 Fraction      0.5 - 0.9 g/dL 0.7   Beta Fraction      0.6 - 1.0 g/dL 0.7   Gamma Fraction      0.7 - 1.6 g/dL 1.2   Monoclonal Peak      0.0 g/dL 0.0   ELP Interpretation:       Essentially normal electrophoretic pattern.  No monoclonal protein seen. Pathologic . . .   Fifty-Six Free Light Chains      0.33 - 1.94 mg/dL 1.70   LAMBDA FREE LT CHAINS      0.57 - 2.63 mg/dL 2.04   KAPPA/LAMBDA RATIO      0.26 - 1.65 0.83   ELP Interpretation Urine       Only trace albumin and trace globulins. No obvious monoclonal protein seen. We recommend a . . .        ASSESSMENT AND PLAN     63 year-old male with a large heterogenous right acetabular mass biopsy of which came back positive as plasma cell neoplasm. Further workup negative for anemia, hypercalcemia with electrophoresis/immunofixation showing monoclonal free kappa light chain immunoglobulin in urine with increased kappa free light chain in serum. Bone marrow biopsy shows no morphologic or immunophenotypic evidence for plasma cell neoplasm. Bone survey negative for any additional lytic lesions.      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   S/p RT completed in 03/2018. Followed by curettage of the right acetabular tumor along with right total hip arthroplasty  04/16/18.Surgical pathology showed plasma cell neoplasm as well as AL amyloid. No evidence of systemic involvement on work up and plan is with continued observation.     Repeat protein electrophoresis as well as a free light chain assay negative last week negative for evidence of monoclonal protein and free  light chains along with normal blood counts and renal function/calcium levels.     Clinically doing very well with no active complaints.     Will monitor with labs including CBC, BMP, SPEP, free light chains      RT clinic in 12 months for follow up with labs 1 week prior      The patient is ready to learn, no apparent learning barriers were identified, Diagnosis and treatment plans were explained to the patient. The patient expressed understanding of the content. The patient questions were answered to his satisfaction.     Chart documentation with Dragon Voice recognition Software. Although reviewed after completion, some words and grammatical errors may remain.  Froilan Peres MD  Attending Physician   Hematology/Medical Oncology    Again, thank you for allowing me to participate in the care of your patient.        Sincerely,        Froilan Peres MD

## 2019-08-07 NOTE — NURSING NOTE
"Oncology Rooming Note    August 7, 2019 2:43 PM   Dong Joseph is a 64 year old male who presents for:    Chief Complaint   Patient presents with     Oncology Clinic Visit     7 month follow up-plasmacytoma of bone     Results     labs completed 7/24/19     Initial Vitals: /60   Pulse 62   Temp 98.4  F (36.9  C) (Temporal)   Resp 18   Ht 1.778 m (5' 10\")   Wt 67.2 kg (148 lb 3.2 oz)   SpO2 95%   BMI 21.26 kg/m   Estimated body mass index is 21.26 kg/m  as calculated from the following:    Height as of this encounter: 1.778 m (5' 10\").    Weight as of this encounter: 67.2 kg (148 lb 3.2 oz). Body surface area is 1.82 meters squared.  Data Unavailable Comment: Data Unavailable   No LMP for male patient.  Allergies reviewed: Yes  Medications reviewed: Yes    Medications: Medication refills not needed today.  Pharmacy name entered into EPIC:    [facility] - [place], [place] - [address]  [facility] [place] [facility] [place] - [address] [id number] - [place], [place] - [address] [place] [address] [id number] - [place], [place] - [address] AT [facility] OF  [address]    Clinical concerns: none. Concerns reviewed with Dr. Peres     5 minutes for nursing intake (face to face time)     Thu MEJIA CMA              "

## 2019-11-06 ENCOUNTER — HEALTH MAINTENANCE LETTER (OUTPATIENT)
Age: 64
End: 2019-11-06

## 2020-04-21 NOTE — PROGRESS NOTES
"Dong Joseph is a 65 year old male who is being evaluated via a billable telephone visit.      The patient has been notified of following:     \"This telephone visit will be conducted via a call between you and your physician/provider. We have found that certain health care needs can be provided without the need for a physical exam.  This service lets us provide the care you need with a short phone conversation.  If a prescription is necessary we can send it directly to your pharmacy.  If lab work is needed we can place an order for that and you can then stop by our lab to have the test done at a later time.    Telephone visits are billed at different rates depending on your insurance coverage. During this emergency period, for some insurers they may be billed the same as an in-person visit.  Please reach out to your insurance provider with any questions.    If during the course of the call the physician/provider feels a telephone visit is not appropriate, you will not be charged for this service.\"    Patient has given verbal consent for Telephone visit?  Yes    How would you like to obtain your AVS? MyChart    [place]     Dong Joseph is a 65 year old male who presents to clinic today for the following health issues:    Hyperlipidemia Follow-Up    Patient is calling his insurance to see what pharmacy they cover for mail order so pharmacy is not pended yet.       Are you regularly taking any medication or supplement to lower your cholesterol?   Yes- Lipitor    Are you having muscle aches or other side effects that you think could be caused by your cholesterol lowering medication?  No      How many servings of fruits and vegetables do you eat daily?  2-3    On average, how many sweetened beverages do you drink each day (Examples: soda, juice, sweet tea, etc.  Do NOT count diet or artificially sweetened beverages)?   0    How many days per week do you exercise enough to make your heart beat faster? 5    How many " minutes a day do you exercise enough to make your heart beat faster? 30 - 60    How many days per week do you miss taking your medication? 0             Current Outpatient Medications   Medication Sig Dispense Refill     Acetaminophen (TYLENOL PO) Take 1,000 mg by mouth       aspirin 81 MG tablet Take 1 tablet by mouth daily. 90 tablet 3     atorvastatin (LIPITOR) 20 MG tablet Take 1 tablet (20 mg) by mouth At Bedtime 90 tablet 3     BP Readings from Last 3 Encounters:   08/07/19 112/60   05/01/19 110/64   01/30/19 104/76    Wt Readings from Last 3 Encounters:   08/07/19 67.2 kg (148 lb 3.2 oz)   05/08/19 68 kg (150 lb)   05/01/19 67.6 kg (149 lb)                    Reviewed and updated as needed this visit by Provider         Review of Systems   ROS COMP: CONSTITUTIONAL: NEGATIVE for fever, chills, change in weight  ENT/MOUTH: NEGATIVE for ear, mouth and throat problems  RESP: NEGATIVE for significant cough or SOB  CV: NEGATIVE for chest pain, palpitations or peripheral edema  GI: NEGATIVE for nausea, abdominal pain, heartburn, or change in bowel habits  MS-he has been doing very well since his hip replacement.  He will follow-up with his surgeon and later with oncology in August  PSYCHIATRIC: NEGATIVE for changes in mood or affect       Objective   Reported vitals:  There were no vitals taken for this visit.   healthy, alert and no distress  PSYCH: Alert and oriented times 3; coherent speech, normal   rate and volume, able to articulate logical thoughts, able   to abstract reason, no tangential thoughts, no hallucinations   or delusions  His affect is normal and pleasant  RESP: No cough, no audible wheezing, able to talk in full sentences  Remainder of exam unable to be completed due to telephone visits    Diagnostic Test Results:  Labs reviewed in Epic  none         Assessment/Plan:  This visit was completed virtually due to our current COVID 19 pandemic.  The patient will be seen as soon as possible in the  office.  If there is any significant change in or worsening of symptoms patient will call in to be triaged, present to the emergency department, or call 911 if acute worsening is noted.   1. Hyperlipidemia LDL goal <130  Historically has had very good control, he is very careful with diet and exercise the lab work when we are able oncology is following his kidney function and his blood sugar  - atorvastatin (LIPITOR) 20 MG tablet; Take 1 tablet (20 mg) by mouth At Bedtime  Dispense: 90 tablet; Refill: 3  - Lipid panel reflex to direct LDL Fasting; Future  - Hepatic panel (Albumin, ALT, AST, Bili, Alk Phos, TP); Future  - NONPHYSICIAN TELEPHONE ASSESSMENT 5-10 MIN    2.  Multiple myeloma-follows annually with his oncologist  Return in about 6 months (around 10/24/2020), or if symptoms worsen or fail to improve, for Lab Work.      Phone call duration:  6.5 minutes    Naomi Kuo PA-C

## 2020-04-24 ENCOUNTER — VIRTUAL VISIT (OUTPATIENT)
Dept: FAMILY MEDICINE | Facility: OTHER | Age: 65
End: 2020-04-24
Payer: COMMERCIAL

## 2020-04-24 DIAGNOSIS — E78.5 HYPERLIPIDEMIA LDL GOAL <130: ICD-10-CM

## 2020-04-24 DIAGNOSIS — C90.00 MULTIPLE MYELOMA NOT HAVING ACHIEVED REMISSION (H): ICD-10-CM

## 2020-04-24 PROCEDURE — 99213 OFFICE O/P EST LOW 20 MIN: CPT | Mod: TEL | Performed by: PHYSICIAN ASSISTANT

## 2020-04-24 RX ORDER — ATORVASTATIN CALCIUM 20 MG/1
20 TABLET, FILM COATED ORAL AT BEDTIME
Qty: 90 TABLET | Refills: 3 | Status: SHIPPED | OUTPATIENT
Start: 2020-04-24

## 2020-04-24 NOTE — PATIENT INSTRUCTIONS
We will complete the lab work when we are able to do so, I have sent in your prescription for you.  Take care!  Naomi Kuo PA-C

## 2020-08-04 DIAGNOSIS — C90.30 PLASMACYTOMA OF BONE (H): ICD-10-CM

## 2020-08-04 LAB
ANION GAP SERPL CALCULATED.3IONS-SCNC: 4 MMOL/L (ref 3–14)
BUN SERPL-MCNC: 21 MG/DL (ref 7–30)
CALCIUM SERPL-MCNC: 9.3 MG/DL (ref 8.5–10.1)
CHLORIDE SERPL-SCNC: 103 MMOL/L (ref 94–109)
CO2 SERPL-SCNC: 30 MMOL/L (ref 20–32)
CREAT SERPL-MCNC: 0.88 MG/DL (ref 0.66–1.25)
ERYTHROCYTE [DISTWIDTH] IN BLOOD BY AUTOMATED COUNT: 12.3 % (ref 10–15)
GFR SERPL CREATININE-BSD FRML MDRD: >90 ML/MIN/{1.73_M2}
GLUCOSE SERPL-MCNC: 88 MG/DL (ref 70–99)
HCT VFR BLD AUTO: 42.6 % (ref 40–53)
HGB BLD-MCNC: 14.5 G/DL (ref 13.3–17.7)
MCH RBC QN AUTO: 30.7 PG (ref 26.5–33)
MCHC RBC AUTO-ENTMCNC: 34 G/DL (ref 31.5–36.5)
MCV RBC AUTO: 90 FL (ref 78–100)
PLATELET # BLD AUTO: 223 10E9/L (ref 150–450)
POTASSIUM SERPL-SCNC: 4.1 MMOL/L (ref 3.4–5.3)
RBC # BLD AUTO: 4.72 10E12/L (ref 4.4–5.9)
SODIUM SERPL-SCNC: 137 MMOL/L (ref 133–144)
WBC # BLD AUTO: 6 10E9/L (ref 4–11)

## 2020-08-04 PROCEDURE — 00000402 ZZHCL STATISTIC TOTAL PROTEIN: Performed by: INTERNAL MEDICINE

## 2020-08-04 PROCEDURE — 36415 COLL VENOUS BLD VENIPUNCTURE: CPT | Performed by: INTERNAL MEDICINE

## 2020-08-04 PROCEDURE — 85027 COMPLETE CBC AUTOMATED: CPT | Performed by: INTERNAL MEDICINE

## 2020-08-04 PROCEDURE — 84165 PROTEIN E-PHORESIS SERUM: CPT | Performed by: INTERNAL MEDICINE

## 2020-08-04 PROCEDURE — 80048 BASIC METABOLIC PNL TOTAL CA: CPT | Performed by: INTERNAL MEDICINE

## 2020-08-05 LAB
ALBUMIN SERPL ELPH-MCNC: 4.7 G/DL (ref 3.7–5.1)
ALPHA1 GLOB SERPL ELPH-MCNC: 0.3 G/DL (ref 0.2–0.4)
ALPHA2 GLOB SERPL ELPH-MCNC: 0.7 G/DL (ref 0.5–0.9)
B-GLOBULIN SERPL ELPH-MCNC: 0.8 G/DL (ref 0.6–1)
GAMMA GLOB SERPL ELPH-MCNC: 1.2 G/DL (ref 0.7–1.6)
M PROTEIN SERPL ELPH-MCNC: 0 G/DL
PROT PATTERN SERPL ELPH-IMP: NORMAL

## 2020-08-06 LAB
KAPPA LC FREE SER-MCNC: 14.73 MG/L (ref 3.3–19.4)
KAPPA LC FREE/LAMBDA FREE SER NEPH: 1.02 {RATIO} (ref 0.26–1.65)
LAMBDA LC FREE SERPL-MCNC: 14.51 MG/L (ref 5.71–26.3)

## 2020-08-11 ENCOUNTER — VIRTUAL VISIT (OUTPATIENT)
Dept: ONCOLOGY | Facility: CLINIC | Age: 65
End: 2020-08-11
Payer: COMMERCIAL

## 2020-08-11 DIAGNOSIS — C90.30 PLASMACYTOMA OF BONE (H): Primary | ICD-10-CM

## 2020-08-11 PROCEDURE — 99214 OFFICE O/P EST MOD 30 MIN: CPT | Mod: TEL | Performed by: INTERNAL MEDICINE

## 2020-08-11 NOTE — LETTER
"    8/11/2020         RE: Dong Joseph  [id number]  [facility] [address]        Dear Colleague,    Thank you for referring your patient, Dong Joseph, to the [facility]. Please see a copy of my visit note below.    Dong Joseph is a 65 year old male who is being evaluated via a billable video visit.      The patient has been notified of following:     \"This video visit will be conducted via a call between you and your physician/provider. We have found that certain health care needs can be provided without the need for an in-person physical exam.  This service lets us provide the care you need with a video conversation.  If a prescription is necessary we can send it directly to your pharmacy.  If lab work is needed we can place an order for that and you can then stop by our lab to have the test done at a later time.    Video visits are billed at different rates depending on your insurance coverage.  Please reach out to your insurance provider with any questions.    If during the course of the call the physician/provider feels a video visit is not appropriate, you will not be charged for this service.\"    Patient has given verbal consent for Video visit? Yes  How would you like to obtain your AVS? MyChart  If you are dropped from the video visit, the video invite should be resent to: Text to cell phone: [phone]  Will anyone else be joining your video visit? No      Video-Visit Details    Type of service:  Video Visit    Video Start Time: 3:06 PM  Video End Time: 3:15 PM    Originating Location (pt. Location): Home    Distant Location (provider location):  [facility]     Platform used for Video Visit: Drea Perez MD      FOLLOW-UP VISIT NOTE    PATIENT NAME: Dong Joseph MRN # [id number]  DATE OF VISIT: Aug 11, 2020 YOB: 1955        CANCER TYPE: Solitary plasmacytoma/AL amyloidoma of right acetabulum.    ONCOLOGY HISTORY:    Patient " was being followed by Dr. Peres but now he has transferred his care to me.  I have reviewed his previous records and have copied and updated from prior notes.    65-year-old male who developed right hip pain with radiation into posterior thigh around the fall 2017.  This progressively got worse.  In January 2018 MRI of the lumbar spine showed degenerative changes throughout, no disc hernniation and moderate foraminal stenosis at multiple levels.  On 1/16/2018 X ray hip showed a lucent area in the right supra-acetabular region measuring 5.2 cm in maximum diameter without any definite fracture. MRI hip on 01/18/18 showed a destructive lesion in the right acetabulum extending past the margin of the bone into adjacent soft tissues. Biopsy of the pelvic lesion on 1/25/2018 came back positive for plasma cell neoplasm.     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 Bone marrow biopsy shows no morphologic or immunophenotypic evidence for plasma cell neoplasm. Bone survey showed right Iliac lesion with lucent areas in frontal region- likely benign.    He was diagnosed with SOLITARY PLASMACYTOMA involving the RIGHT ACETABULUM.    - RT completed in 03/2018 ( 4500 cGy ). However he continued to have significant pain in the hip and underwent curettage of the right acetabular tumor along with right total hip arthroplasty  04/16/18. Surgical pathology showed plasma cell neoplasm as well as AL amyloidoma.     He was then kept on surveillance.      SUBJECTIVE     This is a video visit.  Patient tells me that he has been doing fine and he denies any pain.  Right hip feels good.  He has no restrictions.  He denies any B symptoms.  No new swellings.  Denies neuropathy.  Denies fevers or infections or shortness of breath.  No nausea vomiting diarrhea or constipation.  Energy has been  good.    ECOG 0    ROS:  A comprehensive ROS was otherwise neg        PAST MEDICAL HISTORY     Past Medical History:   Diagnosis Date     Impotence of organic origin 2003     Plasma cell neoplasm 2018    S/P Needle biopsy of right pelvis bone tumor     Pure hypercholesterolemia     statin started circa          CURRENT OUTPATIENT MEDICATIONS     Current Outpatient Medications   Medication Sig Dispense Refill     Acetaminophen (TYLENOL PO) Take 1,000 mg by mouth       aspirin 81 MG tablet Take 1 tablet by mouth daily. 90 tablet 3     atorvastatin (LIPITOR) 20 MG tablet Take 1 tablet (20 mg) by mouth At Bedtime 90 tablet 3        ALLERGIES   No Known Allergies     FAMILY HX     Family History   Problem Relation Age of Onset     C.A.D. Mother         age 70s     Coronary Artery Disease Mother      Cerebrovascular Disease Father         at 66 yo     Hypertension Father      Lung Cancer Brother         [place]      Coronary Artery Disease Brother      Cardiac Sudden Death Brother      Hyperlipidemia Brother      Mental Illness Son      Asthma Son      Depression Son    brother had lung cancer-   He has 2 children- healthy.     SOCIAL HX     Social History     Socioeconomic History     Marital status:      Spouse name: Not on file     Number of children: Not on file     Years of education: Not on file     Highest education level: Not on file   Occupational History     Not on file   Social Needs     Financial resource strain: Not on file     Food insecurity     Worry: Not on file     Inability: Not on file     Transportation needs     Medical: Not on file     Non-medical: Not on file   Tobacco Use     Smoking status: Former Smoker     Packs/day: 0.75     Years: 5.00     Pack years: 3.75     Types: Cigarettes     Start date: 1973     Last attempt to quit: 1978     Years since quittin.2     Smokeless tobacco: Never Used   Substance and Sexual Activity     Alcohol use: Yes      Alcohol/week: 1.7 standard drinks     Comment: Patient reports one drink bi-weekly     Drug use: No     Sexual activity: Not Currently     Partners: Female   Lifestyle     Physical activity     Days per week: Not on file     Minutes per session: Not on file     Stress: Not on file   Relationships     Social connections     Talks on phone: Not on file     Gets together: Not on file     Attends Oriental orthodox service: Not on file     Active member of club or organization: Not on file     Attends meetings of clubs or organizations: Not on file     Relationship status: Not on file     Intimate partner violence     Fear of current or ex partner: Not on file     Emotionally abused: Not on file     Physically abused: Not on file     Forced sexual activity: Not on file   Other Topics Concern     Parent/sibling w/ CABG, MI or angioplasty before 65F 55M? No   Social History Narrative    Single.  Son lives with him.  .      Quit smoking 40 years ago. Drinks etoh socially. Lives with son. Works as .     PHYSICAL EXAM     There were no vitals taken for this visit.  Wt Readings from Last 4 Encounters:   08/07/19 67.2 kg (148 lb 3.2 oz)   05/08/19 68 kg (150 lb)   05/01/19 67.6 kg (149 lb)   01/30/19 68.5 kg (151 lb)         Constitutional.  Does not seem to be in any acute distress.  Eyes.  No redness or discharge noted.  Respiratory.  Speaking in full sentences.  Breathing seems comfortable without any accessory use of muscles.    Skin.  Visualized his skin does not show any obvious rashes.  Musculoskeletal.  Range of motion for visualized areas is intact.  Neurological.  Alert and oriented x3.  Psychiatric.  Mood, mentation and affect are normal.  Decision making capacity is intact.      The rest of a comprehensive physical examination is deferred due to Public Health Emergency video visit restrictions.     LABORATORY AND IMAGING STUDIES     Reviewed and stable.     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     S/p RT (4500 cGy ) completed in 03/2018. He then had curettage of the right acetabular tumor along with right total hip arthroplasty on 4/16/18.  Surgical pathology showed plasma cell neoplasm / AL amyloidoma. No evidence of systemic involvement on work up, so kept on surveillance.     He is doing well and labs are also fine.  I recommend repeating labs including 24 hr UPEP/CARLIN in 6 months as initially the monoclonal protein was seen in the urine as well.    All questions answered and he is agreeable and comfortable with the plan.    Lluvia Perez MD          Again, thank you for allowing me to participate in the care of your patient.        Sincerely,        Lluvia Perez MD

## 2020-08-11 NOTE — NURSING NOTE
"Dong Joseph is a 65 year old male who is being evaluated via a billable telephone visit.      The patient has been notified of following:     \"This telephone visit will be conducted via a call between you and your physician/provider. We have found that certain health care needs can be provided without the need for a physical exam.  This service lets us provide the care you need with a short phone conversation.  If a prescription is necessary we can send it directly to your pharmacy.  If lab work is needed we can place an order for that and you can then stop by our lab to have the test done at a later time.    Telephone visits are billed at different rates depending on your insurance coverage. During this emergency period, for some insurers they may be billed the same as an in-person visit.  Please reach out to your insurance provider with any questions.    If during the course of the call the physician/provider feels a telephone visit is not appropriate, you will not be charged for this service.\"    Patient has given verbal consent for Telephone visit?  Yes    What phone number would you like to be contacted at? [phone]    How would you like to obtain your AVS? MyChart    NEG Sx Checklist    NO Concerns    Mesha Witt CMA      "

## 2020-08-11 NOTE — PROGRESS NOTES
"Dong Joseph is a 65 year old male who is being evaluated via a billable video visit.      The patient has been notified of following:     \"This video visit will be conducted via a call between you and your physician/provider. We have found that certain health care needs can be provided without the need for an in-person physical exam.  This service lets us provide the care you need with a video conversation.  If a prescription is necessary we can send it directly to your pharmacy.  If lab work is needed we can place an order for that and you can then stop by our lab to have the test done at a later time.    Video visits are billed at different rates depending on your insurance coverage.  Please reach out to your insurance provider with any questions.    If during the course of the call the physician/provider feels a video visit is not appropriate, you will not be charged for this service.\"    Patient has given verbal consent for Video visit? Yes  How would you like to obtain your AVS? MyChart  If you are dropped from the video visit, the video invite should be resent to: Text to cell phone: [phone]  Will anyone else be joining your video visit? No      Video-Visit Details    Type of service:  Video Visit    Video Start Time: 3:06 PM  Video End Time: 3:15 PM    Originating Location (pt. Location): Home    Distant Location (provider location):  [facility]     Platform used for Video Visit: Drea Perez MD      FOLLOW-UP VISIT NOTE    PATIENT NAME: Dong Joseph MRN # [id number]  DATE OF VISIT: Aug 11, 2020 YOB: 1955        CANCER TYPE: Solitary plasmacytoma/AL amyloidoma of right acetabulum.    ONCOLOGY HISTORY:    Patient was being followed by Dr. Peres but now he has transferred his care to me.  I have reviewed his previous records and have copied and updated from prior notes.    65-year-old male who developed right hip pain with radiation into posterior thigh " around the fall 2017.  This progressively got worse.  In January 2018 MRI of the lumbar spine showed degenerative changes throughout, no disc hernniation and moderate foraminal stenosis at multiple levels.  On 1/16/2018 X ray hip showed a lucent area in the right supra-acetabular region measuring 5.2 cm in maximum diameter without any definite fracture. MRI hip on 01/18/18 showed a destructive lesion in the right acetabulum extending past the margin of the bone into adjacent soft tissues. Biopsy of the pelvic lesion on 1/25/2018 came back positive for plasma cell neoplasm.     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 Bone marrow biopsy shows no morphologic or immunophenotypic evidence for plasma cell neoplasm. Bone survey showed right Iliac lesion with lucent areas in frontal region- likely benign.    He was diagnosed with SOLITARY PLASMACYTOMA involving the RIGHT ACETABULUM.    - RT completed in 03/2018 ( 4500 cGy ). However he continued to have significant pain in the hip and underwent curettage of the right acetabular tumor along with right total hip arthroplasty  04/16/18. Surgical pathology showed plasma cell neoplasm as well as AL amyloidoma.     He was then kept on surveillance.      SUBJECTIVE     This is a video visit.  Patient tells me that he has been doing fine and he denies any pain.  Right hip feels good.  He has no restrictions.  He denies any B symptoms.  No new swellings.  Denies neuropathy.  Denies fevers or infections or shortness of breath.  No nausea vomiting diarrhea or constipation.  Energy has been good.    ECOG 0    ROS:  A comprehensive ROS was otherwise neg        PAST MEDICAL HISTORY     Past Medical History:   Diagnosis Date     Impotence of organic origin 2003     Plasma cell neoplasm 01/25/2018    S/P Needle biopsy of right pelvis bone  tumor     Pure hypercholesterolemia     statin started circa          CURRENT OUTPATIENT MEDICATIONS     Current Outpatient Medications   Medication Sig Dispense Refill     Acetaminophen (TYLENOL PO) Take 1,000 mg by mouth       aspirin 81 MG tablet Take 1 tablet by mouth daily. 90 tablet 3     atorvastatin (LIPITOR) 20 MG tablet Take 1 tablet (20 mg) by mouth At Bedtime 90 tablet 3        ALLERGIES   No Known Allergies     FAMILY HX     Family History   Problem Relation Age of Onset     C.A.D. Mother         age 70s     Coronary Artery Disease Mother      Cerebrovascular Disease Father         at 68 yo     Hypertension Father      Lung Cancer Brother         [place]      Coronary Artery Disease Brother      Cardiac Sudden Death Brother      Hyperlipidemia Brother      Mental Illness Son      Asthma Son      Depression Son    brother had lung cancer-   He has 2 children- healthy.     SOCIAL HX     Social History     Socioeconomic History     Marital status:      Spouse name: Not on file     Number of children: Not on file     Years of education: Not on file     Highest education level: Not on file   Occupational History     Not on file   Social Needs     Financial resource strain: Not on file     Food insecurity     Worry: Not on file     Inability: Not on file     Transportation needs     Medical: Not on file     Non-medical: Not on file   Tobacco Use     Smoking status: Former Smoker     Packs/day: 0.75     Years: 5.00     Pack years: 3.75     Types: Cigarettes     Start date: 1973     Last attempt to quit: 1978     Years since quittin.2     Smokeless tobacco: Never Used   Substance and Sexual Activity     Alcohol use: Yes     Alcohol/week: 1.7 standard drinks     Comment: Patient reports one drink bi-weekly     Drug use: No     Sexual activity: Not Currently     Partners: Female   Lifestyle     Physical activity     Days per week: Not on file     Minutes per session: Not on file      Stress: Not on file   Relationships     Social connections     Talks on phone: Not on file     Gets together: Not on file     Attends Taoism service: Not on file     Active member of club or organization: Not on file     Attends meetings of clubs or organizations: Not on file     Relationship status: Not on file     Intimate partner violence     Fear of current or ex partner: Not on file     Emotionally abused: Not on file     Physically abused: Not on file     Forced sexual activity: Not on file   Other Topics Concern     Parent/sibling w/ CABG, MI or angioplasty before 65F 55M? No   Social History Narrative    Single.  Son lives with him.  .      Quit smoking 40 years ago. Drinks etoh socially. Lives with son. Works as .     PHYSICAL EXAM     There were no vitals taken for this visit.  Wt Readings from Last 4 Encounters:   08/07/19 67.2 kg (148 lb 3.2 oz)   05/08/19 68 kg (150 lb)   05/01/19 67.6 kg (149 lb)   01/30/19 68.5 kg (151 lb)         Constitutional.  Does not seem to be in any acute distress.  Eyes.  No redness or discharge noted.  Respiratory.  Speaking in full sentences.  Breathing seems comfortable without any accessory use of muscles.    Skin.  Visualized his skin does not show any obvious rashes.  Musculoskeletal.  Range of motion for visualized areas is intact.  Neurological.  Alert and oriented x3.  Psychiatric.  Mood, mentation and affect are normal.  Decision making capacity is intact.      The rest of a comprehensive physical examination is deferred due to Public Health Emergency video visit restrictions.     LABORATORY AND IMAGING STUDIES     Reviewed and stable.     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     S/p RT (4500 cGy ) completed in 03/2018. He then had curettage of the right acetabular tumor along with right total hip arthroplasty on 4/16/18.  Surgical pathology showed plasma cell neoplasm / AL amyloidoma. No  evidence of systemic involvement on work up, so kept on surveillance.     He is doing well and labs are also fine.  I recommend repeating labs including 24 hr UPEP/CARLIN in 6 months as initially the monoclonal protein was seen in the urine as well.    All questions answered and he is agreeable and comfortable with the plan.    Lluvia Perez MD

## 2020-11-29 ENCOUNTER — HEALTH MAINTENANCE LETTER (OUTPATIENT)
Age: 65
End: 2020-11-29

## 2021-03-30 ENCOUNTER — TELEPHONE (OUTPATIENT)
Dept: ONCOLOGY | Facility: CLINIC | Age: 66
End: 2021-03-30

## 2021-03-30 NOTE — TELEPHONE ENCOUNTER
attempted to reach the patient and schedule labs with a virtual visit one week later with Dr. Perez.     [email or web address].    [facility]  Cancer/Infusion Center   [phone]

## 2021-04-07 ENCOUNTER — OFFICE VISIT (OUTPATIENT)
Dept: FAMILY MEDICINE | Facility: OTHER | Age: 66
End: 2021-04-07
Payer: COMMERCIAL

## 2021-04-07 VITALS
DIASTOLIC BLOOD PRESSURE: 80 MMHG | OXYGEN SATURATION: 97 % | HEART RATE: 82 BPM | RESPIRATION RATE: 26 BRPM | SYSTOLIC BLOOD PRESSURE: 122 MMHG | TEMPERATURE: 98.6 F

## 2021-04-07 DIAGNOSIS — Z20.822 SUSPECTED COVID-19 VIRUS INFECTION: Primary | ICD-10-CM

## 2021-04-07 DIAGNOSIS — J06.9 UPPER RESPIRATORY TRACT INFECTION, UNSPECIFIED TYPE: ICD-10-CM

## 2021-04-07 LAB
FLUAV+FLUBV AG SPEC QL: NEGATIVE
FLUAV+FLUBV AG SPEC QL: NEGATIVE
SARS-COV-2 RNA RESP QL NAA+PROBE: NORMAL
SPECIMEN SOURCE: NORMAL
SPECIMEN SOURCE: NORMAL

## 2021-04-07 PROCEDURE — U0005 INFEC AGEN DETEC AMPLI PROBE: HCPCS | Performed by: FAMILY MEDICINE

## 2021-04-07 PROCEDURE — U0003 INFECTIOUS AGENT DETECTION BY NUCLEIC ACID (DNA OR RNA); SEVERE ACUTE RESPIRATORY SYNDROME CORONAVIRUS 2 (SARS-COV-2) (CORONAVIRUS DISEASE [COVID-19]), AMPLIFIED PROBE TECHNIQUE, MAKING USE OF HIGH THROUGHPUT TECHNOLOGIES AS DESCRIBED BY CMS-2020-01-R: HCPCS | Performed by: FAMILY MEDICINE

## 2021-04-07 PROCEDURE — 99213 OFFICE O/P EST LOW 20 MIN: CPT | Performed by: FAMILY MEDICINE

## 2021-04-07 PROCEDURE — 87804 INFLUENZA ASSAY W/OPTIC: CPT | Performed by: FAMILY MEDICINE

## 2021-04-07 ASSESSMENT — PAIN SCALES - GENERAL: PAINLEVEL: NO PAIN (0)

## 2021-04-07 NOTE — PROGRESS NOTES
Assessment & Plan       ICD-10-CM    1. Suspected COVID-19 virus infection  Z20.822 Influenza A/B antigen     Symptomatic COVID-19 Virus (Coronavirus) by PCR     Symptomatic COVID-19 Virus (Coronavirus) by PCR   2. Upper respiratory tract infection, unspecified type  J06.9 Influenza A/B antigen     Symptomatic COVID-19 Virus (Coronavirus) by PCR     Symptomatic COVID-19 Virus (Coronavirus) by PCR      Will repeat COvid testing as had high risk exposure and sounds like COVID. Informed we can check for flu still as well, but much less likely. Would plan for him to need to quarantine until 10 days have passed and symptom improvement as no other source for his symptoms found.    Review of the result(s) of each unique test - flu, COVID  13 minutes spent on the date of the encounter doing chart review, history and exam, documentation and further activities per the note      No follow-ups on file.    Emperatriz Kaur MD, MD  [facility]    Stewart Umana is a 66 year old who presents for the following health issues     HPI     Acute Illness  Acute illness concerns: generalized overall body   Onset/Duration: since Saturday   Symptoms:  Fever: YES  Chills/Sweats: YES  Headache (location?): YES  Sinus Pressure: no  Conjunctivitis:  no  Ear Pain: no  Rhinorrhea: YES  Congestion: YES  Sore Throat: YES  Cough: YES-non-productive  Wheeze: no  Decreased Appetite: YES  Nausea: no  Vomiting: no  Diarrhea: no  Dysuria/Freq.: no  Dysuria or Hematuria: no  Fatigue/Achiness: YES  Sick/Strep Exposure: no  Therapies tried and outcome: None      Review of Systems   Constitutional, HEENT, cardiovascular, pulmonary, GI, , musculoskeletal, neuro, skin, endocrine and psych systems are negative, except as otherwise noted.      Objective    /80 (BP Location: Left arm, Patient Position: Sitting, Cuff Size: Adult Regular)   Pulse 82   Temp 98.6  F (37  C)   Resp 26   SpO2 97%   There is no height or weight on  file to calculate BMI.  Physical Exam   GENERAL: healthy, alert and no distress  EYES: Eyes grossly normal to inspection, PERRL and conjunctivae and sclerae normal  HENT: ear canals and TM's normal, nose with thick congsetion and mouth without ulcers or lesions  NECK: mild anterior adenopathy, no asymmetry, masses, or scars and thyroid normal to palpation  RESP: lungs clear to auscultation - no rales, rhonchi or wheezes  CV: regular rate and rhythm, normal S1 S2, no S3 or S4, no murmur, click or rub, no peripheral edema and peripheral pulses strong  ABDOMEN: soft, nontender, no hepatosplenomegaly, no masses and bowel sounds normal  MS: no gross musculoskeletal defects noted, no edema  SKIN: no suspicious lesions or rashes  NEURO: Normal strength and tone, mentation intact and speech normal  PSYCH: mentation appears normal, affect normal/bright

## 2021-04-08 LAB
LABORATORY COMMENT REPORT: ABNORMAL
SARS-COV-2 RNA RESP QL NAA+PROBE: POSITIVE
SPECIMEN SOURCE: ABNORMAL

## 2021-06-24 ENCOUNTER — PATIENT OUTREACH (OUTPATIENT)
Dept: ONCOLOGY | Facility: CLINIC | Age: 66
End: 2021-06-24

## 2021-06-24 NOTE — PROGRESS NOTES
Call received from Jacque SILVER at [facility] regarding lab orders received for Dong.  The labs need to be ordered through [facility] and she was reviewing the IgG's, wanting to make sure they order the correct ones.  They will not be able to order the 24-hr-urine as refrigerator is not big enough at his workplace.   Writer left message with Dong to get this done at any [facility]  container and drop off sometime next week so resulted for 7/6 appt.  Advised to call with any questions.

## 2021-06-24 NOTE — PROGRESS NOTES
Lab orders faxed to [facility] per patient request for his upcoming follow-up on 7/6 with Jamila.  All Dr. Perez labs faxed; patient states they are able to do a 24-hr-urine also.

## 2021-06-28 ENCOUNTER — TRANSFERRED RECORDS (OUTPATIENT)
Dept: HEALTH INFORMATION MANAGEMENT | Facility: CLINIC | Age: 66
End: 2021-06-28

## 2021-06-28 DIAGNOSIS — C90.30 PLASMACYTOMA OF BONE (H): ICD-10-CM

## 2021-06-28 LAB
ALT SERPL-CCNC: 21 IU/L (ref 0–44)
AST SERPL-CCNC: 17 IU/L (ref 0–40)
CREATININE (EXTERNAL): 1.02 MG/DL (ref 0.76–1.27)
GFR ESTIMATED (EXTERNAL): 77 ML/MIN/1.73
GFR ESTIMATED (IF AFRICAN AMERICAN) (EXTERNAL): 89 ML/MIN/1.73
GLUCOSE (EXTERNAL): 161 MG/DL (ref 65–99)
POTASSIUM (EXTERNAL): 4.6 MMOL/L (ref 3.5–5.2)

## 2021-06-28 PROCEDURE — 86335 IMMUNFIX E-PHORSIS/URINE/CSF: CPT | Performed by: PATHOLOGY

## 2021-06-29 LAB
PROT ELPH PNL UR ELPH: NORMAL
PROT PATTERN UR ELPH-IMP: NORMAL

## 2021-07-01 ENCOUNTER — PATIENT OUTREACH (OUTPATIENT)
Dept: ONCOLOGY | Facility: CLINIC | Age: 66
End: 2021-07-01

## 2021-07-01 NOTE — PROGRESS NOTES
Asked patient if he could writer to confirm that he has a lab appointment scheduled for his upcoming visit next week.  He was doing this through his employer, [employer].

## 2021-07-02 ENCOUNTER — PATIENT OUTREACH (OUTPATIENT)
Dept: ONCOLOGY | Facility: CLINIC | Age: 66
End: 2021-07-02

## 2021-07-02 NOTE — PROGRESS NOTES
Patient returned call to ; he states he had his 24-hr-urine through [facility] (resulted) and the rest of his labs were drawn through [employer], where patient is employed.    contacted [employer] and spoke with nursing staff - confirmed labs drawn on 6/28; the nurse, Damaris was going to check if results back by appointment, 7/6; she will call me back.  If results not in, then will need to reschedule and patient is aware.

## 2021-07-05 NOTE — PROGRESS NOTES
Oncology Follow Up Visit: July 6, 2021    Oncologist: Dr Lluvia Perez  PCP: Naomi Kuo    Diagnosis: Solitary plasmacytoma/AL amyloidoma of right acetabulum  Dong Joseph is a 66-year-old male who developed progressive right hip pain with radiation into posterior thigh in fall 2017. January 2018 MRI of the lumbar spine showed degenerative changes throughout, no disc hernniation and moderate foraminal stenosis at multiple levels.    1/16/2018 X ray hip showed a lucent area in the right supra-acetabular region measuring 5.2 cm in maximum diameter without any definite fracture. MRI hip on 01/18/18 showed a destructive lesion in the right acetabulum extending past the margin of the bone into adjacent soft tissues. Biopsy of the pelvic lesion on 1/25/2018 came back positive for plasma cell neoplasm.   Workup was negative for anemia, hypercalcemia with electrophoresis/immunofixation showing monoclonal free kappa light chain immunoglobulin in urine with increased kappa free light chain in serum. Bone marrow biopsy shows no morphologic or immunophenotypic evidence for plasma cell neoplasm. Bone survey showed right Iliac lesion with lucent areas in frontal region- likely benign.  He was diagnosed with SOLITARY PLASMACYTOMA involving the RIGHT ACETABULUM.  Treatment:   03/2018- RT completed ( 4500 cGy ). He continued to have significant pain in the hip and underwent curettage of the right acetabular tumor along with right total hip arthroplasty    04/16/18 Due to continued significant pain in the hip, he underwent curettage of the right acetabular tumor along with right total hip arthroplasty. Surgical pathology showed plasma cell neoplasm as well as AL amyloidoma.   Continues on surveillance.    Interval History: Mr. Joseph comes to clinic for follow up to his plasmacytoma of the right hip. Pt states he has been feeling well with no new pain or weight changes, night sweats or illnesses. He has been walking and  "exercising regularly. He is eating well he feels and is working fulltime in 3d printing medical items.    Rest of comprehensive and complete ROS is reviewed and is negative.   Past Medical History:   Diagnosis Date     Impotence of organic origin 2003     Plasma cell neoplasm 01/25/2018    S/P Needle biopsy of right pelvis bone tumor     Pure hypercholesterolemia 2002    statin started circa 2002     Current Outpatient Medications   Medication     Acetaminophen (TYLENOL PO)     aspirin 81 MG tablet     atorvastatin (LIPITOR) 20 MG tablet     No current facility-administered medications for this visit.      No Known Allergies    Physical Exam:/81 (BP Location: Right arm, Patient Position: Sitting, Cuff Size: Adult Regular)   Pulse 66   Temp 98.2  F (36.8  C) (Oral)   Resp 14   Ht 1.778 m (5' 10\")   Wt 73.2 kg (161 lb 6.4 oz)   SpO2 99%   BMI 23.16 kg/m     Constitutional: Alert, healthy, and in no distress.   ENT: Eyes bright , No mouth sores  Neck: Supple, No adenopathy.  Cardiac: Heart rate and rhythm is regular and strong without murmur  Respiratory: Breathing easy. Lung sounds clear to auscultation  GI: Abdomen is soft, non-tender, BS normal. No masses or organomegaly  MS: Muscle tone normal, extremities normal with no edema. No pain to hip.   Skin: No suspicious lesions or rashes  Neuro: Sensory grossly WNL, gait normal.   Lymph: Normal ant/post cervical, axillary, supraclavicular nodes  Psych: Mentation appears normal and affect normal/bright with good conversation.     Laboratory Results:   [facility] results 6/28/2021: see abstracted to chart.   Significant for no M protein.   Rest of testing is normal except for mild increase in free Kappa light chain at 19.9( normal up to 19.4 and elevated glucose =161    6/28/2021  [facility]ix ELP Urine No monoclonal protein seen on immunofixation.  Pathological significance requires clinical correlation.      ELP Interpretation Urine Trace albumin and a few trace " globulin bands seen.  No obvious monoclonal protein seen and    the absence of monoclonal immunoglobulins was confirmed by immunofixation of this same   urine sample.  Pathological significance requires clinical correlation.  FINN Valencia,      Assessment and Plan:   Solitary plasmacytoma/AL amyloidoma of right acetabulum-Pt is s/p RT completed in 3/2018 followed by Curettage of the right acetabulum. Review, labs and exam today showing no new sign of concern for return of disease.   Pt states he will be seeing surgeon next week for review of hip as follow up.   We will again repeat labs again in 6 months with clinic visit with Dr Perez.     The total time of this encounter amounted to 20 minutes. This time included face to face time spent with the patient, prep work, ordering tests, and performing post visit documentation.  Jamila Montoya,Cnp

## 2021-07-06 ENCOUNTER — ONCOLOGY VISIT (OUTPATIENT)
Dept: ONCOLOGY | Facility: CLINIC | Age: 66
End: 2021-07-06
Attending: INTERNAL MEDICINE
Payer: COMMERCIAL

## 2021-07-06 VITALS
WEIGHT: 161.4 LBS | TEMPERATURE: 98.2 F | HEART RATE: 66 BPM | OXYGEN SATURATION: 99 % | BODY MASS INDEX: 23.11 KG/M2 | SYSTOLIC BLOOD PRESSURE: 137 MMHG | RESPIRATION RATE: 14 BRPM | DIASTOLIC BLOOD PRESSURE: 81 MMHG | HEIGHT: 70 IN

## 2021-07-06 DIAGNOSIS — C90.30 PLASMACYTOMA OF BONE (H): Primary | ICD-10-CM

## 2021-07-06 PROCEDURE — 99213 OFFICE O/P EST LOW 20 MIN: CPT | Performed by: NURSE PRACTITIONER

## 2021-07-06 ASSESSMENT — MIFFLIN-ST. JEOR: SCORE: 1528.36

## 2021-07-06 ASSESSMENT — PAIN SCALES - GENERAL: PAINLEVEL: NO PAIN (0)

## 2021-07-06 NOTE — LETTER
7/6/2021         RE: Dong Joseph  [address]  [facility] [address]        Dear Colleague,    Thank you for referring your patient, Dong Joseph, to the [facility]. Please see a copy of my visit note below.    Oncology Follow Up Visit: July 6, 2021    Oncologist: Dr Lluvia Perez  PCP: Naomi Kuo    Diagnosis: Solitary plasmacytoma/AL amyloidoma of right acetabulum  Dong Joseph is a 66-year-old male who developed progressive right hip pain with radiation into posterior thigh in fall 2017. January 2018 MRI of the lumbar spine showed degenerative changes throughout, no disc hernniation and moderate foraminal stenosis at multiple levels.    1/16/2018 X ray hip showed a lucent area in the right supra-acetabular region measuring 5.2 cm in maximum diameter without any definite fracture. MRI hip on 01/18/18 showed a destructive lesion in the right acetabulum extending past the margin of the bone into adjacent soft tissues. Biopsy of the pelvic lesion on 1/25/2018 came back positive for plasma cell neoplasm.   Workup was negative for anemia, hypercalcemia with electrophoresis/immunofixation showing monoclonal free kappa light chain immunoglobulin in urine with increased kappa free light chain in serum. Bone marrow biopsy shows no morphologic or immunophenotypic evidence for plasma cell neoplasm. Bone survey showed right Iliac lesion with lucent areas in frontal region- likely benign.  He was diagnosed with SOLITARY PLASMACYTOMA involving the RIGHT ACETABULUM.  Treatment:   03/2018- RT completed ( 4500 cGy ). He continued to have significant pain in the hip and underwent curettage of the right acetabular tumor along with right total hip arthroplasty    04/16/18 Due to continued significant pain in the hip, he underwent curettage of the right acetabular tumor along with right total hip arthroplasty. Surgical pathology showed plasma cell neoplasm as well as AL amyloidoma.  "  Continues on surveillance.    Interval History: Mr. Joseph comes to clinic for follow up to his plasmacytoma of the right hip. Pt states he has been feeling well with no new pain or weight changes, night sweats or illnesses. He has been walking and exercising regularly. He is eating well he feels and is working fulltime in 3d printing medical items.    Rest of comprehensive and complete ROS is reviewed and is negative.   Past Medical History:   Diagnosis Date     Impotence of organic origin 2003     Plasma cell neoplasm 01/25/2018    S/P Needle biopsy of right pelvis bone tumor     Pure hypercholesterolemia 2002    statin started circa 2002     Current Outpatient Medications   Medication     Acetaminophen (TYLENOL PO)     aspirin 81 MG tablet     atorvastatin (LIPITOR) 20 MG tablet     No current facility-administered medications for this visit.      No Known Allergies    Physical Exam:/81 (BP Location: Right arm, Patient Position: Sitting, Cuff Size: Adult Regular)   Pulse 66   Temp 98.2  F (36.8  C) (Oral)   Resp 14   Ht 1.778 m (5' 10\")   Wt 73.2 kg (161 lb 6.4 oz)   SpO2 99%   BMI 23.16 kg/m     Constitutional: Alert, healthy, and in no distress.   ENT: Eyes bright , No mouth sores  Neck: Supple, No adenopathy.  Cardiac: Heart rate and rhythm is regular and strong without murmur  Respiratory: Breathing easy. Lung sounds clear to auscultation  GI: Abdomen is soft, non-tender, BS normal. No masses or organomegaly  MS: Muscle tone normal, extremities normal with no edema. No pain to hip.   Skin: No suspicious lesions or rashes  Neuro: Sensory grossly WNL, gait normal.   Lymph: Normal ant/post cervical, axillary, supraclavicular nodes  Psych: Mentation appears normal and affect normal/bright with good conversation.     Laboratory Results:   [facility] results 6/28/2021: see abstracted to chart.   Significant for no M protein.   Rest of testing is normal except for mild increase in free Kappa light chain " at 19.9( normal up to 19.4 and elevated glucose =161    6/28/2021  [facility] ELP Urine No monoclonal protein seen on immunofixation.  Pathological significance requires clinical correlation.      ELP Interpretation Urine Trace albumin and a few trace globulin bands seen.  No obvious monoclonal protein seen and    the absence of monoclonal immunoglobulins was confirmed by immunofixation of this same   urine sample.  Pathological significance requires clinical correlation.  FINN Valencia,      Assessment and Plan:   Solitary plasmacytoma/AL amyloidoma of right acetabulum-Pt is s/p RT completed in 3/2018 followed by Curettage of the right acetabulum. Review, labs and exam today showing no new sign of concern for return of disease.   Pt states he will be seeing surgeon next week for review of hip as follow up.   We will again repeat labs again in 6 months with clinic visit with Dr Perez.     The total time of this encounter amounted to 20 minutes. This time included face to face time spent with the patient, prep work, ordering tests, and performing post visit documentation.  Jamila Montoya Cnp        Again, thank you for allowing me to participate in the care of your patient.        Sincerely,        Jamila Montoya, NADEEM, APRN CNP

## 2021-07-06 NOTE — NURSING NOTE
"Oncology Rooming Note    July 6, 2021 3:40 PM   Dong Joseph is a 66 year old male who presents for:    Chief Complaint   Patient presents with     Oncology Clinic Visit     Follow up     Initial Vitals: /81 (BP Location: Right arm, Patient Position: Sitting, Cuff Size: Adult Regular)   Pulse 66   Temp 98.2  F (36.8  C) (Oral)   Resp 14   Ht 1.778 m (5' 10\")   Wt 73.2 kg (161 lb 6.4 oz)   SpO2 99%   BMI 23.16 kg/m   Estimated body mass index is 23.16 kg/m  as calculated from the following:    Height as of this encounter: 1.778 m (5' 10\").    Weight as of this encounter: 73.2 kg (161 lb 6.4 oz). Body surface area is 1.9 meters squared.  No Pain (0) Comment: Data Unavailable   No LMP for male patient.  Allergies reviewed: Yes  Medications reviewed: Yes    Medications: Medication refills not needed today.  Pharmacy name entered into EPIC:    [facility] [place] [facility] [address] [address]  [facility] [place] [address] [facility] [place] [address] [place] [address] [id number] - [place], [place] - [address] AT [facility] [address]  [facility] - [place], [place] - [address]    Clinical concerns: Lab results Jamila Montoya was notified.      Monica Landa CMA            "

## 2021-09-19 ENCOUNTER — HEALTH MAINTENANCE LETTER (OUTPATIENT)
Age: 66
End: 2021-09-19

## 2022-01-09 ENCOUNTER — HEALTH MAINTENANCE LETTER (OUTPATIENT)
Age: 67
End: 2022-01-09

## 2022-01-12 ENCOUNTER — TELEPHONE (OUTPATIENT)
Dept: ONCOLOGY | Facility: CLINIC | Age: 67
End: 2022-01-12

## 2022-01-12 ENCOUNTER — CARE COORDINATION (OUTPATIENT)
Dept: ONCOLOGY | Facility: CLINIC | Age: 67
End: 2022-01-12

## 2022-01-12 NOTE — PROGRESS NOTES
Message left for Dong stating that the Dr Perez orders for labs have been faxed to [facility] per his request.    Saima Beverly RN

## 2022-01-12 NOTE — TELEPHONE ENCOUNTER
Patient called in requesting the labs for Dr. Perez to be faxed to his employer, [employer], at [phone].    The patient has an appointment with Dr. Perez on 1/27/2022. Ok to call the patient with any questions. Thank you.    [facility]  Cancer/Infusion Center   [phone]

## 2022-01-17 ENCOUNTER — TRANSFERRED RECORDS (OUTPATIENT)
Dept: HEALTH INFORMATION MANAGEMENT | Facility: CLINIC | Age: 67
End: 2022-01-17
Payer: COMMERCIAL

## 2022-01-17 LAB
ALT SERPL-CCNC: 18 IU/L (ref 0–44)
AST SERPL-CCNC: 21 IU/L (ref 0–40)
CREATININE (EXTERNAL): 0.9 MG/DL (ref 0.76–1.27)
GFR ESTIMATED (EXTERNAL): 90 ML/MIN/1.73
GFR ESTIMATED (IF AFRICAN AMERICAN) (EXTERNAL): 104 ML/MIN/1.73
GLUCOSE (EXTERNAL): 118 MG/DL (ref 65–99)
POTASSIUM (EXTERNAL): 4.9 MMOL/L (ref 3.5–5.2)

## 2022-01-27 ENCOUNTER — ONCOLOGY VISIT (OUTPATIENT)
Dept: ONCOLOGY | Facility: CLINIC | Age: 67
End: 2022-01-27
Attending: NURSE PRACTITIONER
Payer: COMMERCIAL

## 2022-01-27 VITALS
OXYGEN SATURATION: 97 % | SYSTOLIC BLOOD PRESSURE: 154 MMHG | DIASTOLIC BLOOD PRESSURE: 78 MMHG | BODY MASS INDEX: 23.39 KG/M2 | TEMPERATURE: 98.6 F | HEART RATE: 75 BPM | WEIGHT: 163 LBS

## 2022-01-27 DIAGNOSIS — C90.30 PLASMACYTOMA OF BONE (H): Primary | ICD-10-CM

## 2022-01-27 PROCEDURE — 99213 OFFICE O/P EST LOW 20 MIN: CPT | Performed by: INTERNAL MEDICINE

## 2022-01-27 PROCEDURE — G0463 HOSPITAL OUTPT CLINIC VISIT: HCPCS

## 2022-01-27 ASSESSMENT — PAIN SCALES - GENERAL: PAINLEVEL: NO PAIN (0)

## 2022-01-27 NOTE — NURSING NOTE
"Oncology Rooming Note    January 27, 2022 2:37 PM   Dong Joseph is a 66 year old male who presents for:    Chief Complaint   Patient presents with     Oncology Clinic Visit     plasmacytoma of bone     Initial Vitals: BP (!) 154/78   Pulse 75   Temp 98.6  F (37  C) (Oral)   Wt 73.9 kg (163 lb)   SpO2 97%   BMI 23.39 kg/m   Estimated body mass index is 23.39 kg/m  as calculated from the following:    Height as of 7/6/21: 1.778 m (5' 10\").    Weight as of this encounter: 73.9 kg (163 lb). Body surface area is 1.91 meters squared.  No Pain (0) Comment: Data Unavailable   No LMP for male patient.  Allergies reviewed: Yes  Medications reviewed: Yes    Medications: Medication refills not needed today.  Pharmacy name entered into EPIC:    [facility] [place] - [address] [facility] [address]  [facility] [place] - [address] [address] [facility] - [place], [place] - [address] [place] [address] DRUG STORE #[id number] - [place], [place] - [address] AT [facility] OF [place] [address] [address]  [facility] - [place], [place] - [address]    Clinical concerns: none       Avani Bernal CMA            "

## 2022-01-27 NOTE — PROGRESS NOTES
FOLLOW-UP VISIT NOTE    PATIENT NAME: Dong Joseph MRN # [id number]  DATE OF VISIT: Jan 27, 2022 YOB: 1955        CANCER TYPE: Solitary plasmacytoma/AL amyloidoma of right acetabulum.    ONCOLOGY HISTORY:    Patient was being followed by Dr. Peres but now he has transferred his care to me.  I have reviewed his previous records and have copied and updated from prior notes.    66-year-old male who developed right hip pain with radiation into posterior thigh around the fall 2017.  This progressively got worse.  In January 2018 MRI of the lumbar spine showed degenerative changes throughout, no disc hernniation and moderate foraminal stenosis at multiple levels.  On 1/16/2018 X ray hip showed a lucent area in the right supra-acetabular region measuring 5.2 cm in maximum diameter without any definite fracture. MRI hip on 01/18/18 showed a destructive lesion in the right acetabulum extending past the margin of the bone into adjacent soft tissues. Biopsy of the pelvic lesion on 1/25/2018 came back positive for plasma cell neoplasm.     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 Bone marrow biopsy shows no morphologic or immunophenotypic evidence for plasma cell neoplasm. Bone survey showed right Iliac lesion with lucent areas in frontal region of skull- likely benign.    He was diagnosed with SOLITARY PLASMACYTOMA involving the RIGHT ACETABULUM.    - RT completed in 03/2018 ( 4500 cGy ). However he continued to have significant pain in the hip and underwent curettage of the right acetabular tumor along with right total hip arthroplasty  04/16/18. Surgical pathology showed plasma cell neoplasm as well as AL amyloidoma.     He was then kept on surveillance.      SUBJECTIVE     He is doing well.  He denies any right hip pain on the medial pain.  No B symptoms.  No  new swellings.  No infections or shortness of breath.  He does not have any neuropathy.  Energy has been good.  No GI problems.  No abnormal bleeding.     ECOG 0    ROS:  A comprehensive ROS was otherwise neg      I reviewed other history in epic as below.      PAST MEDICAL HISTORY     Past Medical History:   Diagnosis Date     Impotence of organic origin 2003     Plasma cell neoplasm 2018    S/P Needle biopsy of right pelvis bone tumor     Pure hypercholesterolemia     statin started circa          CURRENT OUTPATIENT MEDICATIONS     Current Outpatient Medications   Medication Sig Dispense Refill     Acetaminophen (TYLENOL PO) Take 1,000 mg by mouth       aspirin 81 MG tablet Take 1 tablet by mouth daily. 90 tablet 3     atorvastatin (LIPITOR) 20 MG tablet Take 1 tablet (20 mg) by mouth At Bedtime 90 tablet 3        ALLERGIES   No Known Allergies     FAMILY HX     Family History   Problem Relation Age of Onset     C.A.D. Mother         age 70s     Coronary Artery Disease Mother      Cerebrovascular Disease Father         at 66 yo     Hypertension Father      Lung Cancer Brother         [place]     Coronary Artery Disease Brother      Cardiac Sudden Death Brother      Hyperlipidemia Brother      Mental Illness Son      Asthma Son      Depression Son    brother had lung cancer-   He has 2 children- healthy.     SOCIAL HX     Social History     Socioeconomic History     Marital status: Single     Spouse name: Not on file     Number of children: Not on file     Years of education: Not on file     Highest education level: Not on file   Occupational History     Not on file   Tobacco Use     Smoking status: Former Smoker     Packs/day: 0.75     Years: 5.00     Pack years: 3.75     Types: Cigarettes     Start date: 1973     Quit date: 1978     Years since quittin.6     Smokeless tobacco: Never Used   Substance and Sexual Activity     Alcohol use: Yes     Alcohol/week: 1.7 standard drinks      Comment: Patient reports one drink bi-weekly     Drug use: No     Sexual activity: Not Currently     Partners: Female   Other Topics Concern     Parent/sibling w/ CABG, MI or angioplasty before 65F 55M? No   Social History Narrative    Single.  Son lives with him.  .      Social Determinants of Health     Financial Resource Strain: Not on file   Food Insecurity: Not on file   Transportation Needs: Not on file   Physical Activity: Not on file   Stress: Not on file   Social Connections: Not on file   Intimate Partner Violence: Not At Risk     Fear of Current or Ex-Partner: No     Emotionally Abused: No     Physically Abused: No     Sexually Abused: No   Housing Stability: Not on file     Quit smoking 40 years ago. Drinks etoh socially. Lives with son. Works as .     PHYSICAL EXAM     BP (!) 154/78   Pulse 75   Temp 98.6  F (37  C) (Oral)   Wt 73.9 kg (163 lb)   SpO2 97%   BMI 23.39 kg/m    Wt Readings from Last 4 Encounters:   01/27/22 73.9 kg (163 lb)   07/06/21 73.2 kg (161 lb 6.4 oz)   08/07/19 67.2 kg (148 lb 3.2 oz)   05/08/19 68 kg (150 lb)       CONSTITUTIONAL: No apparent distress  EYES: PERRLA, without pallor or jaundice  ENT/MOUTH: Ears unremarkable. No oral lesions  CVS: s1s2 normal  RESPIRATORY: Chest is clear  GI: Abdomen is benign  NEURO: Alert and oriented ×3  INTEGUMENT: no concerning skin rashes   LYMPHATIC: no palpable lymphadenopathy  MUSCULOSKELETAL: Unremarkable. No bony tenderness.   EXTREMITIES: no pedal edema  PSYCH: Mentation, mood and affect are appropriate         LABORATORY AND IMAGING STUDIES     Reviewed   1/18/2022.  CBC/differential unremarkable.  CMP unremarkable. Normal Creatinine and calcium.  IgG 1191  IgA 170  IgM 73  SPEP does not show any evidence of monoclonal protein.  Immunofixation was unremarkable.  Free kappa and lambda light chains are also normal.    6/28/2021.  Urine protein electrophoresis/immunofixation was unremarkable.     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     S/p RT (4500 cGy ) completed in 03/2018. He then had curettage of the right acetabular tumor along with right total hip arthroplasty on 4/16/18.  Surgical pathology showed plasma cell neoplasm / AL amyloidoma. No evidence of systemic involvement on work up, so kept on surveillance.     He continues to feel well and currently he does not have any evidence of recurrence.      My plan would be to repeat labs in 6 months.  Going forward we will repeat urine test if there is evidence of progression in the blood work.      He will see me in 6 months after repeat blood work    All questions answered and he is agreeable and comfortable with the plan.    Lluvia Perez MD

## 2022-01-27 NOTE — LETTER
1/27/2022         RE: Dong Joseph  [address]  [facility] [address]        Dear Colleague,    Thank you for referring your patient, Dong Joseph, to the [facility]. Please see a copy of my visit note below.    FOLLOW-UP VISIT NOTE    PATIENT NAME: Dong Joseph MRN # [id number]  DATE OF VISIT: Jan 27, 2022 YOB: 1955        CANCER TYPE: Solitary plasmacytoma/AL amyloidoma of right acetabulum.    ONCOLOGY HISTORY:    Patient was being followed by Dr. Peres but now he has transferred his care to me.  I have reviewed his previous records and have copied and updated from prior notes.    66-year-old male who developed right hip pain with radiation into posterior thigh around the fall 2017.  This progressively got worse.  In January 2018 MRI of the lumbar spine showed degenerative changes throughout, no disc hernniation and moderate foraminal stenosis at multiple levels.  On 1/16/2018 X ray hip showed a lucent area in the right supra-acetabular region measuring 5.2 cm in maximum diameter without any definite fracture. MRI hip on 01/18/18 showed a destructive lesion in the right acetabulum extending past the margin of the bone into adjacent soft tissues. Biopsy of the pelvic lesion on 1/25/2018 came back positive for plasma cell neoplasm.     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 Bone marrow biopsy shows no morphologic or immunophenotypic evidence for plasma cell neoplasm. Bone survey showed right Iliac lesion with lucent areas in frontal region of skull- likely benign.    He was diagnosed with SOLITARY PLASMACYTOMA involving the RIGHT ACETABULUM.    - RT completed in 03/2018 ( 4500 cGy ). However he continued to have significant pain in the hip and underwent curettage of the right acetabular tumor along with  right total hip arthroplasty  04/16/18. Surgical pathology showed plasma cell neoplasm as well as AL amyloidoma.     He was then kept on surveillance.      SUBJECTIVE     He is doing well.  He denies any right hip pain on the medial pain.  No B symptoms.  No new swellings.  No infections or shortness of breath.  He does not have any neuropathy.  Energy has been good.  No GI problems.  No abnormal bleeding.     ECOG 0    ROS:  A comprehensive ROS was otherwise neg      I reviewed other history in epic as below.      PAST MEDICAL HISTORY     Past Medical History:   Diagnosis Date     Impotence of organic origin 2003     Plasma cell neoplasm 01/25/2018    S/P Needle biopsy of right pelvis bone tumor     Pure hypercholesterolemia 2002    statin started circa 2002         CURRENT OUTPATIENT MEDICATIONS     Current Outpatient Medications   Medication Sig Dispense Refill     Acetaminophen (TYLENOL PO) Take 1,000 mg by mouth       aspirin 81 MG tablet Take 1 tablet by mouth daily. 90 tablet 3     atorvastatin (LIPITOR) 20 MG tablet Take 1 tablet (20 mg) by mouth At Bedtime 90 tablet 3        ALLERGIES   No Known Allergies     FAMILY HX     Family History   Problem Relation Age of Onset     C.A.D. Mother         age 70s     Coronary Artery Disease Mother      Cerebrovascular Disease Father         at 68 yo     Hypertension Father      Lung Cancer Brother         [place]     Coronary Artery Disease Brother      Cardiac Sudden Death Brother      Hyperlipidemia Brother      Mental Illness Son      Asthma Son      Depression Son    brother had lung cancer-   He has 2 children- healthy.     SOCIAL HX     Social History     Socioeconomic History     Marital status: Single     Spouse name: Not on file     Number of children: Not on file     Years of education: Not on file     Highest education level: Not on file   Occupational History     Not on file   Tobacco Use     Smoking status: Former Smoker     Packs/day: 0.75      Years: 5.00     Pack years: 3.75     Types: Cigarettes     Start date: 1973     Quit date: 1978     Years since quittin.6     Smokeless tobacco: Never Used   Substance and Sexual Activity     Alcohol use: Yes     Alcohol/week: 1.7 standard drinks     Comment: Patient reports one drink bi-weekly     Drug use: No     Sexual activity: Not Currently     Partners: Female   Other Topics Concern     Parent/sibling w/ CABG, MI or angioplasty before 65F 55M? No   Social History Narrative    Single.  Son lives with him.  .      Social Determinants of Health     Financial Resource Strain: Not on file   Food Insecurity: Not on file   Transportation Needs: Not on file   Physical Activity: Not on file   Stress: Not on file   Social Connections: Not on file   Intimate Partner Violence: Not At Risk     Fear of Current or Ex-Partner: No     Emotionally Abused: No     Physically Abused: No     Sexually Abused: No   Housing Stability: Not on file     Quit smoking 40 years ago. Drinks etoh socially. Lives with son. Works as .     PHYSICAL EXAM     BP (!) 154/78   Pulse 75   Temp 98.6  F (37  C) (Oral)   Wt 73.9 kg (163 lb)   SpO2 97%   BMI 23.39 kg/m    Wt Readings from Last 4 Encounters:   22 73.9 kg (163 lb)   21 73.2 kg (161 lb 6.4 oz)   19 67.2 kg (148 lb 3.2 oz)   19 68 kg (150 lb)       CONSTITUTIONAL: No apparent distress  EYES: PERRLA, without pallor or jaundice  ENT/MOUTH: Ears unremarkable. No oral lesions  CVS: s1s2 normal  RESPIRATORY: Chest is clear  GI: Abdomen is benign  NEURO: Alert and oriented ×3  INTEGUMENT: no concerning skin rashes   LYMPHATIC: no palpable lymphadenopathy  MUSCULOSKELETAL: Unremarkable. No bony tenderness.   EXTREMITIES: no pedal edema  PSYCH: Mentation, mood and affect are appropriate         LABORATORY AND IMAGING STUDIES     Reviewed   2022.  CBC/differential unremarkable.  CMP unremarkable. Normal Creatinine and calcium.  IgG  1191  IgA 170  IgM 73  SPEP does not show any evidence of monoclonal protein.  Immunofixation was unremarkable.  Free kappa and lambda light chains are also normal.    6/28/2021.  Urine protein electrophoresis/immunofixation was unremarkable.     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     S/p RT (4500 cGy ) completed in 03/2018. He then had curettage of the right acetabular tumor along with right total hip arthroplasty on 4/16/18.  Surgical pathology showed plasma cell neoplasm / AL amyloidoma. No evidence of systemic involvement on work up, so kept on surveillance.     He continues to feel well and currently he does not have any evidence of recurrence.      My plan would be to repeat labs in 6 months.  Going forward we will repeat urine test if there is evidence of progression in the blood work.      He will see me in 6 months after repeat blood work    All questions answered and he is agreeable and comfortable with the plan.              Again, thank you for allowing me to participate in the care of your patient.      Sincerely,    Lluvia Perez MD

## 2022-06-26 ENCOUNTER — HEALTH MAINTENANCE LETTER (OUTPATIENT)
Age: 67
End: 2022-06-26

## 2022-07-15 NOTE — TELEPHONE ENCOUNTER
----- Message from Saima Diane sent at 5/7/2018  4:50 PM CDT -----  Regarding: requsting orders  Hello!    Patient just called me to schedule for OP PT since he was discharged from home care, however there is not an order for him in the system.  If appropriate can you please enter in that order for him so he can get started asap?    Thank you,  Saima  Rehab scheduling   OB/GYN

## 2022-07-25 ENCOUNTER — TRANSFERRED RECORDS (OUTPATIENT)
Dept: HEALTH INFORMATION MANAGEMENT | Facility: CLINIC | Age: 67
End: 2022-07-25

## 2022-07-28 ENCOUNTER — ONCOLOGY VISIT (OUTPATIENT)
Dept: ONCOLOGY | Facility: CLINIC | Age: 67
End: 2022-07-28
Attending: INTERNAL MEDICINE
Payer: COMMERCIAL

## 2022-07-28 VITALS
SYSTOLIC BLOOD PRESSURE: 145 MMHG | HEART RATE: 58 BPM | WEIGHT: 161.5 LBS | DIASTOLIC BLOOD PRESSURE: 87 MMHG | RESPIRATION RATE: 18 BRPM | TEMPERATURE: 97.6 F | BODY MASS INDEX: 23.17 KG/M2 | OXYGEN SATURATION: 100 %

## 2022-07-28 DIAGNOSIS — C90.30 PLASMACYTOMA OF BONE (H): Primary | ICD-10-CM

## 2022-07-28 PROCEDURE — 99214 OFFICE O/P EST MOD 30 MIN: CPT | Performed by: INTERNAL MEDICINE

## 2022-07-28 PROCEDURE — G0463 HOSPITAL OUTPT CLINIC VISIT: HCPCS

## 2022-07-28 ASSESSMENT — PAIN SCALES - GENERAL: PAINLEVEL: NO PAIN (0)

## 2022-07-28 NOTE — NURSING NOTE
"Oncology Rooming Note    July 28, 2022 8:52 AM   Dong Joseph is a 67 year old male who presents for:    Chief Complaint   Patient presents with     Oncology Clinic Visit     Pt is here for a rtn for MM     Initial Vitals: Blood Pressure (Abnormal) 145/87   Pulse 58   Temperature 97.6  F (36.4  C) (Oral)   Respiration 18   Weight 73.3 kg (161 lb 8 oz)   Oxygen Saturation 100%   Body Mass Index 23.17 kg/m   Estimated body mass index is 23.17 kg/m  as calculated from the following:    Height as of 7/6/21: 1.778 m (5' 10\").    Weight as of this encounter: 73.3 kg (161 lb 8 oz). Body surface area is 1.9 meters squared.  No Pain (0) Comment: Data Unavailable   No LMP for male patient.  Allergies reviewed: Yes  Medications reviewed: Yes    Medications: Medication refills not needed today.  Pharmacy name entered into EPIC:    [facility] - [place] - [id number] [address]  [facility] [place] - [address] [facility] - [place], [place] - [address] [place] [address] DRUG STORE #[id number] - [place], [place] - [address] AT [facility] [address]  [facility] - [place], [place] - [address]    Clinical concerns: none       Nely Urena MA            "

## 2022-07-28 NOTE — LETTER
7/28/2022         RE: Dong Joseph  [address]  [place] [address]        Dear Colleague,    Thank you for referring your patient, Dong Joseph, to the [facility]. Please see a copy of my visit note below.    FOLLOW-UP VISIT NOTE    PATIENT NAME: Dong Joseph MRN # [id number]  DATE OF VISIT: Jul 28, 2022 YOB: 1955        CANCER TYPE: Solitary plasmacytoma/AL amyloidoma of right acetabulum.    ONCOLOGY HISTORY:    Patient was being followed by Dr. Peres but now he has transferred his care to me.  I have reviewed his previous records and have copied and updated from prior notes.    67 year old male who developed right hip pain with radiation into posterior thigh around the fall 2017.  This progressively got worse.  In January 2018 MRI of the lumbar spine showed degenerative changes throughout, no disc hernniation and moderate foraminal stenosis at multiple levels.  On 1/16/2018 X ray hip showed a lucent area in the right supra-acetabular region measuring 5.2 cm in maximum diameter without any definite fracture. MRI hip on 01/18/18 showed a destructive lesion in the right acetabulum extending past the margin of the bone into adjacent soft tissues. Biopsy of the pelvic lesion on 1/25/2018 came back positive for plasma cell neoplasm.     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 Bone marrow biopsy shows no morphologic or immunophenotypic evidence for plasma cell neoplasm. Bone survey showed right Iliac lesion with lucent areas in frontal region of skull- likely benign.    He was diagnosed with SOLITARY PLASMACYTOMA involving the RIGHT ACETABULUM.    - RT completed in 03/2018 ( 4500 cGy ). However he continued to have significant pain in the hip and underwent curettage of the right acetabular tumor along with  right total hip arthroplasty  04/16/18. Surgical pathology showed plasma cell neoplasm as well as AL amyloidoma.     He was then kept on surveillance.      SUBJECTIVE     He feels well.  Currently he does not have any right hip pain or leg pain.  Denies any new lumps bumps or swellings.  No no drenching night sweats or weight loss.  Energy has been fine.  No infections or dyspnea.  No neuropathy.      ECOG 0    ROS:  A comprehensive ROS was otherwise neg      I reviewed other history in epic as below.      PAST MEDICAL HISTORY     Past Medical History:   Diagnosis Date     Impotence of organic origin 2003     Plasma cell neoplasm 01/25/2018    S/P Needle biopsy of right pelvis bone tumor     Pure hypercholesterolemia 2002    statin started circa 2002         CURRENT OUTPATIENT MEDICATIONS     Current Outpatient Medications   Medication Sig Dispense Refill     Acetaminophen (TYLENOL PO) Take 1,000 mg by mouth       aspirin 81 MG tablet Take 1 tablet by mouth daily. 90 tablet 3     atorvastatin (LIPITOR) 20 MG tablet Take 1 tablet (20 mg) by mouth At Bedtime 90 tablet 3        ALLERGIES   No Known Allergies     FAMILY HX     Family History   Problem Relation Age of Onset     C.A.D. Mother         age 70s     Coronary Artery Disease Mother      Cerebrovascular Disease Father         at 68 yo     Hypertension Father      Lung Cancer Brother         [place]     Coronary Artery Disease Brother      Cardiac Sudden Death Brother      Hyperlipidemia Brother      Mental Illness Son      Asthma Son      Depression Son    brother had lung cancer-   He has 2 children- healthy.     SOCIAL HX     Social History     Socioeconomic History     Marital status: Single     Spouse name: Not on file     Number of children: Not on file     Years of education: Not on file     Highest education level: Not on file   Occupational History     Not on file   Tobacco Use     Smoking status: Former Smoker     Packs/day: 0.75     Years: 5.00      Pack years: 3.75     Types: Cigarettes     Start date: 1973     Quit date: 1978     Years since quittin.1     Smokeless tobacco: Never Used   Substance and Sexual Activity     Alcohol use: Yes     Alcohol/week: 1.7 standard drinks     Comment: Patient reports one drink bi-weekly     Drug use: No     Sexual activity: Not Currently     Partners: Female   Other Topics Concern     Parent/sibling w/ CABG, MI or angioplasty before 65F 55M? No   Social History Narrative    Single.  Son lives with him.  .      Social Determinants of Health     Financial Resource Strain: Not on file   Food Insecurity: Not on file   Transportation Needs: Not on file   Physical Activity: Not on file   Stress: Not on file   Social Connections: Not on file   Intimate Partner Violence: Not At Risk     Fear of Current or Ex-Partner: No     Emotionally Abused: No     Physically Abused: No     Sexually Abused: No   Housing Stability: Not on file     Quit smoking 40 years ago. Drinks etoh socially. Lives with son. Works as .     PHYSICAL EXAM     BP (!) 145/87   Pulse 58   Temp 97.6  F (36.4  C) (Oral)   Resp 18   Wt 73.3 kg (161 lb 8 oz)   SpO2 100%   BMI 23.17 kg/m    Wt Readings from Last 4 Encounters:   22 73.3 kg (161 lb 8 oz)   22 73.9 kg (163 lb)   21 73.2 kg (161 lb 6.4 oz)   19 67.2 kg (148 lb 3.2 oz)       CONSTITUTIONAL: no acute distress  EYES: PERRLA, no palor or icterus.   ENT/MOUTH: no mouth lesions. Ears normal  CVS: s1s2 no m r g .   RESPIRATORY: clear to auscultation b/l  GI: soft non tender no hepatosplenomegaly  NEURO: AAOX3  Grossly non focal neuro exam  INTEGUMENT: no obvious rashes  LYMPHATIC: no palpable cervical, supraclavicular, axillary or inguinal LAD  MUSCULOSKELETAL: Unremarkable. No bony tenderness. The right hip surgical scar is well healed without evidence of local recurrence  EXTREMITIES: no edema  PSYCH: Mentation, mood and affect are normal. Decision  making capacity is intact           LABORATORY AND IMAGING STUDIES     Reviewed   7/13/2022.  CBC/differential unremarkable.  CMP is normal  TSH 1.19.  PSA 3.    IgG 1224  IgA 177  IgM 78    Repeat SPEP/CARLIN/FLC are pending.      In March 2022 SPEP did not show any evidence of monoclonal protein.  Immunofixation was unremarkable.  Free kappa and lambda light chains are also normal.    6/28/2021.  Urine protein electrophoresis/immunofixation was unremarkable.     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     S/p RT (4500 cGy ) completed in 03/2018. He then had curettage of the right acetabular tumor along with right total hip arthroplasty on 4/16/18.  Surgical pathology showed plasma cell neoplasm / AL amyloidoma. No evidence of systemic involvement on work up, so kept on surveillance.     He is doing well.  I do not have all of the myeloma blood work back but what ever is back looks fine and he is clinically asymptomatic.    Assuming that there are no surprises on the pending blood work, we will continue to monitor and repeat labs in 6 months.    If there is evidence of recurrence then more extensive hematological work-up would be necessary.    Return to clinic in 6 months with labs prior.    He will let me know in the interim if he has any questions or concerns.      All questions answered and he is agreeable and comfortable with the plan.    Lluvia Perez MD

## 2022-07-28 NOTE — PROGRESS NOTES
FOLLOW-UP VISIT NOTE    PATIENT NAME: Dong Joseph MRN # [id number]  DATE OF VISIT: Jul 28, 2022 YOB: 1955        CANCER TYPE: Solitary plasmacytoma/AL amyloidoma of right acetabulum.    ONCOLOGY HISTORY:    Patient was being followed by Dr. Peres but now he has transferred his care to me.  I have reviewed his previous records and have copied and updated from prior notes.    67 year old male who developed right hip pain with radiation into posterior thigh around the fall 2017.  This progressively got worse.  In January 2018 MRI of the lumbar spine showed degenerative changes throughout, no disc hernniation and moderate foraminal stenosis at multiple levels.  On 1/16/2018 X ray hip showed a lucent area in the right supra-acetabular region measuring 5.2 cm in maximum diameter without any definite fracture. MRI hip on 01/18/18 showed a destructive lesion in the right acetabulum extending past the margin of the bone into adjacent soft tissues. Biopsy of the pelvic lesion on 1/25/2018 came back positive for plasma cell neoplasm.     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 Bone marrow biopsy shows no morphologic or immunophenotypic evidence for plasma cell neoplasm. Bone survey showed right Iliac lesion with lucent areas in frontal region of skull- likely benign.    He was diagnosed with SOLITARY PLASMACYTOMA involving the RIGHT ACETABULUM.    - RT completed in 03/2018 ( 4500 cGy ). However he continued to have significant pain in the hip and underwent curettage of the right acetabular tumor along with right total hip arthroplasty  04/16/18. Surgical pathology showed plasma cell neoplasm as well as AL amyloidoma.     He was then kept on surveillance.      SUBJECTIVE     He feels well.  Currently he does not have any right hip pain or leg pain.  Denies any  new lumps bumps or swellings.  No no drenching night sweats or weight loss.  Energy has been fine.  No infections or dyspnea.  No neuropathy.      ECOG 0    ROS:  A comprehensive ROS was otherwise neg      I reviewed other history in epic as below.      PAST MEDICAL HISTORY     Past Medical History:   Diagnosis Date     Impotence of organic origin      Plasma cell neoplasm 2018    S/P Needle biopsy of right pelvis bone tumor     Pure hypercholesterolemia     statin started circa          CURRENT OUTPATIENT MEDICATIONS     Current Outpatient Medications   Medication Sig Dispense Refill     Acetaminophen (TYLENOL PO) Take 1,000 mg by mouth       aspirin 81 MG tablet Take 1 tablet by mouth daily. 90 tablet 3     atorvastatin (LIPITOR) 20 MG tablet Take 1 tablet (20 mg) by mouth At Bedtime 90 tablet 3        ALLERGIES   No Known Allergies     FAMILY HX     Family History   Problem Relation Age of Onset     C.A.D. Mother         age 70s     Coronary Artery Disease Mother      Cerebrovascular Disease Father         at 68 yo     Hypertension Father      Lung Cancer Brother         [place]      Coronary Artery Disease Brother      Cardiac Sudden Death Brother      Hyperlipidemia Brother      Mental Illness Son      Asthma Son      Depression Son    brother had lung cancer-   He has 2 children- healthy.     SOCIAL HX     Social History     Socioeconomic History     Marital status: Single     Spouse name: Not on file     Number of children: Not on file     Years of education: Not on file     Highest education level: Not on file   Occupational History     Not on file   Tobacco Use     Smoking status: Former Smoker     Packs/day: 0.75     Years: 5.00     Pack years: 3.75     Types: Cigarettes     Start date: 1973     Quit date: 1978     Years since quittin.1     Smokeless tobacco: Never Used   Substance and Sexual Activity     Alcohol use: Yes     Alcohol/week: 1.7 standard drinks      Comment: Patient reports one drink bi-weekly     Drug use: No     Sexual activity: Not Currently     Partners: Female   Other Topics Concern     Parent/sibling w/ CABG, MI or angioplasty before 65F 55M? No   Social History Narrative    Single.  Son lives with him.  .      Social Determinants of Health     Financial Resource Strain: Not on file   Food Insecurity: Not on file   Transportation Needs: Not on file   Physical Activity: Not on file   Stress: Not on file   Social Connections: Not on file   Intimate Partner Violence: Not At Risk     Fear of Current or Ex-Partner: No     Emotionally Abused: No     Physically Abused: No     Sexually Abused: No   Housing Stability: Not on file     Quit smoking 40 years ago. Drinks etoh socially. Lives with son. Works as .     PHYSICAL EXAM     BP (!) 145/87   Pulse 58   Temp 97.6  F (36.4  C) (Oral)   Resp 18   Wt 73.3 kg (161 lb 8 oz)   SpO2 100%   BMI 23.17 kg/m    Wt Readings from Last 4 Encounters:   07/28/22 73.3 kg (161 lb 8 oz)   01/27/22 73.9 kg (163 lb)   07/06/21 73.2 kg (161 lb 6.4 oz)   08/07/19 67.2 kg (148 lb 3.2 oz)       CONSTITUTIONAL: no acute distress  EYES: PERRLA, no palor or icterus.   ENT/MOUTH: no mouth lesions. Ears normal  CVS: s1s2 no m r g .   RESPIRATORY: clear to auscultation b/l  GI: soft non tender no hepatosplenomegaly  NEURO: AAOX3  Grossly non focal neuro exam  INTEGUMENT: no obvious rashes  LYMPHATIC: no palpable cervical, supraclavicular, axillary or inguinal LAD  MUSCULOSKELETAL: Unremarkable. No bony tenderness. The right hip surgical scar is well healed without evidence of local recurrence  EXTREMITIES: no edema  PSYCH: Mentation, mood and affect are normal. Decision making capacity is intact           LABORATORY AND IMAGING STUDIES     Reviewed   7/13/2022.  CBC/differential unremarkable.  CMP is normal  TSH 1.19.  PSA 3.    IgG 1224  IgA 177  IgM 78    Repeat SPEP/CARLIN/FLC are pending.      In March 2022 SPEP did  not show any evidence of monoclonal protein.  Immunofixation was unremarkable.  Free kappa and lambda light chains are also normal.    6/28/2021.  Urine protein electrophoresis/immunofixation was unremarkable.     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     S/p RT (4500 cGy ) completed in 03/2018. He then had curettage of the right acetabular tumor along with right total hip arthroplasty on 4/16/18.  Surgical pathology showed plasma cell neoplasm / AL amyloidoma. No evidence of systemic involvement on work up, so kept on surveillance.     He is doing well.  I do not have all of the myeloma blood work back but what ever is back looks fine and he is clinically asymptomatic.    Assuming that there are no surprises on the pending blood work, we will continue to monitor and repeat labs in 6 months.    If there is evidence of recurrence then more extensive hematological work-up would be necessary.    Return to clinic in 6 months with labs prior.    He will let me know in the interim if he has any questions or concerns.      All questions answered and he is agreeable and comfortable with the plan.    Lluvia Perez MD

## 2022-11-21 ENCOUNTER — HEALTH MAINTENANCE LETTER (OUTPATIENT)
Age: 67
End: 2022-11-21

## 2023-01-19 ENCOUNTER — DOCUMENTATION ONLY (OUTPATIENT)
Dept: ONCOLOGY | Facility: CLINIC | Age: 68
End: 2023-01-19
Payer: COMMERCIAL

## 2023-01-20 ENCOUNTER — TRANSFERRED RECORDS (OUTPATIENT)
Dept: HEALTH INFORMATION MANAGEMENT | Facility: CLINIC | Age: 68
End: 2023-01-20
Payer: COMMERCIAL

## 2023-02-02 ENCOUNTER — ONCOLOGY VISIT (OUTPATIENT)
Dept: ONCOLOGY | Facility: CLINIC | Age: 68
End: 2023-02-02
Attending: INTERNAL MEDICINE
Payer: COMMERCIAL

## 2023-02-02 VITALS
OXYGEN SATURATION: 100 % | TEMPERATURE: 97.7 F | HEART RATE: 55 BPM | SYSTOLIC BLOOD PRESSURE: 141 MMHG | BODY MASS INDEX: 22.81 KG/M2 | RESPIRATION RATE: 16 BRPM | DIASTOLIC BLOOD PRESSURE: 84 MMHG | WEIGHT: 159 LBS

## 2023-02-02 DIAGNOSIS — C90.30 PLASMACYTOMA OF BONE (H): Primary | ICD-10-CM

## 2023-02-02 PROCEDURE — 99214 OFFICE O/P EST MOD 30 MIN: CPT | Performed by: INTERNAL MEDICINE

## 2023-02-02 PROCEDURE — G0463 HOSPITAL OUTPT CLINIC VISIT: HCPCS | Performed by: INTERNAL MEDICINE

## 2023-02-02 PROCEDURE — G0463 HOSPITAL OUTPT CLINIC VISIT: HCPCS

## 2023-02-02 ASSESSMENT — PAIN SCALES - GENERAL: PAINLEVEL: NO PAIN (0)

## 2023-02-02 NOTE — NURSING NOTE
"Oncology Rooming Note    February 2, 2023 8:47 AM   Dong Joseph is a 67 year old male who presents for:    Chief Complaint   Patient presents with     Oncology Clinic Visit     Multiple myeloma; plasmacytoma of bone     Initial Vitals: BP (!) 141/84   Pulse 55   Temp 97.7  F (36.5  C) (Oral)   Resp 16   Wt 72.1 kg (159 lb)   SpO2 100%   BMI 22.81 kg/m   Estimated body mass index is 22.81 kg/m  as calculated from the following:    Height as of 7/6/21: 1.778 m (5' 10\").    Weight as of this encounter: 72.1 kg (159 lb). Body surface area is 1.89 meters squared.  No Pain (0) Comment: Data Unavailable   No LMP for male patient.  Allergies reviewed: Yes  Medications reviewed: Yes    Medications: Medication refills not needed today.  Pharmacy name entered into EPIC:    [facility] - [place], [place] - [address]  [facility] - [place], [place] - [address] [id number] - [place], [place] - [address] [place] [address] [id number] - [place], [place] - [address] AT [facility] [place] [facility] [place] [address] [address]  [facility] - [place], [place] - [address]    Clinical concerns: none       Avani Bernal CMA            "

## 2023-02-02 NOTE — PROGRESS NOTES
FOLLOW-UP VISIT NOTE    PATIENT NAME: Dong Joseph MRN # [id number]  DATE OF VISIT: Feb 2, 2023 YOB: 1955        CANCER TYPE: Solitary plasmacytoma/AL amyloidoma of right acetabulum.    ONCOLOGY HISTORY:    Patient was being followed by Dr. Peres but now he has transferred his care to me.  I have reviewed his previous records and have copied and updated from prior notes.    67 year old male who developed right hip pain with radiation into posterior thigh around the fall 2017.  This progressively got worse.  In January 2018 MRI of the lumbar spine showed degenerative changes throughout, no disc hernniation and moderate foraminal stenosis at multiple levels.  On 1/16/2018 X ray hip showed a lucent area in the right supra-acetabular region measuring 5.2 cm in maximum diameter without any definite fracture. MRI hip on 01/18/18 showed a destructive lesion in the right acetabulum extending past the margin of the bone into adjacent soft tissues. Biopsy of the pelvic lesion on 1/25/2018 came back positive for plasma cell neoplasm.     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 Bone marrow biopsy shows no morphologic or immunophenotypic evidence for plasma cell neoplasm. Bone survey showed right Iliac lesion with lucent areas in frontal region of skull- likely benign.    He was diagnosed with SOLITARY PLASMACYTOMA involving the RIGHT ACETABULUM.    - RT completed in 03/2018 ( 4500 cGy ). However he continued to have significant pain in the hip and underwent curettage of the right acetabular tumor along with right total hip arthroplasty  04/16/18. Surgical pathology showed plasma cell neoplasm as well as AL amyloidoma.     He was then kept on surveillance.      SUBJECTIVE     He is doing well. Denies new pain or new swellings. No B symptoms. Energy si fine. No  neuropathy    ECOG 0    ROS:  A comprehensive ROS was otherwise neg      I reviewed other history in epic as below.      PAST MEDICAL HISTORY     Past Medical History:   Diagnosis Date     Impotence of organic origin 2003     Plasma cell neoplasm 2018    S/P Needle biopsy of right pelvis bone tumor     Pure hypercholesterolemia     statin started circa          CURRENT OUTPATIENT MEDICATIONS     Current Outpatient Medications   Medication Sig Dispense Refill     Acetaminophen (TYLENOL PO) Take 1,000 mg by mouth       aspirin 81 MG tablet Take 1 tablet by mouth daily. 90 tablet 3     atorvastatin (LIPITOR) 20 MG tablet Take 1 tablet (20 mg) by mouth At Bedtime 90 tablet 3        ALLERGIES   No Known Allergies     FAMILY HX     Family History   Problem Relation Age of Onset     C.A.D. Mother         age 70s     Coronary Artery Disease Mother      Cerebrovascular Disease Father         at 66 yo     Hypertension Father      Lung Cancer Brother         [place]      Coronary Artery Disease Brother      Cardiac Sudden Death Brother      Hyperlipidemia Brother      Mental Illness Son      Asthma Son      Depression Son    brother had lung cancer-   He has 2 children- healthy.     SOCIAL HX     Social History     Socioeconomic History     Marital status: Single     Spouse name: Not on file     Number of children: Not on file     Years of education: Not on file     Highest education level: Not on file   Occupational History     Not on file   Tobacco Use     Smoking status: Former     Packs/day: 0.75     Years: 5.00     Pack years: 3.75     Types: Cigarettes     Start date: 1973     Quit date: 1978     Years since quittin.7     Smokeless tobacco: Never   Substance and Sexual Activity     Alcohol use: Yes     Alcohol/week: 1.7 standard drinks     Comment: Patient reports one drink bi-weekly     Drug use: No     Sexual activity: Not Currently     Partners: Female   Other Topics Concern      Parent/sibling w/ CABG, MI or angioplasty before 65F 55M? No   Social History Narrative    Single.  Son lives with him.  .      Social Determinants of Health     Financial Resource Strain: Not on file   Food Insecurity: Not on file   Transportation Needs: Not on file   Physical Activity: Not on file   Stress: Not on file   Social Connections: Not on file   Intimate Partner Violence: Not At Risk     Fear of Current or Ex-Partner: No     Emotionally Abused: No     Physically Abused: No     Sexually Abused: No   Housing Stability: Not on file     Quit smoking 40 years ago. Drinks etoh socially. Lives with son. Works as .     PHYSICAL EXAM     BP (!) 141/84   Pulse 55   Temp 97.7  F (36.5  C) (Oral)   Resp 16   Wt 72.1 kg (159 lb)   SpO2 100%   BMI 22.81 kg/m    Wt Readings from Last 4 Encounters:   02/02/23 72.1 kg (159 lb)   07/28/22 73.3 kg (161 lb 8 oz)   01/27/22 73.9 kg (163 lb)   07/06/21 73.2 kg (161 lb 6.4 oz)     CONSTITUTIONAL: No apparent distress  EYES: PERRLA, without pallor or jaundice  ENT/MOUTH: Ears unremarkable. No oral lesions  CVS: s1s2 normal  RESPIRATORY: Chest is clear  GI: Abdomen is benign  NEURO: Alert and oriented ×3  INTEGUMENT: no concerning skin rashes   LYMPHATIC: no palpable lymphadenopathy  MUSCULOSKELETAL: Unremarkable. No bony tenderness. Right thigh area is well healed  EXTREMITIES: no pedal edema  PSYCH: Mentation, mood and affect are appropriate         LABORATORY AND IMAGING STUDIES     Reviewed   7/13/2022.  CBC/differential unremarkable.  CMP is normal  TSH 1.19.  PSA 3.    IgG 1224  IgA 177  IgM 78    Repeat SPEP/CARLIN/FLC unremarkable  Kappa 22.  Lambda 15.3 and ratio is normal at 1.44    In March 2022 SPEP did not show any evidence of monoclonal protein.  Immunofixation was unremarkable.  Free kappa and lambda light chains are also normal.    6/28/2021.  Urine protein electrophoresis/immunofixation was unremarkable.     ASSESSMENT AND PLAN      Solitary  plasmacytoma AL amyloidoma of the right acetabulum.     S/p RT (4500 cGy ) completed in 03/2018. He then had curettage of the right acetabular tumor along with right total hip arthroplasty on 4/16/18.  Surgical pathology showed plasma cell neoplasm / AL amyloidoma. No evidence of systemic involvement on work up, so kept on surveillance.     He is doing well.  As per him he had repeat labs done for plasmacytoma and these were resulted. I have not seen the results as these were done at an outside facility. I have asked him to send us the reports and I have also asked my nurse to get the results    Assuming that everything is stable, we will continue to monitor his labs every 6 months for now    Return to clinic in 6 months with labs prior.    All questions answered and he is agreeable and comfortable with the plan.    Lluvia Perez MD

## 2023-02-02 NOTE — LETTER
2/2/2023         RE: Dong Joseph  [address]  [facility] [address]        Dear Colleague,    Thank you for referring your patient, Dong Joseph, to the [facility]. Please see a copy of my visit note below.    FOLLOW-UP VISIT NOTE    PATIENT NAME: Dong Joseph MRN # [id number]  DATE OF VISIT: Feb 2, 2023 YOB: 1955        CANCER TYPE: Solitary plasmacytoma/AL amyloidoma of right acetabulum.    ONCOLOGY HISTORY:    Patient was being followed by Dr. Peres but now he has transferred his care to me.  I have reviewed his previous records and have copied and updated from prior notes.    67 year old male who developed right hip pain with radiation into posterior thigh around the fall 2017.  This progressively got worse.  In January 2018 MRI of the lumbar spine showed degenerative changes throughout, no disc hernniation and moderate foraminal stenosis at multiple levels.  On 1/16/2018 X ray hip showed a lucent area in the right supra-acetabular region measuring 5.2 cm in maximum diameter without any definite fracture. MRI hip on 01/18/18 showed a destructive lesion in the right acetabulum extending past the margin of the bone into adjacent soft tissues. Biopsy of the pelvic lesion on 1/25/2018 came back positive for plasma cell neoplasm.     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 Bone marrow biopsy shows no morphologic or immunophenotypic evidence for plasma cell neoplasm. Bone survey showed right Iliac lesion with lucent areas in frontal region of skull- likely benign.    He was diagnosed with SOLITARY PLASMACYTOMA involving the RIGHT ACETABULUM.    - RT completed in 03/2018 ( 4500 cGy ). However he continued to have significant pain in the hip and underwent curettage of the right acetabular tumor along with  right total hip arthroplasty  18. Surgical pathology showed plasma cell neoplasm as well as AL amyloidoma.     He was then kept on surveillance.      SUBJECTIVE     He is doing well. Denies new pain or new swellings. No B symptoms. Energy si fine. No neuropathy    ECOG 0    ROS:  A comprehensive ROS was otherwise neg      I reviewed other history in epic as below.      PAST MEDICAL HISTORY     Past Medical History:   Diagnosis Date     Impotence of organic origin      Plasma cell neoplasm 2018    S/P Needle biopsy of right pelvis bone tumor     Pure hypercholesterolemia     statin started circa          CURRENT OUTPATIENT MEDICATIONS     Current Outpatient Medications   Medication Sig Dispense Refill     Acetaminophen (TYLENOL PO) Take 1,000 mg by mouth       aspirin 81 MG tablet Take 1 tablet by mouth daily. 90 tablet 3     atorvastatin (LIPITOR) 20 MG tablet Take 1 tablet (20 mg) by mouth At Bedtime 90 tablet 3        ALLERGIES   No Known Allergies     FAMILY HX     Family History   Problem Relation Age of Onset     C.A.D. Mother         age 70s     Coronary Artery Disease Mother      Cerebrovascular Disease Father         at 68 yo     Hypertension Father      Lung Cancer Brother         [place]     Coronary Artery Disease Brother      Cardiac Sudden Death Brother      Hyperlipidemia Brother      Mental Illness Son      Asthma Son      Depression Son    brother had lung cancer-   He has 2 children- healthy.     SOCIAL HX     Social History     Socioeconomic History     Marital status: Single     Spouse name: Not on file     Number of children: Not on file     Years of education: Not on file     Highest education level: Not on file   Occupational History     Not on file   Tobacco Use     Smoking status: Former     Packs/day: 0.75     Years: 5.00     Pack years: 3.75     Types: Cigarettes     Start date: 1973     Quit date: 1978     Years since quittin.7     Smokeless  tobacco: Never   Substance and Sexual Activity     Alcohol use: Yes     Alcohol/week: 1.7 standard drinks     Comment: Patient reports one drink bi-weekly     Drug use: No     Sexual activity: Not Currently     Partners: Female   Other Topics Concern     Parent/sibling w/ CABG, MI or angioplasty before 65F 55M? No   Social History Narrative    Single.  Son lives with him.  .      Social Determinants of Health     Financial Resource Strain: Not on file   Food Insecurity: Not on file   Transportation Needs: Not on file   Physical Activity: Not on file   Stress: Not on file   Social Connections: Not on file   Intimate Partner Violence: Not At Risk     Fear of Current or Ex-Partner: No     Emotionally Abused: No     Physically Abused: No     Sexually Abused: No   Housing Stability: Not on file     Quit smoking 40 years ago. Drinks etoh socially. Lives with son. Works as .     PHYSICAL EXAM     BP (!) 141/84   Pulse 55   Temp 97.7  F (36.5  C) (Oral)   Resp 16   Wt 72.1 kg (159 lb)   SpO2 100%   BMI 22.81 kg/m    Wt Readings from Last 4 Encounters:   02/02/23 72.1 kg (159 lb)   07/28/22 73.3 kg (161 lb 8 oz)   01/27/22 73.9 kg (163 lb)   07/06/21 73.2 kg (161 lb 6.4 oz)     CONSTITUTIONAL: No apparent distress  EYES: PERRLA, without pallor or jaundice  ENT/MOUTH: Ears unremarkable. No oral lesions  CVS: s1s2 normal  RESPIRATORY: Chest is clear  GI: Abdomen is benign  NEURO: Alert and oriented ×3  INTEGUMENT: no concerning skin rashes   LYMPHATIC: no palpable lymphadenopathy  MUSCULOSKELETAL: Unremarkable. No bony tenderness. Right thigh area is well healed  EXTREMITIES: no pedal edema  PSYCH: Mentation, mood and affect are appropriate         LABORATORY AND IMAGING STUDIES     Reviewed   7/13/2022.  CBC/differential unremarkable.  CMP is normal  TSH 1.19.  PSA 3.    IgG 1224  IgA 177  IgM 78    Repeat SPEP/CARLIN/FLC unremarkable  Kappa 22.  Lambda 15.3 and ratio is normal at 1.44    In March 2022  SPEP did not show any evidence of monoclonal protein.  Immunofixation was unremarkable.  Free kappa and lambda light chains are also normal.    6/28/2021.  Urine protein electrophoresis/immunofixation was unremarkable.     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     S/p RT (4500 cGy ) completed in 03/2018. He then had curettage of the right acetabular tumor along with right total hip arthroplasty on 4/16/18.  Surgical pathology showed plasma cell neoplasm / AL amyloidoma. No evidence of systemic involvement on work up, so kept on surveillance.     He is doing well.  As per him he had repeat labs done for plasmacytoma and these were resulted. I have not seen the results as these were done at an outside facility. I have asked him to send us the reports and I have also asked my nurse to get the results    Assuming that everything is stable, we will continue to monitor his labs every 6 months for now    Return to clinic in 6 months with labs prior.    All questions answered and he is agreeable and comfortable with the plan.    Lluvia Perez MD

## 2023-03-02 ENCOUNTER — DOCUMENTATION ONLY (OUTPATIENT)
Dept: ONCOLOGY | Facility: CLINIC | Age: 68
End: 2023-03-02
Payer: COMMERCIAL

## 2023-07-09 ENCOUNTER — HEALTH MAINTENANCE LETTER (OUTPATIENT)
Age: 68
End: 2023-07-09

## 2023-07-24 ENCOUNTER — LAB (OUTPATIENT)
Dept: LAB | Facility: CLINIC | Age: 68
End: 2023-07-24
Payer: COMMERCIAL

## 2023-07-24 DIAGNOSIS — C90.30 PLASMACYTOMA OF BONE (H): ICD-10-CM

## 2023-07-24 LAB
ALBUMIN SERPL BCG-MCNC: 4.8 G/DL (ref 3.5–5.2)
ALP SERPL-CCNC: 72 U/L (ref 40–129)
ALT SERPL W P-5'-P-CCNC: 19 U/L (ref 0–70)
ANION GAP SERPL CALCULATED.3IONS-SCNC: 14 MMOL/L (ref 7–15)
AST SERPL W P-5'-P-CCNC: 24 U/L (ref 0–45)
BASOPHILS # BLD AUTO: 0.1 10E3/UL (ref 0–0.2)
BASOPHILS NFR BLD AUTO: 1 %
BILIRUB SERPL-MCNC: 0.7 MG/DL
BUN SERPL-MCNC: 19.5 MG/DL (ref 8–23)
CALCIUM SERPL-MCNC: 9.9 MG/DL (ref 8.8–10.2)
CHLORIDE SERPL-SCNC: 103 MMOL/L (ref 98–107)
CREAT SERPL-MCNC: 0.9 MG/DL (ref 0.67–1.17)
DEPRECATED HCO3 PLAS-SCNC: 24 MMOL/L (ref 22–29)
EOSINOPHIL # BLD AUTO: 0.2 10E3/UL (ref 0–0.7)
EOSINOPHIL NFR BLD AUTO: 3 %
ERYTHROCYTE [DISTWIDTH] IN BLOOD BY AUTOMATED COUNT: 12.4 % (ref 10–15)
GFR SERPL CREATININE-BSD FRML MDRD: >90 ML/MIN/1.73M2
GLUCOSE SERPL-MCNC: 101 MG/DL (ref 70–99)
HCT VFR BLD AUTO: 44.1 % (ref 40–53)
HGB BLD-MCNC: 15 G/DL (ref 13.3–17.7)
IMM GRANULOCYTES # BLD: 0 10E3/UL
IMM GRANULOCYTES NFR BLD: 0 %
LYMPHOCYTES # BLD AUTO: 1.4 10E3/UL (ref 0.8–5.3)
LYMPHOCYTES NFR BLD AUTO: 20 %
MCH RBC QN AUTO: 30.2 PG (ref 26.5–33)
MCHC RBC AUTO-ENTMCNC: 34 G/DL (ref 31.5–36.5)
MCV RBC AUTO: 89 FL (ref 78–100)
MONOCYTES # BLD AUTO: 0.6 10E3/UL (ref 0–1.3)
MONOCYTES NFR BLD AUTO: 9 %
NEUTROPHILS # BLD AUTO: 4.7 10E3/UL (ref 1.6–8.3)
NEUTROPHILS NFR BLD AUTO: 67 %
NRBC # BLD AUTO: 0 10E3/UL
NRBC BLD AUTO-RTO: 0 /100
PLATELET # BLD AUTO: 235 10E3/UL (ref 150–450)
POTASSIUM SERPL-SCNC: 4.3 MMOL/L (ref 3.4–5.3)
PROT SERPL-MCNC: 7.6 G/DL (ref 6.4–8.3)
RBC # BLD AUTO: 4.96 10E6/UL (ref 4.4–5.9)
SODIUM SERPL-SCNC: 141 MMOL/L (ref 136–145)
TOTAL PROTEIN SERUM FOR ELP: 7.3 G/DL (ref 6.4–8.3)
WBC # BLD AUTO: 7 10E3/UL (ref 4–11)

## 2023-07-24 PROCEDURE — 83521 IG LIGHT CHAINS FREE EACH: CPT

## 2023-07-24 PROCEDURE — 86334 IMMUNOFIX E-PHORESIS SERUM: CPT

## 2023-07-24 PROCEDURE — 85025 COMPLETE CBC W/AUTO DIFF WBC: CPT

## 2023-07-24 PROCEDURE — 82784 ASSAY IGA/IGD/IGG/IGM EACH: CPT

## 2023-07-24 PROCEDURE — 80053 COMPREHEN METABOLIC PANEL: CPT

## 2023-07-24 PROCEDURE — 84165 PROTEIN E-PHORESIS SERUM: CPT

## 2023-07-24 PROCEDURE — 84155 ASSAY OF PROTEIN SERUM: CPT | Mod: XU

## 2023-07-24 PROCEDURE — 36415 COLL VENOUS BLD VENIPUNCTURE: CPT

## 2023-07-25 LAB
ALBUMIN SERPL ELPH-MCNC: 4.6 G/DL (ref 3.7–5.1)
ALPHA1 GLOB SERPL ELPH-MCNC: 0.3 G/DL (ref 0.2–0.4)
ALPHA2 GLOB SERPL ELPH-MCNC: 0.6 G/DL (ref 0.5–0.9)
B-GLOBULIN SERPL ELPH-MCNC: 0.7 G/DL (ref 0.6–1)
GAMMA GLOB SERPL ELPH-MCNC: 1.1 G/DL (ref 0.7–1.6)
IGA SERPL-MCNC: 165 MG/DL (ref 84–499)
IGG SERPL-MCNC: 1091 MG/DL (ref 610–1616)
IGM SERPL-MCNC: 74 MG/DL (ref 35–242)
KAPPA LC FREE SER-MCNC: 1.9 MG/DL (ref 0.33–1.94)
KAPPA LC FREE/LAMBDA FREE SER NEPH: 1.17 {RATIO} (ref 0.26–1.65)
LAMBDA LC FREE SERPL-MCNC: 1.62 MG/DL (ref 0.57–2.63)
M PROTEIN SERPL ELPH-MCNC: 0 G/DL
PROT PATTERN SERPL ELPH-IMP: NORMAL
PROT PATTERN SERPL IFE-IMP: NORMAL

## 2023-08-03 ENCOUNTER — ONCOLOGY VISIT (OUTPATIENT)
Dept: ONCOLOGY | Facility: CLINIC | Age: 68
End: 2023-08-03
Attending: INTERNAL MEDICINE
Payer: COMMERCIAL

## 2023-08-03 VITALS
DIASTOLIC BLOOD PRESSURE: 78 MMHG | HEART RATE: 54 BPM | SYSTOLIC BLOOD PRESSURE: 146 MMHG | TEMPERATURE: 97.6 F | WEIGHT: 154.5 LBS | BODY MASS INDEX: 22.17 KG/M2 | OXYGEN SATURATION: 99 % | RESPIRATION RATE: 16 BRPM

## 2023-08-03 DIAGNOSIS — C90.30 PLASMACYTOMA OF BONE (H): Primary | ICD-10-CM

## 2023-08-03 PROCEDURE — G0463 HOSPITAL OUTPT CLINIC VISIT: HCPCS | Performed by: INTERNAL MEDICINE

## 2023-08-03 PROCEDURE — 99214 OFFICE O/P EST MOD 30 MIN: CPT | Performed by: INTERNAL MEDICINE

## 2023-08-03 ASSESSMENT — PAIN SCALES - GENERAL: PAINLEVEL: NO PAIN (0)

## 2023-08-03 NOTE — PROGRESS NOTES
FOLLOW-UP VISIT NOTE    PATIENT NAME: Dong Joseph MRN # [id number]  DATE OF VISIT: Aug 3, 2023 YOB: 1955        CANCER TYPE: Solitary plasmacytoma/AL amyloidoma of right acetabulum.    ONCOLOGY HISTORY:    Patient was being followed by Dr. Peres but now he has transferred his care to me.  I have reviewed his previous records and have copied and updated from prior notes.    68 year old male who developed right hip pain with radiation into posterior thigh around the fall 2017.  This progressively got worse.  In January 2018 MRI of the lumbar spine showed degenerative changes throughout, no disc hernniation and moderate foraminal stenosis at multiple levels.  On 1/16/2018 X ray hip showed a lucent area in the right supra-acetabular region measuring 5.2 cm in maximum diameter without any definite fracture. MRI hip on 01/18/18 showed a destructive lesion in the right acetabulum extending past the margin of the bone into adjacent soft tissues. Biopsy of the pelvic lesion on 1/25/2018 came back positive for plasma cell neoplasm.     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 Bone marrow biopsy shows no morphologic or immunophenotypic evidence for plasma cell neoplasm. Bone survey showed right Iliac lesion with lucent areas in frontal region of skull- likely benign.    He was diagnosed with SOLITARY PLASMACYTOMA involving the RIGHT ACETABULUM.    - RT completed in 03/2018 ( 4500 cGy ). However he continued to have significant pain in the hip and underwent curettage of the right acetabular tumor along with right total hip arthroplasty  04/16/18. Surgical pathology showed plasma cell neoplasm as well as AL amyloidoma.     He was then kept on surveillance.      SUBJECTIVE     He continues to feel good.  He denies any new pain or new swellings or benching night  sweats or weight loss. His weight usually fluctuates between 155-160 lbs. Energy is fine.  Denies any tingling or numbness.  No infections or shortness of breath.  No GI issues.     ECOG 0    ROS:  A comprehensive ROS was otherwise neg      I reviewed other history in epic as below.      PAST MEDICAL HISTORY     Past Medical History:   Diagnosis Date    Impotence of organic origin     Plasma cell neoplasm 2018    S/P Needle biopsy of right pelvis bone tumor    Pure hypercholesterolemia     statin started circa          CURRENT OUTPATIENT MEDICATIONS     Current Outpatient Medications   Medication Sig Dispense Refill    Acetaminophen (TYLENOL PO) Take 1,000 mg by mouth      aspirin 81 MG tablet Take 1 tablet by mouth daily. 90 tablet 3    atorvastatin (LIPITOR) 20 MG tablet Take 1 tablet (20 mg) by mouth At Bedtime 90 tablet 3        ALLERGIES   No Known Allergies     FAMILY HX     Family History   Problem Relation Age of Onset    C.A.D. Mother         age 70s    Coronary Artery Disease Mother     Cerebrovascular Disease Father         at 68 yo    Hypertension Father     Lung Cancer Brother         [place]    Coronary Artery Disease Brother     Cardiac Sudden Death Brother     Hyperlipidemia Brother     Mental Illness Son     Asthma Son     Depression Son    brother had lung cancer-   He has 2 children- healthy.     SOCIAL HX     Social History     Socioeconomic History    Marital status: Single     Spouse name: Not on file    Number of children: Not on file    Years of education: Not on file    Highest education level: Not on file   Occupational History    Not on file   Tobacco Use    Smoking status: Former     Packs/day: 0.75     Years: 5.00     Pack years: 3.75     Types: Cigarettes     Start date: 1973     Quit date: 1978     Years since quittin.2    Smokeless tobacco: Never   Substance and Sexual Activity    Alcohol use: Yes     Alcohol/week: 1.7 standard drinks of alcohol      Comment: Patient reports one drink bi-weekly    Drug use: No    Sexual activity: Not Currently     Partners: Female   Other Topics Concern    Parent/sibling w/ CABG, MI or angioplasty before 65F 55M? No   Social History Narrative    Single.  Son lives with him.  .      Social Determinants of Health     Financial Resource Strain: Not on file   Food Insecurity: Not on file   Transportation Needs: Not on file   Physical Activity: Not on file   Stress: Not on file   Social Connections: Not on file   Intimate Partner Violence: Not At Risk (2/2/2023)    Humiliation, Afraid, Rape, and Kick questionnaire     Fear of Current or Ex-Partner: No     Emotionally Abused: No     Physically Abused: No     Sexually Abused: No   Housing Stability: Not on file     Quit smoking 40 years ago. Drinks etoh socially. Lives with son. Works as .     PHYSICAL EXAM     BP (!) 146/78 (BP Location: Right arm, Patient Position: Sitting, Cuff Size: Adult Regular)   Pulse 54   Temp 97.6  F (36.4  C) (Oral)   Resp 16   Wt 70.1 kg (154 lb 8 oz)   SpO2 99%   BMI 22.17 kg/m    Wt Readings from Last 4 Encounters:   08/03/23 70.1 kg (154 lb 8 oz)   02/02/23 72.1 kg (159 lb)   07/28/22 73.3 kg (161 lb 8 oz)   01/27/22 73.9 kg (163 lb)     CONSTITUTIONAL: no acute distress  EYES: PERRLA, no palor or icterus.   ENT/MOUTH: no mouth lesions. Ears normal  CVS: s1s2 no m r g .   RESPIRATORY: clear to auscultation b/l  GI: soft non tender no hepatosplenomegaly  NEURO: AAOX3  Grossly non focal neuro exam  INTEGUMENT: no obvious rashes  LYMPHATIC: no palpable cervical, supraclavicular, axillary or inguinal LAD  MUSCULOSKELETAL: Unremarkable. No bony tenderness including in the right thigh area. It looks well healed.  EXTREMITIES: no edema  PSYCH: Mentation, mood and affect are normal. Decision making capacity is intact           LABORATORY AND IMAGING STUDIES     Reviewed   7/24/2023.  CBC/differential unremarkable.  CMP is  unremarkable  SPEP/CARLIN do not show any monoclonal protein.    IgG 1091.  IgA 165.  IgM 74.  [place] free light chain 1.9.  Lambda 1.62.  Ratio is normal at 1.17      6/28/2021.  Urine protein electrophoresis/immunofixation was unremarkable.     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     S/p RT (4500 cGy ) completed in 03/2018. He then had curettage of the right acetabular tumor along with right total hip arthroplasty on 4/16/18.  Surgical pathology showed plasma cell neoplasm / AL amyloidoma. No evidence of systemic involvement on work up, so kept on surveillance.       He feels good.  Currently labs are also very stable.  No evidence of monoclonal protein and immunoglobulin levels and free light chains are also normal.    We will repeat labs in 6 months and I will see him after that.      All questions answered and he is agreeable and comfortable with the plan.    Lluvia Perez MD

## 2023-08-03 NOTE — LETTER
8/3/2023         RE: Dong Joseph  [address]  [facility] [address]        Dear Colleague,    Thank you for referring your patient, Dong Joseph, to the [facility]. Please see a copy of my visit note below.    FOLLOW-UP VISIT NOTE    PATIENT NAME: Dong Joseph MRN # [id number]  DATE OF VISIT: Aug 3, 2023 YOB: 1955        CANCER TYPE: Solitary plasmacytoma/AL amyloidoma of right acetabulum.    ONCOLOGY HISTORY:    Patient was being followed by Dr. Peres but now he has transferred his care to me.  I have reviewed his previous records and have copied and updated from prior notes.    68 year old male who developed right hip pain with radiation into posterior thigh around the fall 2017.  This progressively got worse.  In January 2018 MRI of the lumbar spine showed degenerative changes throughout, no disc hernniation and moderate foraminal stenosis at multiple levels.  On 1/16/2018 X ray hip showed a lucent area in the right supra-acetabular region measuring 5.2 cm in maximum diameter without any definite fracture. MRI hip on 01/18/18 showed a destructive lesion in the right acetabulum extending past the margin of the bone into adjacent soft tissues. Biopsy of the pelvic lesion on 1/25/2018 came back positive for plasma cell neoplasm.     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 Bone marrow biopsy shows no morphologic or immunophenotypic evidence for plasma cell neoplasm. Bone survey showed right Iliac lesion with lucent areas in frontal region of skull- likely benign.    He was diagnosed with SOLITARY PLASMACYTOMA involving the RIGHT ACETABULUM.    - RT completed in 03/2018 ( 4500 cGy ). However he continued to have significant pain in the hip and underwent curettage of the right acetabular tumor along with  right total hip arthroplasty  04/16/18. Surgical pathology showed plasma cell neoplasm as well as AL amyloidoma.     He was then kept on surveillance.      SUBJECTIVE     He continues to feel good.  He denies any new pain or new swellings or benching night sweats or weight loss. His weight usually fluctuates between 155-160 lbs. Energy is fine.  Denies any tingling or numbness.  No infections or shortness of breath.  No GI issues.     ECOG 0    ROS:  A comprehensive ROS was otherwise neg      I reviewed other history in epic as below.      PAST MEDICAL HISTORY     Past Medical History:   Diagnosis Date    Impotence of organic origin 2003    Plasma cell neoplasm 01/25/2018    S/P Needle biopsy of right pelvis bone tumor    Pure hypercholesterolemia 2002    statin started circa 2002         CURRENT OUTPATIENT MEDICATIONS     Current Outpatient Medications   Medication Sig Dispense Refill    Acetaminophen (TYLENOL PO) Take 1,000 mg by mouth      aspirin 81 MG tablet Take 1 tablet by mouth daily. 90 tablet 3    atorvastatin (LIPITOR) 20 MG tablet Take 1 tablet (20 mg) by mouth At Bedtime 90 tablet 3        ALLERGIES   No Known Allergies     FAMILY HX     Family History   Problem Relation Age of Onset    C.A.D. Mother         age 70s    Coronary Artery Disease Mother     Cerebrovascular Disease Father         at 66 yo    Hypertension Father     Lung Cancer Brother         [place]     Coronary Artery Disease Brother     Cardiac Sudden Death Brother     Hyperlipidemia Brother     Mental Illness Son     Asthma Son     Depression Son    brother had lung cancer-   He has 2 children- healthy.     SOCIAL HX     Social History     Socioeconomic History    Marital status: Single     Spouse name: Not on file    Number of children: Not on file    Years of education: Not on file    Highest education level: Not on file   Occupational History    Not on file   Tobacco Use    Smoking status: Former     Packs/day: 0.75     Years:  5.00     Pack years: 3.75     Types: Cigarettes     Start date: 1973     Quit date: 1978     Years since quittin.2    Smokeless tobacco: Never   Substance and Sexual Activity    Alcohol use: Yes     Alcohol/week: 1.7 standard drinks of alcohol     Comment: Patient reports one drink bi-weekly    Drug use: No    Sexual activity: Not Currently     Partners: Female   Other Topics Concern    Parent/sibling w/ CABG, MI or angioplasty before 65F 55M? No   Social History Narrative    Single.  Son lives with him.  .      Social Determinants of Health     Financial Resource Strain: Not on file   Food Insecurity: Not on file   Transportation Needs: Not on file   Physical Activity: Not on file   Stress: Not on file   Social Connections: Not on file   Intimate Partner Violence: Not At Risk (2023)    Humiliation, Afraid, Rape, and Kick questionnaire     Fear of Current or Ex-Partner: No     Emotionally Abused: No     Physically Abused: No     Sexually Abused: No   Housing Stability: Not on file     Quit smoking 40 years ago. Drinks etoh socially. Lives with son. Works as .     PHYSICAL EXAM     BP (!) 146/78 (BP Location: Right arm, Patient Position: Sitting, Cuff Size: Adult Regular)   Pulse 54   Temp 97.6  F (36.4  C) (Oral)   Resp 16   Wt 70.1 kg (154 lb 8 oz)   SpO2 99%   BMI 22.17 kg/m    Wt Readings from Last 4 Encounters:   23 70.1 kg (154 lb 8 oz)   23 72.1 kg (159 lb)   22 73.3 kg (161 lb 8 oz)   22 73.9 kg (163 lb)     CONSTITUTIONAL: no acute distress  EYES: PERRLA, no palor or icterus.   ENT/MOUTH: no mouth lesions. Ears normal  CVS: s1s2 no m r g .   RESPIRATORY: clear to auscultation b/l  GI: soft non tender no hepatosplenomegaly  NEURO: AAOX3  Grossly non focal neuro exam  INTEGUMENT: no obvious rashes  LYMPHATIC: no palpable cervical, supraclavicular, axillary or inguinal LAD  MUSCULOSKELETAL: Unremarkable. No bony tenderness including in the right  thigh area. It looks well healed.  EXTREMITIES: no edema  PSYCH: Mentation, mood and affect are normal. Decision making capacity is intact           LABORATORY AND IMAGING STUDIES     Reviewed   7/24/2023.  CBC/differential unremarkable.  CMP is unremarkable  SPEP/CARLIN do not show any monoclonal protein.    IgG 1091.  IgA 165.  IgM 74.  [place] free light chain 1.9.  Lambda 1.62.  Ratio is normal at 1.17      6/28/2021.  Urine protein electrophoresis/immunofixation was unremarkable.     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     S/p RT (4500 cGy ) completed in 03/2018. He then had curettage of the right acetabular tumor along with right total hip arthroplasty on 4/16/18.  Surgical pathology showed plasma cell neoplasm / AL amyloidoma. No evidence of systemic involvement on work up, so kept on surveillance.       He feels good.  Currently labs are also very stable.  No evidence of monoclonal protein and immunoglobulin levels and free light chains are also normal.    We will repeat labs in 6 months and I will see him after that.      All questions answered and he is agreeable and comfortable with the plan.    Lluvia Perez MD

## 2023-08-03 NOTE — NURSING NOTE
"Oncology Rooming Note    August 3, 2023 8:49 AM   Dong Joseph is a 68 year old male who presents for:    Chief Complaint   Patient presents with    Oncology Clinic Visit     Plasmacytoma of bone; multiple myeloma     Initial Vitals: BP (!) 146/78 (BP Location: Right arm, Patient Position: Sitting, Cuff Size: Adult Regular)   Pulse 54   Temp 97.6  F (36.4  C) (Oral)   Resp 16   Wt 70.1 kg (154 lb 8 oz)   SpO2 99%   BMI 22.17 kg/m   Estimated body mass index is 22.17 kg/m  as calculated from the following:    Height as of 7/6/21: 1.778 m (5' 10\").    Weight as of this encounter: 70.1 kg (154 lb 8 oz). Body surface area is 1.86 meters squared.  No Pain (0) Comment: Data Unavailable   No LMP for male patient.  Allergies reviewed: Yes  Medications reviewed: Yes    Medications: Medication refills not needed today.  Pharmacy name entered into EPIC:    [facility] - [place], [place] - [address] [address]  [facility] [place] [address] [facility] - [place] [address] [place] [address] DR[facility] STORE #[id number] - [place], [place] - [address] AT [facility] OF [place] [address] [address]  [facility] - [place], [place] - [address]    Clinical concerns:       Abelino Huston              "

## 2023-09-11 ENCOUNTER — ANCILLARY ORDERS (OUTPATIENT)
Dept: ULTRASOUND IMAGING | Facility: CLINIC | Age: 68
End: 2023-09-11

## 2023-09-11 DIAGNOSIS — Z13.6 ENCOUNTER FOR SCREENING FOR CARDIOVASCULAR DISORDERS: Primary | ICD-10-CM

## 2023-09-22 ENCOUNTER — ANCILLARY PROCEDURE (OUTPATIENT)
Dept: ULTRASOUND IMAGING | Facility: CLINIC | Age: 68
End: 2023-09-22
Attending: NURSE PRACTITIONER
Payer: COMMERCIAL

## 2023-09-22 DIAGNOSIS — Z13.6 ENCOUNTER FOR SCREENING FOR CARDIOVASCULAR DISORDERS: ICD-10-CM

## 2023-09-22 PROCEDURE — 76775 US EXAM ABDO BACK WALL LIM: CPT | Performed by: RADIOLOGY

## 2024-01-22 ENCOUNTER — LAB (OUTPATIENT)
Dept: LAB | Facility: CLINIC | Age: 69
End: 2024-01-22
Payer: COMMERCIAL

## 2024-01-22 DIAGNOSIS — C90.30 PLASMACYTOMA OF BONE (H): ICD-10-CM

## 2024-01-22 LAB
ALBUMIN SERPL BCG-MCNC: 4.7 G/DL (ref 3.5–5.2)
ALP SERPL-CCNC: 76 U/L (ref 40–150)
ALT SERPL W P-5'-P-CCNC: 17 U/L (ref 0–70)
ANION GAP SERPL CALCULATED.3IONS-SCNC: 11 MMOL/L (ref 7–15)
AST SERPL W P-5'-P-CCNC: 21 U/L (ref 0–45)
BASOPHILS # BLD AUTO: 0.1 10E3/UL (ref 0–0.2)
BASOPHILS NFR BLD AUTO: 1 %
BILIRUB SERPL-MCNC: 0.4 MG/DL
BUN SERPL-MCNC: 25.7 MG/DL (ref 8–23)
CALCIUM SERPL-MCNC: 9.3 MG/DL (ref 8.8–10.2)
CHLORIDE SERPL-SCNC: 98 MMOL/L (ref 98–107)
CREAT SERPL-MCNC: 1.03 MG/DL (ref 0.67–1.17)
DEPRECATED HCO3 PLAS-SCNC: 28 MMOL/L (ref 22–29)
EGFRCR SERPLBLD CKD-EPI 2021: 80 ML/MIN/1.73M2
EOSINOPHIL # BLD AUTO: 0.2 10E3/UL (ref 0–0.7)
EOSINOPHIL NFR BLD AUTO: 4 %
ERYTHROCYTE [DISTWIDTH] IN BLOOD BY AUTOMATED COUNT: 12.6 % (ref 10–15)
GLUCOSE SERPL-MCNC: 82 MG/DL (ref 70–99)
HCT VFR BLD AUTO: 41.4 % (ref 40–53)
HGB BLD-MCNC: 14 G/DL (ref 13.3–17.7)
IMM GRANULOCYTES # BLD: 0 10E3/UL
IMM GRANULOCYTES NFR BLD: 0 %
LYMPHOCYTES # BLD AUTO: 1.6 10E3/UL (ref 0.8–5.3)
LYMPHOCYTES NFR BLD AUTO: 25 %
MCH RBC QN AUTO: 30.6 PG (ref 26.5–33)
MCHC RBC AUTO-ENTMCNC: 33.8 G/DL (ref 31.5–36.5)
MCV RBC AUTO: 91 FL (ref 78–100)
MONOCYTES # BLD AUTO: 0.7 10E3/UL (ref 0–1.3)
MONOCYTES NFR BLD AUTO: 11 %
NEUTROPHILS # BLD AUTO: 3.9 10E3/UL (ref 1.6–8.3)
NEUTROPHILS NFR BLD AUTO: 59 %
NRBC # BLD AUTO: 0 10E3/UL
NRBC BLD AUTO-RTO: 0 /100
PLATELET # BLD AUTO: 230 10E3/UL (ref 150–450)
POTASSIUM SERPL-SCNC: 4 MMOL/L (ref 3.4–5.3)
PROT SERPL-MCNC: 7.5 G/DL (ref 6.4–8.3)
RBC # BLD AUTO: 4.57 10E6/UL (ref 4.4–5.9)
SODIUM SERPL-SCNC: 137 MMOL/L (ref 135–145)
WBC # BLD AUTO: 6.5 10E3/UL (ref 4–11)

## 2024-01-22 PROCEDURE — 85025 COMPLETE CBC W/AUTO DIFF WBC: CPT

## 2024-01-22 PROCEDURE — 80053 COMPREHEN METABOLIC PANEL: CPT

## 2024-01-22 PROCEDURE — 82784 ASSAY IGA/IGD/IGG/IGM EACH: CPT | Mod: 91

## 2024-01-22 PROCEDURE — 82784 ASSAY IGA/IGD/IGG/IGM EACH: CPT

## 2024-01-22 PROCEDURE — 84165 PROTEIN E-PHORESIS SERUM: CPT | Performed by: PATHOLOGY

## 2024-01-22 PROCEDURE — 84155 ASSAY OF PROTEIN SERUM: CPT | Mod: 59

## 2024-01-22 PROCEDURE — 83521 IG LIGHT CHAINS FREE EACH: CPT

## 2024-01-22 PROCEDURE — 36415 COLL VENOUS BLD VENIPUNCTURE: CPT

## 2024-01-22 PROCEDURE — 86334 IMMUNOFIX E-PHORESIS SERUM: CPT | Performed by: PATHOLOGY

## 2024-01-23 LAB
ALBUMIN SERPL ELPH-MCNC: 4.4 G/DL (ref 3.7–5.1)
ALPHA1 GLOB SERPL ELPH-MCNC: 0.3 G/DL (ref 0.2–0.4)
ALPHA2 GLOB SERPL ELPH-MCNC: 0.6 G/DL (ref 0.5–0.9)
B-GLOBULIN SERPL ELPH-MCNC: 0.7 G/DL (ref 0.6–1)
GAMMA GLOB SERPL ELPH-MCNC: 1 G/DL (ref 0.7–1.6)
IGA SERPL-MCNC: 155 MG/DL (ref 84–499)
IGG SERPL-MCNC: 1155 MG/DL (ref 610–1616)
IGM SERPL-MCNC: 68 MG/DL (ref 35–242)
KAPPA LC FREE SER-MCNC: 2.11 MG/DL (ref 0.33–1.94)
KAPPA LC FREE/LAMBDA FREE SER NEPH: 1.26 {RATIO} (ref 0.26–1.65)
LAMBDA LC FREE SERPL-MCNC: 1.68 MG/DL (ref 0.57–2.63)
LOCATION OF TASK: NORMAL
M PROTEIN SERPL ELPH-MCNC: 0 G/DL
PROT PATTERN SERPL ELPH-IMP: NORMAL
PROT PATTERN SERPL IFE-IMP: NORMAL
TOTAL PROTEIN SERUM FOR ELP: 7 G/DL (ref 6.4–8.3)

## 2024-01-24 ENCOUNTER — ONCOLOGY VISIT (OUTPATIENT)
Dept: ONCOLOGY | Facility: CLINIC | Age: 69
End: 2024-01-24
Attending: INTERNAL MEDICINE
Payer: COMMERCIAL

## 2024-01-24 VITALS
DIASTOLIC BLOOD PRESSURE: 80 MMHG | WEIGHT: 161.4 LBS | RESPIRATION RATE: 16 BRPM | HEART RATE: 67 BPM | TEMPERATURE: 97.8 F | BODY MASS INDEX: 23.16 KG/M2 | OXYGEN SATURATION: 98 % | SYSTOLIC BLOOD PRESSURE: 145 MMHG

## 2024-01-24 DIAGNOSIS — C90.30 PLASMACYTOMA OF BONE (H): Primary | ICD-10-CM

## 2024-01-24 PROCEDURE — 99214 OFFICE O/P EST MOD 30 MIN: CPT | Performed by: INTERNAL MEDICINE

## 2024-01-24 PROCEDURE — 99213 OFFICE O/P EST LOW 20 MIN: CPT | Performed by: INTERNAL MEDICINE

## 2024-01-24 ASSESSMENT — PAIN SCALES - GENERAL: PAINLEVEL: NO PAIN (0)

## 2024-01-24 NOTE — LETTER
1/24/2024         RE: Dong Joseph  [address]  [facility] [address]        Dear Colleague,    Thank you for referring your patient, Dong Joseph, to the [facility]. Please see a copy of my visit note below.    FOLLOW-UP VISIT NOTE    PATIENT NAME: Dong Joseph MRN # [id number]  DATE OF VISIT: Jan 24, 2024 YOB: 1955        CANCER TYPE: Solitary plasmacytoma/AL amyloidoma of right acetabulum.    ONCOLOGY HISTORY:    Patient was being followed by Dr. Peres but now he has transferred his care to me.  I have reviewed his previous records and have copied and updated from prior notes.    68 year old male who developed right hip pain with radiation into posterior thigh around the fall 2017.  This progressively got worse.  In January 2018 MRI of the lumbar spine showed degenerative changes throughout, no disc hernniation and moderate foraminal stenosis at multiple levels.  On 1/16/2018 X ray hip showed a lucent area in the right supra-acetabular region measuring 5.2 cm in maximum diameter without any definite fracture. MRI hip on 01/18/18 showed a destructive lesion in the right acetabulum extending past the margin of the bone into adjacent soft tissues. Biopsy of the pelvic lesion on 1/25/2018 came back positive for kappa monotypic plasma cell neoplasm.     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 Bone marrow biopsy shows no morphologic or immunophenotypic evidence for plasma cell neoplasm. Bone survey showed right Iliac lesion with lucent areas in frontal region of skull- likely benign.    He was diagnosed with SOLITARY PLASMACYTOMA involving the RIGHT ACETABULUM.    - RT completed in 03/2018 ( 4500 cGy ). However he continued to have significant pain in the hip and underwent curettage of the right acetabular  tumor along with right total hip arthroplasty  04/16/18. Surgical pathology showed plasma cell neoplasm as well as AL amyloidoma.     He was then kept on surveillance.      SUBJECTIVE     He feels well.  He denies any new pain or new swellings or B symptoms.  He denies any infections or shortness of breath.  He has good energy.       ECOG 0    ROS:  A comprehensive ROS was otherwise neg      I reviewed other history in epic as below.      PAST MEDICAL HISTORY     Past Medical History:   Diagnosis Date     Impotence of organic origin 2003     Plasma cell neoplasm 01/25/2018    S/P Needle biopsy of right pelvis bone tumor     Pure hypercholesterolemia 2002    statin started circa 2002         CURRENT OUTPATIENT MEDICATIONS     Current Outpatient Medications   Medication Sig Dispense Refill     Acetaminophen (TYLENOL PO) Take 1,000 mg by mouth       aspirin 81 MG tablet Take 1 tablet by mouth daily. 90 tablet 3     atorvastatin (LIPITOR) 20 MG tablet Take 1 tablet (20 mg) by mouth At Bedtime 90 tablet 3        ALLERGIES   No Known Allergies     FAMILY HX     Family History   Problem Relation Age of Onset     C.A.D. Mother         age 70s     Coronary Artery Disease Mother      Cerebrovascular Disease Father         at 66 yo     Hypertension Father      Lung Cancer Brother         [place]     Coronary Artery Disease Brother      Cardiac Sudden Death Brother      Hyperlipidemia Brother      Mental Illness Son      Asthma Son      Depression Son    brother had lung cancer-   He has 2 children- healthy.     SOCIAL HX     Social History     Socioeconomic History     Marital status: Single     Spouse name: Not on file     Number of children: Not on file     Years of education: Not on file     Highest education level: Not on file   Occupational History     Not on file   Tobacco Use     Smoking status: Former     Packs/day: 0.75     Years: 5.00     Additional pack years: 0.00     Total pack years: 3.75     Types:  Cigarettes     Start date: 1973     Quit date: 1978     Years since quittin.6     Smokeless tobacco: Never   Substance and Sexual Activity     Alcohol use: Yes     Alcohol/week: 1.7 standard drinks of alcohol     Comment: Patient reports one drink bi-weekly     Drug use: No     Sexual activity: Not Currently     Partners: Female   Other Topics Concern     Parent/sibling w/ CABG, MI or angioplasty before 65F 55M? No   Social History Narrative    Single.  Son lives with him.  .      Social Determinants of Health     Financial Resource Strain: Not on file   Food Insecurity: Not on file   Transportation Needs: Not on file   Physical Activity: Not on file   Stress: Not on file   Social Connections: Not on file   Interpersonal Safety: Not At Risk (2023)    Humiliation, Afraid, Rape, and Kick questionnaire      Fear of Current or Ex-Partner: No      Emotionally Abused: No      Physically Abused: No      Sexually Abused: No   Housing Stability: Not on file     Quit smoking 40 years ago. Drinks etoh socially. Lives with son. Works as .     PHYSICAL EXAM     BP (!) 145/80   Pulse 67   Temp 97.8  F (36.6  C)   Resp 16   Wt 73.2 kg (161 lb 6.4 oz)   SpO2 98%   BMI 23.16 kg/m    Wt Readings from Last 4 Encounters:   24 73.2 kg (161 lb 6.4 oz)   23 70.1 kg (154 lb 8 oz)   23 72.1 kg (159 lb)   22 73.3 kg (161 lb 8 oz)     CONSTITUTIONAL: No apparent distress  EYES: PERRLA, without pallor or jaundice  ENT/MOUTH: Ears unremarkable. No oral lesions  CVS: s1s2 normal  RESPIRATORY: Chest is clear  GI: Abdomen is benign  NEURO: Alert and oriented ×3  INTEGUMENT: no concerning skin rashes   LYMPHATIC: no palpable lymphadenopathy  MUSCULOSKELETAL: Unremarkable. No bony tenderness.  Right thigh area looks fine.  EXTREMITIES: no pedal edema  PSYCH: Mentation, mood and affect are appropriate           LABORATORY AND IMAGING STUDIES     Reviewed   2024.  CBC/differential  unremarkable.  CMP is unremarkable except BUN 25.7.  Creatinine 1.03.  Calcium 9.3.  SPEP/CARLIN do not show any monoclonal protein.    IgG 1155.  IgA 155.  IgM 68.  [place] free light chain 2.11.  Lambda 1.68.  Ratio is normal at 1.26      6/28/2021.  Urine protein electrophoresis/immunofixation was unremarkable.     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     S/p RT (4500 cGy ) completed in 03/2018. He then had curettage of the right acetabular tumor along with right total hip arthroplasty on 4/16/18.  Surgical pathology showed plasma cell neoplasm / AL amyloidoma. No evidence of systemic involvement on work up, so kept on surveillance.       He is doing well.  Overall labs are stable.  [place] free light chain is only minimally elevated but the ratio is normal.  Protein immunofixation is unremarkable.      His plasmacytoma is kappa monotypic plasma cell neoplasm.  Previously he also had monoclonal kappa free light chain in the urine ( Urine M-Evan 23% ) in 2018.  Because of slight increase in serum kappa free light chain, I recommend that in 3 months we should repeat labs and at that time I would also check 24-hour urine protein electrophoresis/CARLIN.      I would like to see him back after that.      All questions answered and he is agreeable and comfortable with the plan.    Lluvia Perez MD      Again, thank you for allowing me to participate in the care of your patient.        Sincerely,        Lluvia Perez MD

## 2024-01-24 NOTE — PROGRESS NOTES
FOLLOW-UP VISIT NOTE    PATIENT NAME: Dong Joseph MRN # [id number]  DATE OF VISIT: Jan 24, 2024 YOB: 1955        CANCER TYPE: Solitary plasmacytoma/AL amyloidoma of right acetabulum.    ONCOLOGY HISTORY:    Patient was being followed by Dr. Peres but now he has transferred his care to me.  I have reviewed his previous records and have copied and updated from prior notes.    68 year old male who developed right hip pain with radiation into posterior thigh around the fall 2017.  This progressively got worse.  In January 2018 MRI of the lumbar spine showed degenerative changes throughout, no disc hernniation and moderate foraminal stenosis at multiple levels.  On 1/16/2018 X ray hip showed a lucent area in the right supra-acetabular region measuring 5.2 cm in maximum diameter without any definite fracture. MRI hip on 01/18/18 showed a destructive lesion in the right acetabulum extending past the margin of the bone into adjacent soft tissues. Biopsy of the pelvic lesion on 1/25/2018 came back positive for kappa monotypic plasma cell neoplasm.     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 Bone marrow biopsy shows no morphologic or immunophenotypic evidence for plasma cell neoplasm. Bone survey showed right Iliac lesion with lucent areas in frontal region of skull- likely benign.    He was diagnosed with SOLITARY PLASMACYTOMA involving the RIGHT ACETABULUM.    - RT completed in 03/2018 ( 4500 cGy ). However he continued to have significant pain in the hip and underwent curettage of the right acetabular tumor along with right total hip arthroplasty  04/16/18. Surgical pathology showed plasma cell neoplasm as well as AL amyloidoma.     He was then kept on surveillance.      SUBJECTIVE     He feels well.  He denies any new pain or new swellings or B symptoms.   He denies any infections or shortness of breath.  He has good energy.       ECOG 0    ROS:  A comprehensive ROS was otherwise neg      I reviewed other history in epic as below.      PAST MEDICAL HISTORY     Past Medical History:   Diagnosis Date    Impotence of organic origin 2003    Plasma cell neoplasm 2018    S/P Needle biopsy of right pelvis bone tumor    Pure hypercholesterolemia     statin started circa          CURRENT OUTPATIENT MEDICATIONS     Current Outpatient Medications   Medication Sig Dispense Refill    Acetaminophen (TYLENOL PO) Take 1,000 mg by mouth      aspirin 81 MG tablet Take 1 tablet by mouth daily. 90 tablet 3    atorvastatin (LIPITOR) 20 MG tablet Take 1 tablet (20 mg) by mouth At Bedtime 90 tablet 3        ALLERGIES   No Known Allergies     FAMILY HX     Family History   Problem Relation Age of Onset    C.A.D. Mother         age 70s    Coronary Artery Disease Mother     Cerebrovascular Disease Father         at 66 yo    Hypertension Father     Lung Cancer Brother         [place]     Coronary Artery Disease Brother     Cardiac Sudden Death Brother     Hyperlipidemia Brother     Mental Illness Son     Asthma Son     Depression Son    brother had lung cancer-   He has 2 children- healthy.     SOCIAL HX     Social History     Socioeconomic History    Marital status: Single     Spouse name: Not on file    Number of children: Not on file    Years of education: Not on file    Highest education level: Not on file   Occupational History    Not on file   Tobacco Use    Smoking status: Former     Packs/day: 0.75     Years: 5.00     Additional pack years: 0.00     Total pack years: 3.75     Types: Cigarettes     Start date: 1973     Quit date: 1978     Years since quittin.6    Smokeless tobacco: Never   Substance and Sexual Activity    Alcohol use: Yes     Alcohol/week: 1.7 standard drinks of alcohol     Comment: Patient reports one drink bi-weekly    Drug use: No     Sexual activity: Not Currently     Partners: Female   Other Topics Concern    Parent/sibling w/ CABG, MI or angioplasty before 65F 55M? No   Social History Narrative    Single.  Son lives with him.  .      Social Determinants of Health     Financial Resource Strain: Not on file   Food Insecurity: Not on file   Transportation Needs: Not on file   Physical Activity: Not on file   Stress: Not on file   Social Connections: Not on file   Interpersonal Safety: Not At Risk (2/2/2023)    Humiliation, Afraid, Rape, and Kick questionnaire     Fear of Current or Ex-Partner: No     Emotionally Abused: No     Physically Abused: No     Sexually Abused: No   Housing Stability: Not on file     Quit smoking 40 years ago. Drinks etoh socially. Lives with son. Works as .     PHYSICAL EXAM     BP (!) 145/80   Pulse 67   Temp 97.8  F (36.6  C)   Resp 16   Wt 73.2 kg (161 lb 6.4 oz)   SpO2 98%   BMI 23.16 kg/m    Wt Readings from Last 4 Encounters:   01/24/24 73.2 kg (161 lb 6.4 oz)   08/03/23 70.1 kg (154 lb 8 oz)   02/02/23 72.1 kg (159 lb)   07/28/22 73.3 kg (161 lb 8 oz)     CONSTITUTIONAL: No apparent distress  EYES: PERRLA, without pallor or jaundice  ENT/MOUTH: Ears unremarkable. No oral lesions  CVS: s1s2 normal  RESPIRATORY: Chest is clear  GI: Abdomen is benign  NEURO: Alert and oriented ×3  INTEGUMENT: no concerning skin rashes   LYMPHATIC: no palpable lymphadenopathy  MUSCULOSKELETAL: Unremarkable. No bony tenderness.  Right thigh area looks fine.  EXTREMITIES: no pedal edema  PSYCH: Mentation, mood and affect are appropriate           LABORATORY AND IMAGING STUDIES     Reviewed   1/22/2024.  CBC/differential unremarkable.  CMP is unremarkable except BUN 25.7.  Creatinine 1.03.  Calcium 9.3.  SPEP/CARLIN do not show any monoclonal protein.    IgG 1155.  IgA 155.  IgM 68.  [place] free light chain 2.11.  Lambda 1.68.  Ratio is normal at 1.26      6/28/2021.  Urine protein electrophoresis/immunofixation was  unremarkable.     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     S/p RT (4500 cGy ) completed in 03/2018. He then had curettage of the right acetabular tumor along with right total hip arthroplasty on 4/16/18.  Surgical pathology showed plasma cell neoplasm / AL amyloidoma. No evidence of systemic involvement on work up, so kept on surveillance.       He is doing well.  Overall labs are stable.  [place] free light chain is only minimally elevated but the ratio is normal.  Protein immunofixation is unremarkable.      His plasmacytoma is kappa monotypic plasma cell neoplasm.  Previously he also had monoclonal kappa free light chain in the urine ( Urine M-Evan 23% ) in 2018.  Because of slight increase in serum kappa free light chain, I recommend that in 3 months we should repeat labs and at that time I would also check 24-hour urine protein electrophoresis/CARLIN.      I would like to see him back after that.      All questions answered and he is agreeable and comfortable with the plan.    Lluvia Perez MD

## 2024-01-24 NOTE — NURSING NOTE
"Oncology Rooming Note    January 24, 2024 2:46 PM   Dong Joseph is a 68 year old male who presents for:    Chief Complaint   Patient presents with    Oncology Clinic Visit     Follow Up     Initial Vitals: BP (!) 145/80   Pulse 67   Temp 97.8  F (36.6  C)   Resp 16   Wt 73.2 kg (161 lb 6.4 oz)   SpO2 98%   BMI 23.16 kg/m   Estimated body mass index is 23.16 kg/m  as calculated from the following:    Height as of 7/6/21: 1.778 m (5' 10\").    Weight as of this encounter: 73.2 kg (161 lb 6.4 oz). Body surface area is 1.9 meters squared.  No Pain (0) Comment: Data Unavailable   No LMP for male patient.  Allergies reviewed: Yes  Medications reviewed: Yes    Medications: Medication refills not needed today.  Pharmacy name entered into EPIC:    [facility] - [place], [place] - [address]  [facility] [place] - [address] [id number] - [place], [place] - [address] [place] [address] DRUG STORE #[id number] [place], [place] - [address] [place] [facility]  [facility] [place]    Frailty Screening:   Is the patient here for a new oncology consult visit in cancer care? 2. No      Clinical concerns: No Concerns        Rukhsana Lane MA            "

## 2024-04-24 ENCOUNTER — LAB (OUTPATIENT)
Dept: INFUSION THERAPY | Facility: CLINIC | Age: 69
End: 2024-04-24
Attending: INTERNAL MEDICINE
Payer: COMMERCIAL

## 2024-04-24 DIAGNOSIS — C90.30 PLASMACYTOMA OF BONE (H): ICD-10-CM

## 2024-04-24 LAB
ALBUMIN SERPL BCG-MCNC: 4.8 G/DL (ref 3.5–5.2)
ALP SERPL-CCNC: 76 U/L (ref 40–150)
ALT SERPL W P-5'-P-CCNC: 25 U/L (ref 0–70)
ANION GAP SERPL CALCULATED.3IONS-SCNC: 12 MMOL/L (ref 7–15)
AST SERPL W P-5'-P-CCNC: 23 U/L (ref 0–45)
BASOPHILS # BLD AUTO: 0.1 10E3/UL (ref 0–0.2)
BASOPHILS NFR BLD AUTO: 1 %
BILIRUB SERPL-MCNC: 0.4 MG/DL
BUN SERPL-MCNC: 28.5 MG/DL (ref 8–23)
CALCIUM SERPL-MCNC: 9.6 MG/DL (ref 8.8–10.2)
CHLORIDE SERPL-SCNC: 99 MMOL/L (ref 98–107)
CREAT SERPL-MCNC: 1.27 MG/DL (ref 0.67–1.17)
DEPRECATED HCO3 PLAS-SCNC: 28 MMOL/L (ref 22–29)
EGFRCR SERPLBLD CKD-EPI 2021: 62 ML/MIN/1.73M2
EOSINOPHIL # BLD AUTO: 0.3 10E3/UL (ref 0–0.7)
EOSINOPHIL NFR BLD AUTO: 4 %
ERYTHROCYTE [DISTWIDTH] IN BLOOD BY AUTOMATED COUNT: 12.3 % (ref 10–15)
GLUCOSE SERPL-MCNC: 94 MG/DL (ref 70–99)
HCT VFR BLD AUTO: 44 % (ref 40–53)
HGB BLD-MCNC: 14.9 G/DL (ref 13.3–17.7)
IMM GRANULOCYTES # BLD: 0 10E3/UL
IMM GRANULOCYTES NFR BLD: 1 %
LYMPHOCYTES # BLD AUTO: 1.5 10E3/UL (ref 0.8–5.3)
LYMPHOCYTES NFR BLD AUTO: 22 %
MCH RBC QN AUTO: 30.6 PG (ref 26.5–33)
MCHC RBC AUTO-ENTMCNC: 33.9 G/DL (ref 31.5–36.5)
MCV RBC AUTO: 90 FL (ref 78–100)
MONOCYTES # BLD AUTO: 0.7 10E3/UL (ref 0–1.3)
MONOCYTES NFR BLD AUTO: 10 %
NEUTROPHILS # BLD AUTO: 4.5 10E3/UL (ref 1.6–8.3)
NEUTROPHILS NFR BLD AUTO: 63 %
NRBC # BLD AUTO: 0 10E3/UL
NRBC BLD AUTO-RTO: 0 /100
PLATELET # BLD AUTO: 233 10E3/UL (ref 150–450)
POTASSIUM SERPL-SCNC: 4.3 MMOL/L (ref 3.4–5.3)
PROT SERPL-MCNC: 7.7 G/DL (ref 6.4–8.3)
RBC # BLD AUTO: 4.87 10E6/UL (ref 4.4–5.9)
SODIUM SERPL-SCNC: 139 MMOL/L (ref 135–145)
WBC # BLD AUTO: 7.1 10E3/UL (ref 4–11)

## 2024-04-24 PROCEDURE — 86334 IMMUNOFIX E-PHORESIS SERUM: CPT | Mod: 26 | Performed by: PATHOLOGY

## 2024-04-24 PROCEDURE — 80053 COMPREHEN METABOLIC PANEL: CPT

## 2024-04-24 PROCEDURE — 84155 ASSAY OF PROTEIN SERUM: CPT

## 2024-04-24 PROCEDURE — 82784 ASSAY IGA/IGD/IGG/IGM EACH: CPT

## 2024-04-24 PROCEDURE — 83521 IG LIGHT CHAINS FREE EACH: CPT | Mod: 59

## 2024-04-24 PROCEDURE — 85025 COMPLETE CBC W/AUTO DIFF WBC: CPT

## 2024-04-24 PROCEDURE — 84165 PROTEIN E-PHORESIS SERUM: CPT | Mod: TC | Performed by: PATHOLOGY

## 2024-04-24 PROCEDURE — 36415 COLL VENOUS BLD VENIPUNCTURE: CPT

## 2024-04-24 PROCEDURE — 84166 PROTEIN E-PHORESIS/URINE/CSF: CPT | Mod: 26 | Performed by: PATHOLOGY

## 2024-04-24 PROCEDURE — 86335 IMMUNFIX E-PHORSIS/URINE/CSF: CPT | Mod: 26 | Performed by: PATHOLOGY

## 2024-04-24 PROCEDURE — 86335 IMMUNFIX E-PHORSIS/URINE/CSF: CPT | Performed by: PATHOLOGY

## 2024-04-24 PROCEDURE — 81050 URINALYSIS VOLUME MEASURE: CPT | Performed by: PATHOLOGY

## 2024-04-24 PROCEDURE — 84165 PROTEIN E-PHORESIS SERUM: CPT | Mod: 26 | Performed by: PATHOLOGY

## 2024-04-24 PROCEDURE — 86334 IMMUNOFIX E-PHORESIS SERUM: CPT | Performed by: PATHOLOGY

## 2024-04-25 LAB
ALBUMIN SERPL ELPH-MCNC: 4.6 G/DL (ref 3.7–5.1)
ALPHA1 GLOB SERPL ELPH-MCNC: 0.3 G/DL (ref 0.2–0.4)
ALPHA2 GLOB SERPL ELPH-MCNC: 0.7 G/DL (ref 0.5–0.9)
B-GLOBULIN SERPL ELPH-MCNC: 0.8 G/DL (ref 0.6–1)
GAMMA GLOB SERPL ELPH-MCNC: 1.1 G/DL (ref 0.7–1.6)
IGA SERPL-MCNC: 164 MG/DL (ref 84–499)
IGG SERPL-MCNC: 1085 MG/DL (ref 610–1616)
IGM SERPL-MCNC: 64 MG/DL (ref 35–242)
KAPPA LC FREE SER-MCNC: 1.98 MG/DL (ref 0.33–1.94)
KAPPA LC FREE/LAMBDA FREE SER NEPH: 1.22 {RATIO} (ref 0.26–1.65)
LAMBDA LC FREE SERPL-MCNC: 1.62 MG/DL (ref 0.57–2.63)
M PROTEIN SERPL ELPH-MCNC: 0 G/DL
PATH REPORT.COMMENTS IMP SPEC: NORMAL
PROT ELPH PNL UR ELPH: NORMAL
PROT PATTERN SERPL ELPH-IMP: NORMAL
PROT PATTERN SERPL IFE-IMP: NORMAL
PROT PATTERN UR ELPH-IMP: NORMAL
TOTAL PROTEIN SERUM FOR ELP: 7.4 G/DL (ref 6.4–8.3)

## 2024-05-01 ENCOUNTER — ONCOLOGY VISIT (OUTPATIENT)
Dept: ONCOLOGY | Facility: CLINIC | Age: 69
End: 2024-05-01
Attending: INTERNAL MEDICINE
Payer: COMMERCIAL

## 2024-05-01 VITALS
HEIGHT: 70 IN | BODY MASS INDEX: 22.76 KG/M2 | HEART RATE: 62 BPM | DIASTOLIC BLOOD PRESSURE: 76 MMHG | SYSTOLIC BLOOD PRESSURE: 131 MMHG | WEIGHT: 159 LBS | OXYGEN SATURATION: 100 %

## 2024-05-01 DIAGNOSIS — C90.30 PLASMACYTOMA OF BONE (H): Primary | ICD-10-CM

## 2024-05-01 PROCEDURE — 99214 OFFICE O/P EST MOD 30 MIN: CPT | Performed by: INTERNAL MEDICINE

## 2024-05-01 ASSESSMENT — PAIN SCALES - GENERAL: PAINLEVEL: NO PAIN (0)

## 2024-05-01 NOTE — NURSING NOTE
"Oncology Rooming Note    May 1, 2024 3:29 PM   Dong Joseph is a 69 year old male who presents for:    Chief Complaint   Patient presents with    Oncology Clinic Visit     Follow up     Initial Vitals: /76 (BP Location: Left arm, Patient Position: Chair, Cuff Size: Adult Regular)   Pulse 62   Ht 1.778 m (5' 10\")   Wt 72.1 kg (159 lb)   SpO2 100%   BMI 22.81 kg/m   Estimated body mass index is 22.81 kg/m  as calculated from the following:    Height as of this encounter: 1.778 m (5' 10\").    Weight as of this encounter: 72.1 kg (159 lb). Body surface area is 1.89 meters squared.  No Pain (0) Comment: Data Unavailable   No LMP for male patient.  Allergies reviewed: Yes  Medications reviewed: Yes    Medications: Medication refills not needed today.  Pharmacy name entered into EPIC:    [facility] - [place], [place] - [address]  [facility] [place] - [address] [id number] [facility] - [place], [place] - [address] [place] [address] DRUG STORE #[id number] [place], [place] - [address] AT [facility] OF  & MAIN    Frailty Screening:   Is the patient here for a new oncology consult visit in cancer care? 2. No      Clinical concerns: NO       Mesha Witt CMA              "

## 2024-05-01 NOTE — LETTER
5/1/2024         RE: Dong Joseph  [id number]  [facility] [address]        Dear Colleague,    Thank you for referring your patient, Dong Joseph, to the [facility]. Please see a copy of my visit note below.    FOLLOW-UP VISIT NOTE    PATIENT NAME: Dong Joseph MRN # [id number]  DATE OF VISIT: May 1, 2024 YOB: 1955        CANCER TYPE: Solitary plasmacytoma/AL amyloidoma of right acetabulum.    ONCOLOGY HISTORY:    Patient was being followed by Dr. Peres but now he has transferred his care to me.  I have reviewed his previous records and have copied and updated from prior notes.    69 year old male who developed right hip pain with radiation into posterior thigh around the fall 2017.  This progressively got worse.  In January 2018 MRI of the lumbar spine showed degenerative changes throughout, no disc hernniation and moderate foraminal stenosis at multiple levels.  On 1/16/2018 X ray hip showed a lucent area in the right supra-acetabular region measuring 5.2 cm in maximum diameter without any definite fracture. MRI hip on 01/18/18 showed a destructive lesion in the right acetabulum extending past the margin of the bone into adjacent soft tissues. Biopsy of the pelvic lesion on 1/25/2018 came back positive for kappa monotypic plasma cell neoplasm.     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 Bone marrow biopsy shows no morphologic or immunophenotypic evidence for plasma cell neoplasm. Bone survey showed right Iliac lesion with lucent areas in frontal region of skull- likely benign.    He was diagnosed with SOLITARY PLASMACYTOMA involving the RIGHT ACETABULUM.    - RT completed in 03/2018 ( 4500 cGy ). However he continued to have significant pain in the hip and underwent curettage of the right acetabular  tumor along with right total hip arthroplasty  04/16/18. Surgical pathology showed plasma cell neoplasm as well as AL amyloidoma.     He was then kept on surveillance.      SUBJECTIVE     He is doing fine and denies any new pain or new swellings or B symptoms.       ECOG 0    ROS:  A comprehensive ROS was otherwise neg      I reviewed other history in epic as below.      PAST MEDICAL HISTORY     Past Medical History:   Diagnosis Date     Impotence of organic origin 2003     Plasma cell neoplasm 01/25/2018    S/P Needle biopsy of right pelvis bone tumor     Pure hypercholesterolemia 2002    statin started circa 2002         CURRENT OUTPATIENT MEDICATIONS     Current Outpatient Medications   Medication Sig Dispense Refill     Acetaminophen (TYLENOL PO) Take 1,000 mg by mouth       aspirin 81 MG tablet Take 1 tablet by mouth daily. 90 tablet 3     atorvastatin (LIPITOR) 20 MG tablet Take 1 tablet (20 mg) by mouth At Bedtime 90 tablet 3        ALLERGIES   No Known Allergies     FAMILY HX     Family History   Problem Relation Age of Onset     C.A.D. Mother         age 70s     Coronary Artery Disease Mother      Cerebrovascular Disease Father         at 68 yo     Hypertension Father      Lung Cancer Brother         [place]     Coronary Artery Disease Brother      Cardiac Sudden Death Brother      Hyperlipidemia Brother      Mental Illness Son      Asthma Son      Depression Son    brother had lung cancer-   He has 2 children- healthy.     SOCIAL HX     Social History     Socioeconomic History     Marital status: Single     Spouse name: Not on file     Number of children: Not on file     Years of education: Not on file     Highest education level: Not on file   Occupational History     Not on file   Tobacco Use     Smoking status: Former     Current packs/day: 0.00     Average packs/day: 0.8 packs/day for 5.0 years (3.8 ttl pk-yrs)     Types: Cigarettes     Start date: 6/1/1973     Quit date: 6/1/1978     Years  "since quittin.9     Smokeless tobacco: Never   Substance and Sexual Activity     Alcohol use: Yes     Alcohol/week: 1.7 standard drinks of alcohol     Comment: Patient reports one drink bi-weekly     Drug use: No     Sexual activity: Not Currently     Partners: Female   Other Topics Concern     Parent/sibling w/ CABG, MI or angioplasty before 65F 55M? No   Social History Narrative    Single.  Son lives with him.  .      Social Determinants of Health     Financial Resource Strain: Not on file   Food Insecurity: Not on file   Transportation Needs: Not on file   Physical Activity: Not on file   Stress: Not on file   Social Connections: Not on file   Interpersonal Safety: Not At Risk (2023)    Humiliation, Afraid, Rape, and Kick questionnaire      Fear of Current or Ex-Partner: No      Emotionally Abused: No      Physically Abused: No      Sexually Abused: No   Housing Stability: Not on file     Quit smoking 40 years ago. Drinks etoh socially. Lives with son. Works as .     PHYSICAL EXAM     /76 (BP Location: Left arm, Patient Position: Chair, Cuff Size: Adult Regular)   Pulse 62   Ht 1.778 m (5' 10\")   Wt 72.1 kg (159 lb)   SpO2 100%   BMI 22.81 kg/m    Wt Readings from Last 4 Encounters:   24 72.1 kg (159 lb)   24 73.2 kg (161 lb 6.4 oz)   23 70.1 kg (154 lb 8 oz)   23 72.1 kg (159 lb)     CONSTITUTIONAL: no acute distress  EYES: PERRLA, no palor or icterus.   ENT/MOUTH: no mouth lesions. Ears normal  CVS: s1s2 no m r g .   RESPIRATORY: clear to auscultation b/l  GI: soft non tender no hepatosplenomegaly  NEURO: AAOX3  Grossly non focal neuro exam  INTEGUMENT: no obvious rashes  LYMPHATIC: no palpable cervical, supraclavicular, axillary or inguinal LAD  MUSCULOSKELETAL: Unremarkable. No bony tenderness.   EXTREMITIES: no edema.  Right thigh area looks clean and unremarkable.  PSYCH: Mentation, mood and affect are normal. Decision making capacity is " intact           LABORATORY AND IMAGING STUDIES     Reviewed   4/24/2024.  CBC/differential unremarkable.  CMP is unremarkable except BUN 28.  Creatinine 1.27.  Calcium 9.6.   SPEP/CARLIN do not show any monoclonal protein.    IgG 1085.  IgA 164.  IgM 64.  [place] free light chain 1.98.  Lambda 1.62.  Ratio is normal at 1.22      Urine protein electrophoresis/immunofixation did not show any monoclonal protein.     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     S/p RT (4500 cGy ) completed in 03/2018. He then had curettage of the right acetabular tumor along with right total hip arthroplasty on 4/16/18.  Surgical pathology showed plasma cell neoplasm / AL amyloidoma. No evidence of systemic involvement on work up, so kept on surveillance.     Overall doing well.  Currently no evidence of recurrence.  Free kappa light chain is minimally elevated with normal ratio.  No evidence of monoclonal protein on serum or urine immunofixation.      I would recommend repeat myeloma labs in 6 months.      Elevated creatinine.  He has mildly elevated creatinine which is new.  Keep well-hydrated.  Follow with PCP.  I would recommend repeat labs in about 3 to 4 weeks through his primary care physician's office.      RTC in 6 months        All questions answered and he is agreeable and comfortable with the plan.    Lluvia Perez MD      Again, thank you for allowing me to participate in the care of your patient.        Sincerely,        Lluvia Perez MD

## 2024-05-01 NOTE — PROGRESS NOTES
FOLLOW-UP VISIT NOTE    PATIENT NAME: Dong Joseph MRN # [id number]  DATE OF VISIT: May 1, 2024 YOB: 1955        CANCER TYPE: Solitary plasmacytoma/AL amyloidoma of right acetabulum.    ONCOLOGY HISTORY:    Patient was being followed by Dr. Peres but now he has transferred his care to me.  I have reviewed his previous records and have copied and updated from prior notes.    69 year old male who developed right hip pain with radiation into posterior thigh around the fall 2017.  This progressively got worse.  In January 2018 MRI of the lumbar spine showed degenerative changes throughout, no disc hernniation and moderate foraminal stenosis at multiple levels.  On 1/16/2018 X ray hip showed a lucent area in the right supra-acetabular region measuring 5.2 cm in maximum diameter without any definite fracture. MRI hip on 01/18/18 showed a destructive lesion in the right acetabulum extending past the margin of the bone into adjacent soft tissues. Biopsy of the pelvic lesion on 1/25/2018 came back positive for kappa monotypic plasma cell neoplasm.     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 Bone marrow biopsy shows no morphologic or immunophenotypic evidence for plasma cell neoplasm. Bone survey showed right Iliac lesion with lucent areas in frontal region of skull- likely benign.    He was diagnosed with SOLITARY PLASMACYTOMA involving the RIGHT ACETABULUM.    - RT completed in 03/2018 ( 4500 cGy ). However he continued to have significant pain in the hip and underwent curettage of the right acetabular tumor along with right total hip arthroplasty  04/16/18. Surgical pathology showed plasma cell neoplasm as well as AL amyloidoma.     He was then kept on surveillance.      SUBJECTIVE     He is doing fine and denies any new pain or new swellings or B symptoms.        ECOG 0    ROS:  A comprehensive ROS was otherwise neg      I reviewed other history in epic as below.      PAST MEDICAL HISTORY     Past Medical History:   Diagnosis Date    Impotence of organic origin 2003    Plasma cell neoplasm 2018    S/P Needle biopsy of right pelvis bone tumor    Pure hypercholesterolemia     statin started circa          CURRENT OUTPATIENT MEDICATIONS     Current Outpatient Medications   Medication Sig Dispense Refill    Acetaminophen (TYLENOL PO) Take 1,000 mg by mouth      aspirin 81 MG tablet Take 1 tablet by mouth daily. 90 tablet 3    atorvastatin (LIPITOR) 20 MG tablet Take 1 tablet (20 mg) by mouth At Bedtime 90 tablet 3        ALLERGIES   No Known Allergies     FAMILY HX     Family History   Problem Relation Age of Onset    C.A.D. Mother         age 70s    Coronary Artery Disease Mother     Cerebrovascular Disease Father         at 68 yo    Hypertension Father     Lung Cancer Brother         [place]     Coronary Artery Disease Brother     Cardiac Sudden Death Brother     Hyperlipidemia Brother     Mental Illness Son     Asthma Son     Depression Son    brother had lung cancer-   He has 2 children- healthy.     SOCIAL HX     Social History     Socioeconomic History    Marital status: Single     Spouse name: Not on file    Number of children: Not on file    Years of education: Not on file    Highest education level: Not on file   Occupational History    Not on file   Tobacco Use    Smoking status: Former     Current packs/day: 0.00     Average packs/day: 0.8 packs/day for 5.0 years (3.8 ttl pk-yrs)     Types: Cigarettes     Start date: 1973     Quit date: 1978     Years since quittin.9    Smokeless tobacco: Never   Substance and Sexual Activity    Alcohol use: Yes     Alcohol/week: 1.7 standard drinks of alcohol     Comment: Patient reports one drink bi-weekly    Drug use: No    Sexual activity: Not Currently     Partners: Female   Other Topics  "Concern    Parent/sibling w/ CABG, MI or angioplasty before 65F 55M? No   Social History Narrative    Single.  Son lives with him.  .      Social Determinants of Health     Financial Resource Strain: Not on file   Food Insecurity: Not on file   Transportation Needs: Not on file   Physical Activity: Not on file   Stress: Not on file   Social Connections: Not on file   Interpersonal Safety: Not At Risk (2/2/2023)    Humiliation, Afraid, Rape, and Kick questionnaire     Fear of Current or Ex-Partner: No     Emotionally Abused: No     Physically Abused: No     Sexually Abused: No   Housing Stability: Not on file     Quit smoking 40 years ago. Drinks etoh socially. Lives with son. Works as .     PHYSICAL EXAM     /76 (BP Location: Left arm, Patient Position: Chair, Cuff Size: Adult Regular)   Pulse 62   Ht 1.778 m (5' 10\")   Wt 72.1 kg (159 lb)   SpO2 100%   BMI 22.81 kg/m    Wt Readings from Last 4 Encounters:   05/01/24 72.1 kg (159 lb)   01/24/24 73.2 kg (161 lb 6.4 oz)   08/03/23 70.1 kg (154 lb 8 oz)   02/02/23 72.1 kg (159 lb)     CONSTITUTIONAL: no acute distress  EYES: PERRLA, no palor or icterus.   ENT/MOUTH: no mouth lesions. Ears normal  CVS: s1s2 no m r g .   RESPIRATORY: clear to auscultation b/l  GI: soft non tender no hepatosplenomegaly  NEURO: AAOX3  Grossly non focal neuro exam  INTEGUMENT: no obvious rashes  LYMPHATIC: no palpable cervical, supraclavicular, axillary or inguinal LAD  MUSCULOSKELETAL: Unremarkable. No bony tenderness.   EXTREMITIES: no edema.  Right thigh area looks clean and unremarkable.  PSYCH: Mentation, mood and affect are normal. Decision making capacity is intact           LABORATORY AND IMAGING STUDIES     Reviewed   4/24/2024.  CBC/differential unremarkable.  CMP is unremarkable except BUN 28.  Creatinine 1.27.  Calcium 9.6.   SPEP/CARLIN do not show any monoclonal protein.    IgG 1085.  IgA 164.  IgM 64.  [place] free light chain 1.98.  Lambda 1.62.  " Ratio is normal at 1.22      Urine protein electrophoresis/immunofixation did not show any monoclonal protein.     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     S/p RT (4500 cGy ) completed in 03/2018. He then had curettage of the right acetabular tumor along with right total hip arthroplasty on 4/16/18.  Surgical pathology showed plasma cell neoplasm / AL amyloidoma. No evidence of systemic involvement on work up, so kept on surveillance.     Overall doing well.  Currently no evidence of recurrence.  Free kappa light chain is minimally elevated with normal ratio.  No evidence of monoclonal protein on serum or urine immunofixation.      I would recommend repeat myeloma labs in 6 months.      Elevated creatinine.  He has mildly elevated creatinine which is new.  Keep well-hydrated.  Follow with PCP.  I would recommend repeat labs in about 3 to 4 weeks through his primary care physician's office.      RTC in 6 months        All questions answered and he is agreeable and comfortable with the plan.    Lluvia Perez MD

## 2024-09-01 ENCOUNTER — HEALTH MAINTENANCE LETTER (OUTPATIENT)
Age: 69
End: 2024-09-01
